# Patient Record
Sex: MALE | Race: BLACK OR AFRICAN AMERICAN | NOT HISPANIC OR LATINO | Employment: OTHER | URBAN - METROPOLITAN AREA
[De-identification: names, ages, dates, MRNs, and addresses within clinical notes are randomized per-mention and may not be internally consistent; named-entity substitution may affect disease eponyms.]

---

## 2017-01-17 DIAGNOSIS — N52.9 ERECTILE DYSFUNCTION, UNSPECIFIED ERECTILE DYSFUNCTION TYPE: ICD-10-CM

## 2017-01-17 DIAGNOSIS — I10 ESSENTIAL HYPERTENSION: ICD-10-CM

## 2017-01-17 DIAGNOSIS — G47.00 INSOMNIA, UNSPECIFIED TYPE: ICD-10-CM

## 2017-01-17 RX ORDER — SILDENAFIL CITRATE 100 MG/1
100 TABLET, FILM COATED ORAL
Qty: 8 TABLET | Refills: 6 | Status: SHIPPED | OUTPATIENT
Start: 2017-01-17 | End: 2018-01-26 | Stop reason: SDUPTHER

## 2017-01-17 RX ORDER — ZOLPIDEM TARTRATE 10 MG/1
10 TABLET ORAL NIGHTLY PRN
Qty: 30 TABLET | Refills: 5 | Status: SHIPPED | OUTPATIENT
Start: 2017-01-17 | End: 2017-08-31 | Stop reason: SDUPTHER

## 2017-01-17 RX ORDER — LOSARTAN POTASSIUM AND HYDROCHLOROTHIAZIDE 25; 100 MG/1; MG/1
1 TABLET ORAL DAILY
Qty: 90 TABLET | Refills: 3 | Status: SHIPPED | OUTPATIENT
Start: 2017-01-17 | End: 2017-04-27 | Stop reason: SDUPTHER

## 2017-01-17 NOTE — TELEPHONE ENCOUNTER
----- Message from Rebecca Velasquez sent at 1/17/2017  4:25 PM CST -----  Contact: Self/ [phone]   Type: Rx    Name of medication(s): losartan-hydrochlorothiazide 100-25 mg (HYZAAR) 100-25 mg per tablet, zolpidem (AMBIEN) 10 mg Tab and VIAGRA 100 mg tablet    Is this a refill? New rx? Refill     Who prescribed medication? Dr. Valle     Pharmacy Name, Phone, & Location: [facility]     Comments: pt is calling to have a refill on the medications above. Please call and advise     Thank you

## 2017-03-10 ENCOUNTER — TELEPHONE (OUTPATIENT)
Dept: INTERNAL MEDICINE | Facility: CLINIC | Age: 62
End: 2017-03-10

## 2017-03-10 NOTE — TELEPHONE ENCOUNTER
Please call pt. He is due to follow up with me. Missed appointment on 3/10. Need to reschedule for BP and DM follow up. Thanks.

## 2017-04-27 DIAGNOSIS — I10 ESSENTIAL HYPERTENSION: ICD-10-CM

## 2017-04-27 RX ORDER — LOSARTAN POTASSIUM AND HYDROCHLOROTHIAZIDE 25; 100 MG/1; MG/1
1 TABLET ORAL DAILY
Qty: 90 TABLET | Refills: 3 | Status: SHIPPED | OUTPATIENT
Start: 2017-04-27 | End: 2018-03-16 | Stop reason: SDUPTHER

## 2017-04-27 NOTE — TELEPHONE ENCOUNTER
----- Message from Blanca Hernandez sent at 4/27/2017  4:49 PM CDT -----  Contact: self/[phone]  Pt called in regards to getting a Rx refill for losartan-hydrochlorothiazide 100-25 mg (HYZAAR) 100-25 mg per tablet.      [facility]  Please advise

## 2017-05-19 DIAGNOSIS — E11.9 TYPE 2 DIABETES MELLITUS WITHOUT COMPLICATION: ICD-10-CM

## 2017-06-29 ENCOUNTER — OFFICE VISIT (OUTPATIENT)
Dept: INTERNAL MEDICINE | Facility: CLINIC | Age: 62
End: 2017-06-29
Payer: COMMERCIAL

## 2017-06-29 VITALS
BODY MASS INDEX: 38 KG/M2 | WEIGHT: 250.75 LBS | HEART RATE: 62 BPM | SYSTOLIC BLOOD PRESSURE: 134 MMHG | HEIGHT: 68 IN | DIASTOLIC BLOOD PRESSURE: 80 MMHG

## 2017-06-29 DIAGNOSIS — E11.9 DIABETES MELLITUS TYPE II, NON INSULIN DEPENDENT: ICD-10-CM

## 2017-06-29 DIAGNOSIS — Z00.00 HEALTH CARE MAINTENANCE: ICD-10-CM

## 2017-06-29 DIAGNOSIS — I10 ESSENTIAL HYPERTENSION, BENIGN: Primary | Chronic | ICD-10-CM

## 2017-06-29 DIAGNOSIS — Z23 NEED FOR STREPTOCOCCUS PNEUMONIAE VACCINATION: ICD-10-CM

## 2017-06-29 DIAGNOSIS — M79.671 PAIN IN BOTH FEET: ICD-10-CM

## 2017-06-29 DIAGNOSIS — M79.672 PAIN IN BOTH FEET: ICD-10-CM

## 2017-06-29 PROCEDURE — 90732 PPSV23 VACC 2 YRS+ SUBQ/IM: CPT | Mod: S$GLB,,, | Performed by: INTERNAL MEDICINE

## 2017-06-29 PROCEDURE — 4010F ACE/ARB THERAPY RXD/TAKEN: CPT | Mod: S$GLB,,, | Performed by: INTERNAL MEDICINE

## 2017-06-29 PROCEDURE — 90471 IMMUNIZATION ADMIN: CPT | Mod: S$GLB,,, | Performed by: INTERNAL MEDICINE

## 2017-06-29 PROCEDURE — 3044F HG A1C LEVEL LT 7.0%: CPT | Mod: S$GLB,,, | Performed by: INTERNAL MEDICINE

## 2017-06-29 PROCEDURE — 99214 OFFICE O/P EST MOD 30 MIN: CPT | Mod: 25,S$GLB,, | Performed by: INTERNAL MEDICINE

## 2017-06-29 PROCEDURE — 99999 PR PBB SHADOW E&M-EST. PATIENT-LVL III: CPT | Mod: PBBFAC,,, | Performed by: INTERNAL MEDICINE

## 2017-06-29 NOTE — PROGRESS NOTES
"Subjective:       Patient ID: Mirza Do is a 61 y.o. male.    Chief Complaint: Follow-up    HPI   60 yo M here for follow up of HTN, hx CVA, DM.     a1c in September 6.5%. LDL in March was 142. Due for recheck.     Losartan-hctz 100-25    Feels like he is walking on water for several months.     Feeling heart racing when worrying about bills, etc.     Wants to lose weight. Does some walking 3 days a week 3 miles.   Review of Systems   Constitutional: Negative for fever.   HENT: Negative.    Eyes: Negative.    Respiratory: Negative for shortness of breath.    Cardiovascular: Negative for chest pain and leg swelling.   Gastrointestinal: Negative for abdominal pain, diarrhea, nausea and vomiting.   Genitourinary: Negative.    Musculoskeletal: Negative for arthralgias.   Skin: Negative for rash.   Psychiatric/Behavioral: Negative.        Objective:   /80   Pulse 62   Ht 5' 8" (1.727 m)   Wt 113.7 kg (250 lb 12.4 oz)   BMI 38.13 kg/m²      Physical Exam   Constitutional: He is oriented to person, place, and time. He appears well-developed and well-nourished.   HENT:   Head: Normocephalic and atraumatic.   Eyes: Conjunctivae and EOM are normal. Pupils are equal, round, and reactive to light.   Neck: Neck supple. No thyromegaly present.   Cardiovascular: Normal rate, regular rhythm and normal heart sounds.    No murmur heard.  Pulmonary/Chest: Effort normal and breath sounds normal. No respiratory distress. He has no wheezes.   Abdominal: Soft. Bowel sounds are normal. He exhibits no distension. There is no tenderness.   Musculoskeletal: Normal range of motion.   Neurological: He is alert and oriented to person, place, and time.   Skin: Skin is warm and dry. No rash noted.   Psychiatric: He has a normal mood and affect. Judgment and thought content normal.   Vitals reviewed.      Protective Sensation (w/ 10 gram monofilament):  Right: Intact  Left: Intact    Visual Inspection:  Normal -  Bilateral    Pedal " Pulses:   Right: Diminshed  Left: Diminshed    Posterior tibialis:   Right:Diminshed  Left: Diminshed      Assessment:       1. Essential hypertension, benign    2. Diabetes mellitus type II, non insulin dependent    3. Health care maintenance    4. Pain in both feet        Plan:       Mirza was seen today for follow-up.    Diagnoses and all orders for this visit:    Essential hypertension, benign  At goal, continue current meds    Diabetes mellitus type II, non insulin dependent  -     Comprehensive metabolic panel; Future  -     Hemoglobin A1c; Future  -     Lipid panel; Future   Diet only    Health care maintenance  -     Case request GI: COLONOSCOPY    Discomfort in both feet with walking - question claudication. a1c has been under 7%, less suspicious for neuropathy  -     Cardiology Lab Ankle Brachial Indices W Stress; Future    Other orders  -     Pneumococcal Polysaccharide Vaccine (23 Valent) (SQ/IM)

## 2017-07-07 DIAGNOSIS — Z12.11 SPECIAL SCREENING FOR MALIGNANT NEOPLASMS, COLON: Primary | ICD-10-CM

## 2017-07-07 RX ORDER — SODIUM, POTASSIUM,MAG SULFATES 17.5-3.13G
SOLUTION, RECONSTITUTED, ORAL ORAL
Qty: 1 BOTTLE | Refills: 0 | Status: ON HOLD | OUTPATIENT
Start: 2017-07-07 | End: 2017-08-04

## 2017-08-04 ENCOUNTER — ANESTHESIA (OUTPATIENT)
Dept: ENDOSCOPY | Facility: HOSPITAL | Age: 62
End: 2017-08-04
Payer: COMMERCIAL

## 2017-08-04 ENCOUNTER — ANESTHESIA EVENT (OUTPATIENT)
Dept: ENDOSCOPY | Facility: HOSPITAL | Age: 62
End: 2017-08-04
Payer: COMMERCIAL

## 2017-08-04 ENCOUNTER — SURGERY (OUTPATIENT)
Age: 62
End: 2017-08-04

## 2017-08-04 VITALS — RESPIRATION RATE: 16 BRPM

## 2017-08-04 PROCEDURE — D9220A PRA ANESTHESIA: Mod: CRNA,,, | Performed by: NURSE ANESTHETIST, CERTIFIED REGISTERED

## 2017-08-04 PROCEDURE — 63600175 PHARM REV CODE 636 W HCPCS: Performed by: NURSE ANESTHETIST, CERTIFIED REGISTERED

## 2017-08-04 PROCEDURE — D9220A PRA ANESTHESIA: Mod: ANES,,, | Performed by: ANESTHESIOLOGY

## 2017-08-04 RX ORDER — PROPOFOL 10 MG/ML
VIAL (ML) INTRAVENOUS CONTINUOUS PRN
Status: DISCONTINUED | OUTPATIENT
Start: 2017-08-04 | End: 2017-08-04

## 2017-08-04 RX ORDER — PROPOFOL 10 MG/ML
VIAL (ML) INTRAVENOUS
Status: DISCONTINUED | OUTPATIENT
Start: 2017-08-04 | End: 2017-08-04

## 2017-08-04 RX ORDER — LIDOCAINE HCL/PF 100 MG/5ML
SYRINGE (ML) INTRAVENOUS
Status: DISCONTINUED | OUTPATIENT
Start: 2017-08-04 | End: 2017-08-04

## 2017-08-04 RX ADMIN — PROPOFOL 100 MG: 10 INJECTION, EMULSION INTRAVENOUS at 02:08

## 2017-08-04 RX ADMIN — PROPOFOL 150 MCG/KG/MIN: 10 INJECTION, EMULSION INTRAVENOUS at 02:08

## 2017-08-04 RX ADMIN — LIDOCAINE HYDROCHLORIDE 50 MG: 20 INJECTION, SOLUTION INTRAVENOUS at 02:08

## 2017-08-04 NOTE — ANESTHESIA RELEASE NOTE
Post Anesthetic Evaluation    Patient: Mirza Do    Procedure(s) Performed: Procedure(s) (LRB):  COLONOSCOPY (N/A)    Anesthesia type: GA    Patient location: PACU    Post pain: Adequate analgesia    Post assessment: no apparent anesthetic complications    Last Vitals:   Vitals:    08/04/17 1457   BP: 136/83   Pulse: 87   Resp: 16   Temp:        Post vital signs: stable    Level of consciousness: awake    Complications: none    Follow-up Needed: No

## 2017-08-04 NOTE — ANESTHESIA POSTPROCEDURE EVALUATION
"Anesthesia Post Evaluation    Patient: Mirza Do    Procedure(s) Performed: Procedure(s) (LRB):  COLONOSCOPY (N/A)    Final Anesthesia Type: general  Patient location during evaluation: PACU  Patient participation: Yes- Able to Participate  Level of consciousness: awake and alert  Post-procedure vital signs: reviewed and stable  Pain management: adequate  Airway patency: patent  PONV status at discharge: No PONV  Anesthetic complications: no      Cardiovascular status: blood pressure returned to baseline  Respiratory status: spontaneous ventilation and room air  Hydration status: euvolemic  Follow-up not needed.        Visit Vitals  /83   Pulse 87   Temp 37.2 °C (98.9 °F)   Resp 16   Ht 5' 8" (1.727 m)   Wt 118.8 kg (262 lb)   SpO2 97%   BMI 39.84 kg/m²       Pain/Chaim Score: Pain Assessment Performed: Yes (8/4/2017  1:26 PM)  Presence of Pain: denies (8/4/2017  2:41 PM)  Pain Rating Prior to Med Admin: 0 (8/4/2017  2:41 PM)  Pain Rating Post Med Admin: 0 (8/4/2017  2:41 PM)  Chaim Score: 10 (8/4/2017  2:41 PM)      "

## 2017-08-04 NOTE — TRANSFER OF CARE
"Anesthesia Transfer of Care Note    Patient: Mirza Do    Procedure(s) Performed: Procedure(s) (LRB):  COLONOSCOPY (N/A)    Patient location: PACU    Anesthesia Type: general    Transport from OR: Transported from OR on room air with adequate spontaneous ventilation    Post pain: adequate analgesia    Post assessment: no apparent anesthetic complications    Post vital signs: stable    Level of consciousness: sedated    Nausea/Vomiting: no nausea/vomiting    Complications: none    Transfer of care protocol was followed      Last vitals:   Visit Vitals  /76   Pulse 73   Temp 37.2 °C (98.9 °F)   Resp 16   Ht 5' 8" (1.727 m)   Wt 118.8 kg (262 lb)   SpO2 97%   BMI 39.84 kg/m²     "

## 2017-08-04 NOTE — ANESTHESIA PREPROCEDURE EVALUATION
08/04/2017  Mirza Do is a 61 y.o., male.    Anesthesia Evaluation    I have reviewed the Patient Summary Reports.    I have reviewed the Nursing Notes.      Review of Systems  Anesthesia Hx:  No problems with previous Anesthesia    Hematology/Oncology:  Hematology Normal   Oncology Normal     EENT/Dental:EENT/Dental Normal   Cardiovascular:   Hypertension hyperlipidemia    Pulmonary:  Pulmonary Normal    Renal/:  Renal/ Normal     Hepatic/GI:  Hepatic/GI Normal    Musculoskeletal:  Musculoskeletal Normal    Neurological:   CVA    Endocrine:   Diabetes    Dermatological:  Skin Normal    Psych:  Psychiatric Normal           Physical Exam  General:  Obesity    Airway/Jaw/Neck:  Airway Findings: Mouth Opening: Normal Tongue: Normal  General Airway Assessment: Adult  Mallampati: II  TM Distance: Normal, at least 6 cm        Eyes/Ears/Nose:  EYES/EARS/NOSE FINDINGS: Normal   Dental:  Dental Findings: lower partial dentures, upper partial dentures   Chest/Lungs:  Chest/Lungs Clear    Heart/Vascular:  Heart Findings: Normal Heart murmur: negative Vascular Findings: Normal    Abdomen:  Abdomen Findings: Normal    Musculoskeletal:  Musculoskeletal Findings: Normal   Skin:  Skin Findings: Normal    Mental Status:  Mental Status Findings: Normal        Anesthesia Plan  Type of Anesthesia, risks & benefits discussed:  Anesthesia Type:  general  Patient's Preference:   Intra-op Monitoring Plan:   Intra-op Monitoring Plan Comments:   Post Op Pain Control Plan:   Post Op Pain Control Plan Comments:   Induction:   IV  Beta Blocker:  Patient is not currently on a Beta-Blocker (No further documentation required).       Informed Consent: Patient understands risks and agrees with Anesthesia plan.  Questions answered. Anesthesia consent signed with patient.  ASA Score: 3     Day of Surgery Review of History & Physical:     H&P update referred to the surgeon.         Ready For Surgery From Anesthesia Perspective.

## 2017-08-11 ENCOUNTER — TELEPHONE (OUTPATIENT)
Dept: ENDOSCOPY | Facility: HOSPITAL | Age: 62
End: 2017-08-11

## 2017-08-31 DIAGNOSIS — G47.00 INSOMNIA, UNSPECIFIED TYPE: ICD-10-CM

## 2017-08-31 RX ORDER — ZOLPIDEM TARTRATE 10 MG/1
TABLET ORAL
Qty: 30 TABLET | Refills: 5 | Status: SHIPPED | OUTPATIENT
Start: 2017-08-31 | End: 2018-03-16 | Stop reason: SDUPTHER

## 2017-10-06 ENCOUNTER — HOSPITAL ENCOUNTER (EMERGENCY)
Facility: HOSPITAL | Age: 62
Discharge: HOME | End: 2017-10-06
Attending: EMERGENCY MEDICINE
Payer: COMMERCIAL

## 2017-10-06 VITALS
OXYGEN SATURATION: 95 % | HEIGHT: 68 IN | WEIGHT: 240 LBS | BODY MASS INDEX: 36.37 KG/M2 | RESPIRATION RATE: 18 BRPM | SYSTOLIC BLOOD PRESSURE: 141 MMHG | HEART RATE: 84 BPM | TEMPERATURE: 99 F | DIASTOLIC BLOOD PRESSURE: 96 MMHG

## 2017-10-06 DIAGNOSIS — S05.01XA ABRASION OF RIGHT CORNEA, INITIAL ENCOUNTER: Primary | ICD-10-CM

## 2017-10-06 PROCEDURE — 25000003 PHARM REV CODE 250: Performed by: NURSE PRACTITIONER

## 2017-10-06 PROCEDURE — 99283 EMERGENCY DEPT VISIT LOW MDM: CPT

## 2017-10-06 RX ORDER — PROPARACAINE HYDROCHLORIDE 5 MG/ML
2 SOLUTION/ DROPS OPHTHALMIC
Status: COMPLETED | OUTPATIENT
Start: 2017-10-06 | End: 2017-10-06

## 2017-10-06 RX ORDER — HYDROCODONE BITARTRATE AND ACETAMINOPHEN 5; 325 MG/1; MG/1
1 TABLET ORAL EVERY 6 HOURS PRN
Qty: 12 TABLET | Refills: 0 | Status: SHIPPED | OUTPATIENT
Start: 2017-10-06 | End: 2018-03-16

## 2017-10-06 RX ADMIN — FLUORESCEIN SODIUM 1 STRIP: 1 STRIP OPHTHALMIC at 06:10

## 2017-10-06 RX ADMIN — PROPARACAINE HYDROCHLORIDE 2 DROP: 5 SOLUTION/ DROPS OPHTHALMIC at 06:10

## 2017-10-06 NOTE — ED PROVIDER NOTES
Encounter Date: 10/6/2017    SCRIBE #1 NOTE: I, Sultanashanel Hernandez, am scribing for, and in the presence of,  Bobby Hui NP. I have scribed the following portions of the note - Other sections scribed: HPI and ROS.       History     Chief Complaint   Patient presents with    Eye Injury     pt c/o (R) eye pain after accidentally getting hit by a tree branch at 1:00 pm today     CC: Eye Injury    HPI: This 62 y.o. male with medical history of hypertension, CVA, diabetes mellitus type II and hyperlipidemia presents to the ED c/o an injury to the right eye. Pt reports that a tree branch snapped and hit him in the right eye at about 1:00 pm today. He states that he is presently experiencing pain to the right eye as well as pain surrounding the right side of the eye. Symptoms are acute, constant and severe (10/10). Pt reports that he was evaluated at an Urgent Care facility today for the same symptoms and was diagnosed with a corneal abrasion. He adds that he was prescribed antibiotics and was advised to come in to the ED. Pt denies use of contacts and history of eye issues. No other associated symptoms.           The history is provided by the patient. No  was used.     Review of patient's allergies indicates:   Allergen Reactions    No known allergies      Past Medical History:   Diagnosis Date    CVA (cerebral infarction)     CVA approx 2011/2012    Diabetes mellitus type II, non insulin dependent 10/14/2016    Erectile dysfunction     Erectile Dysfunction    Hyperlipidemia 10/14/2016    Hypertension     Hypertension     Past Surgical History:   Procedure Laterality Date    BACK SURGERY      discectomy    COLONOSCOPY N/A 8/4/2017    Procedure: COLONOSCOPY;  Surgeon: Mario Alberto Vega MD;  Location: [address]);  Service: Endoscopy;  Laterality: N/A;  Conflict with [facility]'s schedule no other times available with CRS, Pt requested 8/4/17 - moved to be scoped with GI MD - ER    HERNIA  REPAIR      ROOT CANAL      UMBILICAL HERNIA REPAIR       Family History   Problem Relation Age of Onset    Cancer Father     Hypertension Brother     Cancer Brother     No Known Problems Sister     No Known Problems Sister     No Known Problems Sister      Social History   Substance Use Topics    Smoking status: Former Smoker     Packs/day: 0.10     Years: 4.00    Smokeless tobacco: Never Used      Comment: social smoking as teenager    Alcohol use 6.0 oz/week     10 Cans of beer per week      Comment: occasionally     Review of Systems   Constitutional: Negative for fever.   Eyes: Positive for pain (right), redness and visual disturbance (Right Eye Decreased). Negative for photophobia, discharge and itching.   Respiratory: Negative for cough and shortness of breath.    Cardiovascular: Negative for chest pain and leg swelling.   Gastrointestinal: Negative for abdominal pain, nausea and vomiting.   Musculoskeletal: Negative for neck pain and neck stiffness.   Skin: Negative for rash and wound.   Neurological: Positive for headaches (to the right side of the face/head ). Negative for dizziness, speech difficulty and weakness.        (-) LOC   Psychiatric/Behavioral: Negative for confusion.       Physical Exam     Initial Vitals [10/06/17 1728]   BP Pulse Resp Temp SpO2   (!) 164/94 82 18 99.1 °F (37.3 °C) --      MAP       117.33         Physical Exam    Nursing note and vitals reviewed.  Constitutional: He appears well-developed and well-nourished. He is not diaphoretic. He is cooperative.  Non-toxic appearance. He does not have a sickly appearance. No distress.   HENT:   Head: Normocephalic and atraumatic. Head is without raccoon's eyes, without Estrada's sign and without contusion.   Right Ear: Tympanic membrane and external ear normal. Tympanic membrane is not erythematous. No hemotympanum.   Left Ear: Tympanic membrane and external ear normal. Tympanic membrane is not erythematous. No hemotympanum.    Mouth/Throat: Uvula is midline, oropharynx is clear and moist and mucous membranes are normal. No trismus in the jaw.   Eyes: EOM are normal. Pupils are equal, round, and reactive to light. Right conjunctiva is injected. Right conjunctiva has no hemorrhage. Left conjunctiva is not injected. Left conjunctiva has no hemorrhage.   Fundoscopic exam:       The right eye shows no hemorrhage and no papilledema.        The left eye shows no hemorrhage and no papilledema.   Slit lamp exam:       The right eye shows corneal abrasion and fluorescein uptake. The right eye shows no corneal flare, no foreign body and no hyphema.        The left eye shows no corneal abrasion, no corneal flare, no hyphema and no fluorescein uptake.   Eye History: he does not wear contact lenses and does not wear glasses.  Visual Acuity: OD: 20/40; OS: 20/20; OU: 20/20. Physical Exam:  OD: Pupil equal, round, and reactive to light with EOM's intact in all directions. There is no photophobia with direct light. There was fluroescein uptake on Wood's Lamp exam with a Negative Shruthi's sign. There is a corneal abrasion. There are no dendritic lesion, hyphema, or subconjunctival hemorrhage. There is no foreign body identified on the eye itself or under the lids with double lid eversion. Fundoscopic Exam with no papilledema appreciated. Slit Lamp Exam completed with no abnormal findings identified.     OS: Pupil equal, round, and reactive to light with EOM's intact in all directions. There is no photophobia with direct light. There was no fluroescein uptake on Wood's Lamp exam and a Negative Shruthi's sign. There is no corneal abrasion, conjunctival abrasion, dendritic lesion, hyphema, or subconjunctival hemorrhage. There is no foreign body identified on the eye. Fundoscopic Exam with no papilledema appreciated. Slit Lamp Exam completed with no abnormal findings identified.      Neck: Full passive range of motion without pain. Normal range of motion  present.   Pulmonary/Chest: No tachypnea and no bradypnea. No respiratory distress.   Musculoskeletal: Normal range of motion.   Neurological: He is alert and oriented to person, place, and time. He has normal strength. No sensory deficit. Coordination and gait normal. GCS eye subscore is 4. GCS verbal subscore is 5. GCS motor subscore is 6.   Skin: Skin is warm and dry. Capillary refill takes less than 2 seconds. No bruising and no rash noted. No erythema.   Psychiatric: He has a normal mood and affect. His behavior is normal. Judgment and thought content normal.         ED Course   Procedures  Labs Reviewed - No data to display          Medical Decision Making:   History:   Old Medical Records: I decided to obtain old medical records.  Old Records Summarized: other records.       <> Summary of Records: Patient was seen at the urgent care clinic on [facility].  Spoke with Kimberly at the clinic.  There they diagnosed him with a large corneal abrasion.  Discharged him with antibiotic ointment.  No treatment for pain.       APC / Resident Notes:   This is an evaluation of a 62 y.o. male that presents to the Emergency Department for right eye pain following an injury from a tree branch earlier today.  The patient was seen at an outside facility given antibiotic eye ointment and advised to come to the emergency Department for new or worsening pain.  The patient reports that the provider at the urgent care and advised him to come to the emergency department.  He reports they did not give any medication for pain. Physical Exam shows a non-toxic, afebrile, and well appearing male.  There is a corneal abrasion noted in the right eye through visual axis.  Extra ocular movements are intact.  There is no evidence of open globe injury.  There is no tenderness palpation surrounding the orbit of the right eye.  There is no surrounding erythema or increased warmth.  No abnormalities noted in the anterior chamber of the eye.   No tenderness palpation over the skull or maxillofacial right face.  Vital Signs Are Reassuring. If available, previous records reviewed.     My overall impression is corneal abrasion of right eye. I considered, but at this time, do not suspect orbital fracture, orbital or periorbital cellulitis, open globe injury, retained ocular foreign body, skull fracture.    D/C Meds: Norco. Additional D/C Information: Continue antibiotic ointment as previously prescribed. The diagnosis, treatment plan, instructions for follow-up and reevaluation with her eye doctor or ophthalmology as well as ED return precautions were discussed and understanding was verbalized. All questions or concerns have been addressed. This case was discussed with Dr. Cee who is in agreement with my assessment and plan. EUNICE Keith, MEME-ROSA        Scribe Attestation:   Scribe #1: I performed the above scribed service and the documentation accurately describes the services I performed. I attest to the accuracy of the note.    Attending Attestation:     Physician Attestation Statement for NP/PA:   I discussed this assessment and plan of this patient with the NP/PA, but I did not personally examine the patient. The face to face encounter was performed by the NP/PA.        Physician Attestation for Scribe:  Physician Attestation Statement for Scribe #1: I, Bobby Hui NP, reviewed documentation, as scribed by Sultana Hernandez in my presence, and it is both accurate and complete.                 ED Course      Clinical Impression:   The encounter diagnosis was Abrasion of right cornea, initial encounter.    Disposition:   Disposition: Discharged  Condition: Stable                        MEME Luna  10/06/17 2013       Adrien Cee MD  10/07/17 0892

## 2017-10-07 NOTE — DISCHARGE INSTRUCTIONS
Please return to the Emergency Department for any new or worsening symptoms including: worsening pain, vision changes, fever, chest pain, shortness of breath, loss of consciousness, dizziness, weakness, or any other concerns.     Please follow up with an Eye Doctor within in the week. If you do not have one, you may contact the one listed on your discharge paperwork or you may also call the [facility] Appointment Desk at [phone] to schedule an appointment with one.     Please use your antibiotic eye ointment as directed by your previous visit.     Please take all medication as prescribed. You have been prescribed Norco for pain. Please do not take this medication while working, drinking alcohol, swimming, or while driving/operating heavy machinery. This medication may cause drowsiness, impair judgment, and reduce physical capabilities. This medication contains Tylenol. Please do not take any additional Tylenol while you are taking this medication.

## 2018-01-15 ENCOUNTER — HOSPITAL ENCOUNTER (EMERGENCY)
Facility: HOSPITAL | Age: 63
Discharge: HOME | End: 2018-01-15
Attending: EMERGENCY MEDICINE
Payer: COMMERCIAL

## 2018-01-15 VITALS
DIASTOLIC BLOOD PRESSURE: 88 MMHG | HEIGHT: 68 IN | BODY MASS INDEX: 39.4 KG/M2 | TEMPERATURE: 98 F | WEIGHT: 260 LBS | HEART RATE: 60 BPM | RESPIRATION RATE: 17 BRPM | OXYGEN SATURATION: 98 % | SYSTOLIC BLOOD PRESSURE: 180 MMHG

## 2018-01-15 DIAGNOSIS — R05.9 COUGH: Primary | ICD-10-CM

## 2018-01-15 DIAGNOSIS — Z87.09 HISTORY OF INFLUENZA: ICD-10-CM

## 2018-01-15 PROCEDURE — 94640 AIRWAY INHALATION TREATMENT: CPT

## 2018-01-15 PROCEDURE — 25000242 PHARM REV CODE 250 ALT 637 W/ HCPCS: Performed by: PHYSICIAN ASSISTANT

## 2018-01-15 PROCEDURE — 99284 EMERGENCY DEPT VISIT MOD MDM: CPT

## 2018-01-15 PROCEDURE — 25000003 PHARM REV CODE 250: Performed by: PHYSICIAN ASSISTANT

## 2018-01-15 PROCEDURE — 94761 N-INVAS EAR/PLS OXIMETRY MLT: CPT

## 2018-01-15 RX ORDER — PROMETHAZINE HYDROCHLORIDE AND CODEINE PHOSPHATE 6.25; 1 MG/5ML; MG/5ML
5 SOLUTION ORAL EVERY 4 HOURS PRN
Qty: 118 ML | Refills: 0 | Status: SHIPPED | OUTPATIENT
Start: 2018-01-15 | End: 2018-01-25

## 2018-01-15 RX ORDER — BENZONATATE 100 MG/1
200 CAPSULE ORAL ONCE
Status: COMPLETED | OUTPATIENT
Start: 2018-01-15 | End: 2018-01-15

## 2018-01-15 RX ORDER — IPRATROPIUM BROMIDE AND ALBUTEROL SULFATE 2.5; .5 MG/3ML; MG/3ML
3 SOLUTION RESPIRATORY (INHALATION)
Status: COMPLETED | OUTPATIENT
Start: 2018-01-15 | End: 2018-01-15

## 2018-01-15 RX ORDER — OSELTAMIVIR PHOSPHATE 75 MG/1
75 CAPSULE ORAL
COMMUNITY
End: 2018-03-16

## 2018-01-15 RX ORDER — AZITHROMYCIN 250 MG/1
500 TABLET, FILM COATED ORAL DAILY
COMMUNITY
End: 2018-03-16

## 2018-01-15 RX ORDER — AZELASTINE 1 MG/ML
1 SPRAY, METERED NASAL 2 TIMES DAILY
COMMUNITY
End: 2018-03-16

## 2018-01-15 RX ORDER — ALBUTEROL SULFATE 90 UG/1
1-2 AEROSOL, METERED RESPIRATORY (INHALATION) EVERY 6 HOURS PRN
Qty: 1 INHALER | Refills: 0 | Status: SHIPPED | OUTPATIENT
Start: 2018-01-15 | End: 2018-11-13 | Stop reason: ALTCHOICE

## 2018-01-15 RX ADMIN — BENZONATATE 200 MG: 100 CAPSULE ORAL at 09:01

## 2018-01-15 RX ADMIN — IPRATROPIUM BROMIDE AND ALBUTEROL SULFATE 3 ML: .5; 3 SOLUTION RESPIRATORY (INHALATION) at 09:01

## 2018-01-15 NOTE — ED TRIAGE NOTES
"Patient came in PER personal transport with c/o cough, worse at night, CP from cough, and headache, chills. Patient was diagnosed with the flu last Wednesday. Patient states that he has been able to eat and drink fine but the cough "is really bad".   "

## 2018-01-15 NOTE — ED PROVIDER NOTES
Encounter Date: 1/15/2018    SCRIBE #1 NOTE: I, Lubna Vigil, am scribing for, and in the presence of, Ananda Boss PA-C. Other sections scribed: HPI/ROS.       History     Chief Complaint   Patient presents with    Cough     Went to urgent care last week and was dx with flu. Reports productive cough with white sputum and chest pain with cough    Fever    Chills     CC: Cough    Pt is a 62 y.o. male w/ HTN, NIDDM type 2, HLD, and hx of CVA, back surgery, and umbilical hernia repair presenting to the ED c/o productive cough x 7 days. Pt states he was dx with flu at an urgent care last week and was given azithromycin, Tamiflu, and Cheratussin. Pt reports compliance w/ abx and Tamiflu and endorses alleviation of all flu sx except for cough. Pt reports no relief with Cheratussin or Tylenol Cold + Flu. Pt reports cough is interfering severely with sleep. Pt also c/o CP exclusively with cough, as well as subjective fever, chills, and minor diarrhea. Pt reports SOB last week but none currently.     Pt denies N/V, sore throat, appetite changes, HA, arthralgias or myalgias, or hx of DM. Pt reports no further symptoms.        The history is provided by the patient.     Review of patient's allergies indicates:   Allergen Reactions    No known allergies      Past Medical History:   Diagnosis Date    CVA (cerebral infarction)     CVA approx 2011/2012    Diabetes mellitus type II, non insulin dependent 10/14/2016    Erectile dysfunction     Erectile Dysfunction    Hyperlipidemia 10/14/2016    Hypertension     Hypertension     Past Surgical History:   Procedure Laterality Date    BACK SURGERY      discectomy    COLONOSCOPY N/A 8/4/2017    Procedure: COLONOSCOPY;  Surgeon: Mario Alberto Vega MD;  Location: [facility] ([address] [place] [address]);  Service: Endoscopy;  Laterality: N/A;  Conflict with [facility]'s schedule no other times available with CRS, Pt requested 8/4/17 - moved to be scoped with GI MD - ER    HERNIA REPAIR      ROOT CANAL       UMBILICAL HERNIA REPAIR       Family History   Problem Relation Age of Onset    Cancer Father     Hypertension Brother     Cancer Brother     No Known Problems Sister     No Known Problems Sister     No Known Problems Sister      Social History   Substance Use Topics    Smoking status: Former Smoker     Packs/day: 0.10     Years: 4.00    Smokeless tobacco: Never Used      Comment: social smoking as teenager    Alcohol use 6.0 oz/week     10 Cans of beer per week      Comment: occasionally     Review of Systems   Constitutional: Positive for chills and fever. Negative for appetite change.   HENT: Negative for ear pain and sore throat.    Eyes: Negative for pain.   Respiratory: Positive for cough.    Cardiovascular: Positive for chest pain (+) exclusive with cough.   Gastrointestinal: Positive for diarrhea. Negative for constipation, nausea and vomiting.   Genitourinary: Negative for dysuria and hematuria.   Musculoskeletal: Negative for arthralgias and myalgias.   Skin: Negative for wound.   Neurological: Negative for headaches.       Physical Exam     Initial Vitals   BP Pulse Resp Temp SpO2   01/15/18 0852 01/15/18 0852 01/15/18 0930 01/15/18 0852 01/15/18 0852   (!) 185/90 62 18 98 °F (36.7 °C) 97 %      MAP       01/15/18 0852       121.67         Physical Exam    Nursing note and vitals reviewed.  Constitutional: He appears well-developed and well-nourished. He is not diaphoretic. No distress.   HENT:   Head: Normocephalic and atraumatic.   Right Ear: Tympanic membrane normal.   Left Ear: Tympanic membrane normal.   Nose: Nose normal. No rhinorrhea. Right sinus exhibits no maxillary sinus tenderness and no frontal sinus tenderness. Left sinus exhibits no maxillary sinus tenderness and no frontal sinus tenderness.   Mouth/Throat: Uvula is midline and oropharynx is clear and moist. No oropharyngeal exudate, posterior oropharyngeal edema, posterior oropharyngeal erythema or tonsillar abscesses.    Eyes: Conjunctivae and EOM are normal. Right eye exhibits no discharge. Left eye exhibits no discharge.   Neck: Normal range of motion. No tracheal deviation present. No JVD present.   Cardiovascular: Normal rate, regular rhythm and normal heart sounds. Exam reveals no friction rub.    No murmur heard.  Pulmonary/Chest: Breath sounds normal. No stridor. No respiratory distress. He has no wheezes. He has no rhonchi. He has no rales. He exhibits no tenderness.   Musculoskeletal: Normal range of motion.   Lymphadenopathy:     He has no cervical adenopathy.   Neurological: He is alert and oriented to person, place, and time.   Skin: Skin is warm and dry. No rash and no abscess noted. No erythema. No pallor.         ED Course   Procedures  Labs Reviewed - No data to display       X-Rays:   Independently Interpreted Readings:   Chest X-Ray: No PNA     Medical Decision Making:   History:   Old Medical Records: I decided to obtain old medical records.  Initial Assessment:   61 yo M with persistent cough following diagnosis of flu last week. Taking both Tamiflu and Azithromycin. Aside from cough, all other symptoms overall improving.   ED Management:  Cough likely lingering from viral process. No PNA on CXR. No wheezing to suggest acute asthma exacerbation, however, Duoneb in ED improves symptoms. Does not endorse acid reflux symptoms. Not on ACE-inhibitor.     Sent home with cough medication and albuterol. Patient agreeable to plan.   Other:   I have discussed this case with another health care provider.       <> Summary of the Discussion: Case discussed with Dr. Jordan who is in agreement with my assessment and plan.             Scribe Attestation:   Scribe #1: I performed the above scribed service and the documentation accurately describes the services I performed. I attest to the accuracy of the note.    Attending Attestation:           Physician Attestation for Scribe:  Physician Attestation Statement for Scribe #1: I,  Ananda Boss PA-C, reviewed documentation, as scribed by Lubna Vigil in my presence, and it is both accurate and complete.                 ED Course      Clinical Impression:   The primary encounter diagnosis was Cough. A diagnosis of History of influenza was also pertinent to this visit.    Disposition:   Disposition: Discharged  Condition: Stable                        Ananda Boss PA-C  01/15/18 1112

## 2018-01-26 DIAGNOSIS — N52.9 ERECTILE DYSFUNCTION, UNSPECIFIED ERECTILE DYSFUNCTION TYPE: ICD-10-CM

## 2018-01-26 RX ORDER — SILDENAFIL CITRATE 100 MG/1
TABLET, FILM COATED ORAL
Qty: 8 TABLET | Refills: 0 | Status: SHIPPED | OUTPATIENT
Start: 2018-01-26 | End: 2018-02-19 | Stop reason: SDUPTHER

## 2018-01-26 NOTE — TELEPHONE ENCOUNTER
----- Message from Liborio Rowley sent at 1/26/2018 11:25 AM CST -----  Contact: [phone]  Type: Rx    Name of medication(s): VIAGRA 100 mg tablet    Is this a refill? New rx? Refill     Who prescribed medication?    Pharmacy Name, Phone, & Location: [facility] [phone] (    Comments: please advise Thanks

## 2018-02-19 DIAGNOSIS — N52.9 ERECTILE DYSFUNCTION, UNSPECIFIED ERECTILE DYSFUNCTION TYPE: ICD-10-CM

## 2018-02-19 RX ORDER — SILDENAFIL CITRATE 100 MG/1
TABLET, FILM COATED ORAL
Qty: 8 TABLET | Refills: 0 | Status: SHIPPED | OUTPATIENT
Start: 2018-02-19 | End: 2018-03-16 | Stop reason: SDUPTHER

## 2018-02-19 NOTE — TELEPHONE ENCOUNTER
"----- Message from Denise Brownlee sent at 2/19/2018  3:38 PM CST -----  Contact: Self/[phone]  RX request - refill or new RX.  Is this a refill or new RX:  refill  RX name and strength: VIAGRA 100 mg tablet   Directions: TAKE 1 TABLET BY MOUTH AS NEEDED FOR ERECTILE DYSFUNCTION  Is this a 30 day or 90 day RX:     Pharmacy name and phone # (DON'T enter "on file" or "in chart"): [facility] [phone] (Phone) or [phone] (Fax  Comments:        "

## 2018-03-16 ENCOUNTER — OFFICE VISIT (OUTPATIENT)
Dept: INTERNAL MEDICINE | Facility: CLINIC | Age: 63
End: 2018-03-16
Payer: COMMERCIAL

## 2018-03-16 VITALS
HEIGHT: 68 IN | DIASTOLIC BLOOD PRESSURE: 80 MMHG | OXYGEN SATURATION: 95 % | SYSTOLIC BLOOD PRESSURE: 115 MMHG | WEIGHT: 258 LBS | HEART RATE: 78 BPM | BODY MASS INDEX: 39.1 KG/M2

## 2018-03-16 DIAGNOSIS — Z00.00 HEALTH CARE MAINTENANCE: Primary | ICD-10-CM

## 2018-03-16 DIAGNOSIS — N52.9 ERECTILE DYSFUNCTION, UNSPECIFIED ERECTILE DYSFUNCTION TYPE: ICD-10-CM

## 2018-03-16 DIAGNOSIS — G47.00 INSOMNIA, UNSPECIFIED TYPE: ICD-10-CM

## 2018-03-16 DIAGNOSIS — Z86.73 HISTORY OF CVA (CEREBROVASCULAR ACCIDENT): ICD-10-CM

## 2018-03-16 DIAGNOSIS — I10 ESSENTIAL HYPERTENSION, BENIGN: Chronic | ICD-10-CM

## 2018-03-16 DIAGNOSIS — E11.9 DIABETES MELLITUS TYPE II, NON INSULIN DEPENDENT: ICD-10-CM

## 2018-03-16 DIAGNOSIS — E78.5 HYPERLIPIDEMIA, UNSPECIFIED HYPERLIPIDEMIA TYPE: ICD-10-CM

## 2018-03-16 DIAGNOSIS — Z12.5 SPECIAL SCREENING FOR MALIGNANT NEOPLASM OF PROSTATE: ICD-10-CM

## 2018-03-16 DIAGNOSIS — I10 ESSENTIAL HYPERTENSION: ICD-10-CM

## 2018-03-16 DIAGNOSIS — M25.462 SWELLING OF JOINT OF LEFT KNEE: ICD-10-CM

## 2018-03-16 DIAGNOSIS — M25.562 POSTERIOR LEFT KNEE PAIN: ICD-10-CM

## 2018-03-16 LAB
BILIRUB UR QL STRIP: NEGATIVE
CLARITY UR REFRACT.AUTO: CLEAR
COLOR UR AUTO: YELLOW
CREAT UR-MCNC: 220 MG/DL
GLUCOSE UR QL STRIP: NEGATIVE
HGB UR QL STRIP: ABNORMAL
KETONES UR QL STRIP: NEGATIVE
LEUKOCYTE ESTERASE UR QL STRIP: NEGATIVE
MICROALBUMIN UR DL<=1MG/L-MCNC: 8 UG/ML
MICROALBUMIN/CREATININE RATIO: 3.6 UG/MG
MICROSCOPIC COMMENT: NORMAL
NITRITE UR QL STRIP: NEGATIVE
PH UR STRIP: 5 [PH] (ref 5–8)
PROT UR QL STRIP: NEGATIVE
RBC #/AREA URNS AUTO: 3 /HPF (ref 0–4)
SP GR UR STRIP: 1.03 (ref 1–1.03)
SQUAMOUS #/AREA URNS AUTO: 1 /HPF
URN SPEC COLLECT METH UR: ABNORMAL
UROBILINOGEN UR STRIP-ACNC: NEGATIVE EU/DL
WBC #/AREA URNS AUTO: 0 /HPF (ref 0–5)

## 2018-03-16 PROCEDURE — 99999 PR PBB SHADOW E&M-EST. PATIENT-LVL IV: CPT | Mod: PBBFAC,,, | Performed by: INTERNAL MEDICINE

## 2018-03-16 PROCEDURE — 99396 PREV VISIT EST AGE 40-64: CPT | Mod: S$GLB,,, | Performed by: INTERNAL MEDICINE

## 2018-03-16 PROCEDURE — 81001 URINALYSIS AUTO W/SCOPE: CPT

## 2018-03-16 PROCEDURE — 82043 UR ALBUMIN QUANTITATIVE: CPT

## 2018-03-16 RX ORDER — LOSARTAN POTASSIUM AND HYDROCHLOROTHIAZIDE 25; 100 MG/1; MG/1
1 TABLET ORAL DAILY
Qty: 90 TABLET | Refills: 3 | Status: SHIPPED | OUTPATIENT
Start: 2018-03-16 | End: 2018-09-25 | Stop reason: SDUPTHER

## 2018-03-16 RX ORDER — SILDENAFIL CITRATE 100 MG/1
TABLET, FILM COATED ORAL
Qty: 8 TABLET | Refills: 3 | Status: SHIPPED | OUTPATIENT
Start: 2018-03-16 | End: 2018-07-05 | Stop reason: SDUPTHER

## 2018-03-16 RX ORDER — ATORVASTATIN CALCIUM 20 MG/1
20 TABLET, FILM COATED ORAL DAILY
Qty: 90 TABLET | Refills: 3 | Status: SHIPPED | OUTPATIENT
Start: 2018-03-16 | End: 2019-02-21 | Stop reason: SDUPTHER

## 2018-03-16 RX ORDER — ZOLPIDEM TARTRATE 10 MG/1
10 TABLET ORAL NIGHTLY PRN
Qty: 30 TABLET | Refills: 5 | Status: SHIPPED | OUTPATIENT
Start: 2018-03-16 | End: 2018-09-25 | Stop reason: SDUPTHER

## 2018-03-16 NOTE — PROGRESS NOTES
"Subjective:       Patient ID: Mirza Do is a 62 y.o. male.    Chief Complaint: Annual Exam (yearly physical.); Numbness (pt complains of numbness and tingling sensation in the L leg. ); and Joint Swelling (pt complains of having L knee pain/swelling, hard to elevate or bend.)    HPI   Mirza Do is a 62 y.o. male here for a yearly preventative healthcare visit.     DM 2  Not on meds, last a1c 6%    Hx CVA approx 2012  On plavix for secondary prevention    HTN  Losartan -hctz 100-25    Left outer thigh gets numb when standing for long periods. No back or foot pain.   Back of left knee also bothers him. 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  Swelling left knee.     He was sick with flu and had to go to ED.   Had some wheezing  Review of Systems   Constitutional: Negative for fever.   HENT: Negative.    Eyes: Negative.    Respiratory: Negative for shortness of breath.    Cardiovascular: Negative for chest pain and leg swelling.   Gastrointestinal: Negative for abdominal pain, diarrhea, nausea and vomiting.   Genitourinary: Negative.    Musculoskeletal: Negative for arthralgias.        Left knee pain posterior, left outer thigh discomfort with standing   Skin: Negative for rash.   Psychiatric/Behavioral: Negative.        Objective:   /80 (BP Location: Left arm, Patient Position: Sitting, BP Method: Large (Manual))   Pulse 78   Ht 5' 8" (1.727 m)   Wt 117 kg (258 lb)   SpO2 95%   BMI 39.23 kg/m²      Physical Exam   Constitutional: He is oriented to person, place, and time. He appears well-developed and well-nourished. No distress.   HENT:   Head: Normocephalic and atraumatic.   Cardiovascular: Normal rate and regular rhythm.    No murmur heard.  Pulmonary/Chest: Effort normal. No respiratory distress. He has no wheezes. He has no rales.   Neurological: He is alert and oriented to person, place, and time.   Skin: Skin is warm and dry. He is not diaphoretic.   Psychiatric: He has a normal mood " and affect. His behavior is normal.       Assessment:       1. Health care maintenance    2. Diabetes mellitus type II, non insulin dependent    3. Essential hypertension, benign    4. History of CVA (cerebrovascular accident)    5. Hyperlipidemia, unspecified hyperlipidemia type    6. Posterior left knee pain    7. Swelling of joint of left knee    8. Essential hypertension    9. Erectile dysfunction, unspecified erectile dysfunction type    10. Insomnia, unspecified type    11. Special screening for malignant neoplasm of prostate        Plan:       Mirza was seen today for annual exam, numbness and joint swelling.    Diagnoses and all orders for this visit:    Health care maintenance  -     CBC auto differential; Future  -     Comprehensive metabolic panel; Future  -     Hemoglobin A1c; Future  -     Lipid panel; Future  -     TSH; Future  -     Urinalysis    Diabetes mellitus type II, non insulin dependent  -     Hemoglobin A1c; Future  -     Ambulatory referral to Optometry  -     Microalbumin/creatinine urine ratio  -     atorvastatin (LIPITOR) 20 MG tablet; Take 1 tablet (20 mg total) by mouth once daily.   If a1c uptrends will start metformin - discussed with patient and wife.   His wife was unaware of his diagnosis of diabetes (made at our visit in 2016) - discussed this diagnosis with her.     Essential hypertension, benign  At goal, continue current medicine    History of CVA (cerebrovascular accident)    Hyperlipidemia, unspecified hyperlipidemia type  -     atorvastatin (LIPITOR) 20 MG tablet; Take 1 tablet (20 mg total) by mouth once daily.    Posterior left knee pain  -     X-ray Knee Ortho Bilateral; Future  -     Ambulatory referral to Orthopedics    Swelling of joint of left knee  -     X-ray Knee Ortho Bilateral; Future  -     Ambulatory referral to Orthopedics    Essential hypertension  -     losartan-hydrochlorothiazide 100-25 mg (HYZAAR) 100-25 mg per tablet; Take 1 tablet by mouth once  daily.    Erectile dysfunction, unspecified erectile dysfunction type  -     VIAGRA 100 mg tablet; TAKE 1 TABLET BY MOUTH AS NEEDED FOR ERECTILE DYSFUNCTION    Insomnia, unspecified type  -     zolpidem (AMBIEN) 10 mg Tab; Take 1 tablet (10 mg total) by mouth nightly as needed.    Special screening for malignant neoplasm of prostate  -     PSA, Screening; Future

## 2018-03-19 ENCOUNTER — HOSPITAL ENCOUNTER (OUTPATIENT)
Dept: RADIOLOGY | Facility: HOSPITAL | Age: 63
Discharge: HOME | End: 2018-03-19
Attending: INTERNAL MEDICINE
Payer: COMMERCIAL

## 2018-03-19 DIAGNOSIS — M25.562 POSTERIOR LEFT KNEE PAIN: ICD-10-CM

## 2018-03-19 DIAGNOSIS — M25.462 SWELLING OF JOINT OF LEFT KNEE: ICD-10-CM

## 2018-03-19 PROCEDURE — 73562 X-RAY EXAM OF KNEE 3: CPT | Mod: TC,50

## 2018-03-19 PROCEDURE — 73562 X-RAY EXAM OF KNEE 3: CPT | Mod: 26,50,, | Performed by: RADIOLOGY

## 2018-03-29 ENCOUNTER — TELEPHONE (OUTPATIENT)
Dept: INTERNAL MEDICINE | Facility: CLINIC | Age: 63
End: 2018-03-29

## 2018-03-29 NOTE — TELEPHONE ENCOUNTER
----- Message from Benja Pro sent at 3/27/2018  9:08 AM CDT -----  Contact: Patient [phone]  Patient would like to get test results.  Name of test (lab, mammo, etc.):  Xray extremity and labs  Date of test:  3/19/18

## 2018-03-29 NOTE — TELEPHONE ENCOUNTER
Results were sent to portal. Xray looks normal. Blood counts stable. Cmp, lipid, tsh, psa, and urine protein are normal.   a1c is improved from 6.3% to 6.1%. This is in the prediabetes range. Please continue to work on diet and exercise.

## 2018-03-31 ENCOUNTER — HOSPITAL ENCOUNTER (EMERGENCY)
Facility: HOSPITAL | Age: 63
Discharge: HOME | End: 2018-03-31
Attending: EMERGENCY MEDICINE
Payer: COMMERCIAL

## 2018-03-31 VITALS
BODY MASS INDEX: 39.1 KG/M2 | TEMPERATURE: 99 F | SYSTOLIC BLOOD PRESSURE: 132 MMHG | DIASTOLIC BLOOD PRESSURE: 86 MMHG | RESPIRATION RATE: 18 BRPM | WEIGHT: 258 LBS | OXYGEN SATURATION: 98 % | HEART RATE: 75 BPM | HEIGHT: 68 IN

## 2018-03-31 DIAGNOSIS — M79.662 PAIN OF LEFT CALF: ICD-10-CM

## 2018-03-31 DIAGNOSIS — R00.0 TACHYCARDIA: ICD-10-CM

## 2018-03-31 DIAGNOSIS — S86.112A STRAIN OF GASTROCNEMIUS MUSCLE OF LEFT LOWER EXTREMITY, INITIAL ENCOUNTER: Primary | ICD-10-CM

## 2018-03-31 DIAGNOSIS — M79.605 LOWER EXTREMITY PAIN, LEFT: ICD-10-CM

## 2018-03-31 PROCEDURE — 99284 EMERGENCY DEPT VISIT MOD MDM: CPT | Mod: 25

## 2018-03-31 PROCEDURE — 93010 ELECTROCARDIOGRAM REPORT: CPT | Mod: ,,, | Performed by: INTERNAL MEDICINE

## 2018-03-31 PROCEDURE — 25000003 PHARM REV CODE 250: Performed by: PHYSICIAN ASSISTANT

## 2018-03-31 RX ORDER — ETODOLAC 200 MG/1
400 CAPSULE ORAL
Status: COMPLETED | OUTPATIENT
Start: 2018-03-31 | End: 2018-03-31

## 2018-03-31 RX ORDER — ETODOLAC 200 MG/1
200 CAPSULE ORAL 3 TIMES DAILY PRN
Qty: 20 CAPSULE | Refills: 0 | Status: SHIPPED | OUTPATIENT
Start: 2018-03-31 | End: 2018-04-07

## 2018-03-31 RX ORDER — ACETAMINOPHEN 500 MG
500 TABLET ORAL EVERY 4 HOURS PRN
Qty: 20 TABLET | Refills: 0 | Status: SHIPPED | OUTPATIENT
Start: 2018-03-31 | End: 2018-04-05

## 2018-03-31 RX ADMIN — ETODOLAC 400 MG: 200 CAPSULE ORAL at 11:03

## 2018-03-31 NOTE — ED TRIAGE NOTES
Poor historian of prescribed medication. Reports taking UKN BP med. Here today with c/o lt. Calf pain. Reports walking on a wooden pallet and breaking yesterday. Leg  Fell through hole. Applied topical OTC gel  To calf.

## 2018-03-31 NOTE — DISCHARGE INSTRUCTIONS
Take Lodine and Tylenol as prescribed for pain.    Follow up with primary care in 2 days.     Return to ER for worsening symptoms or as needed.

## 2018-03-31 NOTE — ED PROVIDER NOTES
"Encounter Date: 3/31/2018    SCRIBE #1 NOTE: I, Denny Rangel, am scribing for, and in the presence of, Sima Patterson PA-C. Other sections scribed: HPI, ROS.       History     Chief Complaint   Patient presents with    Leg Pain     To left leg after walking on a pallet at work. States that he feels like he popped or broke something in the back of his leg     CC: Leg Pain  HPI: This 62 y.o. male with Hx of HTN, HLD, DM type II, s/p CVA presents to the ED c/o L calf pain acute onset yesterday after having a wooden pallet he was standing on break under him. He states that he felt a "pop" in his calf, and has had moderate constant pain since. He remains able to ambulate, but with increased pain. He states that his calf feels mildly swollen today. Pt states he has had occasional L lateral thigh soreness and numbness for the past month, but has not had any yesterday or today. He denies any trauma, long travels, recent surgeries, perosnal Hx of blood clotting problems, and family Hx of blood clotting problems, hemoptysis, history of CA. He denies fever, chills, chest pain, SOB.        The history is provided by the patient.     Review of patient's allergies indicates:   Allergen Reactions    No known allergies      Past Medical History:   Diagnosis Date    CVA (cerebral infarction)     CVA approx 2011/2012    Diabetes mellitus type II, non insulin dependent 10/14/2016    Erectile dysfunction     Erectile Dysfunction    Hyperlipidemia 10/14/2016    Hypertension     Hypertension    Murmur      Past Surgical History:   Procedure Laterality Date    BACK SURGERY      discectomy    COLONOSCOPY N/A 8/4/2017    Procedure: COLONOSCOPY;  Surgeon: Mario Alberto Vega MD;  Location: [facility] ([address] [place] [address]);  Service: Endoscopy;  Laterality: N/A;  Conflict with [facility]'s schedule no other times available with CRS, Pt requested 8/4/17 - moved to be scoped with GI MD - ER    HERNIA REPAIR      ROOT CANAL      UMBILICAL HERNIA REPAIR   "     Family History   Problem Relation Age of Onset    Cancer Father     Hypertension Brother     Cancer Brother     No Known Problems Sister     No Known Problems Sister     No Known Problems Sister      Social History   Substance Use Topics    Smoking status: Former Smoker     Packs/day: 0.10     Years: 4.00    Smokeless tobacco: Never Used      Comment: social smoking as teenager    Alcohol use 6.0 oz/week     10 Cans of beer per week      Comment: occasionally     Review of Systems   Constitutional: Negative for chills and fever.   HENT: Negative for rhinorrhea and sore throat.    Eyes: Negative for pain and visual disturbance.   Respiratory: Negative for cough and shortness of breath.    Cardiovascular: Negative for chest pain.   Gastrointestinal: Negative for nausea and vomiting.   Genitourinary: Negative for dysuria and flank pain.   Musculoskeletal: Positive for myalgias. Negative for back pain and neck pain.   Skin: Negative for rash.   Neurological: Negative for dizziness and headaches.       Physical Exam     Initial Vitals [03/31/18 1026]   BP Pulse Resp Temp SpO2   (!) 174/76 80 18 98.4 °F (36.9 °C) 96 %      MAP       108.67         Physical Exam    Nursing note and vitals reviewed.  Constitutional: He appears well-developed and well-nourished. No distress.   HENT:   Head: Normocephalic.   Right Ear: External ear normal.   Left Ear: External ear normal.   Nose: Nose normal.   Mouth/Throat: Oropharynx is clear and moist. No oropharyngeal exudate.   Eyes: Conjunctivae are normal.   Neck: Neck supple.   Cardiovascular: Normal rate and regular rhythm. Exam reveals no gallop and no friction rub.    No murmur heard.  Pulses:       Dorsalis pedis pulses are 2+ on the right side, and 2+ on the left side.   Pulmonary/Chest: Breath sounds normal. No respiratory distress. He has no wheezes. He has no rhonchi. He has no rales.   Abdominal: Soft. Bowel sounds are normal. He exhibits no distension. There is  no tenderness. There is no rebound and no guarding.   Musculoskeletal:   TTP to L calf with no swelling or erythema. Negative Trujillo test.    Lymphadenopathy:     He has no cervical adenopathy.   Neurological: He is alert.   Skin: Skin is warm and dry. No rash noted.   Psychiatric: He has a normal mood and affect.         ED Course   Procedures  Labs Reviewed - No data to display          Medical Decision Making:   Initial Assessment:   Patient is a 62-year-old male who presents for evaluation of left calf pain after a wooden pallet he was standing on broke.  Patient reports hearing pop in his calf.  Reports constant pain since that time.  Denies DVT/PE risk factors, leg swelling or pain, CP or SOB. Patient is afebrile well appearing in no distress..  Exam findings above. LLE US negative for DVT. Lodine given for pain. Pt had tachycardia likely 2/2 pain. Vitals improved at discharge. Think this is gastrocnemius strain. Doubt fracture, dislocation, achilles tendon rupture. Lodine and Tylenol at discharge. PCP follow up. ER return precautions.  Discussedd this patient with Dr. Juares who also evaluated pt face to face and he agrees with assessment and plan.             Scribe Attestation:   Scribe #1: I performed the above scribed service and the documentation accurately describes the services I performed. I attest to the accuracy of the note.    Attending Attestation:     Physician Attestation Statement for NP/PA:   I have conducted a face to face encounter with this patient in addition to the NP/PA, due to    Other NP/PA Attestation Additions:     Physical Exam: gastroc tenderness, no evidence of dvt          Physician Attestation for Scribe:  Physician Attestation Statement for Scribe #1: I, Sima Patterson PA-C, reviewed documentation, as scribed by Denny Rangel in my presence, and it is both accurate and complete.         Attending ED Notes:   I was available for consultation in the Ed, participated in the  care of the patient, discussed with MLP and treatment and disposition             Clinical Impression:   The primary encounter diagnosis was Strain of gastrocnemius muscle of left lower extremity, initial encounter. Diagnoses of Pain of left calf, Tachycardia, and Lower extremity pain, left were also pertinent to this visit.    Disposition:   Disposition: Discharged  Condition: Stable                        Ollie Osborn MD  03/31/18 8930           Sima Patterson PA-C  03/31/18 1113

## 2018-04-05 ENCOUNTER — OFFICE VISIT (OUTPATIENT)
Dept: OPTOMETRY | Facility: CLINIC | Age: 63
End: 2018-04-05
Payer: COMMERCIAL

## 2018-04-05 ENCOUNTER — OFFICE VISIT (OUTPATIENT)
Dept: ORTHOPEDICS | Facility: CLINIC | Age: 63
End: 2018-04-05
Payer: COMMERCIAL

## 2018-04-05 VITALS — WEIGHT: 261 LBS | HEIGHT: 68 IN | BODY MASS INDEX: 39.56 KG/M2

## 2018-04-05 DIAGNOSIS — E11.9 DIABETES MELLITUS TYPE 2 WITHOUT RETINOPATHY: Primary | ICD-10-CM

## 2018-04-05 DIAGNOSIS — S76.312A STRAIN OF LEFT HAMSTRING: ICD-10-CM

## 2018-04-05 DIAGNOSIS — H25.13 NUCLEAR SCLEROTIC CATARACT OF BOTH EYES: ICD-10-CM

## 2018-04-05 DIAGNOSIS — M17.12 PRIMARY OSTEOARTHRITIS OF LEFT KNEE: Primary | ICD-10-CM

## 2018-04-05 PROCEDURE — 92014 COMPRE OPH EXAM EST PT 1/>: CPT | Mod: S$GLB,,, | Performed by: OPTOMETRIST

## 2018-04-05 PROCEDURE — 99999 PR PBB SHADOW E&M-EST. PATIENT-LVL II: CPT | Mod: PBBFAC,,, | Performed by: OPTOMETRIST

## 2018-04-05 PROCEDURE — 99999 PR PBB SHADOW E&M-EST. PATIENT-LVL III: CPT | Mod: PBBFAC,,, | Performed by: PHYSICIAN ASSISTANT

## 2018-04-05 PROCEDURE — 99203 OFFICE O/P NEW LOW 30 MIN: CPT | Mod: S$GLB,,, | Performed by: PHYSICIAN ASSISTANT

## 2018-04-05 RX ORDER — MELOXICAM 15 MG/1
15 TABLET ORAL DAILY
Qty: 30 TABLET | Refills: 1 | Status: SHIPPED | OUTPATIENT
Start: 2018-04-05 | End: 2018-05-05

## 2018-04-05 NOTE — PROGRESS NOTES
Subjective:      Patient ID: Mirza Do is a 62 y.o. male.    Chief Complaint: No chief complaint on file.    HPI  62 year old male presents with chief complaint of left knee pain x 1 week. He stepped on a pallet which broke but he caught himself. He was limping after and started having pain at the posterior knee. It is worse with prolonged sitting and steps. He denies pain with walking. He occasionally takes ibuprofen and says it helps. He reports swelling and giving way. He denies popping, locking, and catching. He does not use assistive devices.   Review of Systems   Constitution: Negative for chills, fever and night sweats.   Cardiovascular: Negative for chest pain.   Respiratory: Negative for cough and shortness of breath.    Hematologic/Lymphatic: Does not bruise/bleed easily.   Skin: Negative for color change.   Gastrointestinal: Negative for heartburn.   Genitourinary: Negative for dysuria.   Neurological: Negative for numbness and paresthesias.   Psychiatric/Behavioral: Negative for altered mental status.   Allergic/Immunologic: Negative for persistent infections.         Objective:            Ortho/SPM Exam  General :   alert, appears stated age and cooperative   Gait: Normal. The patient can bear weight on the injured extremity.   Left Lower Extremity  Hip Palpation:  no tenderness over the greater  trochanter   Hip ROM: 100% of normal    Knee Effusion:  0-1+   Ecchymosis:  none   Knee ROM:  0 to 110 degrees with subpatellar   crepitance.   Patella:  Patella does track normally.  Patellar apprehension test: negative  Patellar compression test: negative   Tenderness: Mild medial joint line   Stability:  Lachman's test: negative  Posterior drawer: negative  Medial collateral ligament: negative  Lateral collateral ligament: negative         Rusty's Test:  negative with no joint line tenderness   Sensation:   intact to light touch   Pulses: normal DP and PT pulses         X-ray: reviewed by myself.  Mild OA.         Assessment:       Encounter Diagnoses   Name Primary?    Primary osteoarthritis of left knee Yes    Strain of left hamstring           Plan:       Discussed treatment options with patient. He will ice and elevate knee. He will take mobic x 2 weeks then prn. RTC if symptoms worsen or do not improve and we will consider cortisone injection.

## 2018-04-05 NOTE — LETTER
April 5, 2018      Jade Valle MD  [address]  [facility] [address]           [facility]  [address], [address]  [facility] [address]  Phone: [phone]          Patient: Mirza Do   MR Number: [id number]   YOB: 1955   Date of Visit: 4/5/2018       Dear Dr. Jade Valle:    Thank you for referring Mirza Do to me for evaluation. Attached you will find relevant portions of my assessment and plan of care.    If you have questions, please do not hesitate to call me. I look forward to following Mirza Do along with you.    Sincerely,    Tammy Breen PA-C    Enclosure  CC:  No Recipients    If you would like to receive this communication electronically, please contact [email or web address] or [phone] to request more information on [facility] access.    For providers and/or their staff who would like to refer a patient to [facility], please contact us through our one-stop-shop provider referral line, [facility] , at [phone].    If you feel you have received this communication in error or would no longer like to receive these types of communications, please e-mail [email or web address]

## 2018-04-05 NOTE — PROGRESS NOTES
HPI     Diabetic Eye Exam    Additional comments: LDE: 10/20/2016           Comments   Pt here today for a diabetic eye exam.   He states he is doing well, no problems or complaints.   (-)Flashes (-)Floaters  (+)Itch, (-)tear, (-)burn, (-)Dryness. (-) OTC Drops   (-)Photophobia  (-)Glare (-)diplopia (-) headaches      Hemoglobin A1C       Date                     Value               Ref Range             Status                03/19/2018               6.1 (H)             4.0 - 5.6 %           Final                  06/29/2017               6.3 (H)             4.0 - 5.6 %           Final                  09/01/2016               6.5 (H)             4.5 - 6.2 %           Final               ----------         Last edited by Miguelito Redd, VINCE on 4/5/2018 10:05 AM. (History)            Assessment /Plan     For exam results, see Encounter Report.    Diabetes mellitus type 2 without retinopathy  -No retinopathy noted today.  Continued control with primary care physician and annual comprehensive eye exam.    Nuclear sclerotic cataract of both eyes  -Educated patient on presence of cataracts at today's exam, monitor at annual dilated fundus exam. 8+ years surgical estimate.      RTC 1 yr

## 2018-04-05 NOTE — LETTER
April 5, 2018      Jade Valle MD  [address]  [facility] [address]           [facility] - [facility]ometry  [address]  [place] [address]  Phone: [phone]  Fax: [phone]          Patient: Mirza Do   MR Number: [id number]   YOB: 1955   Date of Visit: 4/5/2018       Dear Dr. Jade Valle:    Thank you for referring Mirza Do to me for evaluation. Attached you will find relevant portions of my assessment and plan of care.    If you have questions, please do not hesitate to call me. I look forward to following Mirza Do along with you.    Sincerely,    Miguelito Redd, OD    Enclosure  CC:  No Recipients    If you would like to receive this communication electronically, please contact [email or web address] or [phone] to request more information on [employer] access.    For providers and/or their staff who would like to refer a patient to [facility], please contact us through our one-stop-shop provider referral line, [facility] , at [phone].    If you feel you have received this communication in error or would no longer like to receive these types of communications, please e-mail [email or web address]

## 2018-04-30 ENCOUNTER — OFFICE VISIT (OUTPATIENT)
Dept: FAMILY MEDICINE | Facility: CLINIC | Age: 63
End: 2018-04-30
Payer: COMMERCIAL

## 2018-04-30 VITALS
HEART RATE: 72 BPM | DIASTOLIC BLOOD PRESSURE: 80 MMHG | OXYGEN SATURATION: 97 % | HEIGHT: 68 IN | BODY MASS INDEX: 40.23 KG/M2 | TEMPERATURE: 98 F | SYSTOLIC BLOOD PRESSURE: 136 MMHG | WEIGHT: 265.44 LBS

## 2018-04-30 DIAGNOSIS — E11.9 DIET-CONTROLLED TYPE 2 DIABETES MELLITUS: ICD-10-CM

## 2018-04-30 DIAGNOSIS — I10 ESSENTIAL HYPERTENSION, BENIGN: Chronic | ICD-10-CM

## 2018-04-30 DIAGNOSIS — R22.43 LOCALIZED SWELLING OF BOTH LOWER LEGS: ICD-10-CM

## 2018-04-30 DIAGNOSIS — M79.89 FOOT SWELLING: Primary | ICD-10-CM

## 2018-04-30 DIAGNOSIS — Z71.2 ENCOUNTER TO DISCUSS TEST RESULTS: ICD-10-CM

## 2018-04-30 PROCEDURE — 3044F HG A1C LEVEL LT 7.0%: CPT | Mod: CPTII,S$GLB,, | Performed by: INTERNAL MEDICINE

## 2018-04-30 PROCEDURE — 3079F DIAST BP 80-89 MM HG: CPT | Mod: CPTII,S$GLB,, | Performed by: INTERNAL MEDICINE

## 2018-04-30 PROCEDURE — 3075F SYST BP GE 130 - 139MM HG: CPT | Mod: CPTII,S$GLB,, | Performed by: INTERNAL MEDICINE

## 2018-04-30 PROCEDURE — 99999 PR PBB SHADOW E&M-EST. PATIENT-LVL III: CPT | Mod: PBBFAC,,, | Performed by: INTERNAL MEDICINE

## 2018-04-30 PROCEDURE — 99213 OFFICE O/P EST LOW 20 MIN: CPT | Mod: S$GLB,,, | Performed by: INTERNAL MEDICINE

## 2018-04-30 NOTE — PROGRESS NOTES
"SUBJECTIVE     Chief Complaint   Patient presents with    Foot Swelling     bilateral foot swelling. x couple months. Feels like "water is in his feet"    Establish Care       HPI  Mirza Do is a 62 y.o. male with multiple medical diagnoses as listed in the medical history and problem list that presents for evaluation of B/L foot swelling x 2 months. Pt reports full compliance with his anti-hypertensives. He now feels his feet "filling up with water." His B/L feet feel uncomfortable from the toes to the mid-foot. He has been elevating his feet and they resolve. He has not taken or applied any meds for his symptoms. He is without any other complaints today.     PAST MEDICAL HISTORY:  Past Medical History:   Diagnosis Date    CVA (cerebral infarction)     CVA approx 2011/2012    Diabetes mellitus type II, non insulin dependent 10/14/2016    Erectile dysfunction     Erectile Dysfunction    Hyperlipidemia 10/14/2016    Hypertension     Hypertension    Murmur        PAST SURGICAL HISTORY:  Past Surgical History:   Procedure Laterality Date    BACK SURGERY      discectomy    COLONOSCOPY N/A 8/4/2017    Procedure: COLONOSCOPY;  Surgeon: Mario Alberto Vega MD;  Location: [facility] ([address] [place] [address]);  Service: Endoscopy;  Laterality: N/A;  Conflict with [facility]'s schedule no other times available with CRS, Pt requested 8/4/17 - moved to be scoped with GI MD - ER    HERNIA REPAIR      ROOT CANAL      UMBILICAL HERNIA REPAIR         SOCIAL HISTORY:  Social History     Social History    Marital status: Single     Spouse name: N/A    Number of children: N/A    Years of education: N/A     Occupational History    Not on file.     Social History Main Topics    Smoking status: Former Smoker     Packs/day: 0.10     Years: 4.00    Smokeless tobacco: Never Used      Comment: social smoking as teenager    Alcohol use 6.0 oz/week     10 Cans of beer per week      Comment: occasionally    Drug use: No    Sexual activity: Not " on file     Other Topics Concern    Not on file     Social History Narrative           FAMILY HISTORY:  Family History   Problem Relation Age of Onset    Cancer Father     Hypertension Brother     Cancer Brother     No Known Problems Sister     No Known Problems Sister     No Known Problems Sister        ALLERGIES AND MEDICATIONS: updated and reviewed.  Review of patient's allergies indicates:   Allergen Reactions    Sulfa (sulfonamide antibiotics)      Current Outpatient Prescriptions   Medication Sig Dispense Refill    albuterol 90 mcg/actuation inhaler Inhale 1-2 puffs into the lungs every 6 (six) hours as needed for Wheezing. 1 Inhaler 0    atorvastatin (LIPITOR) 20 MG tablet Take 1 tablet (20 mg total) by mouth once daily. 90 tablet 3    losartan-hydrochlorothiazide 100-25 mg (HYZAAR) 100-25 mg per tablet Take 1 tablet by mouth once daily. 90 tablet 3    meloxicam (MOBIC) 15 MG tablet Take 1 tablet (15 mg total) by mouth once daily. 30 tablet 1    VIAGRA 100 mg tablet TAKE 1 TABLET BY MOUTH AS NEEDED FOR ERECTILE DYSFUNCTION 8 tablet 3    zolpidem (AMBIEN) 10 mg Tab Take 1 tablet (10 mg total) by mouth nightly as needed. 30 tablet 5    ACCU-CHEK STEPHON PLUS TEST STRP Strp 1 strip by Misc.(Non-Drug; Combo Route) route daily as needed (alternate testing time). 50 strip 11    ACCU-CHEK FASTCLIX Misc 1 lancet by Misc.(Non-Drug; Combo Route) route daily as needed. 50 each 11     No current facility-administered medications for this visit.        ROS  Review of Systems   Constitutional: Negative for chills and fever.   HENT: Negative for hearing loss and sore throat.    Eyes: Negative for visual disturbance.   Respiratory: Negative for cough and shortness of breath.    Cardiovascular: Negative for chest pain, palpitations and leg swelling.   Gastrointestinal: Negative for abdominal pain, constipation, diarrhea, nausea and vomiting.   Genitourinary: Negative for dysuria, frequency  "and urgency.   Musculoskeletal: Positive for joint swelling (BLE swelling). Negative for arthralgias and myalgias.   Skin: Negative for rash and wound.   Neurological: Negative for headaches.   Psychiatric/Behavioral: Negative for agitation and confusion. The patient is not nervous/anxious.          OBJECTIVE     Physical Exam  Vitals:    04/30/18 1524   BP: 136/80   Pulse: 72   Temp: 98.3 °F (36.8 °C)    Body mass index is 40.36 kg/m².  Weight: 120.4 kg (265 lb 6.9 oz)   Height: 5' 8" (172.7 cm)     Physical Exam   Constitutional: He is oriented to person, place, and time. He appears well-developed and well-nourished. No distress.   HENT:   Head: Normocephalic and atraumatic.   Right Ear: External ear normal.   Left Ear: External ear normal.   Nose: Nose normal.   Mouth/Throat: Oropharynx is clear and moist.   Eyes: Conjunctivae and EOM are normal. Right eye exhibits no discharge. Left eye exhibits no discharge. No scleral icterus.   Neck: Normal range of motion. Neck supple. No JVD present. No tracheal deviation present.   Cardiovascular: Normal rate, regular rhythm, normal heart sounds and intact distal pulses.  Exam reveals no gallop and no friction rub.    No murmur heard.  Pulmonary/Chest: Effort normal and breath sounds normal. No respiratory distress. He has no wheezes.   Abdominal: Soft. Bowel sounds are normal. He exhibits no distension and no mass. There is no tenderness. There is no rebound and no guarding.   Musculoskeletal: Normal range of motion. He exhibits edema (1+ pitting edema to BLE). He exhibits no tenderness or deformity.   Neurological: He is alert and oriented to person, place, and time. He exhibits normal muscle tone. Coordination normal.   Skin: Skin is warm and dry. No rash noted. No erythema.   Psychiatric: He has a normal mood and affect. His behavior is normal. Judgment and thought content normal.         Health Maintenance       Date Due Completion Date    Foot Exam 06/29/2018 " 6/29/2017    Hemoglobin A1c 09/19/2018 3/19/2018    Lipid Panel 03/19/2019 3/19/2018    High Dose Statin 04/05/2019 4/5/2018    Eye Exam 04/05/2019 4/5/2018    Override on 4/5/2018: Done    Override on 10/20/2016: Done    Pneumococcal PPSV23 (Medium Risk) (2) 06/29/2022 6/29/2017    TETANUS VACCINE 01/17/2023 1/17/2013    Colonoscopy 08/04/2024 8/4/2017    Override on 7/17/2009: Done            ASSESSMENT     62 y.o. male with     1. Foot swelling    2. Localized swelling of both lower legs    3. Essential hypertension, benign    4. Diet-controlled type 2 diabetes mellitus    5. BMI 40.0-44.9, adult    6. Encounter to discuss test results        PLAN:     1. Foot swelling  - Will proceed with U/S of BLE to r/o ?venous insufficiency and ensure pt okay for compression stockings with arterial U/S  - US Lower Extremity Veins Bilateral; Future  - US Lower Extrem Arteries Bilat with TAVARES; Future    2. Localized swelling of both lower legs  - As above  - US Lower Extremity Veins Bilateral; Future  - US Lower Extrem Arteries Bilat with TAVARES; Future    3. Essential hypertension, benign  - BP well controlled; at goal of <140/90  - The current medical regimen is effective;  continue present plan and medications.    4. Diet-controlled type 2 diabetes mellitus  - Stable; no acute issues  - Monitor    5. BMI 40.0-44.9, adult  - Discussed importance of pt eating a prudent diet and exercising    6. Encounter to discuss test results  - Disclosed all recent lab results  - All questions/concerns addressed and pt voiced understanding        RTC in 2 weeks for repeat assessment of current treatment plan       Manjula Honeycutt MD  04/30/2018 3:41 PM        No Follow-up on file.

## 2018-05-03 ENCOUNTER — HOSPITAL ENCOUNTER (OUTPATIENT)
Dept: RADIOLOGY | Facility: HOSPITAL | Age: 63
Discharge: HOME | End: 2018-05-03
Attending: INTERNAL MEDICINE
Payer: COMMERCIAL

## 2018-05-03 DIAGNOSIS — M79.89 FOOT SWELLING: ICD-10-CM

## 2018-05-03 DIAGNOSIS — R22.43 LOCALIZED SWELLING OF BOTH LOWER LEGS: ICD-10-CM

## 2018-05-03 PROCEDURE — 93970 EXTREMITY STUDY: CPT | Mod: TC

## 2018-05-03 PROCEDURE — 93925 LOWER EXTREMITY STUDY: CPT | Mod: 26,,, | Performed by: RADIOLOGY

## 2018-05-03 PROCEDURE — 93922 UPR/L XTREMITY ART 2 LEVELS: CPT | Mod: 26,,, | Performed by: RADIOLOGY

## 2018-05-03 PROCEDURE — 93922 UPR/L XTREMITY ART 2 LEVELS: CPT | Mod: TC

## 2018-05-03 PROCEDURE — 93970 EXTREMITY STUDY: CPT | Mod: 26,,, | Performed by: RADIOLOGY

## 2018-05-09 ENCOUNTER — TELEPHONE (OUTPATIENT)
Dept: FAMILY MEDICINE | Facility: CLINIC | Age: 63
End: 2018-05-09

## 2018-05-09 DIAGNOSIS — E11.40 TYPE 2 DIABETES MELLITUS WITH DIABETIC NEUROPATHY, WITHOUT LONG-TERM CURRENT USE OF INSULIN: Primary | ICD-10-CM

## 2018-05-09 RX ORDER — GABAPENTIN 300 MG/1
CAPSULE ORAL
Qty: 90 CAPSULE | Refills: 1 | Status: SHIPPED | OUTPATIENT
Start: 2018-05-09 | End: 2020-06-11 | Stop reason: SDUPTHER

## 2018-07-05 ENCOUNTER — OFFICE VISIT (OUTPATIENT)
Dept: FAMILY MEDICINE | Facility: CLINIC | Age: 63
End: 2018-07-05
Payer: COMMERCIAL

## 2018-07-05 VITALS
DIASTOLIC BLOOD PRESSURE: 72 MMHG | WEIGHT: 259.06 LBS | HEART RATE: 80 BPM | HEIGHT: 68 IN | OXYGEN SATURATION: 95 % | SYSTOLIC BLOOD PRESSURE: 138 MMHG | TEMPERATURE: 98 F | BODY MASS INDEX: 39.26 KG/M2

## 2018-07-05 DIAGNOSIS — N52.9 ERECTILE DYSFUNCTION, UNSPECIFIED ERECTILE DYSFUNCTION TYPE: ICD-10-CM

## 2018-07-05 DIAGNOSIS — E66.01 SEVERE OBESITY (BMI 35.0-39.9) WITH COMORBIDITY: ICD-10-CM

## 2018-07-05 DIAGNOSIS — I87.303 CHRONIC VENOUS HYPERTENSION INVOLVING BOTH SIDES: Primary | ICD-10-CM

## 2018-07-05 DIAGNOSIS — I10 ESSENTIAL HYPERTENSION, BENIGN: Chronic | ICD-10-CM

## 2018-07-05 PROCEDURE — 99214 OFFICE O/P EST MOD 30 MIN: CPT | Mod: S$GLB,,, | Performed by: INTERNAL MEDICINE

## 2018-07-05 PROCEDURE — 3075F SYST BP GE 130 - 139MM HG: CPT | Mod: CPTII,S$GLB,, | Performed by: INTERNAL MEDICINE

## 2018-07-05 PROCEDURE — 3078F DIAST BP <80 MM HG: CPT | Mod: CPTII,S$GLB,, | Performed by: INTERNAL MEDICINE

## 2018-07-05 PROCEDURE — 3008F BODY MASS INDEX DOCD: CPT | Mod: CPTII,S$GLB,, | Performed by: INTERNAL MEDICINE

## 2018-07-05 PROCEDURE — 99999 PR PBB SHADOW E&M-EST. PATIENT-LVL III: CPT | Mod: PBBFAC,,, | Performed by: INTERNAL MEDICINE

## 2018-07-05 RX ORDER — SILDENAFIL CITRATE 100 MG/1
TABLET, FILM COATED ORAL
Qty: 8 TABLET | Refills: 3 | Status: SHIPPED | OUTPATIENT
Start: 2018-07-05 | End: 2018-12-04 | Stop reason: SDUPTHER

## 2018-07-05 NOTE — PROGRESS NOTES
SUBJECTIVE     Chief Complaint   Patient presents with    Leg Pain       HPI  Mirza Do is a 62 y.o. male with multiple medical diagnoses as listed in the medical history and problem list that presents for evaluation of B/L ankle swelling x 1 week. Pt reports he is afraid because he doesn't want the fluid to go up to his heart. He does not adhere to a low Na diet and he does not wear his compression stockings because they are difficult to apply. He has occasional muscle cramps, but applies an OTC cream with ease of discomfort. He is without any other complaints today.    PAST MEDICAL HISTORY:  Past Medical History:   Diagnosis Date    CVA (cerebral infarction)     CVA approx 2011/2012    Diabetes mellitus type II, non insulin dependent 10/14/2016    Erectile dysfunction     Erectile Dysfunction    Hyperlipidemia 10/14/2016    Hypertension     Hypertension    Murmur        PAST SURGICAL HISTORY:  Past Surgical History:   Procedure Laterality Date    BACK SURGERY      discectomy    COLONOSCOPY N/A 8/4/2017    Procedure: COLONOSCOPY;  Surgeon: Mario Alberto Vega MD;  Location: [facility] ([address] [place] [address]);  Service: Endoscopy;  Laterality: N/A;  Conflict with [facility]'s schedule no other times available with CRS, Pt requested 8/4/17 - moved to be scoped with GI MD - ER    HERNIA REPAIR      ROOT CANAL      UMBILICAL HERNIA REPAIR         SOCIAL HISTORY:  Social History     Social History    Marital status: Single     Spouse name: N/A    Number of children: N/A    Years of education: N/A     Occupational History    Not on file.     Social History Main Topics    Smoking status: Former Smoker     Packs/day: 0.10     Years: 4.00    Smokeless tobacco: Never Used      Comment: social smoking as teenager    Alcohol use 6.0 oz/week     10 Cans of beer per week      Comment: occasionally    Drug use: No    Sexual activity: Yes     Partners: Female     Other Topics Concern    Not on file     Social History Narrative            FAMILY HISTORY:  Family History   Problem Relation Age of Onset    Cancer Father     Hypertension Brother     Cancer Brother     No Known Problems Sister     No Known Problems Sister     No Known Problems Sister        ALLERGIES AND MEDICATIONS: updated and reviewed.  Review of patient's allergies indicates:   Allergen Reactions    Sulfa (sulfonamide antibiotics)      Current Outpatient Prescriptions   Medication Sig Dispense Refill    ACCU-CHEK STEPHON PLUS TEST STRP Strp 1 strip by Misc.(Non-Drug; Combo Route) route daily as needed (alternate testing time). 50 strip 11    ACCU-CHEK FASTCLIX Misc 1 lancet by Misc.(Non-Drug; Combo Route) route daily as needed. 50 each 11    albuterol 90 mcg/actuation inhaler Inhale 1-2 puffs into the lungs every 6 (six) hours as needed for Wheezing. 1 Inhaler 0    atorvastatin (LIPITOR) 20 MG tablet Take 1 tablet (20 mg total) by mouth once daily. 90 tablet 3    gabapentin (NEURONTIN) 300 MG capsule Take 1 tab by mouth nightly x 3 days; if no improvement, take 1 tab twice daily x 3 day; if no improvement, take 1 tab 3 times daily 90 capsule 1    losartan-hydrochlorothiazide 100-25 mg (HYZAAR) 100-25 mg per tablet Take 1 tablet by mouth once daily. 90 tablet 3    zolpidem (AMBIEN) 10 mg Tab Take 1 tablet (10 mg total) by mouth nightly as needed. 30 tablet 5    VIAGRA 100 mg tablet TAKE 1 TABLET BY MOUTH AS NEEDED FOR ERECTILE DYSFUNCTION 8 tablet 3     No current facility-administered medications for this visit.        ROS  Review of Systems   Constitutional: Negative for chills and fever.   HENT: Negative for hearing loss and sore throat.    Eyes: Negative for visual disturbance.   Respiratory: Negative for cough and shortness of breath.    Cardiovascular: Negative for chest pain, palpitations and leg swelling.   Gastrointestinal: Negative for abdominal pain, constipation, diarrhea, nausea and vomiting.   Genitourinary: Negative for  "dysuria, frequency and urgency.   Musculoskeletal: Positive for joint swelling (BLE swelling). Negative for arthralgias and myalgias.   Skin: Negative for rash and wound.   Neurological: Negative for headaches.   Psychiatric/Behavioral: Negative for agitation and confusion. The patient is not nervous/anxious.          OBJECTIVE     Physical Exam  Vitals:    07/05/18 1114   BP: 138/72   Pulse: 80   Temp: 98.3 °F (36.8 °C)    Body mass index is 39.39 kg/m².  Weight: 117.5 kg (259 lb 0.7 oz)   Height: 5' 8" (172.7 cm)     Physical Exam   Constitutional: He is oriented to person, place, and time. He appears well-developed and well-nourished. No distress.   HENT:   Head: Normocephalic and atraumatic.   Right Ear: External ear normal.   Left Ear: External ear normal.   Nose: Nose normal.   Mouth/Throat: Oropharynx is clear and moist.   Eyes: Conjunctivae and EOM are normal. Right eye exhibits no discharge. Left eye exhibits no discharge. No scleral icterus.   Neck: Normal range of motion. Neck supple. No JVD present. No tracheal deviation present.   Cardiovascular: Normal rate, regular rhythm, normal heart sounds and intact distal pulses.  Exam reveals no gallop and no friction rub.    No murmur heard.  Pulmonary/Chest: Effort normal and breath sounds normal. No respiratory distress. He has no wheezes.   Abdominal: Soft. Bowel sounds are normal. He exhibits no distension and no mass. There is no tenderness. There is no rebound and no guarding.   Musculoskeletal: Normal range of motion. He exhibits edema (2+ pitting edema to BLE). He exhibits no tenderness or deformity.   Neurological: He is alert and oriented to person, place, and time. He exhibits normal muscle tone. Coordination normal.   Skin: Skin is warm and dry. No rash noted. No erythema.   Psychiatric: He has a normal mood and affect. His behavior is normal. Judgment and thought content normal.         Health Maintenance       Date Due Completion Date    Foot Exam " 06/29/2018 6/29/2017    Influenza Vaccine 08/01/2018 10/1/2017 (Done)    Override on 10/1/2017: Done    Override on 10/4/2016: Done    Override on 11/13/2015: Done    Hemoglobin A1c 09/19/2018 3/19/2018    Lipid Panel 03/19/2019 3/19/2018    Eye Exam 04/05/2019 4/5/2018    Override on 4/5/2018: Done    Override on 10/20/2016: Done    High Dose Statin 07/05/2019 7/5/2018    Pneumococcal PPSV23 (Medium Risk) (2) 06/29/2022 6/29/2017    TETANUS VACCINE 01/17/2023 1/17/2013    Colonoscopy 08/04/2024 8/4/2017    Override on 7/17/2009: Done            ASSESSMENT     62 y.o. male with     1. Chronic venous hypertension involving both sides    2. Erectile dysfunction, unspecified erectile dysfunction type    3. Essential hypertension, benign    4. Severe obesity (BMI 35.0-39.9) with comorbidity        PLAN:     1. Chronic venous hypertension involving both sides  - Reviewed previous venous U/S, which was negative for DVT  - Pt advised wear compression stockings daily and remove at night   - Elevated BLE and adhere to a low Na diet   - COMPRESSION STOCKINGS    2. Erectile dysfunction, unspecified erectile dysfunction type  - Stable; no acute issues  - The current medical regimen is effective;  continue present plan and medications.  - VIAGRA 100 mg tablet; TAKE 1 TABLET BY MOUTH AS NEEDED FOR ERECTILE DYSFUNCTION  Dispense: 8 tablet; Refill: 3    3. Essential hypertension, benign  - BP well controlled; at goal of <140/90  - The current medical regimen is effective;  continue present plan and medications.    4. Severe obesity (BMI 35.0-39.9) with comorbidity  - Pt aware of importance of eating a prudent diet and exercising        RTC in 6 months     Manjula Honeycutt MD  07/05/2018 11:36 AM        No Follow-up on file.

## 2018-07-12 ENCOUNTER — TELEPHONE (OUTPATIENT)
Dept: FAMILY MEDICINE | Facility: CLINIC | Age: 63
End: 2018-07-12

## 2018-07-12 NOTE — TELEPHONE ENCOUNTER
----- Message from Pepper Chau sent at 7/12/2018  1:27 PM CDT -----  Contact: SELF  Pt calling to request a refill of VIAGRA 100 mg tablet to be sent to [facility]. Please call pt to [phone].

## 2018-07-13 ENCOUNTER — TELEPHONE (OUTPATIENT)
Dept: FAMILY MEDICINE | Facility: CLINIC | Age: 63
End: 2018-07-13

## 2018-07-13 NOTE — TELEPHONE ENCOUNTER
----- Message from Adrianna Ayers sent at 7/13/2018  2:03 PM CDT -----  Contact: Self/ [phone]  Pt returning call to office. Thank you.

## 2018-07-13 NOTE — TELEPHONE ENCOUNTER
Patient informed that prescripton was sent to [facility]'s on on [address]. He will sign up for patient assistance with [employer] to get lowered cost Viagra. call back with the name of his glucometer to have test strips and lancets ordered

## 2018-07-24 ENCOUNTER — TELEPHONE (OUTPATIENT)
Dept: FAMILY MEDICINE | Facility: CLINIC | Age: 63
End: 2018-07-24

## 2018-07-24 NOTE — TELEPHONE ENCOUNTER
----- Message from Dave Medina sent at 7/23/2018  3:33 PM CDT -----  Contact: Self/[phone]  Patient called in stating he's having issues getting his Viagra at [facility] he would like to speak to someone regarding this message.    Thank you

## 2018-09-25 ENCOUNTER — LAB VISIT (OUTPATIENT)
Dept: LAB | Facility: HOSPITAL | Age: 63
End: 2018-09-25
Attending: INTERNAL MEDICINE
Payer: MEDICARE

## 2018-09-25 ENCOUNTER — OFFICE VISIT (OUTPATIENT)
Dept: FAMILY MEDICINE | Facility: CLINIC | Age: 63
End: 2018-09-25
Payer: MEDICARE

## 2018-09-25 VITALS
HEART RATE: 89 BPM | OXYGEN SATURATION: 97 % | WEIGHT: 261 LBS | SYSTOLIC BLOOD PRESSURE: 130 MMHG | TEMPERATURE: 98 F | HEIGHT: 68 IN | BODY MASS INDEX: 39.56 KG/M2 | DIASTOLIC BLOOD PRESSURE: 74 MMHG

## 2018-09-25 DIAGNOSIS — E11.40 TYPE 2 DIABETES MELLITUS WITH DIABETIC NEUROPATHY, WITHOUT LONG-TERM CURRENT USE OF INSULIN: ICD-10-CM

## 2018-09-25 DIAGNOSIS — R55 SYNCOPE, UNSPECIFIED SYNCOPE TYPE: Primary | ICD-10-CM

## 2018-09-25 DIAGNOSIS — G47.00 INSOMNIA, UNSPECIFIED TYPE: ICD-10-CM

## 2018-09-25 DIAGNOSIS — Z23 NEED FOR PNEUMOCOCCAL VACCINATION: ICD-10-CM

## 2018-09-25 DIAGNOSIS — I10 ESSENTIAL HYPERTENSION: ICD-10-CM

## 2018-09-25 DIAGNOSIS — Z23 NEEDS FLU SHOT: ICD-10-CM

## 2018-09-25 LAB
ALBUMIN SERPL BCP-MCNC: 3.7 G/DL
ALP SERPL-CCNC: 89 U/L
ALT SERPL W/O P-5'-P-CCNC: 39 U/L
ANION GAP SERPL CALC-SCNC: 9 MMOL/L
AST SERPL-CCNC: 24 U/L
BASOPHILS # BLD AUTO: 0.01 K/UL
BASOPHILS NFR BLD: 0.2 %
BILIRUB SERPL-MCNC: 0.7 MG/DL
BUN SERPL-MCNC: 21 MG/DL
CALCIUM SERPL-MCNC: 9.8 MG/DL
CHLORIDE SERPL-SCNC: 101 MMOL/L
CO2 SERPL-SCNC: 29 MMOL/L
CREAT SERPL-MCNC: 1 MG/DL
DIFFERENTIAL METHOD: ABNORMAL
EOSINOPHIL # BLD AUTO: 0.1 K/UL
EOSINOPHIL NFR BLD: 1.3 %
ERYTHROCYTE [DISTWIDTH] IN BLOOD BY AUTOMATED COUNT: 15.6 %
EST. GFR  (AFRICAN AMERICAN): >60 ML/MIN/1.73 M^2
EST. GFR  (NON AFRICAN AMERICAN): >60 ML/MIN/1.73 M^2
GLUCOSE SERPL-MCNC: 160 MG/DL
HCT VFR BLD AUTO: 42.5 %
HGB BLD-MCNC: 13.6 G/DL
LYMPHOCYTES # BLD AUTO: 2 K/UL
LYMPHOCYTES NFR BLD: 34.3 %
MCH RBC QN AUTO: 27.6 PG
MCHC RBC AUTO-ENTMCNC: 32 G/DL
MCV RBC AUTO: 86 FL
MONOCYTES # BLD AUTO: 0.3 K/UL
MONOCYTES NFR BLD: 5 %
NEUTROPHILS # BLD AUTO: 3.5 K/UL
NEUTROPHILS NFR BLD: 59.2 %
PLATELET # BLD AUTO: 235 K/UL
PMV BLD AUTO: 10.8 FL
POTASSIUM SERPL-SCNC: 3.6 MMOL/L
PROT SERPL-MCNC: 7.4 G/DL
RBC # BLD AUTO: 4.92 M/UL
SODIUM SERPL-SCNC: 139 MMOL/L
WBC # BLD AUTO: 5.95 K/UL

## 2018-09-25 PROCEDURE — 99214 OFFICE O/P EST MOD 30 MIN: CPT | Mod: S$PBB,,, | Performed by: INTERNAL MEDICINE

## 2018-09-25 PROCEDURE — 36415 COLL VENOUS BLD VENIPUNCTURE: CPT | Mod: PO

## 2018-09-25 PROCEDURE — 93005 ELECTROCARDIOGRAM TRACING: CPT | Mod: PBBFAC,PO | Performed by: INTERNAL MEDICINE

## 2018-09-25 PROCEDURE — 85025 COMPLETE CBC W/AUTO DIFF WBC: CPT

## 2018-09-25 PROCEDURE — 83036 HEMOGLOBIN GLYCOSYLATED A1C: CPT

## 2018-09-25 PROCEDURE — 80053 COMPREHEN METABOLIC PANEL: CPT

## 2018-09-25 PROCEDURE — 93010 ELECTROCARDIOGRAM REPORT: CPT | Mod: S$PBB,,, | Performed by: INTERNAL MEDICINE

## 2018-09-25 PROCEDURE — 90662 IIV NO PRSV INCREASED AG IM: CPT | Mod: PBBFAC,PO

## 2018-09-25 PROCEDURE — 99214 OFFICE O/P EST MOD 30 MIN: CPT | Mod: PBBFAC,PO | Performed by: INTERNAL MEDICINE

## 2018-09-25 PROCEDURE — G0009 ADMIN PNEUMOCOCCAL VACCINE: HCPCS | Mod: PBBFAC,PO

## 2018-09-25 PROCEDURE — 99999 PR PBB SHADOW E&M-EST. PATIENT-LVL IV: CPT | Mod: PBBFAC,,, | Performed by: INTERNAL MEDICINE

## 2018-09-25 RX ORDER — LOSARTAN POTASSIUM AND HYDROCHLOROTHIAZIDE 25; 100 MG/1; MG/1
1 TABLET ORAL DAILY
Qty: 90 TABLET | Refills: 3 | Status: SHIPPED | OUTPATIENT
Start: 2018-09-25 | End: 2018-12-28 | Stop reason: SDUPTHER

## 2018-09-25 RX ORDER — ZOLPIDEM TARTRATE 10 MG/1
10 TABLET ORAL NIGHTLY PRN
Qty: 30 TABLET | Refills: 2 | Status: SHIPPED | OUTPATIENT
Start: 2018-09-25 | End: 2018-12-28 | Stop reason: SDUPTHER

## 2018-09-25 NOTE — PROGRESS NOTES
SUBJECTIVE     Chief Complaint   Patient presents with    Hypertension     lighthead,     Follow-up    Medication Refill       HPI  Mirza Do is a 63 y.o. male with multiple medical diagnoses as listed in the medical history and problem list that presents for follow-up for medication refill for HTN. He has been fully compliant with meds and denies any adverse effects. He has been checking his BP at home, but does not recall any specific numbers. He is compliant with a low Na diet. He does not do any formal exercise, but does a lot of walking. Pt had a ?syncopal episode ~3 weeks ago while on vacation. He took all of his meds on an empty stomach and then walked the dog. Upon returning, he sat down and experienced some nausea followed by diaphoresis and a ?syncope period. He was possibly unconscious for ~10-15 minutes. He was with his girlfriend, who is a nurse, and she reported his breathing was normal during this time. He felt just fine after regaining consciousness. He had also had a shot of alcohol that morning.     PAST MEDICAL HISTORY:  Past Medical History:   Diagnosis Date    CVA (cerebral infarction)     CVA approx 2011/2012    Diabetes mellitus type II, non insulin dependent 10/14/2016    Erectile dysfunction     Erectile Dysfunction    Hyperlipidemia 10/14/2016    Hypertension     Hypertension    Murmur        PAST SURGICAL HISTORY:  Past Surgical History:   Procedure Laterality Date    BACK SURGERY      discectomy    COLONOSCOPY N/A 8/4/2017    Procedure: COLONOSCOPY;  Surgeon: Mario Alberto Vega MD;  [facility]  Service: Endoscopy;  Laterality: N/A;  Conflict with [facility]'s schedule no other times available with CRS, Pt requested 8/4/17 - moved to be scoped with GI MD - ER    COLONOSCOPY N/A 8/4/2017    Performed by Mario Alberto Vega MD at [facility] ([facility])    HERNIA REPAIR      ROOT CANAL      UMBILICAL HERNIA REPAIR         SOCIAL HISTORY:  Social History     Socioeconomic  History    Marital status: Single     Spouse name: Not on file    Number of children: Not on file    Years of education: Not on file    Highest education level: Not on file   Social Needs    Financial resource strain: Not on file    Food insecurity - worry: Not on file    Food insecurity - inability: Not on file    Transportation needs - medical: Not on file    Transportation needs - non-medical: Not on file   Occupational History    Not on file   Tobacco Use    Smoking status: Former Smoker     Packs/day: 0.10     Years: 4.00     Pack years: 0.40    Smokeless tobacco: Never Used    Tobacco comment: social smoking as teenager   Substance and Sexual Activity    Alcohol use: Yes     Alcohol/week: 6.0 oz     Types: 10 Cans of beer per week     Comment: occasionally    Drug use: No    Sexual activity: Yes     Partners: Female   Other Topics Concern    Not on file   Social History Narrative           FAMILY HISTORY:  Family History   Problem Relation Age of Onset    Cancer Father     Hypertension Brother     Cancer Brother     No Known Problems Sister     No Known Problems Sister     No Known Problems Sister        ALLERGIES AND MEDICATIONS: updated and reviewed.  Review of patient's allergies indicates:   Allergen Reactions    Sulfa (sulfonamide antibiotics)      Current Outpatient Medications   Medication Sig Dispense Refill    albuterol 90 mcg/actuation inhaler Inhale 1-2 puffs into the lungs every 6 (six) hours as needed for Wheezing. 1 Inhaler 0    atorvastatin (LIPITOR) 20 MG tablet Take 1 tablet (20 mg total) by mouth once daily. 90 tablet 3    gabapentin (NEURONTIN) 300 MG capsule Take 1 tab by mouth nightly x 3 days; if no improvement, take 1 tab twice daily x 3 day; if no improvement, take 1 tab 3 times daily 90 capsule 1    losartan-hydrochlorothiazide 100-25 mg (HYZAAR) 100-25 mg per tablet Take 1 tablet by mouth once daily. 90 tablet 3    VIAGRA 100 mg  "tablet TAKE 1 TABLET BY MOUTH AS NEEDED FOR ERECTILE DYSFUNCTION 8 tablet 3    zolpidem (AMBIEN) 10 mg Tab Take 1 tablet (10 mg total) by mouth nightly as needed. 30 tablet 2    ACCU-CHEK STEPHON PLUS TEST STRP Strp 1 strip by Misc.(Non-Drug; Combo Route) route daily as needed (alternate testing time). 50 strip 11    ACCU-CHEK FASTCLIX Misc 1 lancet by Misc.(Non-Drug; Combo Route) route daily as needed. 50 each 11     No current facility-administered medications for this visit.        ROS  Review of Systems   Constitutional: Negative for chills and fever.   HENT: Negative for hearing loss and sore throat.    Eyes: Negative for visual disturbance.   Respiratory: Negative for cough and shortness of breath.    Cardiovascular: Negative for chest pain, palpitations and leg swelling.   Gastrointestinal: Negative for abdominal pain, constipation, diarrhea, nausea and vomiting.   Genitourinary: Negative for dysuria, frequency and urgency.   Musculoskeletal: Negative for arthralgias, joint swelling and myalgias.   Skin: Negative for rash and wound.   Neurological: Negative for headaches.   Psychiatric/Behavioral: Negative for agitation and confusion. The patient is not nervous/anxious.          OBJECTIVE     Physical Exam  Vitals:    09/25/18 1003   BP: 130/74   Pulse: 89   Temp: 98.4 °F (36.9 °C)    Body mass index is 39.69 kg/m².  Weight: 118.4 kg (261 lb 0.4 oz)   Height: 5' 8" (172.7 cm)     Physical Exam   Constitutional: He is oriented to person, place, and time. He appears well-developed and well-nourished. No distress.   HENT:   Head: Normocephalic and atraumatic.   Right Ear: External ear normal.   Left Ear: External ear normal.   Nose: Nose normal.   Mouth/Throat: Oropharynx is clear and moist.   Eyes: Conjunctivae and EOM are normal. Right eye exhibits no discharge. Left eye exhibits no discharge. No scleral icterus.   Neck: Normal range of motion. Neck supple. No JVD present. No tracheal deviation present. "   Cardiovascular: Normal rate, regular rhythm, normal heart sounds and intact distal pulses. Exam reveals no gallop and no friction rub.   No murmur heard.  Pulmonary/Chest: Effort normal and breath sounds normal. No respiratory distress. He has no wheezes.   Abdominal: Soft. Bowel sounds are normal. He exhibits no distension and no mass. There is no tenderness. There is no rebound and no guarding.   Musculoskeletal: Normal range of motion. He exhibits no edema, tenderness or deformity.   Neurological: He is alert and oriented to person, place, and time. He exhibits normal muscle tone. Coordination normal.   Skin: Skin is warm and dry. No rash noted. No erythema.   Psychiatric: He has a normal mood and affect. His behavior is normal. Judgment and thought content normal.         Health Maintenance       Date Due Completion Date    Foot Exam 06/29/2018 6/29/2017    Influenza Vaccine 08/01/2018 10/1/2017 (Done)    Override on 10/1/2017: Done    Override on 10/4/2016: Done    Override on 11/13/2015: Done    Pneumococcal (65+) (1 of 2 - PCV13) 08/29/2018 6/29/2017    Abdominal Aortic Aneurysm Screening 08/29/2018 ---    Hemoglobin A1c 09/19/2018 3/19/2018    Lipid Panel 03/19/2019 3/19/2018    Eye Exam 04/05/2019 4/5/2018    Override on 4/5/2018: Done    Override on 10/20/2016: Done    High Dose Statin 07/05/2019 7/5/2018    TETANUS VACCINE 01/17/2023 1/17/2013    Colonoscopy 08/04/2024 8/4/2017    Override on 7/17/2009: Done            ASSESSMENT     63 y.o. male with     1. Syncope, unspecified syncope type    2. Essential hypertension    3. Insomnia, unspecified type    4. Type 2 diabetes mellitus with diabetic neuropathy, without long-term current use of insulin    5. Need for pneumococcal vaccination    6. Needs flu shot        PLAN:     1. Syncope, unspecified syncope type  - Will plan for cardiac workup as below  - EKG 12-lead; NSR, rate 80s, axis ~-30, normal intervals, no LVH or atrial enlargement, TWI in I,  aVL, and V3-V6, T wave flattening in II and aVF, +PVCs and PACs  - Holter monitor - 48 hour; Future  - Echo 2d complete; Future  - Ambulatory referral to Cardiology    2. Essential hypertension  - BP well controlled; at goal of <140/90  - The current medical regimen is effective;  continue present plan and medications.  - losartan-hydrochlorothiazide 100-25 mg (HYZAAR) 100-25 mg per tablet; Take 1 tablet by mouth once daily.  Dispense: 90 tablet; Refill: 3    3. Insomnia, unspecified type  - Stable; no acute issues  - The current medical regimen is effective;  continue present plan and medications.  - zolpidem (AMBIEN) 10 mg Tab; Take 1 tablet (10 mg total) by mouth nightly as needed.  Dispense: 30 tablet; Refill: 2    4. Type 2 diabetes mellitus with diabetic neuropathy, without long-term current use of insulin  - Stable; no acute issues  - Monitor  - CBC auto differential; Future  - Comprehensive metabolic panel; Future  - Hemoglobin A1c; Future    5. Need for pneumococcal vaccination  - Pneumococcal Conjugate Vaccine (13 Valent) (IM)    6. Needs flu shot  - Influenza - High Dose (65+) (PF) (IM)        RTC in 2 weeks for repeat assessment of current treatment plan       Manjula Honeycutt MD  09/25/2018 10:21 AM        No Follow-up on file.

## 2018-09-26 LAB
ESTIMATED AVG GLUCOSE: 134 MG/DL
HBA1C MFR BLD HPLC: 6.3 %

## 2018-09-27 ENCOUNTER — TELEPHONE (OUTPATIENT)
Dept: FAMILY MEDICINE | Facility: CLINIC | Age: 63
End: 2018-09-27

## 2018-09-27 NOTE — TELEPHONE ENCOUNTER
No answer, unable to leave messages. Mailing letter for patient to schedule his Cardiology appointment. Thanks.

## 2018-10-03 ENCOUNTER — HOSPITAL ENCOUNTER (EMERGENCY)
Facility: HOSPITAL | Age: 63
Discharge: HOME | End: 2018-10-03
Attending: EMERGENCY MEDICINE
Payer: MEDICARE

## 2018-10-03 VITALS
OXYGEN SATURATION: 99 % | WEIGHT: 260 LBS | HEART RATE: 82 BPM | TEMPERATURE: 99 F | BODY MASS INDEX: 39.4 KG/M2 | RESPIRATION RATE: 18 BRPM | DIASTOLIC BLOOD PRESSURE: 85 MMHG | SYSTOLIC BLOOD PRESSURE: 149 MMHG | HEIGHT: 68 IN

## 2018-10-03 DIAGNOSIS — R19.7 DIARRHEA, UNSPECIFIED TYPE: ICD-10-CM

## 2018-10-03 DIAGNOSIS — R68.83 CHILLS (WITHOUT FEVER): Primary | ICD-10-CM

## 2018-10-03 LAB
GLUCOSE SERPL-MCNC: 132 MG/DL (ref 70–110)
POCT GLUCOSE: 132 MG/DL (ref 70–110)

## 2018-10-03 PROCEDURE — 25000003 PHARM REV CODE 250: Performed by: EMERGENCY MEDICINE

## 2018-10-03 PROCEDURE — 99283 EMERGENCY DEPT VISIT LOW MDM: CPT | Mod: 25

## 2018-10-03 PROCEDURE — 82962 GLUCOSE BLOOD TEST: CPT

## 2018-10-03 RX ORDER — ACETAMINOPHEN 325 MG/1
650 TABLET ORAL
Status: COMPLETED | OUTPATIENT
Start: 2018-10-03 | End: 2018-10-03

## 2018-10-03 RX ADMIN — ACETAMINOPHEN 650 MG: 325 TABLET ORAL at 06:10

## 2018-10-03 NOTE — ED PROVIDER NOTES
"Encounter Date: 10/3/2018    SCRIBE #1 NOTE: I, Rich Morales , am scribing for, and in the presence of,  Adina Potts MD . I have scribed the following portions of the note - Other sections scribed: HPI, ROS .       History     Chief Complaint   Patient presents with    Fever     "Chills and fever" x 3 days      CC: Fever    HPI: 63 y.o M who has a past medical history of CVA (cerebral infarction), Diabetes mellitus type II, Hyperlipidemia, Hypertension presents to the ED c/o  chills, subjective fever, dry mouth, diarrhea, generalized body aches which began 3x days ago after receiving a flu and pneumonia shot. Pt reports he has been drinking a lot of water and has been urinating frequently. No prior medication at home for his symptoms. No pain/burning during medication, no nausea, emesis, cough, chest pain, dizziness, lightheadedness, of fever.         The history is provided by the patient. No  was used.     Review of patient's allergies indicates:   Allergen Reactions    Sulfa (sulfonamide antibiotics)      Past Medical History:   Diagnosis Date    CVA (cerebral infarction)     CVA approx 2011/2012    Diabetes mellitus type II, non insulin dependent 10/14/2016    Erectile dysfunction     Erectile Dysfunction    Hyperlipidemia 10/14/2016    Hypertension     Hypertension    Murmur      Past Surgical History:   Procedure Laterality Date    BACK SURGERY      discectomy    COLONOSCOPY N/A 8/4/2017    Procedure: COLONOSCOPY;  Surgeon: Mario Alberto Vega MD;  [facility]  Service: Endoscopy;  Laterality: N/A;  Conflict with [facility]'s schedule no other times available with CRS, Pt requested 8/4/17 - moved to be scoped with GI MD - ER    COLONOSCOPY N/A 8/4/2017    Performed by Mario Alberto Vega MD at [facility] (4[facility])    HERNIA REPAIR      ROOT CANAL      UMBILICAL HERNIA REPAIR       Family History   Problem Relation Age of Onset    Cancer Father     Hypertension Brother     " Cancer Brother     No Known Problems Sister     No Known Problems Sister     No Known Problems Sister      Social History     Tobacco Use    Smoking status: Former Smoker     Packs/day: 0.10     Years: 4.00     Pack years: 0.40    Smokeless tobacco: Never Used    Tobacco comment: social smoking as teenager   Substance Use Topics    Alcohol use: Yes     Alcohol/week: 6.0 oz     Types: 10 Cans of beer per week     Comment: occasionally    Drug use: No     Review of Systems   Constitutional: Positive for chills. Negative for activity change, diaphoresis and fever.        (+) Generalized body ache     HENT: Negative for congestion, drooling, ear pain, rhinorrhea, sneezing, sore throat and trouble swallowing.    Eyes: Negative for pain.   Respiratory: Positive for wheezing (reports intermittent wheezing). Negative for cough, chest tightness, shortness of breath and stridor.    Cardiovascular: Negative for chest pain, palpitations and leg swelling.   Gastrointestinal: Positive for diarrhea. Negative for abdominal distention, abdominal pain, constipation, nausea and vomiting.   Genitourinary: Negative for difficulty urinating, dysuria and urgency.   Musculoskeletal: Positive for myalgias. Negative for arthralgias, back pain, neck pain and neck stiffness.   Skin: Negative for pallor, rash and wound.   Neurological: Negative for dizziness, syncope, weakness, light-headedness, numbness and headaches.   All other systems reviewed and are negative.      Physical Exam     Initial Vitals [10/03/18 0608]   BP Pulse Resp Temp SpO2   (!) 164/90 79 18 98.5 °F (36.9 °C) 97 %      MAP       --         Physical Exam    Constitutional: He appears well-developed and well-nourished. He is not diaphoretic. No distress.   HENT:   Mouth/Throat: Oropharynx is clear and moist.   Eyes: Pupils are equal, round, and reactive to light.   Neck: Neck supple.   Cardiovascular: Normal rate and regular rhythm.   Pulmonary/Chest: Breath sounds  normal. No respiratory distress. He has no wheezes. He has no rhonchi. He has no rales.   Abdominal: Soft. Bowel sounds are normal.   Lymphadenopathy:     He has no cervical adenopathy.   Neurological: He is alert and oriented to person, place, and time.   Skin: Skin is warm and dry.   Psychiatric: He has a normal mood and affect.         ED Course   Procedures  Labs Reviewed   POCT GLUCOSE MONITORING CONTINUOUS          Imaging Results    None          Medical Decision Making:   Initial Assessment:   63-year-old male with diabetes and hypertension presents with intermittent fever and chills for 3 days says receiving his pneumonia vaccinations.  No associated cough, abdominal pain, sore throat. He does report intermittent nonbloody diarrhea.  Patient is well appearing on exam.  There is no focal findings in his HEENT or respiratory exam.  Given report of poly tipsy a and polyuria, will check blood glucose.  Treat with Tylenol.  I do not think patient has influenza as he is afebrile here.  I do not suspect acute dehydration as he is well appearing with moist mucous membranes on exam.  Patient treated with Tylenol.  Blood glucose 132.  Discharge with primary care follow-up.  Adina Potts MD              Scribe Attestation:   Scribe #1: I performed the above scribed service and the documentation accurately describes the services I performed. I attest to the accuracy of the note.    Attending Attestation:           Physician Attestation for Scribe:  Physician Attestation Statement for Scribe #1: I, Adina Potts MD , reviewed documentation, as scribed by Rich Morales  in my presence, and it is both accurate and complete.                    Clinical Impression:   The primary encounter diagnosis was Chills (without fever). A diagnosis of Diarrhea, unspecified type was also pertinent to this visit.                             Adina Potts MD  10/03/18 [id number]

## 2018-10-03 NOTE — ED TRIAGE NOTES
"Pt reports to ED with c/o chills and a fever that started Monday as well as N/D and loss of appetite. Pt received the flu shot last week. Pt reports some shortness of breath, denies chest pain. Pt also c/o "aching in my bones." Pt has not taken anything for relief.  "

## 2018-10-05 ENCOUNTER — HOSPITAL ENCOUNTER (EMERGENCY)
Facility: HOSPITAL | Age: 63
Discharge: HOME | End: 2018-10-05
Attending: EMERGENCY MEDICINE
Payer: MEDICARE

## 2018-10-05 ENCOUNTER — HOSPITAL ENCOUNTER (OUTPATIENT)
Dept: CARDIOLOGY | Facility: HOSPITAL | Age: 63
Discharge: HOME | End: 2018-10-05
Attending: INTERNAL MEDICINE
Payer: MEDICARE

## 2018-10-05 VITALS
RESPIRATION RATE: 20 BRPM | HEIGHT: 68 IN | SYSTOLIC BLOOD PRESSURE: 138 MMHG | WEIGHT: 261 LBS | DIASTOLIC BLOOD PRESSURE: 92 MMHG | BODY MASS INDEX: 39.56 KG/M2 | TEMPERATURE: 98 F | HEART RATE: 72 BPM | OXYGEN SATURATION: 94 %

## 2018-10-05 DIAGNOSIS — R55 SYNCOPE, UNSPECIFIED SYNCOPE TYPE: ICD-10-CM

## 2018-10-05 DIAGNOSIS — H92.02 LEFT EAR PAIN: Primary | ICD-10-CM

## 2018-10-05 PROBLEM — I51.89 DIASTOLIC DYSFUNCTION WITHOUT HEART FAILURE: Status: ACTIVE | Noted: 2018-10-05

## 2018-10-05 PROCEDURE — 93227 XTRNL ECG REC<48 HR R&I: CPT | Mod: ,,, | Performed by: INTERNAL MEDICINE

## 2018-10-05 PROCEDURE — 99281 EMR DPT VST MAYX REQ PHY/QHP: CPT

## 2018-10-05 PROCEDURE — 93225 XTRNL ECG REC<48 HRS REC: CPT

## 2018-10-05 NOTE — DISCHARGE INSTRUCTIONS
You can purchase Flonase nasal spray over-the-counter and use as directed on the label.    Follow-up with ENT.    Return to the emergency department for any concerns.

## 2018-10-05 NOTE — ED PROVIDER NOTES
"Encounter Date: 10/5/2018  SORT MSE:  Pt is a 63 y.o. male who presents for emergent consideration for foreign body in left ear. Pt will be moved to room when one is available, otherwise will wait in waiting room with triage nurse supervision.  Pt arrived by ambulatory. He is not in distress. Orders have not been placed. SHERYL Anderson DNP ACNP-BC 10/5/2018 11:38 AM     SCRIBE #1 NOTE: I, Marcus Mora, am scribing for, and in the presence of,  Pepper Newman PA-C. I have scribed the following portions of the note - Other sections scribed: HPI, ROS.       History     Chief Complaint   Patient presents with    Otalgia     left ear pain that began yesterday; pt reports that feels like something is "sitting on my ear drum"; denies drainage from ear; reports face began to hurt also     CC: Otalgia    The patient is a 62 y/o male with past medical hx of CVA, DM type 2, ED, hyperlipidemia, HTN, and a murmur that presents for emergent consideration of moderate (5/10), constant, L ear pain that began yesterday. He describes the pain as a "throbbing" throughout his ear and the L side of his face. He also describes it if there is a foreign body in "sitting in there". The pt denies drainage. He denies sinus issues, congestion, sore throat, HA, weakness, or numbness. No tx PTA.       The history is provided by the patient. No  was used.     Review of patient's allergies indicates:   Allergen Reactions    Sulfa (sulfonamide antibiotics)      Past Medical History:   Diagnosis Date    CVA (cerebral infarction)     CVA approx 2011/2012    Diabetes mellitus type II, non insulin dependent 10/14/2016    Erectile dysfunction     Erectile Dysfunction    Hyperlipidemia 10/14/2016    Hypertension     Hypertension    Murmur      Past Surgical History:   Procedure Laterality Date    BACK SURGERY      discectomy    COLONOSCOPY N/A 8/4/2017    Procedure: COLONOSCOPY;  Surgeon: Mario Alberto Vega MD;  Location: [facility]" (4TH FLR);  Service: Endoscopy;  Laterality: N/A;  Conflict with Jack's schedule no other times available with CRS, Pt requested 8/4/17 - moved to be scoped with GI MD - ER    COLONOSCOPY N/A 8/4/2017    Performed by [facility] Alber[facility] MD at [facility] (4TH FLR)    HERNIA REPAIR      ROOT CANAL      UMBILICAL HERNIA REPAIR       Family History   Problem Relation Age of Onset    Cancer Father     Hypertension Brother     Cancer Brother     No Known Problems Sister     No Known Problems Sister     No Known Problems Sister      Social History     Tobacco Use    Smoking status: Former Smoker     Packs/day: 0.10     Years: 4.00     Pack years: 0.40    Smokeless tobacco: Never Used    Tobacco comment: social smoking as teenager   Substance Use Topics    Alcohol use: Yes     Alcohol/week: 6.0 oz     Types: 10 Cans of beer per week     Comment: occasionally    Drug use: No     Review of Systems   Constitutional: Negative for chills and fever.   HENT: Positive for ear pain. Negative for congestion, ear discharge, facial swelling, hearing loss, rhinorrhea, sinus pressure, sinus pain and sore throat.    Eyes: Negative for pain and redness.   Respiratory: Negative for cough and shortness of breath.    Cardiovascular: Negative for chest pain.   Gastrointestinal: Negative for abdominal pain, diarrhea, nausea and vomiting.   Genitourinary: Negative for dysuria, flank pain, frequency, hematuria and urgency.   Musculoskeletal: Negative for back pain and neck pain.   Skin: Negative for rash.   Neurological: Negative for weakness, light-headedness, numbness and headaches.   Psychiatric/Behavioral: Negative for confusion.   All other systems reviewed and are negative.      Physical Exam     Initial Vitals [10/05/18 1140]   BP Pulse Resp Temp SpO2   (!) 143/91 67 18 98.1 °F (36.7 °C) 99 %      MAP       --         Physical Exam    Nursing note and vitals reviewed.  Constitutional: Vital signs are normal. He appears  well-developed and well-nourished. He is not diaphoretic. He is cooperative.  Non-toxic appearance. He does not have a sickly appearance. He does not appear ill. No distress.   HENT:   Head: Normocephalic and atraumatic.   Right Ear: Tympanic membrane, external ear and ear canal normal. No lacerations. No drainage, swelling or tenderness. No foreign bodies. No mastoid tenderness. Tympanic membrane is not injected, not scarred, not perforated, not erythematous, not retracted and not bulging. No middle ear effusion. No hemotympanum.   Left Ear: Tympanic membrane, external ear and ear canal normal. No lacerations. No drainage, swelling or tenderness. No foreign bodies. No mastoid tenderness. Tympanic membrane is not injected, not scarred, not perforated, not erythematous, not retracted and not bulging.  No middle ear effusion. No hemotympanum.   Nose: Nose normal.   Mouth/Throat: Uvula is midline, oropharynx is clear and moist and mucous membranes are normal. No oral lesions. No trismus in the jaw. No dental abscesses or uvula swelling. No oropharyngeal exudate, posterior oropharyngeal edema or posterior oropharyngeal erythema.   Eyes: Conjunctivae, EOM and lids are normal. Pupils are equal, round, and reactive to light.   Neck: Trachea normal, normal range of motion, full passive range of motion without pain and phonation normal. Neck supple.   Cardiovascular: Normal rate, regular rhythm, normal heart sounds and intact distal pulses. Exam reveals no gallop and no friction rub.    No murmur heard.  Pulmonary/Chest: Effort normal and breath sounds normal. No respiratory distress. He has no decreased breath sounds. He has no wheezes. He has no rhonchi. He has no rales.   Abdominal: Soft. Normal appearance and bowel sounds are normal. He exhibits no distension. There is no tenderness.   Musculoskeletal: Normal range of motion.   Lymphadenopathy:     He has no cervical adenopathy.   Neurological: He is alert and oriented  to person, place, and time. He has normal strength.   Skin: Skin is warm and dry. Capillary refill takes less than 2 seconds. No rash noted.   Psychiatric: He has a normal mood and affect. His speech is normal and behavior is normal. Judgment and thought content normal. Cognition and memory are normal.         ED Course   Procedures  Labs Reviewed - No data to display       Imaging Results    None                APC / Resident Notes:   This is an evaluation of a 63 y.o. male that presents to the Emergency Department for left otalgia x 1 day. Patient reports left ear pain and foreign body sensation. He denies drainage, facial swelling, dental pain, sinus congestion, sinus pressure or further symptoms. Denies attempted treatment.    Exam findings: Patient is non-toxic, afebrile and well appearing.  TMs are clear bilaterally.  There is no cerumen impaction.  There is no foreign body.  The external ear and ear canals are clear without drainage or erythema.  There is no tenderness with manipulation of the tragus.  There is no mastoid tenderness or erythema.  Good dentition, no abscess or dental caries.  Neck is supple, full range of motion noted. No cervical adenopathy.  Posterior oropharynx is clear.  Lungs are clear to ausculation bilaterally.    If available, past records have been reviewed.  Vitals are reassuring.    My overall impression: left otalgia  DDx: otalgia, otitis media, otitis externa, foreign body    ED course:  I will recommend Flonase and use of daily antihistamine.  I will recommend follow up with ENT.  Patient is stable for discharge. ED return precautions given.    The diagnosis and treatment plan have been discussed with the patient. All questions and concerns have been addressed. Patient expressed understanding. An educational information sheet was given to the patient prior to discharge.     This case has been discussed with Dr. Boyle and he is in agreement of the diagnosis and treatment plan.        Pepper Newman PA-C         Scribe Attestation:   Scribe #1: I performed the above scribed service and the documentation accurately describes the services I performed. I attest to the accuracy of the note.    Attending Attestation:           Physician Attestation for Scribe:  Physician Attestation Statement for Scribe #1: I, Pepper Newman PA-C, reviewed documentation, as scribed by Marcus Mora in my presence, and it is both accurate and complete.                    Clinical Impression:   The encounter diagnosis was Left ear pain.      Disposition:   Disposition: Discharged  Condition: Stable                        Pepper Newman PA-C  10/05/18 [id number]

## 2018-10-05 NOTE — ED TRIAGE NOTES
"Pt c/o LEFT sided ear pain, LEFT sided headache x2 days. Pt reports last night it felt like something was moving in his ear and "hitting" his ear drum. Denies hearing change. Pain is 9/10. Pt took 2 aspirin this AM and had releif  "

## 2018-10-08 LAB
OHS CV HOLTER LENGTH DECIMAL HOURS: 23.98
OHS CV HOLTER LENGTH HOURS: 23
OHS CV HOLTER LENGTH MINUTES: 59

## 2018-10-11 ENCOUNTER — TELEPHONE (OUTPATIENT)
Dept: FAMILY MEDICINE | Facility: CLINIC | Age: 63
End: 2018-10-11

## 2018-10-11 NOTE — TELEPHONE ENCOUNTER
----- Message from Irma Pearce sent at 10/11/2018  2:07 PM CDT -----  Contact: Mirza [phone]  Patient is requesting a call back from the staff in regards to his results from his test taken on October 5. Please call at your earliest convenience.

## 2018-10-11 NOTE — TELEPHONE ENCOUNTER
Patient informed of results, left message Carlos's voicemail to call patient to schedule Cardiology appointment

## 2018-10-17 ENCOUNTER — TELEPHONE (OUTPATIENT)
Dept: FAMILY MEDICINE | Facility: CLINIC | Age: 63
End: 2018-10-17

## 2018-10-17 NOTE — TELEPHONE ENCOUNTER
----- Message from Kylee Dahl sent at 10/17/2018  3:13 PM CDT -----  Contact: Self   Pt calling regarding handicap sticker. [phone]

## 2018-10-18 NOTE — TELEPHONE ENCOUNTER
Left message on voicemail. We have do diagnosis for handicap sticker. Call back to let us know what problem you are having to require handicap sticker

## 2018-10-22 ENCOUNTER — OFFICE VISIT (OUTPATIENT)
Dept: CARDIOLOGY | Facility: CLINIC | Age: 63
End: 2018-10-22
Payer: MEDICARE

## 2018-10-22 VITALS
WEIGHT: 261.94 LBS | OXYGEN SATURATION: 98 % | HEIGHT: 68 IN | DIASTOLIC BLOOD PRESSURE: 62 MMHG | BODY MASS INDEX: 39.7 KG/M2 | HEART RATE: 76 BPM | RESPIRATION RATE: 15 BRPM | SYSTOLIC BLOOD PRESSURE: 126 MMHG

## 2018-10-22 DIAGNOSIS — E11.40 TYPE 2 DIABETES MELLITUS WITH DIABETIC NEUROPATHY, WITHOUT LONG-TERM CURRENT USE OF INSULIN: ICD-10-CM

## 2018-10-22 DIAGNOSIS — E78.2 MIXED HYPERLIPIDEMIA: ICD-10-CM

## 2018-10-22 DIAGNOSIS — R42 DIZZINESS: Primary | ICD-10-CM

## 2018-10-22 DIAGNOSIS — E66.01 SEVERE OBESITY (BMI 35.0-39.9) WITH COMORBIDITY: ICD-10-CM

## 2018-10-22 DIAGNOSIS — R06.02 SOB (SHORTNESS OF BREATH): ICD-10-CM

## 2018-10-22 DIAGNOSIS — R94.31 ABNORMAL EKG: ICD-10-CM

## 2018-10-22 DIAGNOSIS — G47.33 OSA (OBSTRUCTIVE SLEEP APNEA): ICD-10-CM

## 2018-10-22 DIAGNOSIS — I10 ESSENTIAL HYPERTENSION, BENIGN: Chronic | ICD-10-CM

## 2018-10-22 DIAGNOSIS — I42.8 OTHER CARDIOMYOPATHIES: ICD-10-CM

## 2018-10-22 PROCEDURE — 99213 OFFICE O/P EST LOW 20 MIN: CPT | Mod: PBBFAC | Performed by: INTERNAL MEDICINE

## 2018-10-22 PROCEDURE — 93005 ELECTROCARDIOGRAM TRACING: CPT | Mod: PBBFAC | Performed by: INTERNAL MEDICINE

## 2018-10-22 PROCEDURE — 99204 OFFICE O/P NEW MOD 45 MIN: CPT | Mod: S$PBB,,, | Performed by: INTERNAL MEDICINE

## 2018-10-22 PROCEDURE — 93010 ELECTROCARDIOGRAM REPORT: CPT | Mod: S$PBB,,, | Performed by: INTERNAL MEDICINE

## 2018-10-22 PROCEDURE — 99999 PR PBB SHADOW E&M-EST. PATIENT-LVL III: CPT | Mod: PBBFAC,,, | Performed by: INTERNAL MEDICINE

## 2018-10-22 RX ORDER — NAPROXEN SODIUM 220 MG/1
81 TABLET, FILM COATED ORAL DAILY
COMMUNITY

## 2018-10-22 NOTE — PROGRESS NOTES
CARDIOVASCULAR CONSULTATION    REASON FOR CONSULT:   Mirza Do is a 63 y.o. male who presents for eval of LOC/dizziness.    Req/PCP: Yinka  HISTORY OF PRESENT ILLNESS:   The patient is a very pleasant 63-year-old  man referred at the request of his primary care physician for evaluation of dizziness.  The initial evaluation was actually scheduled for evaluation syncope, but the patient denies loss of consciousness.  He does report episodic dizziness, although he did recently have a Holter with arrhythmia but no symptoms during the Holter monitoring.  He denies angina or dyspnea.  There has been no PND, orthopnea, or lower extremity edema.  He has had no melena, hematuria, or claudicant symptoms.  The patient does report snoring and probable sleep apnea as well as up past sleep study, but does not use CPAP.    Family history appears to be negative for premature CAD or sudden cardiac death in first-degree relatives.    The patient is a nonsmoker.    CARDIOVASCULAR HISTORY:   PAT (Holter 10/2018)  CMP 45% (echo 10/2018)  ?YUNIEL, not on CPAP    PAST MEDICAL HISTORY:     Past Medical History:   Diagnosis Date    CVA (cerebral infarction)     CVA approx 2011/2012    Diabetes mellitus type II, non insulin dependent 10/14/2016    Erectile dysfunction     Erectile Dysfunction    Hyperlipidemia 10/14/2016    Hypertension     Hypertension    Murmur        PAST SURGICAL HISTORY:     Past Surgical History:   Procedure Laterality Date    BACK SURGERY      discectomy    COLONOSCOPY N/A 8/4/2017    Procedure: COLONOSCOPY;  Surgeon: Mario Alberto Vega MD;  Location: [facility] (4TH FLR);  Service: Endoscopy;  Laterality: N/A;  Conflict with [facility]'s schedule no other times available with CRS, Pt requested 8/4/17 - moved to be scoped with GI MD - ER    COLONOSCOPY N/A 8/4/2017    Performed by Mario Alberto Vega MD at [facility] (4TH FLR)    HERNIA REPAIR      ROOT CANAL      UMBILICAL HERNIA REPAIR         ALLERGIES  AND MEDICATION:     Review of patient's allergies indicates:   Allergen Reactions    Sulfa (sulfonamide antibiotics)         Medication List           Accurate as of 10/22/18 11:47 AM. If you have any questions, ask your nurse or doctor.               CONTINUE taking these medications    ACCU-CHEK STEPHON PLUS TEST STRP Strp  Generic drug:  blood sugar diagnostic  1 strip by Misc.(Non-Drug; Combo Route) route daily as needed (alternate testing time).     ACCU-CHEK FASTCLIX LANCING DEV Misc  Generic drug:  lancets  1 lancet by Misc.(Non-Drug; Combo Route) route daily as needed.     albuterol 90 mcg/actuation inhaler  Commonly known as:  PROVENTIL/VENTOLIN HFA  Inhale 1-2 puffs into the lungs every 6 (six) hours as needed for Wheezing.     aspirin 81 MG Chew     atorvastatin 20 MG tablet  Commonly known as:  LIPITOR  Take 1 tablet (20 mg total) by mouth once daily.     gabapentin 300 MG capsule  Commonly known as:  NEURONTIN  Take 1 tab by mouth nightly x 3 days; if no improvement, take 1 tab twice daily x 3 day; if no improvement, take 1 tab 3 times daily     losartan-hydrochlorothiazide 100-25 mg 100-25 mg per tablet  Commonly known as:  HYZAAR  Take 1 tablet by mouth once daily.     VIAGRA 100 MG tablet  Generic drug:  sildenafil  TAKE 1 TABLET BY MOUTH AS NEEDED FOR ERECTILE DYSFUNCTION     zolpidem 10 mg Tab  Commonly known as:  AMBIEN  Take 1 tablet (10 mg total) by mouth nightly as needed.            SOCIAL HISTORY:     Social History     Socioeconomic History    Marital status: Single     Spouse name: Not on file    Number of children: Not on file    Years of education: Not on file    Highest education level: Not on file   Social Needs    Financial resource strain: Not on file    Food insecurity - worry: Not on file    Food insecurity - inability: Not on file    Transportation needs - medical: Not on file    Transportation needs - non-medical: Not on file   Occupational History    Not on file  "  Tobacco Use    Smoking status: Former Smoker     Packs/day: 0.10     Years: 4.00     Pack years: 0.40    Smokeless tobacco: Never Used    Tobacco comment: social smoking as teenager   Substance and Sexual Activity    Alcohol use: Yes     Alcohol/week: 6.0 oz     Types: 10 Cans of beer per week     Comment: occasionally    Drug use: No    Sexual activity: Yes     Partners: Female   Other Topics Concern    Not on file   Social History Narrative           FAMILY HISTORY:     Family History   Problem Relation Age of Onset    Cancer Father     Hypertension Brother     Cancer Brother     No Known Problems Sister     No Known Problems Sister     No Known Problems Sister        REVIEW OF SYSTEMS:   Review of Systems   Constitutional: Negative for chills, diaphoresis and fever.   HENT: Negative for nosebleeds.    Eyes: Negative for blurred vision, double vision and photophobia.   Respiratory: Negative for hemoptysis, shortness of breath and wheezing.    Cardiovascular: Negative for chest pain, palpitations, orthopnea, claudication, leg swelling and PND.   Gastrointestinal: Negative for abdominal pain, blood in stool, heartburn, melena, nausea and vomiting.   Genitourinary: Negative for flank pain and hematuria.   Musculoskeletal: Negative for falls, myalgias and neck pain.   Skin: Negative for rash.   Neurological: Positive for dizziness. Negative for seizures, loss of consciousness, weakness and headaches.   Endo/Heme/Allergies: Negative for polydipsia. Does not bruise/bleed easily.   Psychiatric/Behavioral: Negative for depression and memory loss. The patient is not nervous/anxious.        PHYSICAL EXAM:     Vitals:    10/22/18 1122   BP: 126/62   Pulse: 76   Resp: 15    Body mass index is 39.82 kg/m².  Weight: 118.8 kg (261 lb 14.5 oz)   Height: 5' 8" (172.7 cm)     Physical Exam   Constitutional: He is oriented to person, place, and time. He appears well-developed and well-nourished. " He is cooperative.  Non-toxic appearance. No distress.   HENT:   Head: Normocephalic and atraumatic.   Eyes: Conjunctivae and EOM are normal. Pupils are equal, round, and reactive to light. No scleral icterus.   Neck: Trachea normal and normal range of motion. Neck supple. Normal carotid pulses and no JVD present. Carotid bruit is not present. No neck rigidity. No tracheal deviation and no edema present. No thyromegaly present.   Cardiovascular: Normal rate, regular rhythm, S1 normal and S2 normal. PMI is not displaced. Exam reveals no gallop and no friction rub.   No murmur heard.  Pulses:       Carotid pulses are 2+ on the right side, and 2+ on the left side.  Pulmonary/Chest: Effort normal and breath sounds normal. No stridor. No respiratory distress. He has no wheezes. He has no rales. He exhibits no tenderness.   Abdominal: Soft. He exhibits no distension. There is no hepatosplenomegaly.   obese   Musculoskeletal: He exhibits no edema or tenderness.   Feet:   Right Foot:   Skin Integrity: Negative for ulcer.   Left Foot:   Skin Integrity: Negative for ulcer.   Neurological: He is alert and oriented to person, place, and time. No cranial nerve deficit.   Skin: Skin is warm and dry. No rash noted. No erythema.   Psychiatric: He has a normal mood and affect. His speech is normal and behavior is normal.   Vitals reviewed.      DATA:   EKG: (personally reviewed tracing)  10/22/18 SR 76, LAD, lat ST abnl ?isch    Laboratory:  CBC:  Recent Labs   Lab 03/04/16  0910 03/19/18  0815 09/25/18  1110   WHITE BLOOD CELL COUNT 5.70 6.36 5.95   HEMOGLOBIN 14.1 13.3 L 13.6 L   HEMATOCRIT 43.8 41.2 42.5   PLATELETS 231 242 235       CHEMISTRIES:  Recent Labs   Lab 11/19/15  0732  06/29/17  1015 03/19/18  0815 09/25/18  1110   GLUCOSE 128 H   < > 96 105 160 H   SODIUM 139   < > 141 140 139   POTASSIUM 3.8   < > 3.8 4.2 3.6   BUN BLD 5 L   < > 11 19 21   CREATININE 0.8   < > 0.8 0.8 1.0   EGFR IF  >60   < > >60.0  >60 >60   EGFR IF NON- >60   < > >60.0 >60 >60   CALCIUM 9.1   < > 9.4 8.9 9.8   MAGNESIUM 2.4  --   --   --   --     < > = values in this interval not displayed.       CARDIAC BIOMARKERS:  Recent Labs   Lab 11/19/15  0732 11/19/15  1536 11/19/15  1916   TROPONIN I 0.024 0.017 0.020       COAGS:  Recent Labs   Lab 11/19/15  0732   INR 0.9       LIPIDS/LFTS:  Recent Labs   Lab 03/04/16  0910 06/29/17  1015 03/19/18  0815 09/25/18  1110   CHOLESTEROL 224 H 170 188  --    TRIGLYCERIDES 89 94 122  --    HDL 64 49 52  --    LDL CHOLESTEROL 142.2 102.2 111.6  --    NON-HDL CHOLESTEROL 160 121 136  --    AST 26 29 27 24   ALT 40 40 42 39     Lab Results   Component Value Date    HGBA1C 6.3 (H) 09/25/2018     The 10-year ASCVD risk score (Colemariel HOWELL Jr., et al., 2013) is: 27%    Values used to calculate the score:      Age: 63 years      Sex: Male      Is Non- : Yes      Diabetic: Yes      Tobacco smoker: No      Systolic Blood Pressure: 126 mmHg      Is BP treated: Yes      HDL Cholesterol: 52 mg/dL      Total Cholesterol: 188 mg/dL      Cardiovascular Testing:  Holter 10/5/18  Paroxsymal atrial tachycardia noted during monitoring period (140 BPM).  PACs and PVCs also noted.  Diary not returned.    Echo 10/5/18  · Left ventricle ejection fraction is mildly decreased at 45%, mild global hypokinesis.  · Left ventricle shows concentric remodeling.  · Grade I (mild) left ventricular diastolic dysfunction consistent with impaired relaxation.  · Tricuspid valve shows trace regurgitation.  · The estimated PA systolic pressure is 33.60 mm Hg    ASSESSMENT:   # Dizziness without LOC, no recurrence  # PAT, Holter 10/2018, no sxs during holter period  # CMP, EF 45% (echo 10/2018)  # abnl EKG  # HTN, controlled  # HLP on atorva 20mg, pt reports noncompliance  # ?DM  # BMI 40  # ?YUNIEL, pt reports possible sleep study several yrs (?[facility]) ago but is not on CPAP    PLAN:   Cont med rx  Resume atorva  20mg qhs  Check Ex MPI  Diet/exercise/weight loss  YUNIEL eval  RTC 1 month  Check lipids/LFT 3 months (mid Jan 2019)      Paul Fernandez MD, FACC

## 2018-10-22 NOTE — LETTER
October 22, 2018      Manjula Honeycutt MD  [address] [address]  [address]  [place] [address]           [facility]  [address] [facility] [address]  [place] [address]  Phone: [phone]          Patient: Mirza Do   MR Number: [id number]   YOB: 1955   Date of Visit: 10/22/2018       Dear Dr. Manjula Honeycutt:    Thank you for referring Mirza Do to me for evaluation. Attached you will find relevant portions of my assessment and plan of care.    If you have questions, please do not hesitate to call me. I look forward to following Mirza Do along with you.    Sincerely,    Paul Fernandez MD    Enclosure  CC:  No Recipients    If you would like to receive this communication electronically, please contact [email or web address] or [phone] to request more information on [facility] Link access.    For providers and/or their staff who would like to refer a patient to [facility], please contact us through our one-stop-shop provider referral line, [facility], at [phone].    If you feel you have received this communication in error or would no longer like to receive these types of communications, please e-mail [email or web address]

## 2018-10-30 ENCOUNTER — HOSPITAL ENCOUNTER (OUTPATIENT)
Dept: RADIOLOGY | Facility: HOSPITAL | Age: 63
Discharge: HOME | End: 2018-10-30
Attending: INTERNAL MEDICINE
Payer: MEDICARE

## 2018-10-30 ENCOUNTER — HOSPITAL ENCOUNTER (EMERGENCY)
Facility: HOSPITAL | Age: 63
Discharge: HOME | End: 2018-10-30
Attending: EMERGENCY MEDICINE
Payer: MEDICARE

## 2018-10-30 ENCOUNTER — HOSPITAL ENCOUNTER (OUTPATIENT)
Dept: CARDIOLOGY | Facility: HOSPITAL | Age: 63
Discharge: HOME | End: 2018-10-30
Attending: INTERNAL MEDICINE
Payer: MEDICARE

## 2018-10-30 VITALS
DIASTOLIC BLOOD PRESSURE: 86 MMHG | HEART RATE: 64 BPM | OXYGEN SATURATION: 96 % | TEMPERATURE: 98 F | SYSTOLIC BLOOD PRESSURE: 151 MMHG | RESPIRATION RATE: 19 BRPM | BODY MASS INDEX: 39.4 KG/M2 | WEIGHT: 260 LBS | HEIGHT: 68 IN

## 2018-10-30 DIAGNOSIS — R94.31 ABNORMAL EKG: ICD-10-CM

## 2018-10-30 DIAGNOSIS — E11.40 TYPE 2 DIABETES MELLITUS WITH DIABETIC NEUROPATHY, WITHOUT LONG-TERM CURRENT USE OF INSULIN: ICD-10-CM

## 2018-10-30 DIAGNOSIS — R05.9 COUGH: Primary | ICD-10-CM

## 2018-10-30 DIAGNOSIS — I42.8 OTHER CARDIOMYOPATHIES: ICD-10-CM

## 2018-10-30 DIAGNOSIS — R42 DIZZINESS: ICD-10-CM

## 2018-10-30 LAB
CV STRESS BASE HR: 66
DIASTOLIC BLOOD PRESSURE: 87
OHS CV CPX 1 MINUTE RECOVERY HEART RATE: 144 BPM
OHS CV CPX 85 PERCENT MAX PREDICTED HEART RATE MALE: 132
OHS CV CPX ESTIMATED METS: 5
OHS CV CPX MAX PREDICTED HEART RATE: 155
OHS CV CPX PATIENT IS FEMALE: 0
OHS CV CPX PATIENT IS MALE: 1
OHS CV CPX PEAK DIASTOLIC BLOOD PRESSURE: 123 MMHG
OHS CV CPX PEAK HEAR RATE: 171
OHS CV CPX PEAK RATE PRESSURE PRODUCT: NORMAL
OHS CV CPX PEAK SYSTOLIC BLOOD PRESSURE: 221
OHS CV CPX PERCENT MAX PREDICTED HEART RATE ACHIEVED: 110
OHS CV CPX RATE PRESSURE PRODUCT PRESENTING: NORMAL
STRESS ANGINA INDEX: 0
STRESS ECHO POST EXERCISE DUR MIN: 3 MIN
STRESS ECHO POST EXERCISE DUR SEC: 50
SYSTOLIC BLOOD PRESSURE: 154

## 2018-10-30 PROCEDURE — 78452 HT MUSCLE IMAGE SPECT MULT: CPT

## 2018-10-30 PROCEDURE — 99284 EMERGENCY DEPT VISIT MOD MDM: CPT | Mod: 25

## 2018-10-30 PROCEDURE — 25000003 PHARM REV CODE 250

## 2018-10-30 PROCEDURE — 93016 CV STRESS TEST SUPVJ ONLY: CPT | Mod: ,,, | Performed by: INTERNAL MEDICINE

## 2018-10-30 PROCEDURE — 93005 ELECTROCARDIOGRAM TRACING: CPT

## 2018-10-30 PROCEDURE — 93018 CV STRESS TEST I&R ONLY: CPT | Mod: ,,, | Performed by: INTERNAL MEDICINE

## 2018-10-30 PROCEDURE — 78452 HT MUSCLE IMAGE SPECT MULT: CPT | Mod: 26,,, | Performed by: INTERNAL MEDICINE

## 2018-10-30 PROCEDURE — 93010 ELECTROCARDIOGRAM REPORT: CPT | Mod: ,,, | Performed by: INTERNAL MEDICINE

## 2018-10-30 RX ORDER — METOPROLOL TARTRATE 1 MG/ML
5 INJECTION, SOLUTION INTRAVENOUS ONCE
Status: DISCONTINUED | OUTPATIENT
Start: 2018-10-30 | End: 2018-10-31 | Stop reason: HOSPADM

## 2018-10-30 RX ORDER — BENZONATATE 100 MG/1
100 CAPSULE ORAL 3 TIMES DAILY PRN
Qty: 15 CAPSULE | Refills: 0 | Status: SHIPPED | OUTPATIENT
Start: 2018-10-30 | End: 2018-11-09

## 2018-10-30 RX ORDER — METOPROLOL TARTRATE 1 MG/ML
INJECTION, SOLUTION INTRAVENOUS
Status: COMPLETED
Start: 2018-10-30 | End: 2018-10-30

## 2018-10-30 RX ADMIN — METOPROLOL TARTRATE 5 MG: 5 INJECTION, SOLUTION INTRAVENOUS at 09:10

## 2018-10-30 NOTE — ED TRIAGE NOTES
Patient presents to the ED via personal transportation alone. Patient reports productive cough brown mucus x 4 days, headache x 1 day, nasal congestion, sob, and diarrhea x 2 days. Denies nausea, vomiting, chills. Patient states that had a fever, but denies checking temperature.

## 2018-10-30 NOTE — ED PROVIDER NOTES
"Encounter Date: 10/30/2018  SORT:  This is a 63 year old male who presents to the ED complaining of a "Cold".  He reports a cough with brown sputum.  Orders placed.      History     Chief Complaint   Patient presents with    Cough     Pt. arrives to ED reports cough that started about a week ago, reports sputum brown in color, reports recently getting flu and pneumonia vaccine, feeling warm at times. Denies sore throat, states "When I cough too much my head hurts."      Chief Complaint:  Cough  History of  Present Illness: History obtained from patient. This 63 y.o. male who has past medical history of hypertension, hyperlipidemia and diabetes presents to the ED complaining of productive cough with white sputum for 1 week with associated rhinorrhea.  He denies fever, chills, chest pain, shortness of breath, headache, dizziness, weakness, nausea, vomiting, congestion, rhinorrhea and sore throat. He reports no sick contacts.  He has attempted treatment with their foot home without relief.  He reports worsening of cough at night.          Review of patient's allergies indicates:   Allergen Reactions    Sulfa (sulfonamide antibiotics)      Past Medical History:   Diagnosis Date    CVA (cerebral infarction)     CVA approx 2011/2012    Diabetes mellitus type II, non insulin dependent 10/14/2016    Erectile dysfunction     Erectile Dysfunction    Hyperlipidemia 10/14/2016    Hypertension     Hypertension    Murmur      Past Surgical History:   Procedure Laterality Date    BACK SURGERY      discectomy    COLONOSCOPY N/A 8/4/2017    Procedure: COLONOSCOPY;  Surgeon: Mario Alberto Vega MD;  [facility]  Service: Endoscopy;  Laterality: N/A;  Conflict with [facility]'s schedule no other times available with CRS, Pt requested 8/4/17 - moved to be scoped with GI MD - ER    COLONOSCOPY N/A 8/4/2017    Performed by Mario Alberto Vega MD at [facility] (4TH FLR)    HERNIA REPAIR      ROOT CANAL      UMBILICAL HERNIA " REPAIR       Family History   Problem Relation Age of Onset    Cancer Father     Hypertension Brother     Cancer Brother     No Known Problems Sister     No Known Problems Sister     No Known Problems Sister      Social History     Tobacco Use    Smoking status: Current Some Day Smoker     Packs/day: 0.10     Years: 4.00     Pack years: 0.40    Smokeless tobacco: Never Used    Tobacco comment: social smoking as teenager   Substance Use Topics    Alcohol use: Yes     Alcohol/week: 6.0 oz     Types: 10 Cans of beer per week     Comment: occasionally    Drug use: No     Review of Systems   Constitutional: Negative for chills and fever.   HENT: Positive for rhinorrhea. Negative for congestion, ear pain and sore throat.    Respiratory: Positive for cough. Negative for shortness of breath.    Cardiovascular: Negative for chest pain.   Gastrointestinal: Negative for nausea and vomiting.   Neurological: Negative for dizziness, weakness and headaches.       Physical Exam     Initial Vitals [10/30/18 1059]   BP Pulse Resp Temp SpO2   (!) 150/92 (!) 124 18 98 °F (36.7 °C) 98 %      MAP       --         Physical Exam    Nursing note and vitals reviewed.  Constitutional: He appears well-developed and well-nourished. No distress.   HENT:   Head: Normocephalic and atraumatic.   Right Ear: Tympanic membrane normal.   Left Ear: Tympanic membrane normal.   Nose: Nose normal.   Mouth/Throat: Uvula is midline and mucous membranes are normal. Posterior oropharyngeal erythema present. No oropharyngeal exudate or posterior oropharyngeal edema.   Eyes: EOM are normal. Pupils are equal, round, and reactive to light.   Neck: Normal range of motion. Neck supple.   Cardiovascular: Normal rate, regular rhythm and normal heart sounds.   Pulmonary/Chest: Breath sounds normal. No respiratory distress. He has no wheezes.   The patient is not coughing on exam   Lymphadenopathy:     He has no cervical adenopathy.   Neurological: He is  alert and oriented to person, place, and time. He has normal strength. No sensory deficit.   Skin: Skin is warm. Capillary refill takes less than 2 seconds.         ED Course   Procedures  Labs Reviewed - No data to display  EKG Readings: (Independently Interpreted)   Rhythm: Normal Sinus Rhythm. Heart Rate: 108. Ectopy: No Ectopy. Conduction: Normal. ST Segments: Normal ST Segments. T Waves Flipped: V4, V5 and V6. Clinical Impression: Normal Sinus Rhythm       Imaging Results          X-Ray Chest PA And Lateral (Final result)  Result time 10/30/18 12:25:52    Final result by Froilan Baker MD (10/30/18 12:25:52)                 Impression:      No acute cardiopulmonary disease      Electronically signed by: Froilan Baker MD  Date:    10/30/2018  Time:    12:25             Narrative:    EXAMINATION:  XR CHEST PA AND LATERAL    CLINICAL HISTORY:  Cough    TECHNIQUE:  PA and lateral views of the chest were performed.    COMPARISON:  01/15/2018    FINDINGS:  The heart size and mediastinal contour are normal.  Lungs are expanded with mild relative elevation of right diaphragm, chronic finding.  Lungs are clear without consolidation or pleural fluid.  Skeletal structures are intact noting degenerative changes in the spine with prominent osteophytes.                                 Medical Decision Making:   Clinical Tests:   Radiological Study: Ordered and Reviewed  ED Management:  This is an evaluation of 63-year-old male who presents the ED for productive cough for 1 week.  Patient was initially tachycardic in triage.  Patient was not tachycardic on my exam.  Vital signs otherwise stable.  Afebrile.  Patient is nontoxic appearing in no acute distress. Lungs are clear to auscultation with no signs of respiratory distress. Patient is not coughing on exam.  Heart sounds are normal. Posterior oropharynx is erythematous without tonsillar edema or exudates. No cervical lymphadenopathy.  Chest x-ray shows no  cardiopulmonary processes.  No focal consolidation concerning for pneumonia. EKG shows inverted T waves without ST segment changes.  I considered but doubt cardiac etiology given no complaints of chest pain, shortness of breath or weakness. Patient reported having stress test prior to evaluation in the ED.  Will instruct patient to follow up with his cardiologist for test results.  Recheck vital signs prior to discharge showed heart rate of 64.   Patient will be discharged home with Tessalon Perles.  Patient given return precautions and instructed to follow up with primary care.  Patient verbalized understanding and agreed to the plan.    I discussed the case with Dr. Peter Rodrigez who is in agreement with my assessment and plan.                        Clinical Impression:   The encounter diagnosis was Cough.                             Abner Weheler PA-C  10/30/18 7852

## 2018-11-08 ENCOUNTER — OFFICE VISIT (OUTPATIENT)
Dept: CARDIOLOGY | Facility: CLINIC | Age: 63
End: 2018-11-08
Payer: MEDICARE

## 2018-11-08 VITALS
HEART RATE: 120 BPM | DIASTOLIC BLOOD PRESSURE: 100 MMHG | BODY MASS INDEX: 40.22 KG/M2 | WEIGHT: 264.56 LBS | RESPIRATION RATE: 16 BRPM | SYSTOLIC BLOOD PRESSURE: 162 MMHG | OXYGEN SATURATION: 81 %

## 2018-11-08 DIAGNOSIS — E78.1 HYPERTRIGLYCERIDEMIA: ICD-10-CM

## 2018-11-08 DIAGNOSIS — I51.89 DIASTOLIC DYSFUNCTION WITHOUT HEART FAILURE: ICD-10-CM

## 2018-11-08 DIAGNOSIS — I42.8 OTHER CARDIOMYOPATHIES: ICD-10-CM

## 2018-11-08 DIAGNOSIS — I10 ESSENTIAL HYPERTENSION, BENIGN: Primary | Chronic | ICD-10-CM

## 2018-11-08 DIAGNOSIS — E11.40 TYPE 2 DIABETES MELLITUS WITH DIABETIC NEUROPATHY, WITHOUT LONG-TERM CURRENT USE OF INSULIN: ICD-10-CM

## 2018-11-08 DIAGNOSIS — E78.2 MIXED HYPERLIPIDEMIA: ICD-10-CM

## 2018-11-08 DIAGNOSIS — E66.01 SEVERE OBESITY (BMI 35.0-39.9) WITH COMORBIDITY: ICD-10-CM

## 2018-11-08 PROCEDURE — 99214 OFFICE O/P EST MOD 30 MIN: CPT | Mod: S$GLB,,, | Performed by: INTERNAL MEDICINE

## 2018-11-08 PROCEDURE — 99499 UNLISTED E&M SERVICE: CPT | Mod: S$PBB,,, | Performed by: INTERNAL MEDICINE

## 2018-11-08 PROCEDURE — 99999 PR PBB SHADOW E&M-EST. PATIENT-LVL III: CPT | Mod: PBBFAC,,, | Performed by: INTERNAL MEDICINE

## 2018-11-08 PROCEDURE — 99213 OFFICE O/P EST LOW 20 MIN: CPT | Mod: PBBFAC | Performed by: INTERNAL MEDICINE

## 2018-11-08 RX ORDER — METOPROLOL SUCCINATE 50 MG/1
50 TABLET, EXTENDED RELEASE ORAL DAILY
Qty: 90 TABLET | Refills: 3 | Status: SHIPPED | OUTPATIENT
Start: 2018-11-08 | End: 2019-04-24 | Stop reason: SDUPTHER

## 2018-11-13 ENCOUNTER — OFFICE VISIT (OUTPATIENT)
Dept: FAMILY MEDICINE | Facility: CLINIC | Age: 63
End: 2018-11-13
Payer: MEDICARE

## 2018-11-13 ENCOUNTER — INITIAL CONSULT (OUTPATIENT)
Dept: PULMONOLOGY | Facility: CLINIC | Age: 63
End: 2018-11-13
Payer: MEDICARE

## 2018-11-13 VITALS
HEIGHT: 68 IN | SYSTOLIC BLOOD PRESSURE: 148 MMHG | TEMPERATURE: 98 F | BODY MASS INDEX: 40.6 KG/M2 | DIASTOLIC BLOOD PRESSURE: 90 MMHG | OXYGEN SATURATION: 97 % | WEIGHT: 267.88 LBS | HEART RATE: 127 BPM

## 2018-11-13 VITALS
HEIGHT: 68 IN | OXYGEN SATURATION: 97 % | DIASTOLIC BLOOD PRESSURE: 91 MMHG | TEMPERATURE: 98 F | WEIGHT: 266.19 LBS | BODY MASS INDEX: 40.34 KG/M2 | SYSTOLIC BLOOD PRESSURE: 138 MMHG | HEART RATE: 123 BPM

## 2018-11-13 DIAGNOSIS — E66.01 SEVERE OBESITY (BMI 35.0-39.9) WITH COMORBIDITY: ICD-10-CM

## 2018-11-13 DIAGNOSIS — J20.8 ACUTE BACTERIAL BRONCHITIS: Primary | ICD-10-CM

## 2018-11-13 DIAGNOSIS — I10 ESSENTIAL HYPERTENSION, BENIGN: Chronic | ICD-10-CM

## 2018-11-13 DIAGNOSIS — G47.33 OSA (OBSTRUCTIVE SLEEP APNEA): ICD-10-CM

## 2018-11-13 DIAGNOSIS — Z13.6 ENCOUNTER FOR ABDOMINAL AORTIC ANEURYSM (AAA) SCREENING: ICD-10-CM

## 2018-11-13 DIAGNOSIS — R05.3 CHRONIC COUGH: ICD-10-CM

## 2018-11-13 DIAGNOSIS — B96.89 ACUTE BACTERIAL BRONCHITIS: Primary | ICD-10-CM

## 2018-11-13 PROCEDURE — 99204 OFFICE O/P NEW MOD 45 MIN: CPT | Mod: S$GLB,,, | Performed by: INTERNAL MEDICINE

## 2018-11-13 PROCEDURE — 3075F SYST BP GE 130 - 139MM HG: CPT | Mod: CPTII,S$GLB,, | Performed by: INTERNAL MEDICINE

## 2018-11-13 PROCEDURE — 99214 OFFICE O/P EST MOD 30 MIN: CPT | Mod: S$GLB,,, | Performed by: INTERNAL MEDICINE

## 2018-11-13 PROCEDURE — 1101F PT FALLS ASSESS-DOCD LE1/YR: CPT | Mod: CPTII,S$GLB,, | Performed by: INTERNAL MEDICINE

## 2018-11-13 PROCEDURE — 3080F DIAST BP >= 90 MM HG: CPT | Mod: CPTII,S$GLB,, | Performed by: INTERNAL MEDICINE

## 2018-11-13 PROCEDURE — 99999 PR PBB SHADOW E&M-EST. PATIENT-LVL III: CPT | Mod: PBBFAC,,, | Performed by: INTERNAL MEDICINE

## 2018-11-13 PROCEDURE — 3008F BODY MASS INDEX DOCD: CPT | Mod: CPTII,S$GLB,, | Performed by: INTERNAL MEDICINE

## 2018-11-13 PROCEDURE — 3074F SYST BP LT 130 MM HG: CPT | Mod: CPTII,S$GLB,, | Performed by: INTERNAL MEDICINE

## 2018-11-13 RX ORDER — ESOMEPRAZOLE MAGNESIUM 40 MG/1
40 CAPSULE, DELAYED RELEASE ORAL
Qty: 30 CAPSULE | Refills: 2 | Status: SHIPPED | OUTPATIENT
Start: 2018-11-13 | End: 2019-04-24

## 2018-11-13 RX ORDER — PROMETHAZINE HYDROCHLORIDE AND DEXTROMETHORPHAN HYDROBROMIDE 6.25; 15 MG/5ML; MG/5ML
5 SYRUP ORAL EVERY 12 HOURS PRN
Qty: 180 ML | Refills: 0 | Status: SHIPPED | OUTPATIENT
Start: 2018-11-13 | End: 2018-11-23

## 2018-11-13 RX ORDER — DOXYCYCLINE HYCLATE 100 MG
100 TABLET ORAL 2 TIMES DAILY
Qty: 20 TABLET | Refills: 0 | Status: SHIPPED | OUTPATIENT
Start: 2018-11-13 | End: 2018-11-23

## 2018-11-13 RX ORDER — LEVALBUTEROL TARTRATE 45 UG/1
1-2 AEROSOL, METERED ORAL EVERY 6 HOURS PRN
Qty: 15 G | Refills: 0 | Status: SHIPPED | OUTPATIENT
Start: 2018-11-13 | End: 2021-03-17

## 2018-11-13 NOTE — PROGRESS NOTES
Mirza Do  was seen as a new patient at the request of  Paul Fernandez MD for the evaluation of  yuniel.    CHIEF COMPLAINT:    Chief Complaint   Patient presents with    Sleep Apnea    Snoring       HISTORY OF PRESENT ILLNESS: Mirza Do is a 63 y.o. male is here for sleep evaluation.   Patient underwent psg at [facility] many years ago.  Patient was recommend cpap titration but did not tolerate due to claustrophobia.  Never got cpap.    Currently, patient with loud snoring.  +witnessed apnea.  Rested upon awake.  Occasional sleep talking.  No sleep walking.  No cataplexy.  No rls symptoms.      [place] Sleepiness Scale score during initial sleep evaluation was 6.    SLEEP ROUTINE:  Activity the hour prior to sleep: watch tv     Bed partner:  alone  Time to bed:  10 pm to 12 am   Lights off:  Music is on  Sleep onset latency:  30 minutes        Disruptions or awakenings:    none    Wakeup time:      4 am   Perceived sleep quality:  rested       Daytime naps:      none  Weekend sleep routine:      same  Caffeine use: none  exercise habit:   Walk 1 mile per day      PAST MEDICAL HISTORY:    Active Ambulatory Problems     Diagnosis Date Noted    Impotence of organic origin     History of CVA (cerebrovascular accident)     Essential hypertension, benign     Insomnia 10/05/2012    History of hiatal hernia 11/19/2015    Hypertriglyceridemia 11/20/2015    Type 2 diabetes mellitus with diabetic neuropathy, without long-term current use of insulin 10/14/2016    Mixed hyperlipidemia 10/14/2016    Severe obesity (BMI 35.0-39.9) with comorbidity 04/30/2018    Diastolic dysfunction without heart failure 10/05/2018    YUNIEL (obstructive sleep apnea) 11/13/2018    Chronic cough 11/13/2018     Resolved Ambulatory Problems     Diagnosis Date Noted    Subcutaneous emphysema, post-procedure 07/03/2015    Acute chest pain 11/19/2015    URI (upper respiratory infection) 11/19/2015     Past Medical History:    Diagnosis Date    CVA (cerebral infarction)     Diabetes mellitus type II, non insulin dependent 10/14/2016    Erectile dysfunction     Hyperlipidemia 10/14/2016    Hypertension     Murmur                 PAST SURGICAL HISTORY:    Past Surgical History:   Procedure Laterality Date    BACK SURGERY      discectomy    COLONOSCOPY N/A 8/4/2017    Procedure: COLONOSCOPY;  Surgeon: Mario Alberto Vega MD;  Location: [facility] (4TH FLR);  Service: Endoscopy;  Laterality: N/A;  Conflict with [facility]'s schedule no other times available with CRS, Pt requested 8/4/17 - moved to be scoped with GI MD - ER    COLONOSCOPY N/A 8/4/2017    Performed by [facility] Alber[facility]ega MD at [facility] (4TH FLR)    ROOT CANAL      UMBILICAL HERNIA REPAIR           FAMILY HISTORY:                Family History   Problem Relation Age of Onset    Cancer Father     Hypertension Brother     Cancer Brother     No Known Problems Sister     No Known Problems Sister     No Known Problems Sister        SOCIAL HISTORY:          Tobacco:   Social History     Tobacco Use   Smoking Status Current Some Day Smoker    Packs/day: 0.10    Years: 4.00    Pack years: 0.40   Smokeless Tobacco Never Used   Tobacco Comment    social smoking as teenager       alcohol use:    Social History     Substance and Sexual Activity   Alcohol Use Yes    Alcohol/week: 6.0 oz    Types: 10 Cans of beer per week    Comment: occasionally                 Occupation:  [employer]     ALLERGIES:  Review of patient's allergies indicates:  No Known Allergies    CURRENT MEDICATIONS:    Current Outpatient Medications   Medication Sig Dispense Refill    ACCU-CHEK STEPHON PLUS TEST STRP Strp 1 strip by Misc.(Non-Drug; Combo Route) route daily as needed (alternate testing time). 50 strip 11    ACCU-CHEK FASTCLIX Misc 1 lancet by Misc.(Non-Drug; Combo Route) route daily as needed. 50 each 11    albuterol 90 mcg/actuation inhaler Inhale 1-2 puffs into the lungs every 6 (six) hours  "as needed for Wheezing. 1 Inhaler 0    aspirin 81 MG Chew Take 81 mg by mouth once daily.      atorvastatin (LIPITOR) 20 MG tablet Take 1 tablet (20 mg total) by mouth once daily. 90 tablet 3    gabapentin (NEURONTIN) 300 MG capsule Take 1 tab by mouth nightly x 3 days; if no improvement, take 1 tab twice daily x 3 day; if no improvement, take 1 tab 3 times daily 90 capsule 1    losartan-hydrochlorothiazide 100-25 mg (HYZAAR) 100-25 mg per tablet Take 1 tablet by mouth once daily. 90 tablet 3    metoprolol succinate (TOPROL-XL) 50 MG 24 hr tablet Take 1 tablet (50 mg total) by mouth once daily. 90 tablet 3    VIAGRA 100 mg tablet TAKE 1 TABLET BY MOUTH AS NEEDED FOR ERECTILE DYSFUNCTION 8 tablet 3    zolpidem (AMBIEN) 10 mg Tab Take 1 tablet (10 mg total) by mouth nightly as needed. 30 tablet 2    esomeprazole (NEXIUM) 40 MG capsule Take 1 capsule (40 mg total) by mouth before breakfast. 30 capsule 2     No current facility-administered medications for this visit.                   REVIEW OF SYSTEMS:     Sleep related symptoms as per HPI.  CONST:Denies weight gain    HEENT: + sinus congestion  PULM: Denies dyspnea; chronic  CARD:  Denies palpitations   GI:  Denies acid reflux  : Denies polyuria  NEURO: Denies headaches  PSYCH: Denies mood disturbance  HEME: Denies anemia   Otherwise, a balance of systems reviewed is negative.          PHYSICAL EXAM:  Vitals:    11/13/18 1038   BP: (!) 138/91   Pulse: (!) 123   Temp: 98 °F (36.7 °C)   TempSrc: Oral   SpO2: 97%   Weight: 120.7 kg (266 lb 3.2 oz)   Height: 5' 8" (1.727 m)   PainSc: 0-No pain     Body mass index is 40.48 kg/m².     GENERAL: Normal development, well groomed  HEENT:  Conjunctivae are non-erythematous; Pupils equal, round, and reactive to light; Nose is symmetrical; Nasal mucosa is pink and moist; Septum is midline; Inferior turbinates are normal; Nasal airflow is normal; Posterior pharynx is pink; Modified Mallampati: 4; Posterior palate is " normal; Tonsils +1; Uvula is normal and pink;Tongue is normal; Dentition is fair; No TMJ tenderness; Jaw opening and protrusion without click and without discomfort.  NECK: Supple.  No thyromegaly. No palpable nodes.     SKIN: On face and neck: No abrasions, no rashes, no lesions.  No subcutaneous nodules are palpable.  RESPIRATORY: Chest is clear to auscultation.  Normal chest expansion and non-labored breathing at rest.  CARDIOVASCULAR: Normal S1, S2.  No murmurs, gallops or rubs. No carotid bruits bilaterally.  EXTREMITIES: No edema. No clubbing. No cyanosis. Station normal. Gait normal.        NEURO/PSYCH: Oriented to time, place and person. Normal attention span and concentration. Affect is full. Mood is normal.                                              DATA no prior sleep study    10/30/18 no effusion or consolidation  10/5/18  · Left ventricle ejection fraction is mildly decreased at 45%, mild global hypokinesis.  · Left ventricle shows concentric remodeling.  · Grade I (mild) left ventricular diastolic dysfunction consistent with impaired relaxation.  · Tricuspid valve shows trace regurgitation.  · The estimated PA systolic pressure is 33.60 mm Hg    Lab Results   Component Value Date    TSH 0.986 03/19/2018     ASSESSMENT/PLAN  Problem List Items Addressed This Visit     Chronic cough    Overview     Etiology unclear.  ?LPR due worsening of cough with supine position.  Sleep in semirecumbent position.  +hiattal hernia on ct neck.           Current Assessment & Plan     Will refer to ent for upper airway and lpr evaluation.         YUNIEL (obstructive sleep apnea)    Overview     S/p sleep study at [facility].  Poor cpap tolerance due to claustrophobial         Current Assessment & Plan     requalification study.           Relevant Orders    Polysomnogram (CPAP will be added if patient meets diagnostic criteria.)    Severe obesity (BMI 35.0-39.9) with comorbidity    Overview     Aware of need for drastic weight  loss.           Current Assessment & Plan     S/p diabetic teaching.                 Diagnostic: Polysomnogram. The nature of this procedure and its indication was discussed with the patient.     Education: During our discussion today, we talked about the etiology of obstructive sleep apnea as well as the potential ramifications of untreated sleep apnea, which could include daytime sleepiness, hypertension, heart disease and/or stroke.     Precautions: The patient was advised to abstain from driving should they feel sleepy or drowsy.       Thank you for allowing me the opportunity to participate in the care of your patient.    Patient will Follow-up after sleep study. with md/np.    Please cc note to  Paul Fernandez MD.     This is 45 minutes visit, over 50% of time spent in direct consultation with patient.

## 2018-11-13 NOTE — LETTER
November 13, 2018      Paul Fernandez MD  [address] [facility] [address]  [address]  [place] [address]           [facility]  [address] [facility] [address]  [place] [address]  Phone: [phone]  Fax: [phone]          Patient: Mirza Do   MR Number: [id number]   YOB: 1955   Date of Visit: 11/13/2018       Dear Dr. Paul Fernandez:    Thank you for referring Mirza Do to me for evaluation. Attached you will find relevant portions of my assessment and plan of care.    If you have questions, please do not hesitate to call me. I look forward to following Mirza Do along with you.    Sincerely,    Blas Garcia MD    Enclosure  CC:  No Recipients    If you would like to receive this communication electronically, please contact [email or web address] or [phone] to request more information on [facility] access.    For providers and/or their staff who would like to refer a patient to [facility], please contact us through our one-stop-shop provider referral line, [facility], at [phone].    If you feel you have received this communication in error or would no longer like to receive these types of communications, please e-mail [email or web address]

## 2018-11-13 NOTE — PROGRESS NOTES
SUBJECTIVE     Chief Complaint   Patient presents with    Cough     worst at night. Went to ER. completed meds. Nothing haelped. Wet cough       HPI  Mirza Do is a 63 y.o. male with multiple medical diagnoses as listed in the medical history and problem list that presents for evaluation of URI x 1 month. Pt reports a productive cough and wheezing. Denies any fever or chills, but pt has had night sweats. Pt has been taking Theraflu and Tessalon Perles without relief of symptoms. A hot totti has helped him. Denies any recent travel. +sick contacts(pt has been at a  facility).    PAST MEDICAL HISTORY:  Past Medical History:   Diagnosis Date    CVA (cerebral infarction)     CVA approx 2011/2012    Diabetes mellitus type II, non insulin dependent 10/14/2016    Erectile dysfunction     Erectile Dysfunction    Hyperlipidemia 10/14/2016    Hypertension     Hypertension    Murmur        PAST SURGICAL HISTORY:  Past Surgical History:   Procedure Laterality Date    BACK SURGERY      discectomy    COLONOSCOPY N/A 8/4/2017    Procedure: COLONOSCOPY;  Surgeon: Mario Alberto Vega MD;  Location: [facility] (4TH FLR);  Service: Endoscopy;  Laterality: N/A;  Conflict with Jack's schedule no other times available with CRS, Pt requested 8/4/17 - moved to be scoped with GI MD - ER    COLONOSCOPY N/A 8/4/2017    Performed by Mario Alberto Vega MD at [facility] (4TH FLR)    ROOT CANAL      UMBILICAL HERNIA REPAIR         SOCIAL HISTORY:  Social History     Socioeconomic History    Marital status: Single     Spouse name: Not on file    Number of children: Not on file    Years of education: Not on file    Highest education level: Not on file   Social Needs    Financial resource strain: Not on file    Food insecurity - worry: Not on file    Food insecurity - inability: Not on file    Transportation needs - medical: Not on file    Transportation needs - non-medical: Not on file   Occupational History    Not on file    Tobacco Use    Smoking status: Current Some Day Smoker     Packs/day: 0.10     Years: 4.00     Pack years: 0.40    Smokeless tobacco: Never Used    Tobacco comment: social smoking as teenager   Substance and Sexual Activity    Alcohol use: Yes     Alcohol/week: 6.0 oz     Types: 10 Cans of beer per week     Comment: occasionally    Drug use: No    Sexual activity: Yes     Partners: Female   Other Topics Concern    Not on file   Social History Narrative           FAMILY HISTORY:  Family History   Problem Relation Age of Onset    Cancer Father     Hypertension Brother     Cancer Brother     No Known Problems Sister     No Known Problems Sister     No Known Problems Sister        ALLERGIES AND MEDICATIONS: updated and reviewed.  Review of patient's allergies indicates:  No Known Allergies  Current Outpatient Medications   Medication Sig Dispense Refill    ACCU-CHEK STEPHON PLUS TEST STRP Strp 1 strip by Misc.(Non-Drug; Combo Route) route daily as needed (alternate testing time). 50 strip 11    ACCU-CHEK FASTCLIX Misc 1 lancet by Misc.(Non-Drug; Combo Route) route daily as needed. 50 each 11    aspirin 81 MG Chew Take 81 mg by mouth once daily.      atorvastatin (LIPITOR) 20 MG tablet Take 1 tablet (20 mg total) by mouth once daily. 90 tablet 3    doxycycline (VIBRA-TABS) 100 MG tablet Take 1 tablet (100 mg total) by mouth 2 (two) times daily. for 10 days 20 tablet 0    esomeprazole (NEXIUM) 40 MG capsule Take 1 capsule (40 mg total) by mouth before breakfast. 30 capsule 2    gabapentin (NEURONTIN) 300 MG capsule Take 1 tab by mouth nightly x 3 days; if no improvement, take 1 tab twice daily x 3 day; if no improvement, take 1 tab 3 times daily 90 capsule 1    levalbuterol (XOPENEX HFA) 45 mcg/actuation inhaler Inhale 1-2 puffs into the lungs every 6 (six) hours as needed for Wheezing or Shortness of Breath. Rescue 15 g 0    losartan-hydrochlorothiazide 100-25 mg (HYZAAR)  "100-25 mg per tablet Take 1 tablet by mouth once daily. 90 tablet 3    metoprolol succinate (TOPROL-XL) 50 MG 24 hr tablet Take 1 tablet (50 mg total) by mouth once daily. 90 tablet 3    promethazine-dextromethorphan (PROMETHAZINE-DM) 6.25-15 mg/5 mL Syrp Take 5 mLs by mouth every 12 (twelve) hours as needed. 180 mL 0    VIAGRA 100 mg tablet TAKE 1 TABLET BY MOUTH AS NEEDED FOR ERECTILE DYSFUNCTION 8 tablet 3    zolpidem (AMBIEN) 10 mg Tab Take 1 tablet (10 mg total) by mouth nightly as needed. 30 tablet 2     No current facility-administered medications for this visit.        ROS  Review of Systems   Constitutional: Negative for chills and fever.   HENT: Negative for hearing loss and sore throat.    Eyes: Negative for visual disturbance.   Respiratory: Positive for cough and wheezing. Negative for shortness of breath.    Cardiovascular: Negative for chest pain, palpitations and leg swelling.   Gastrointestinal: Negative for abdominal pain, constipation, diarrhea, nausea and vomiting.   Genitourinary: Negative for dysuria, frequency and urgency.   Musculoskeletal: Negative for arthralgias, joint swelling and myalgias.   Skin: Negative for rash and wound.   Neurological: Negative for headaches.   Psychiatric/Behavioral: Negative for agitation and confusion. The patient is not nervous/anxious.          OBJECTIVE     Physical Exam  Vitals:    11/13/18 1426   BP: (!) 148/90   Pulse: (!) 127   Temp: 97.8 °F (36.6 °C)    Body mass index is 40.73 kg/m².  Weight: 121.5 kg (267 lb 13.7 oz)   Height: 5' 8" (172.7 cm)     Physical Exam   Constitutional: He is oriented to person, place, and time. He appears well-developed and well-nourished. No distress.   HENT:   Head: Normocephalic and atraumatic.   Right Ear: External ear normal.   Left Ear: External ear normal.   Nose: Nose normal.   Mouth/Throat: Oropharynx is clear and moist.   Eyes: Conjunctivae and EOM are normal. Right eye exhibits no discharge. Left eye exhibits " no discharge. No scleral icterus.   Neck: Normal range of motion. Neck supple. No JVD present. No tracheal deviation present.   Cardiovascular: Normal rate, regular rhythm, normal heart sounds and intact distal pulses. Exam reveals no gallop and no friction rub.   No murmur heard.  Pulmonary/Chest: Effort normal and breath sounds normal. No respiratory distress. He has no wheezes.   Abdominal: Soft. Bowel sounds are normal. He exhibits no distension and no mass. There is no tenderness. There is no rebound and no guarding.   Musculoskeletal: Normal range of motion. He exhibits no edema, tenderness or deformity.   Neurological: He is alert and oriented to person, place, and time. He exhibits normal muscle tone. Coordination normal.   Skin: Skin is warm and dry. No rash noted. No erythema.   Psychiatric: He has a normal mood and affect. His behavior is normal. Judgment and thought content normal.         Health Maintenance       Date Due Completion Date    Foot Exam 06/29/2018 6/29/2017    Abdominal Aortic Aneurysm Screening 08/29/2018 ---    Lipid Panel 03/19/2019 3/19/2018    Hemoglobin A1c 03/25/2019 9/25/2018    Eye Exam 04/05/2019 4/5/2018    Override on 4/5/2018: Done    Override on 10/20/2016: Done    High Dose Statin 11/13/2019 11/13/2018    Pneumococcal (65+) (2 of 2 - PPSV23) 06/29/2022 9/25/2018    TETANUS VACCINE 01/17/2023 1/17/2013    Colonoscopy 08/04/2024 8/4/2017    Override on 7/17/2009: Done            ASSESSMENT     63 y.o. male with     1. Acute bacterial bronchitis    2. Essential hypertension, benign    3. BMI 40.0-44.9, adult    4. Encounter for abdominal aortic aneurysm (AAA) screening        PLAN:     1. Acute bacterial bronchitis  - Continue symptomatic treatment with rest, increase fluid intake, tylenol or ibuprofen PRN fever(temp >/= 100.4) or body aches. Okay to take OTC antihistamines, i.e. Bendaryl, Claritin, Allegra, etc. as needed.  - Okay to gargle with warm, salt water or use throat  lozenges as needed  - Pt advised to take Abx to completion  - doxycycline (VIBRA-TABS) 100 MG tablet; Take 1 tablet (100 mg total) by mouth 2 (two) times daily. for 10 days  Dispense: 20 tablet; Refill: 0  - promethazine-dextromethorphan (PROMETHAZINE-DM) 6.25-15 mg/5 mL Syrp; Take 5 mLs by mouth every 12 (twelve) hours as needed.  Dispense: 180 mL; Refill: 0  - levalbuterol (XOPENEX HFA) 45 mcg/actuation inhaler; Inhale 1-2 puffs into the lungs every 6 (six) hours as needed for Wheezing or Shortness of Breath. Rescue  Dispense: 15 g; Refill: 0    2. Essential hypertension, benign  - BP elevated above goal of <140/90; possibly 2/2 acute illness  - Continue current meds and monitor    3. BMI 40.0-44.9, adult  - Discussed importance of eating a prudent diet and exercising    4. Encounter for abdominal aortic aneurysm (AAA) screening  - US Doppler Abdomen Complete (Cupid Only); Future        RTC in 1-2 weeks as needed for any acute worsening of current condition or failure to improve        Manjula Honeycutt MD  11/13/2018 2:44 PM        No Follow-up on file.

## 2018-11-14 DIAGNOSIS — G47.00 INSOMNIA, UNSPECIFIED TYPE: ICD-10-CM

## 2018-11-14 RX ORDER — ZOLPIDEM TARTRATE 10 MG/1
10 TABLET ORAL NIGHTLY PRN
Qty: 30 TABLET | Refills: 2 | OUTPATIENT
Start: 2018-11-14

## 2018-11-14 NOTE — TELEPHONE ENCOUNTER
----- Message from Adrianna Randall sent at 11/14/2018 11:13 AM CST -----  Contact: [facility] [phone]  Can the clinic reply in [facility]: no      Please refill the medication(s) listed below. The patient can be reached at this phone number (_____) once it is called into the pharmacy.      Medication #1zolpidem (AMBIEN) 10 mg Tab - pt needs early refill. Is it okay? The patient dropped in toilet on accident. Call pharmacy      Medication #2      Preferred Pharmacy:

## 2018-11-15 ENCOUNTER — TELEPHONE (OUTPATIENT)
Dept: FAMILY MEDICINE | Facility: CLINIC | Age: 63
End: 2018-11-15

## 2018-11-15 DIAGNOSIS — Z13.6 ENCOUNTER FOR ABDOMINAL AORTIC ANEURYSM (AAA) SCREENING: Primary | ICD-10-CM

## 2018-11-15 RX ORDER — ZOLPIDEM TARTRATE 10 MG/1
10 TABLET ORAL NIGHTLY PRN
Qty: 30 TABLET | Refills: 2 | OUTPATIENT
Start: 2018-11-15

## 2018-11-15 NOTE — TELEPHONE ENCOUNTER
----- Message from Magaly Wilkins sent at 11/15/2018  9:11 AM CST -----  Contact: Self  Pt is calling to get a refill approved for zolpidem (AMBIEN) 10 mg Tab. Please call pt at [phone]      [facility] [address] [place] [address] [address] [place] [address]  Phone: [phone] Fax: [phone]

## 2018-11-15 NOTE — TELEPHONE ENCOUNTER
----- Message from Gracie Escobar sent at 11/15/2018 11:38 AM CST -----  Patient's u/s order was put in wrong. INCORRECTLY ORDERED-to be order under US AAA Screening (message from Kristy at the hospital). Thanks.

## 2018-11-16 ENCOUNTER — TELEPHONE (OUTPATIENT)
Dept: FAMILY MEDICINE | Facility: CLINIC | Age: 63
End: 2018-11-16

## 2018-11-16 DIAGNOSIS — G47.00 INSOMNIA, UNSPECIFIED TYPE: ICD-10-CM

## 2018-11-16 DIAGNOSIS — Z13.6 SCREENING FOR AAA (ABDOMINAL AORTIC ANEURYSM): Primary | ICD-10-CM

## 2018-11-16 RX ORDER — ZOLPIDEM TARTRATE 10 MG/1
10 TABLET ORAL NIGHTLY PRN
Qty: 30 TABLET | Refills: 2 | OUTPATIENT
Start: 2018-11-16

## 2018-11-16 NOTE — TELEPHONE ENCOUNTER
Spoke with pt he and he states he opened th bottle over the toilet and wasted his medication. Want to know if he can get an order for his Ambien

## 2018-11-16 NOTE — TELEPHONE ENCOUNTER
----- Message from Gracie Escobar sent at 11/16/2018 11:33 AM CST -----  Message from Kristy: ORDERED INCORRECTLY, supposed to read US AAA not CV

## 2018-11-23 ENCOUNTER — TELEPHONE (OUTPATIENT)
Dept: SLEEP MEDICINE | Facility: OTHER | Age: 63
End: 2018-11-23

## 2018-11-26 ENCOUNTER — TELEPHONE (OUTPATIENT)
Dept: SLEEP MEDICINE | Facility: OTHER | Age: 63
End: 2018-11-26

## 2018-11-30 ENCOUNTER — TELEPHONE (OUTPATIENT)
Dept: SLEEP MEDICINE | Facility: OTHER | Age: 63
End: 2018-11-30

## 2018-12-03 ENCOUNTER — TELEPHONE (OUTPATIENT)
Dept: SLEEP MEDICINE | Facility: OTHER | Age: 63
End: 2018-12-03

## 2018-12-03 ENCOUNTER — HOSPITAL ENCOUNTER (OUTPATIENT)
Dept: RADIOLOGY | Facility: HOSPITAL | Age: 63
Discharge: HOME | End: 2018-12-03
Attending: INTERNAL MEDICINE
Payer: MEDICARE

## 2018-12-03 DIAGNOSIS — Z13.6 SCREENING FOR AAA (ABDOMINAL AORTIC ANEURYSM): ICD-10-CM

## 2018-12-03 PROCEDURE — 76775 US EXAM ABDO BACK WALL LIM: CPT | Mod: 26,,, | Performed by: RADIOLOGY

## 2018-12-03 PROCEDURE — 76775 US EXAM ABDO BACK WALL LIM: CPT | Mod: TC

## 2018-12-04 ENCOUNTER — TELEPHONE (OUTPATIENT)
Dept: SLEEP MEDICINE | Facility: OTHER | Age: 63
End: 2018-12-04

## 2018-12-04 DIAGNOSIS — N52.9 ERECTILE DYSFUNCTION, UNSPECIFIED ERECTILE DYSFUNCTION TYPE: ICD-10-CM

## 2018-12-04 RX ORDER — SILDENAFIL CITRATE 100 MG/1
TABLET, FILM COATED ORAL
Qty: 8 TABLET | Refills: 3 | Status: SHIPPED | OUTPATIENT
Start: 2018-12-04 | End: 2022-04-05

## 2018-12-04 NOTE — TELEPHONE ENCOUNTER
----- Message from Adrianna Ayers sent at 12/4/2018 11:21 AM CST -----  Contact: Self/ [phone]  Refills:    [VIAGRA 100 mg tablet]  [zolpidem (AMBIEN) 10 mg Tab]    Please call pt with status. Thank you.  .  [id number] - [place], [place] - [address] [place] [address]  Phone: [phone] Fax: [phone]

## 2018-12-10 ENCOUNTER — TELEPHONE (OUTPATIENT)
Dept: SLEEP MEDICINE | Facility: OTHER | Age: 63
End: 2018-12-10

## 2018-12-10 ENCOUNTER — HOSPITAL ENCOUNTER (OUTPATIENT)
Dept: SLEEP MEDICINE | Facility: OTHER | Age: 63
Discharge: HOME | End: 2018-12-10
Attending: INTERNAL MEDICINE
Payer: MEDICARE

## 2018-12-10 DIAGNOSIS — G47.33 OSA (OBSTRUCTIVE SLEEP APNEA): ICD-10-CM

## 2018-12-10 PROCEDURE — 95811 POLYSOM 6/>YRS CPAP 4/> PARM: CPT

## 2018-12-10 PROCEDURE — 95811 POLYSOM 6/>YRS CPAP 4/> PARM: CPT | Mod: 26,,, | Performed by: INTERNAL MEDICINE

## 2018-12-11 NOTE — PROGRESS NOTES
A Split night study was preformed on Mirza Do on the night of 12/10/18. the procedure was explained to the patient, all questions were asked and answered prior to the start of the study. the importance of supine sleep was discussed. The patient did meet split night criteria.     EKG: Appeared to have shown NSR with periods of Tachycardia, PVC's and PAC's    Cpap mask: F&P Eson Nasal Large with a chin strap    Cpap range: 6-93pfK4D, Cflex 3, Humidity used    Optimum pressure: Best control of sleep disordered breathing events not noted.     The HOB had to be elevated during the night for patient comfort. The patient stated he was use to sleeping in a chair or on the sofa. The patient appeared to have tolerated cpap and the mask fairly well.  An end of the night instruction sheet was giving to the patient upon leaving the lab. The patient's response to cpap at the end of the night was that he felt like he slept good with it on.

## 2018-12-13 ENCOUNTER — TELEPHONE (OUTPATIENT)
Dept: FAMILY MEDICINE | Facility: CLINIC | Age: 63
End: 2018-12-13

## 2018-12-13 NOTE — TELEPHONE ENCOUNTER
Please inform pt that his U/S result was released on [facility] on 12/3/18 at 5:20 pm. It is normal. I do not have information regarding the sleep study. He can call the Sleep Medicine clinic for results.

## 2018-12-13 NOTE — TELEPHONE ENCOUNTER
Returned call. Patient had concerns about his US and the results of the sleep study he had. He was told that they would look over results and get with PCP regarding results.  Informed patient that we did not get any messages form the other provider, but that I will send message to PCP to see what results are and what next step should be. Informed patient that we will callback. Voiced understanding.    Please Advise  Sleep Medicine and Us results.

## 2018-12-13 NOTE — TELEPHONE ENCOUNTER
----- Message from Dave Medina sent at 12/13/2018  1:08 PM CST -----  Contact: Self/[phone]  The patient would like to speak to the staff regarding his test results.        Thank you

## 2018-12-20 ENCOUNTER — TELEPHONE (OUTPATIENT)
Dept: SLEEP MEDICINE | Facility: OTHER | Age: 63
End: 2018-12-20

## 2018-12-20 DIAGNOSIS — G47.33 OSA (OBSTRUCTIVE SLEEP APNEA): Primary | ICD-10-CM

## 2018-12-20 NOTE — TELEPHONE ENCOUNTER
Called patient to discuss results of sleep study. Patient states he want to come in and discuss options with  Appointment scheduled.

## 2018-12-20 NOTE — TELEPHONE ENCOUNTER
Please let patient know that he/she has sleep apnea and the titration went well.  Patient can have the option of coming into clinic to further discuss result of sleep study  or begin cpap and follow up in clinic.  If patient agreed to cpap, then I would like to set patient up with cpap via dme of choice.  Clinic follow up with md/np in approximately 8 weeks after cpap usage.

## 2018-12-20 NOTE — PROCEDURES
"Dear Provider,     You have ordered sleep LAB services to perform the sleep study for Mirza Do.  The sleep study that you ordered is complete.      Please find Sleep Study result in "Chart Review" under the "Media tab."      As the ordering provider, you are responsible for reviewing the results and implementing a treatment plan with your patient.    If you need a Sleep Medicine provider to explain the sleep study findings and arrange treatment for the patient, please refer patient for consultation to our Sleep Clinic via [facility] with Ambulatory Consult Sleep.    To do that please place an order for an  "Ambulatory Consult Sleep" - it will go to our clinic work queue for our Medical Assistant to contact the patient for an appointment.     For any questions, please contact our clinic staff at [phone] to talk to clinical staff.   "

## 2018-12-28 DIAGNOSIS — I10 ESSENTIAL HYPERTENSION: ICD-10-CM

## 2018-12-28 DIAGNOSIS — G47.00 INSOMNIA, UNSPECIFIED TYPE: ICD-10-CM

## 2018-12-28 RX ORDER — ZOLPIDEM TARTRATE 10 MG/1
TABLET ORAL
Qty: 30 TABLET | Refills: 2 | Status: SHIPPED | OUTPATIENT
Start: 2018-12-28 | End: 2019-04-08 | Stop reason: SDUPTHER

## 2018-12-28 RX ORDER — ZOLPIDEM TARTRATE 10 MG/1
10 TABLET ORAL NIGHTLY PRN
Qty: 30 TABLET | Refills: 2 | Status: CANCELLED | OUTPATIENT
Start: 2018-12-28

## 2018-12-28 RX ORDER — LOSARTAN POTASSIUM AND HYDROCHLOROTHIAZIDE 25; 100 MG/1; MG/1
1 TABLET ORAL DAILY
Qty: 90 TABLET | Refills: 1 | Status: SHIPPED | OUTPATIENT
Start: 2018-12-28 | End: 2019-10-30

## 2018-12-28 NOTE — TELEPHONE ENCOUNTER
----- Message from Kylee Dahl sent at 12/28/2018  1:15 PM CST -----  Contact: Self  Refill : losartan-hydrochlorothiazide 100-25 mg (HYZAAR) 100-25 mg per tablet     zolpidem (AMBIEN) 10 mg Tab       [facility] - [place], [place] - [address] [place] [address]  Phone: [phone] Fax: [phone]

## 2019-01-08 ENCOUNTER — OFFICE VISIT (OUTPATIENT)
Dept: PULMONOLOGY | Facility: CLINIC | Age: 64
End: 2019-01-08
Payer: MEDICARE

## 2019-01-08 VITALS
HEART RATE: 75 BPM | WEIGHT: 267.19 LBS | DIASTOLIC BLOOD PRESSURE: 95 MMHG | TEMPERATURE: 98 F | BODY MASS INDEX: 40.5 KG/M2 | HEIGHT: 68 IN | SYSTOLIC BLOOD PRESSURE: 166 MMHG | OXYGEN SATURATION: 97 %

## 2019-01-08 DIAGNOSIS — G47.33 OBSTRUCTIVE SLEEP APNEA: Primary | ICD-10-CM

## 2019-01-08 PROCEDURE — 3077F PR MOST RECENT SYSTOLIC BLOOD PRESSURE >= 140 MM HG: ICD-10-PCS | Mod: CPTII,S$GLB,, | Performed by: NURSE PRACTITIONER

## 2019-01-08 PROCEDURE — 3080F PR MOST RECENT DIASTOLIC BLOOD PRESSURE >= 90 MM HG: ICD-10-PCS | Mod: CPTII,S$GLB,, | Performed by: NURSE PRACTITIONER

## 2019-01-08 PROCEDURE — 3008F BODY MASS INDEX DOCD: CPT | Mod: CPTII,S$GLB,, | Performed by: NURSE PRACTITIONER

## 2019-01-08 PROCEDURE — 1101F PR PT FALLS ASSESS DOC 0-1 FALLS W/OUT INJ PAST YR: ICD-10-PCS | Mod: CPTII,S$GLB,, | Performed by: NURSE PRACTITIONER

## 2019-01-08 PROCEDURE — 3077F SYST BP >= 140 MM HG: CPT | Mod: CPTII,S$GLB,, | Performed by: NURSE PRACTITIONER

## 2019-01-08 PROCEDURE — 99214 PR OFFICE/OUTPT VISIT, EST, LEVL IV, 30-39 MIN: ICD-10-PCS | Mod: S$GLB,,, | Performed by: NURSE PRACTITIONER

## 2019-01-08 PROCEDURE — 3008F PR BODY MASS INDEX (BMI) DOCUMENTED: ICD-10-PCS | Mod: CPTII,S$GLB,, | Performed by: NURSE PRACTITIONER

## 2019-01-08 PROCEDURE — 99214 OFFICE O/P EST MOD 30 MIN: CPT | Mod: S$GLB,,, | Performed by: NURSE PRACTITIONER

## 2019-01-08 PROCEDURE — 3080F DIAST BP >= 90 MM HG: CPT | Mod: CPTII,S$GLB,, | Performed by: NURSE PRACTITIONER

## 2019-01-08 PROCEDURE — 1101F PT FALLS ASSESS-DOCD LE1/YR: CPT | Mod: CPTII,S$GLB,, | Performed by: NURSE PRACTITIONER

## 2019-01-08 NOTE — PATIENT INSTRUCTIONS
[facility] :    Durable Medical Equipment -[facility] [phone]     Neha Ly, RT office [phone]    Plan:   1. Start CPAP, Compliance requirement on machine 4 hours or more 70% nights (22/30 days)  2. If do not like mask, exchange equipment within 30 days  3. Follow up in 6 weeks after usage to assess effectiveness    Education: During our discussion today, we talked about the etiology of obstructive sleep apnea as well as the potential ramifications of untreated sleep apnea, which could include daytime sleepiness, dementia, cognitive impairment, hypertension, heart disease and/or stroke. We discussed potential treatment options, which could include weight loss, body positioning, oral appliances (OA), continuous positive airway pressure (CPAP), or referral for surgical consideration.     Behavior modification which includes losing weight, exercising, changing the sleep position, abstaining from alcohol, and avoiding certain medications    Precautions: The patient was advised to abstain from driving should they feel sleepy or drowsy    CPAP DESENSITIZATION if difficulty adhering to machine:    Step 1:  Wear the CPAP  at home while awake for 1-2 hour each day.  Practice breathing through the mask while watching television, reading, or performing another sedentary activity that keeps your mind off your anxiety.     Step 2: Use the CPAP   during scheduled one hour naps at home.     Step 3: If can tolerate CPAP for 2 hours daily while awake, start sleeping with it. Use CPAP   during the initial 4.5 hours of nocturnal sleep.     Step 4: Use CPAP  through the entire night of sleep.

## 2019-01-08 NOTE — PROGRESS NOTES
CHIEF COMPLAINT:    Chief Complaint   Patient presents with    Sleep Apnea     sleep study f/u       HISTORY OF PRESENT ILLNESS: Mirza Do is a 63 y.o. male with HT,HLD, Diastolic Dysfunction, morbid obesity, YUNIEL,  Hx CVA is here for sleep study follow up. Patient with symptoms of snoring, witnessed apnea.       Split-night 12/10/2018:    The overall AHI was 65.8 with an oxygen zuri of 73.0%.    Effective control of sleep disordered breathing was achieved with CPAP at 12 cm of water.       PAST MEDICAL HISTORY:    Active Ambulatory Problems     Diagnosis Date Noted    Impotence of organic origin     History of CVA (cerebrovascular accident)     Essential hypertension, benign     Insomnia 10/05/2012    History of hiatal hernia 11/19/2015    Hypertriglyceridemia 11/20/2015    Type 2 diabetes mellitus with diabetic neuropathy, without long-term current use of insulin 10/14/2016    Mixed hyperlipidemia 10/14/2016    BMI 40.0-44.9, adult 04/30/2018    Diastolic dysfunction without heart failure 10/05/2018    YUNIEL (obstructive sleep apnea) 11/13/2018    Chronic cough 11/13/2018       Past Medical History:   Diagnosis Date    CVA (cerebral infarction)     Diabetes mellitus type II, non insulin dependent 10/14/2016    Erectile dysfunction     Hyperlipidemia 10/14/2016    Hypertension     Murmur                 PAST SURGICAL HISTORY:    Past Surgical History:   Procedure Laterality Date    BACK SURGERY      discectomy    COLONOSCOPY N/A 8/4/2017    Performed by Mario Alberto [facility]ega MD at [facility] (4TH FLR)    ROOT CANAL      UMBILICAL HERNIA REPAIR           FAMILY HISTORY:                Family History   Problem Relation Age of Onset    Cancer Father     Hypertension Brother     Cancer Brother     No Known Problems Sister     No Known Problems Sister     No Known Problems Sister        SOCIAL HISTORY:          Tobacco:   Social History     Tobacco Use   Smoking Status Current Some Day Smoker     Packs/day: 0.10    Years: 4.00    Pack years: 0.40   Smokeless Tobacco Never Used   Tobacco Comment    social smoking as teenager       alcohol use:    Social History     Substance and Sexual Activity   Alcohol Use Yes    Alcohol/week: 6.0 oz    Types: 10 Cans of beer per week    Comment: occasionally                 Occupation:  [employer]     ALLERGIES:  Review of patient's allergies indicates:  No Known Allergies    CURRENT MEDICATIONS:    Current Outpatient Medications   Medication Sig Dispense Refill    ACCU-CHEK STEPHON PLUS TEST STRP Strp 1 strip by Misc.(Non-Drug; Combo Route) route daily as needed (alternate testing time). 50 strip 11    ACCU-CHEK FASTCLIX Misc 1 lancet by Misc.(Non-Drug; Combo Route) route daily as needed. 50 each 11    aspirin 81 MG Chew Take 81 mg by mouth once daily.      atorvastatin (LIPITOR) 20 MG tablet Take 1 tablet (20 mg total) by mouth once daily. 90 tablet 3    esomeprazole (NEXIUM) 40 MG capsule Take 1 capsule (40 mg total) by mouth before breakfast. 30 capsule 2    gabapentin (NEURONTIN) 300 MG capsule Take 1 tab by mouth nightly x 3 days; if no improvement, take 1 tab twice daily x 3 day; if no improvement, take 1 tab 3 times daily 90 capsule 1    levalbuterol (XOPENEX HFA) 45 mcg/actuation inhaler Inhale 1-2 puffs into the lungs every 6 (six) hours as needed for Wheezing or Shortness of Breath. Rescue 15 g 0    losartan-hydrochlorothiazide 100-25 mg (HYZAAR) 100-25 mg per tablet Take 1 tablet by mouth once daily. 90 tablet 1    metoprolol succinate (TOPROL-XL) 50 MG 24 hr tablet Take 1 tablet (50 mg total) by mouth once daily. 90 tablet 3    VIAGRA 100 mg tablet TAKE 1 TABLET BY MOUTH AS NEEDED FOR ERECTILE DYSFUNCTION 8 tablet 3    zolpidem (AMBIEN) 10 mg Tab TAKE 1 TABLET BY MOUTH AT NIGHT AS NEEDED 30 tablet 2     No current facility-administered medications for this visit.                   REVIEW OF SYSTEMS:     Sleep related symptoms as per  "HPI.  CONST:Denies weight gain    HEENT: Denies sinus congestion  PULM: Denies dyspnea; chronic  CARD:  Denies palpitations       PHYSICAL EXAM:  Vitals:    01/08/19 1147   BP: (!) 166/95   Pulse: 75   Temp: 98.1 °F (36.7 °C)   TempSrc: Oral   SpO2: 97%   Weight: 121.2 kg (267 lb 3.2 oz)   Height: 5' 8" (1.727 m)   PainSc: 0-No pain     Body mass index is 40.63 kg/m².     GENERAL: Normal development, well groomed, obese  HEENT:  Conjunctivae are non-erythematous; Pupils equal, round, and reactive to light; Nose is symmetrical; Nasal mucosa is pink and moist; Septum is midline; Inferior turbinates are normal; Nasal airflow is normal; Posterior pharynx is pink; Modified Mallampati: 4  RESPIRATORY: Chest is clear to auscultation.  Normal chest expansion and non-labored breathing at rest.  CARDIOVASCULAR: Normal S1, S2.  N murmurs, gallops or rubs.   EXTREMITIES:  Station normal. Gait normal.        NEURO/PSYCH: Oriented to time, place and person. Normal attention span and concentration. Affect is full. Mood is normal.                                              DATA     CXR10/30/18 no effusion or consolidation  ECHO10/5/18  · Left ventricle ejection fraction is mildly decreased at 45%, mild global hypokinesis.  · Left ventricle shows concentric remodeling.  · Grade I (mild) left ventricular diastolic dysfunction consistent with impaired relaxation.  · Tricuspid valve shows trace regurgitation.  · The estimated PA systolic pressure is 33.60 mm Hg    Split-night 12/10/2018:    The overall AHI was 65.8 with an oxygen zuri of 73.0%.    Effective control of sleep disordered breathing was achieved with CPAP at 12 cm of water.     Lab Results   Component Value Date    TSH 0.986 03/19/2018     ASSESSMENT/PLAN  Problem List Items Addressed This Visit        Unprioritized    BMI 40.0-44.9, adult    Overview     Aware of need for drastic weight loss.           Current Assessment & Plan     Referral bariatric medicine         " Relevant Orders    Ambulatory Referral to Bariatric Medicine    Obstructive sleep apnea - Primary    Overview     Split-night 12/10/2018:    The overall AHI was 65.8 with an oxygen zuri of 73.0%.    Effective control of sleep disordered breathing was achieved with CPAP at 12 cm of water.          Current Assessment & Plan     1. Start CPAP, Compliance requirement on machine 4 hours or more 70% nights (22/30 days)  2. If do not like mask, exchange equipment within 30 days  3. Follow up in 6 weeks after usage to assess effectiveness         Relevant Orders    CPAP FOR HOME USE          Education: During our discussion today, we talked about the etiology of obstructive sleep apnea as well as the potential ramifications of untreated sleep apnea, which could include daytime sleepiness, dementia, cognitive impairment, hypertension, heart disease and/or stroke. We discussed potential treatment options, which could include weight loss, body positioning, oral appliances (OA), continuous positive airway pressure (CPAP), or referral for surgical consideration.     Behavior modification which includes losing weight, exercising, changing the sleep position, abstaining from alcohol, and avoiding certain medications    Patient will Follow-up in about 8 weeks (around 3/5/2019).

## 2019-01-10 PROBLEM — E66.813 CLASS 3 SEVERE OBESITY WITH SERIOUS COMORBIDITY AND BODY MASS INDEX (BMI) OF 40.0 TO 44.9 IN ADULT: Status: ACTIVE | Noted: 2019-01-10

## 2019-01-10 PROBLEM — E66.01 CLASS 3 SEVERE OBESITY WITH SERIOUS COMORBIDITY AND BODY MASS INDEX (BMI) OF 40.0 TO 44.9 IN ADULT: Status: ACTIVE | Noted: 2019-01-10

## 2019-01-10 NOTE — ASSESSMENT & PLAN NOTE
1. Start CPAP, Compliance requirement on machine 4 hours or more 70% nights (22/30 days)  2. If do not like mask, exchange equipment within 30 days  3. Follow up in 6 weeks after usage to assess effectiveness

## 2019-01-31 NOTE — PROGRESS NOTES
Subjective:       Patient ID: Mirza Do is a 63 y.o. male.    Chief Complaint: Consult    CC:    Current attempts at weight loss: New pt to me, referred by Farida Lucero, NP  [address] [facility]  [place] [id number] , with Patient Active Problem List:     Impotence of organic origin     History of CVA (cerebrovascular accident)     Essential hypertension, benign     Insomnia     History of hiatal hernia     Hypertriglyceridemia     Type 2 diabetes mellitus with diabetic neuropathy, without long-term current use of insulin     Mixed hyperlipidemia     BMI 40.0-44.9, adult     Diastolic dysfunction without heart failure     Obstructive sleep apnea on CPAP      Chronic cough         Lab Results       Component                Value               Date                       HGBA1C                   6.3 (H)             09/25/2018                 HGBA1C                   6.1 (H)             03/19/2018                 HGBA1C                   6.3 (H)             06/29/2017            Lab Results       Component                Value               Date                       LDLCALC                  111.6               03/19/2018                 CREATININE               1.0                 09/25/2018                 Has started to use CPAP. Walking 3 days a week. Does not yet have gym membership his insurance. Lives in [facility]     Previous diet attempts: Denies.     History of medication for loss: Denies.   checked today     Heaviest weight: 271#    Lightest weight: 160# in     Goal weight:  200#      Last eye exam:     Last year. No glaucoma per pt.                                   Provider:    Typical eating patterns:  Retired from [employer]. Lives with daughter, son-in-law and grandson. He does most of the cooking, sometimes his daughters bring food home  Breakfast:  Skips. Maybe fruit. Weekends: same    Lunch: maybe burger. May skip.     Dinner: Red beans, rice, chicken and green beans and dessert. Fried  "fish with rice, beet and cucumber salad.  (larger portions)    Snacks: Cookies.     Beverages: soda - 1 with dinner, water, ACV. Beer and shot of tequila- daily to weekly.     Willingness to change: 10/10    EKG:  SR with Mobitz I  Nonspecific T wave abnormality  Abnormal ECG  When compared with ECG of 22-OCT-2018 10:37,  Significant changes have occurred    BMR: 1993      Review of Systems   Constitutional: Negative for chills and fever.   Respiratory: Positive for apnea and shortness of breath.         Using CPAP for a week. Has to get it checked.    Cardiovascular: Positive for leg swelling. Negative for chest pain.   Gastrointestinal: Negative for constipation and diarrhea.        Denies GERD   Genitourinary: Negative for difficulty urinating and dysuria.   Musculoskeletal: Negative for arthralgias and back pain.   Neurological: Negative for dizziness and light-headedness.   Psychiatric/Behavioral: Negative for dysphoric mood. The patient is not nervous/anxious.        Objective:     /78   Pulse (!) 130   Ht 5' 8" (1.727 m)   Wt 123 kg (271 lb 2.7 oz)   BMI 41.23 kg/m²     Physical Exam   Constitutional: He is oriented to person, place, and time. He appears well-developed. No distress.   Morbidly obese     HENT:   Head: Normocephalic and atraumatic.   Eyes: Pupils are equal, round, and reactive to light. No scleral icterus.   Neck: Normal range of motion. Neck supple. No thyromegaly present.   Cardiovascular: Normal rate, regular rhythm and normal heart sounds. Exam reveals no gallop and no friction rub.   No murmur heard.  Pulmonary/Chest: Effort normal and breath sounds normal. No respiratory distress. He has no wheezes.   Abdominal: Soft. Bowel sounds are normal. He exhibits no distension. There is no tenderness.   Musculoskeletal: Normal range of motion. He exhibits no edema.   Neurological: He is alert and oriented to person, place, and time.   Skin: Skin is warm and dry.   Psychiatric: He has a " normal mood and affect. His behavior is normal. Judgment normal.   Vitals reviewed.      Assessment:       1. Class 3 severe obesity due to excess calories with serious comorbidity and body mass index (BMI) of 40.0 to 44.9 in adult    2. Type 2 diabetes mellitus with diabetic neuropathy, without long-term current use of insulin    3. Hypertriglyceridemia    4. Essential hypertension, benign        Plan:         1. Class 3 severe obesity due to excess calories with serious comorbidity and body mass index (BMI) of 40.0 to 44.9 in adult  Patient was informed that topiramate is used for migraine prevention and seizures. Weight loss is a common side effect that is well documented. he understands this. he was informed of the potential side effects such as serious and possibly fatal rash in which case the medication should be discontinued immediately. Paresthesias, forgetfulness, fatigue, kidney stones, GI symptoms, and changes in lab values such as electrolytes, blood counts and kidney function.    Start topiramate  in the evening for 1 week, then morning and evening.     Exercise 30 min 3 times a week. Gradually increase.     Contact your health insurance about your free gym membership.     No soda, sweet tea, juices or lemonade    Limit/avoid starchy carbohydrates (bread, rice, pasta, potatoes, corn, peas, oatmeal, grits, tortillas, crackers, chips)    Nutrition materials provided today.      3520-9932 star menu and meal ideas given.     2. Type 2 diabetes mellitus with diabetic neuropathy, without long-term current use of insulin  Expect improvement with weight loss.       3. Hypertriglyceridemia  Expect improvement with weight loss. Recheck after 10% TBW lost.      4. Essential hypertension, benign  The current medical regimen is effective;  continue present plan and medications. Expect improvement with weight loss.

## 2019-02-04 ENCOUNTER — OFFICE VISIT (OUTPATIENT)
Dept: BARIATRICS | Facility: CLINIC | Age: 64
End: 2019-02-04
Payer: MEDICARE

## 2019-02-04 VITALS
HEIGHT: 68 IN | WEIGHT: 271.19 LBS | SYSTOLIC BLOOD PRESSURE: 121 MMHG | DIASTOLIC BLOOD PRESSURE: 78 MMHG | BODY MASS INDEX: 41.1 KG/M2 | HEART RATE: 78 BPM

## 2019-02-04 DIAGNOSIS — E66.01 CLASS 3 SEVERE OBESITY DUE TO EXCESS CALORIES WITH SERIOUS COMORBIDITY AND BODY MASS INDEX (BMI) OF 40.0 TO 44.9 IN ADULT: Primary | ICD-10-CM

## 2019-02-04 DIAGNOSIS — I10 ESSENTIAL HYPERTENSION, BENIGN: Chronic | ICD-10-CM

## 2019-02-04 DIAGNOSIS — E11.40 TYPE 2 DIABETES MELLITUS WITH DIABETIC NEUROPATHY, WITHOUT LONG-TERM CURRENT USE OF INSULIN: ICD-10-CM

## 2019-02-04 DIAGNOSIS — E78.1 HYPERTRIGLYCERIDEMIA: ICD-10-CM

## 2019-02-04 PROCEDURE — 3044F PR MOST RECENT HEMOGLOBIN A1C LEVEL <7.0%: ICD-10-PCS | Mod: CPTII,S$GLB,, | Performed by: INTERNAL MEDICINE

## 2019-02-04 PROCEDURE — 99999 PR PBB SHADOW E&M-EST. PATIENT-LVL III: ICD-10-PCS | Mod: PBBFAC,,, | Performed by: INTERNAL MEDICINE

## 2019-02-04 PROCEDURE — 99499 UNLISTED E&M SERVICE: CPT | Mod: S$GLB,,, | Performed by: INTERNAL MEDICINE

## 2019-02-04 PROCEDURE — 99215 PR OFFICE/OUTPT VISIT, EST, LEVL V, 40-54 MIN: ICD-10-PCS | Mod: S$GLB,,, | Performed by: INTERNAL MEDICINE

## 2019-02-04 PROCEDURE — 99499 RISK ADDL DX/OHS AUDIT: ICD-10-PCS | Mod: S$GLB,,, | Performed by: INTERNAL MEDICINE

## 2019-02-04 PROCEDURE — 1101F PR PT FALLS ASSESS DOC 0-1 FALLS W/OUT INJ PAST YR: ICD-10-PCS | Mod: CPTII,S$GLB,, | Performed by: INTERNAL MEDICINE

## 2019-02-04 PROCEDURE — 3074F SYST BP LT 130 MM HG: CPT | Mod: CPTII,S$GLB,, | Performed by: INTERNAL MEDICINE

## 2019-02-04 PROCEDURE — 3078F PR MOST RECENT DIASTOLIC BLOOD PRESSURE < 80 MM HG: ICD-10-PCS | Mod: CPTII,S$GLB,, | Performed by: INTERNAL MEDICINE

## 2019-02-04 PROCEDURE — 3044F HG A1C LEVEL LT 7.0%: CPT | Mod: CPTII,S$GLB,, | Performed by: INTERNAL MEDICINE

## 2019-02-04 PROCEDURE — 99215 OFFICE O/P EST HI 40 MIN: CPT | Mod: S$GLB,,, | Performed by: INTERNAL MEDICINE

## 2019-02-04 PROCEDURE — 99999 PR PBB SHADOW E&M-EST. PATIENT-LVL III: CPT | Mod: PBBFAC,,, | Performed by: INTERNAL MEDICINE

## 2019-02-04 PROCEDURE — 3008F PR BODY MASS INDEX (BMI) DOCUMENTED: ICD-10-PCS | Mod: CPTII,S$GLB,, | Performed by: INTERNAL MEDICINE

## 2019-02-04 PROCEDURE — 3074F PR MOST RECENT SYSTOLIC BLOOD PRESSURE < 130 MM HG: ICD-10-PCS | Mod: CPTII,S$GLB,, | Performed by: INTERNAL MEDICINE

## 2019-02-04 PROCEDURE — 3078F DIAST BP <80 MM HG: CPT | Mod: CPTII,S$GLB,, | Performed by: INTERNAL MEDICINE

## 2019-02-04 PROCEDURE — 1101F PT FALLS ASSESS-DOCD LE1/YR: CPT | Mod: CPTII,S$GLB,, | Performed by: INTERNAL MEDICINE

## 2019-02-04 PROCEDURE — 3008F BODY MASS INDEX DOCD: CPT | Mod: CPTII,S$GLB,, | Performed by: INTERNAL MEDICINE

## 2019-02-04 RX ORDER — TOPIRAMATE 25 MG/1
25 TABLET ORAL 2 TIMES DAILY
Qty: 60 TABLET | Refills: 3 | Status: SHIPPED | OUTPATIENT
Start: 2019-02-04 | End: 2019-06-13 | Stop reason: SDUPTHER

## 2019-02-04 NOTE — PATIENT INSTRUCTIONS
Patient was informed that topiramate is used for migraine prevention and seizures. Weight loss is a common side effect that is well documented. he understands this. he was informed of the potential side effects such as serious and possibly fatal rash in which case the medication should be discontinued immediately. Paresthesias, forgetfulness, fatigue, kidney stones, GI symptoms, and changes in lab values such as electrolytes, blood counts and kidney function.    Start topiramate  in the evening for 1 week, then morning and evening.     Exercise 30 min 3 times a week. Gradually increase.     Contact your health insurance about your free gym membership.     No soda, sweet tea, juices or lemonade    Limit/avoid starchy carbohydrates (bread, rice, pasta, potatoes, corn, peas, oatmeal, grits, tortillas, crackers, chips)    Fruits and Vegetables       Include 1-2 servings of fruit daily.      1 serving of fruit includes ½ cup unsweetened applesauce, ½ medium banana, tennis ball size piece of fruit, 17 grapes, 1 cup melon, 1 cup strawberries, ¼ cup dried fruit     Include 2-3 servings of vegetables daily. 1 serving is 1 cup raw or ½ cup cooked.     Non-starchy vegetables include artichoke, asparagus, baby corn, bamboo shoots, beans: green/Italian/wax, bean sprouts, beets, broccoli, [place] sprouts, cabbage, carrots, cauliflower, celery, cucumber, eggplant, green onions or scallions, greens, jicama, leeks, mushrooms, okra, onions, pea pods, peppers, radishes, spinach, summer squash, tomatoes and salsa, turnips, vegetable juice cocktail, water chestnuts, zucchini            1200- 1500 calorie Sample meal plan   80-120g protein per day.   Protein drinks and bars: 0-4 grams sugar   Drink lots of water throughout the day and exercise!   MENU # 1   Breakfast: 2 eggs, 1 turkey sausage ochoa, 1 apple   Snack: P3 protein pack  Lunch: 2 roll-ups (sliced turkey, low-fat sliced cheese, mustard), 12 baby carrots dipped in 1 Tbsp  natural peanut butter   Mid-Day Snack: ¼ cup unsalted almonds, ½ cup fruit   Dinner: 1 chicken thigh simmered in tomato sauce + 2 Tbsp mozzarella cheese, ½ cup black beans, 1/2 cup steamed carrots   Evening Snack: 1/2 cup grapes with 4 cubes low-fat cheddar cheese   ___________________________________________________   MENU # 2   Breakfast: 200 calorie protein drink   Mid-morning snack : ¼ cup unsalted nuts, medium banana   Lunch: 3oz tuna or chicken salad made with 2 tbsp light rucker, over salad with tomatoes and cucumbers.   Snack: low-fat cheese stick   Dinner: 3oz hamburger ochoa, slice of low-fat cheese, 1 cup yellow squash and zucchini sauteed in nonstick cookspray  Snack: 6oz light yogurt   _______________________________________________________   Menu #3   Breakfast: 6oz plain Greek yogurt + fruit (½ banana OR ½ cup fruit - pineapple, blueberries, strawberries, peach), may add Splenda to vinh.   Lunch: Grilled chicken breast w/ slice low-fat pepper diamond cheese, 1/2 cup grilled/baked asparagus and small salad with Salad Spritzer.   Mid-Day snack: 200 calorie protein drink   Dinner: 4oz Grilled fish, ½ cup white beans, ½ cup cooked spinach   Evening Snack: fudgsicle no-sugar-added   Menu # 4   Breakfast: 1 scoop vanilla protein powder + 4oz skim milk + 4oz coffee   Snack: Pure protein bar   Lunch: 2 Lettuce tacos: 3oz seasoned ground turkey wrapped in a Roshan lettuce leaves with 1 Tbsp shredded cheese and dollop low-fat sour cream   Snack: ½ cup cottage cheese, ½ cup pineapple chunks   Dinner: Shrimp omelet: 2 eggs, ½ cup shrimp, green onions, and shredded cheese   ______________________________________________________   Menu #5   Breakfast: 1 cup low-fat cottage cheese, ½ cup peaches (no sugar added)   Snack: 1 apple, 4 cubes cheese   Lunch: 3oz baked pork chop, 1 cup okra and tomato stew   Snack: 1/2 cup black beans + salsa + dollop light sour cream   Dinner: Caprese chicken salad: 3 oz chicken breast,  1oz fresh mozzarella, sliced tomato, 1 Tbsp olive oil, basil   Snack: sugar-free popsicle   _______________________________________________________  Menu #6   Breakfast: ½ cup part-skim ricotta cheese, 2 Tbsp sugar-free strawberry preserves, sprinkle of slivered almonds   Snack: 1 orange + string cheese  Lunch: 3 oz canned chicken, 1oz shredded cheddar cheese, ½ cup black beans, 2 Tbsp salsa   Snack: 200 calorie Protein drink   Dinner: 4 oz grilled salmon steak, over mixed green salad with 2 tbsp light dressing   _______________________________________________________  Menu #7  Breakfast: crust-less quiche (bake 1 egg mixed w/ low fat cheese, broccoli and low sodium ham in a muffin cup until cooked inside)  Snack: 1 light yogurt  Lunch: protein shake (1 scoop powder + 8 oz skim milk, blended w/ ice)  Snack: small apple w/ 2 tbsp PB2 (powdered peanut butter)  Dinner: 2 Turkey meatballs w/ low sugar marinara sauce, 1/2 cup spiralized zucchini (sauteed on stove top w/ nonstick cookspray)        Meal Ideas for Regular Bariatric Diet  *Recipes and products available at [email or web address].com      Breakfast: (15-20g protein)    - Egg white omelet: 2 egg whites or ½ cup Egg Beaters. (Optional proteins: cheese, shrimp, black beans, chicken, sliced turkey) (Optional veggies: tomatoes, salsa, spinach, mushrooms, onions, green peppers, or small slice avocado)     - Egg and sausage: 1 egg or ¼ cup Egg Beaters (any variety), with 1 ochoa or 2 links of Turkey sausage or Veggie breakfast sausage ([employer] or [employer])    - Crust-less breakfast quiche: To make a glass pie dish, mix 4oz part skim Ricotta, 1 cup skim milk, and 2 eggs as your base. Add protein: shredded cheese, sliced lean ham or turkey, turkey michaels/sausage. Add veggies: tomato, onion, green onion, mushroom, green pepper, spinach, etc.    - Yogurt parfait: Mix 1 - 6oz container Dannon Light N Fit vanilla yogurt, with ¼ cup Kashi Go Lean cereal    - Cottage  cheese and fruit: ½ cup part-skim cottage cheese or ricotta cheese topped with fresh fruit or sugar free preserves     - Aranza Luna's Vanilla Egg custard* (add 2 Tbsp instant coffee granules to make Cappuccino Custard*)    - Hi-Protein café latte (skim milk, decaf coffee, 1 scoop protein powder). Optional to add Sugar free syrup or extract flavoring.    Lunch: (20-30g protein)    - ½ cup Black bean soup (Homemade or Progresso), with ¼ cup shredded low-fat cheese. Top with chopped tomato or fresh salsa.     - Lean deli turkey breast and low-fat sliced cheese, mustard or light rucker to moisten, rolled up together, or wrapped in a Roshan lettuce leaf    - Chicken salad made from dinner leftovers, moisten with low-fat salad dressing or light rucker. Also try leftover salmon, shrimp, tuna or boiled eggs. Serve ½ cup over dark green salad    - Fat-free canned refried beans, topped with ¼ cup shredded low-fat cheese. Top with chopped tomato or fresh salsa.     - Greek salad: Top mixed greens with 1-2oz grilled chicken, tomatoes, red onions, 2-3 kalamata olives, and sprinkle lightly with feta cheese. Spritz with Balsamic vinegar to taste.     - Crust-less lunch quiche: To make a glass pie dish, mix 4oz part skim Ricotta, 1 cup skim milk, and 2 eggs as your base. Add protein: shredded cheese, sliced lean ham or turkey, shrimp, chicken. Add veggies: tomato, onion, green onion, mushroom, green pepper, spinach, artichoke, broccoli, etc.    - Pizza bake: tomato sauce, low-fat shredded mozzarella and turkey pepperoni or Mauritanian michaels. Add any veggies.    - Cucumber crab bites: Spread ¼ cup crab dip (lump crabmeat + light cream cheese and green onions) over sliced cucumber.     - Chicken with light spinach and artichoke dip*: Puree in : 6oz cooked and drained spinach, 2 cloves garlic, 1 can cannelloni beans, ½ cup chopped green onions, 1 can drained artichoke hearts (not marinated in oil), lemon juice and basil. Mix  in 2oz chopped up chicken.    Supper: (20-30g protein)    - Serve grilled fish over dark green salad tossed with low-fat dressing, served with grilled asparagus davidson     - Rotisserie chicken salad: served with sliced strawberries, walnuts, fat-free feta cheese crumbles and 1 tbsp Rosens Own Light Raspberry [place] Vinaigrette    - Shrimp cocktail: Dip cold boiled shrimp in homemade low-sugar cocktail sauce (1/2 cup Hernan One Carb ketchup, 2 tbsp horseradish, 1/4 tsp hot sauce, 1 tsp Worcestershire sauce, 1 tbsp freshly-squeezed lemon juice). Serve with dark green salad, walnuts, and crumbled blue cheese drizzled with olive oil and Balsamic vinegar    - Tuna Melt: Spread tuna salad onto 2 thick slices of tomato. Top with low-fat cheese and broil until cheese is melted. May also be made with chicken salad of shrimp salad. Rafael Gonzalez with different types of cheeses.    - Homemade low-fat Chili using extra lean ground beef or ground turkey. Top with shredded cheese and salsa as desired. May add dollop fat-free sour cream if desired    - Dinner Omelet with shrimp or chicken and onion, green peppers and chives.    - No noodle lasagna: Use sliced zucchini or eggplant in place of noodles.  Layer with part skim ricotta cheese and low sugar meat sauce (use very lean ground beef or ground turkey).    - Mexican chicken bake: Bake chunks of chicken breast or thigh with taco seasoning, Pace brand enchilada sauce, green onions and low-fat cheese. Serve with ¼ cup black beans or fat free refried beans topped with chopped tomatoes or salsa.    - Liliam frozen meatballs, simmered in Classico Marinara sauce. Different flavors of salsa or spaghetti sauce create different dishes! Sprinkle with parmesan cheese. Serve with grilled or steamed veggies, or a dark green salad.    - Simmer boneless skinless chicken thigh chunks in Classico Marinara sauce or roasted salsa until tender with chopped onion, bell pepper, garlic, mushrooms,  spinach, etc.     - Hamburger, without the bun, dressed the way you like. Served with grilled or steamed veggies.    - Eggplant parmesan: Bake slices of eggplant at 350 degrees for 15 minutes. Layer tomato sauce, sliced eggplant and low-fat mozzarella cheese in a baking dish and cover with foil. Bake 30-40 more minutes or until bubbly. Uncover and bake at 400 degrees for about 15 more minutes, or until top is slightly crisp.    - Fish tacos: grilled/baked white fish, wrapped in Roshan lettuce leaf, topped with salsa, shredded low-fat cheese, and light coleslaw.    Snacks: (100-200 calories; >5g protein)    - 1 low-fat cheese stick with 8 cherry tomatoes or 1 serving fresh fruit  - 4 thin slices fat-free turkey breast and 1 slice low-fat cheese  - 4 thin slices fat-free honey ham with wedge of melon  - 1/4 cup unsalted nuts with ½ cup fruit  - 6-oz container Dannon Light n Fit vanilla yogurt, topped with 1oz unsalted nuts         - apple, celery or baby carrots spread with 2 Tbsp natural peanut butter or almond butter   - apple slices with 1 oz slice low-fat cheese  - celery, cucumber, bell pepper or baby carrots dipped in ¼ cup hummus bean spread or light spinach and artichoke dip (*recipe in lunch section)  - 100 calorie bag microwave light popcorn with 3 tbsp grated parmesan cheese  - Lazaro Links Beef Steak - 14g protein! (similar to beef jerky)  - 2 wedges Laughing Cow - Light Herb & Garlic Cheese with sliced cucumber or green bell pepper  - 1/2 cup low-fat cottage cheese with ¼ cup fruit or ¼ cup salsa  - RTD Protein drinks: Atkins, Low Carb Slim Fast, EAS light, Muscle Milk Light, etc.  - Homemade Protein drinks: GNC Soy95, Isopure, Nectar, UNJURY, Whey Gourmet, etc. Mix 1 scoop powder with 8oz skim/1% milk or light soymilk.  - Protein bars: Atkins, EAS, Pure Protein, Think Thin, Detour, etc. Must have 0-4 grams sugar - Read the label.    Takeout Options: No more than twice/week  Deli - Salads (no pasta or  rice), meats, cheeses. Roasted chicken. Lox (salmon)    Mexican - Platters which don't include tortillas, chips, or rice. Go easy on the beans. Example: Fajitas without the tortillas. Ask the  not to bring chips to the table if they are too tempting.    Greek - Meat or fish and vegetable, but no bread or rice. Including hummus, baba ganoush, etc, is OK. Most sit-down Greek restaurants can provide you with cucumber slices for dipping instead of barber bread.    Fast Food (Avoid as much as possible) - Salads (no croutons and limit salad dressing to 2 tbsp), grilled chicken sandwich without the bun and ask for no rucker. Chanas low fat chili or Taco Bell pintos and cheese.    BBQ - The meats are fine if you ask for sauces on the side, but most of the traditional side dishes are loaded with carbs. Compa slaw, baked beans and BBQ sauce are typically made with sugar.    Chinese - Nothing deep-fried, no rice or noodles. Many Chinese sauces have starch and sugar in them, so you'll have to use your judgement. If you find that these sauces trigger cravings, or cause Dumping, you can ask for the sauce to be made without sugar or just use soy sauce.

## 2019-02-04 NOTE — LETTER
[facility]  [address]  [facility] [address]  Phone: [phone]  Fax: [phone] February 4, 2019      Farida Lucero, TATIANA  [address]  [place] [address]    Patient: Mirza Do   MR Number: [id number]   YOB: 1955   Date of Visit: 2/4/2019     Dear Farida Lucero:    Thank you for referring Mirza Do to me for evaluation. Attached you will find relevant portions of my assessment and plan of care.    Mr. Do's assessment is as follows:    1. Class 3 severe obesity due to excess calories with serious comorbidity and body mass index (BMI) of 40.0 to 44.9 in adult    2. Type 2 diabetes mellitus with diabetic neuropathy, without long-term current use of insulin    3. Hypertriglyceridemia    4. Essential hypertension, benign        PLAN:    1. Class 3 severe obesity due to excess calories with serious comorbidity and body mass index (BMI) of 40.0 to 44.9 in adult  The patient was informed that topiramate is used for migraine prevention and seizures. Weight loss is a common side effect that is well documented. He understands this. He was informed of the potential side effects such as serious and possibly fatal rash in which case the medication should be discontinued immediately. Additional side effects include paresthesias, forgetfulness, fatigue, kidney stones, GI symptoms, and changes in lab values such as electrolytes, blood counts and kidney function.     The patient is to start topiramate  in the evening for one week, then morning and evening.      He is to exercise for 30 min, three times a week and gradually increase.      The patient should contact his health insurance about his free gym membership.      No soda, sweet tea, juices or lemonade     He is to limit/avoid starchy carbohydrates (bread, rice, pasta, potatoes, corn, peas, oatmeal, grits, tortillas, crackers, chips)  The patient was provided with nutrition materials today and given a  [id number] star menu and meal ideas.      2. Type 2 diabetes mellitus with diabetic neuropathy, without long-term current use of insulin  Expect improvement with weight loss.      3. Hypertriglyceridemia  Expect improvement with weight loss. Recheck after 10% TBW lost.       4. Essential hypertension, benign  The current medical regimen is effective; continue present plan and medications. Expect improvement with weight loss.      If you have questions, please do not hesitate to call me. I look forward to following Mirza Do along with you.    Sincerely,    Pepper Arias MD  Section of Bariatric Surgery  Department of Surgery  [facility]    AGF/afw

## 2019-02-08 ENCOUNTER — LAB VISIT (OUTPATIENT)
Dept: LAB | Facility: HOSPITAL | Age: 64
End: 2019-02-08
Attending: INTERNAL MEDICINE
Payer: MEDICARE

## 2019-02-08 DIAGNOSIS — E78.1 HYPERTRIGLYCERIDEMIA: ICD-10-CM

## 2019-02-08 DIAGNOSIS — E78.2 MIXED HYPERLIPIDEMIA: ICD-10-CM

## 2019-02-08 LAB
ALBUMIN SERPL BCP-MCNC: 3.7 G/DL
ALP SERPL-CCNC: 84 U/L
ALT SERPL W/O P-5'-P-CCNC: 40 U/L
AST SERPL-CCNC: 26 U/L
BILIRUB DIRECT SERPL-MCNC: 0.3 MG/DL
BILIRUB SERPL-MCNC: 0.7 MG/DL
CHOLEST SERPL-MCNC: 192 MG/DL
CHOLEST/HDLC SERPL: 4.1 {RATIO}
HDLC SERPL-MCNC: 47 MG/DL
HDLC SERPL: 24.5 %
LDLC SERPL CALC-MCNC: 115.4 MG/DL
NONHDLC SERPL-MCNC: 145 MG/DL
PROT SERPL-MCNC: 7.4 G/DL
TRIGL SERPL-MCNC: 148 MG/DL

## 2019-02-08 PROCEDURE — 80076 HEPATIC FUNCTION PANEL: CPT

## 2019-02-08 PROCEDURE — 36415 COLL VENOUS BLD VENIPUNCTURE: CPT

## 2019-02-08 PROCEDURE — 80061 LIPID PANEL: CPT

## 2019-02-19 ENCOUNTER — OFFICE VISIT (OUTPATIENT)
Dept: PULMONOLOGY | Facility: CLINIC | Age: 64
End: 2019-02-19
Payer: MEDICARE

## 2019-02-19 ENCOUNTER — HOSPITAL ENCOUNTER (OUTPATIENT)
Dept: CARDIOLOGY | Facility: HOSPITAL | Age: 64
Discharge: HOME | End: 2019-02-19
Attending: NURSE PRACTITIONER
Payer: MEDICARE

## 2019-02-19 VITALS
BODY MASS INDEX: 41.24 KG/M2 | WEIGHT: 272.13 LBS | TEMPERATURE: 98 F | DIASTOLIC BLOOD PRESSURE: 79 MMHG | HEIGHT: 68 IN | OXYGEN SATURATION: 99 % | HEART RATE: 110 BPM | SYSTOLIC BLOOD PRESSURE: 120 MMHG

## 2019-02-19 DIAGNOSIS — R00.0 TACHYCARDIA: ICD-10-CM

## 2019-02-19 DIAGNOSIS — R06.02 SOB (SHORTNESS OF BREATH) ON EXERTION: ICD-10-CM

## 2019-02-19 DIAGNOSIS — G47.33 OSA ON CPAP: Primary | ICD-10-CM

## 2019-02-19 PROCEDURE — 93005 ELECTROCARDIOGRAM TRACING: CPT

## 2019-02-19 PROCEDURE — 1101F PT FALLS ASSESS-DOCD LE1/YR: CPT | Mod: CPTII,S$GLB,, | Performed by: NURSE PRACTITIONER

## 2019-02-19 PROCEDURE — 3074F PR MOST RECENT SYSTOLIC BLOOD PRESSURE < 130 MM HG: ICD-10-PCS | Mod: CPTII,S$GLB,, | Performed by: NURSE PRACTITIONER

## 2019-02-19 PROCEDURE — 3008F PR BODY MASS INDEX (BMI) DOCUMENTED: ICD-10-PCS | Mod: CPTII,S$GLB,, | Performed by: NURSE PRACTITIONER

## 2019-02-19 PROCEDURE — 99999 PR PBB SHADOW E&M-EST. PATIENT-LVL IV: ICD-10-PCS | Mod: PBBFAC,,, | Performed by: NURSE PRACTITIONER

## 2019-02-19 PROCEDURE — 99214 PR OFFICE/OUTPT VISIT, EST, LEVL IV, 30-39 MIN: ICD-10-PCS | Mod: S$GLB,,, | Performed by: NURSE PRACTITIONER

## 2019-02-19 PROCEDURE — 3074F SYST BP LT 130 MM HG: CPT | Mod: CPTII,S$GLB,, | Performed by: NURSE PRACTITIONER

## 2019-02-19 PROCEDURE — 3078F PR MOST RECENT DIASTOLIC BLOOD PRESSURE < 80 MM HG: ICD-10-PCS | Mod: CPTII,S$GLB,, | Performed by: NURSE PRACTITIONER

## 2019-02-19 PROCEDURE — 99214 OFFICE O/P EST MOD 30 MIN: CPT | Mod: S$GLB,,, | Performed by: NURSE PRACTITIONER

## 2019-02-19 PROCEDURE — 99999 PR PBB SHADOW E&M-EST. PATIENT-LVL IV: CPT | Mod: PBBFAC,,, | Performed by: NURSE PRACTITIONER

## 2019-02-19 PROCEDURE — 93010 EKG 12-LEAD: ICD-10-PCS | Mod: ,,, | Performed by: INTERNAL MEDICINE

## 2019-02-19 PROCEDURE — 93010 ELECTROCARDIOGRAM REPORT: CPT | Mod: ,,, | Performed by: INTERNAL MEDICINE

## 2019-02-19 PROCEDURE — 3078F DIAST BP <80 MM HG: CPT | Mod: CPTII,S$GLB,, | Performed by: NURSE PRACTITIONER

## 2019-02-19 PROCEDURE — 3008F BODY MASS INDEX DOCD: CPT | Mod: CPTII,S$GLB,, | Performed by: NURSE PRACTITIONER

## 2019-02-19 PROCEDURE — 1101F PR PT FALLS ASSESS DOC 0-1 FALLS W/OUT INJ PAST YR: ICD-10-PCS | Mod: CPTII,S$GLB,, | Performed by: NURSE PRACTITIONER

## 2019-02-19 NOTE — PROGRESS NOTES
CHIEF COMPLAINT:    Chief Complaint   Patient presents with    Sleep Apnea       HISTORY OF PRESENT ILLNESS: Mirza Do is a 63 y.o. male with HT,HLD, Diastolic Dysfunction, morbid obesity, YUNIEL,  Hx CVA is here for management of obstructive sleep apnea and for SOB.  Patient with symptoms of snoring, witnessed apnea.  He started on CPAP last visit and reports improved symptoms when using but difficulty tolerating pressure.  At today's visit, patient reports SOB with exertion after walking about 1 block. Uses xopenex once weekly with improvement. No chronic cough.     Split-night 12/10/2018:    The overall AHI was 65.8 with an oxygen zuri of 73.0%.    Effective control of sleep disordered breathing was achieved with CPAP at 12 cm of water.     CPAP set up Date :not noted on cpap  Overall CPAP use: rare  Is CPAP helping?  Yes, states he feels better and more rested after using, resolution snoring  Pressure tolerance: slightly too high    CPAP Device interrogation last 30 days:   Machine type : [employer]  Pressure setting and range:  12 cm H2O  Average daily usage 30 days:  1.8  Hours    Days > 4 Hours: /30 days  Predicted AHI: 1.3  Mask fit: 100 %  Periodic breathin %          PAST MEDICAL HISTORY:    Active Ambulatory Problems     Diagnosis Date Noted    Impotence of organic origin     History of CVA (cerebrovascular accident)     Essential hypertension, benign     Insomnia 10/05/2012    History of hiatal hernia 2015    Hypertriglyceridemia 2015    Type 2 diabetes mellitus with diabetic neuropathy, without long-term current use of insulin 10/14/2016    Mixed hyperlipidemia 10/14/2016    BMI 40.0-44.9, adult 2018    Diastolic dysfunction without heart failure 10/05/2018    YUNIEL (obstructive sleep apnea) 2018    Chronic cough 2018       Past Medical History:   Diagnosis Date    CVA (cerebral infarction)     Diabetes mellitus type II, non insulin dependent  10/14/2016    Erectile dysfunction     Hyperlipidemia 10/14/2016    Hypertension     Murmur                 PAST SURGICAL HISTORY:    Past Surgical History:   Procedure Laterality Date    BACK SURGERY      discectomy    COLONOSCOPY N/A 8/4/2017    Performed by Mario Alberto Vega MD at [facility] (4TH FLR)    ROOT CANAL      UMBILICAL HERNIA REPAIR           FAMILY HISTORY:                Family History   Problem Relation Age of Onset    Cancer Father     Hypertension Brother     Cancer Brother     No Known Problems Sister     No Known Problems Sister     No Known Problems Sister        SOCIAL HISTORY:          Tobacco:   Social History     Tobacco Use   Smoking Status Current Some Day Smoker    Packs/day: 0.10    Years: 4.00    Pack years: 0.40   Smokeless Tobacco Never Used   Tobacco Comment    social smoking as teenager       alcohol use:    Social History     Substance and Sexual Activity   Alcohol Use Yes    Alcohol/week: 6.0 oz    Types: 10 Cans of beer per week    Comment: occasionally                 Occupation:  [employer]     ALLERGIES:  Review of patient's allergies indicates:  No Known Allergies    CURRENT MEDICATIONS:    Current Outpatient Medications   Medication Sig Dispense Refill    ACCU-CHEK STEPHON PLUS TEST STRP Strp 1 strip by Misc.(Non-Drug; Combo Route) route daily as needed (alternate testing time). 50 strip 11    ACCU-CHEK FASTCLIX Misc 1 lancet by Misc.(Non-Drug; Combo Route) route daily as needed. 50 each 11    aspirin 81 MG Chew Take 81 mg by mouth once daily.      esomeprazole (NEXIUM) 40 MG capsule Take 1 capsule (40 mg total) by mouth before breakfast. 30 capsule 2    gabapentin (NEURONTIN) 300 MG capsule Take 1 tab by mouth nightly x 3 days; if no improvement, take 1 tab twice daily x 3 day; if no improvement, take 1 tab 3 times daily 90 capsule 1    levalbuterol (XOPENEX HFA) 45 mcg/actuation inhaler Inhale 1-2 puffs into the lungs every 6 (six) hours as needed  "for Wheezing or Shortness of Breath. Rescue 15 g 0    losartan-hydrochlorothiazide 100-25 mg (HYZAAR) 100-25 mg per tablet Take 1 tablet by mouth once daily. 90 tablet 1    metoprolol succinate (TOPROL-XL) 50 MG 24 hr tablet Take 1 tablet (50 mg total) by mouth once daily. 90 tablet 3    topiramate (TOPAMAX) 25 MG tablet Take 1 tablet (25 mg total) by mouth 2 (two) times daily. 60 tablet 3    VIAGRA 100 mg tablet TAKE 1 TABLET BY MOUTH AS NEEDED FOR ERECTILE DYSFUNCTION 8 tablet 3    zolpidem (AMBIEN) 10 mg Tab TAKE 1 TABLET BY MOUTH AT NIGHT AS NEEDED 30 tablet 2    atorvastatin (LIPITOR) 40 MG tablet Take 1 tablet (40 mg total) by mouth every evening. 90 tablet 3     No current facility-administered medications for this visit.                   REVIEW OF SYSTEMS:     Sleep related symptoms as per HPI.  CONST:Denies weight gain    HEENT: Denies sinus congestion  PULM: + FERRIS; chronic cough  CARD:  Denies palpitations       PHYSICAL EXAM:  Vitals:    02/19/19 0825   BP: 120/79   Pulse: 110   Temp: 97.8 °F (36.6 °C)   TempSrc: Oral   SpO2: 99%   Weight: 123.4 kg (272 lb 1.6 oz)   Height: 5' 8" (1.727 m)   PainSc: 0-No pain     Body mass index is 41.37 kg/m².     GENERAL: Normal development, well groomed, obese  HEENT:  Conjunctivae are non-erythematous; Pupils equal, round, and reactive to light; Nose is symmetrical; Nasal mucosa is pink and moist; Septum is midline; Inferior turbinates are normal; Nasal airflow is normal; Posterior pharynx is pink; Modified Mallampati: 4  RESPIRATORY: Chest is clear to auscultation.  Normal chest expansion and non-labored breathing at rest.  CARDIOVASCULAR: Normal S1, S2.  No murmurs, gallops or rubs. Taachycardic  EXTREMITIES:  Trace lower extremity edema. Station normal. Gait normal.        NEURO/PSYCH: Oriented to time, place and person. Normal attention span and concentration. Affect is full. Mood is normal.                                              DATA:     CXR10/30/18 " no effusion or consolidation  ECHO10/5/18  · Left ventricle ejection fraction is mildly decreased at 45%, mild global hypokinesis.  · Left ventricle shows concentric remodeling.  · Grade I (mild) left ventricular diastolic dysfunction consistent with impaired relaxation.  · Tricuspid valve shows trace regurgitation.  · The estimated PA systolic pressure is 33.60 mm Hg    Split-night 12/10/2018:    The overall AHI was 65.8 with an oxygen zuri of 73.0%.    Effective control of sleep disordered breathing was achieved with CPAP at 12 cm of water.     Lab Results   Component Value Date    TSH 0.986 03/19/2018     ASSESSMENT/PLAN  Problem List Items Addressed This Visit        Unprioritized    YUNIEL on CPAP - Primary    Overview     Split-night 12/10/2018:    The overall AHI was 65.8 with an oxygen zuri of 73.0%.    Effective control of sleep disordered breathing was achieved with CPAP at 12 cm of water with Cflex 2.   Not tolerating CPAP 12 cm H2O, switched setting to 8-14 cm H2O with Aflex 3         Current Assessment & Plan     Patient is using and benefiting. Recommend increase nightly usage. Adjusted setting to APAP to see if this improves comfort         SOB (shortness of breath) on exertion    Overview     Multifactorial- body habitus, deconditioning, diastolic dysfunction, RAD component given response to bronchodilators.         Current Assessment & Plan     Recommend pulmonary studies to assess lung function         Relevant Orders    Complete PFT with bronchodilator    Stress test, pulmonary    SCHEDULED EKG 12-LEAD (to Muse) (Completed)      Other Visit Diagnoses     Tachycardia        Relevant Orders    SCHEDULED EKG 12-LEAD (to Muse) (Completed)          Patient will Follow-up in about 1 month (around 3/19/2019).

## 2019-02-20 ENCOUNTER — HOSPITAL ENCOUNTER (OUTPATIENT)
Dept: RESPIRATORY THERAPY | Facility: HOSPITAL | Age: 64
Discharge: HOME | End: 2019-02-20
Attending: INTERNAL MEDICINE
Payer: MEDICARE

## 2019-02-20 VITALS — RESPIRATION RATE: 18 BRPM | HEART RATE: 112 BPM | OXYGEN SATURATION: 99 %

## 2019-02-20 DIAGNOSIS — R06.02 SOB (SHORTNESS OF BREATH) ON EXERTION: ICD-10-CM

## 2019-02-20 PROCEDURE — 94060 EVALUATION OF WHEEZING: CPT

## 2019-02-20 PROCEDURE — 94618 PULMONARY STRESS TESTING: CPT

## 2019-02-20 PROCEDURE — 25000242 PHARM REV CODE 250 ALT 637 W/ HCPCS: Performed by: INTERNAL MEDICINE

## 2019-02-20 RX ORDER — ALBUTEROL SULFATE 2.5 MG/.5ML
2.5 SOLUTION RESPIRATORY (INHALATION) ONCE
Status: COMPLETED | OUTPATIENT
Start: 2019-02-20 | End: 2019-02-20

## 2019-02-20 RX ADMIN — ALBUTEROL SULFATE 2.5 MG: 2.5 SOLUTION RESPIRATORY (INHALATION) at 11:02

## 2019-02-21 ENCOUNTER — OFFICE VISIT (OUTPATIENT)
Dept: CARDIOLOGY | Facility: CLINIC | Age: 64
End: 2019-02-21
Payer: MEDICARE

## 2019-02-21 VITALS
SYSTOLIC BLOOD PRESSURE: 122 MMHG | HEIGHT: 68 IN | WEIGHT: 271.38 LBS | DIASTOLIC BLOOD PRESSURE: 64 MMHG | RESPIRATION RATE: 15 BRPM | BODY MASS INDEX: 41.13 KG/M2 | OXYGEN SATURATION: 97 % | HEART RATE: 78 BPM

## 2019-02-21 DIAGNOSIS — E11.40 TYPE 2 DIABETES MELLITUS WITH DIABETIC NEUROPATHY, WITHOUT LONG-TERM CURRENT USE OF INSULIN: ICD-10-CM

## 2019-02-21 DIAGNOSIS — E66.01 SEVERE OBESITY (BMI 35.0-39.9) WITH COMORBIDITY: ICD-10-CM

## 2019-02-21 DIAGNOSIS — E78.5 HYPERLIPIDEMIA, UNSPECIFIED HYPERLIPIDEMIA TYPE: ICD-10-CM

## 2019-02-21 DIAGNOSIS — G47.33 OSA (OBSTRUCTIVE SLEEP APNEA): ICD-10-CM

## 2019-02-21 DIAGNOSIS — E11.9 DIABETES MELLITUS TYPE II, NON INSULIN DEPENDENT: ICD-10-CM

## 2019-02-21 DIAGNOSIS — E78.2 MIXED HYPERLIPIDEMIA: ICD-10-CM

## 2019-02-21 DIAGNOSIS — R94.31 ABNORMAL EKG: ICD-10-CM

## 2019-02-21 DIAGNOSIS — I51.89 DIASTOLIC DYSFUNCTION WITHOUT HEART FAILURE: ICD-10-CM

## 2019-02-21 DIAGNOSIS — I10 ESSENTIAL HYPERTENSION, BENIGN: Primary | ICD-10-CM

## 2019-02-21 PROCEDURE — 99499 RISK ADDL DX/OHS AUDIT: ICD-10-PCS | Mod: S$GLB,,, | Performed by: INTERNAL MEDICINE

## 2019-02-21 PROCEDURE — 3008F BODY MASS INDEX DOCD: CPT | Mod: CPTII,S$GLB,, | Performed by: INTERNAL MEDICINE

## 2019-02-21 PROCEDURE — 1101F PR PT FALLS ASSESS DOC 0-1 FALLS W/OUT INJ PAST YR: ICD-10-PCS | Mod: CPTII,S$GLB,, | Performed by: INTERNAL MEDICINE

## 2019-02-21 PROCEDURE — 3044F HG A1C LEVEL LT 7.0%: CPT | Mod: CPTII,S$GLB,, | Performed by: INTERNAL MEDICINE

## 2019-02-21 PROCEDURE — 3078F DIAST BP <80 MM HG: CPT | Mod: CPTII,S$GLB,, | Performed by: INTERNAL MEDICINE

## 2019-02-21 PROCEDURE — 3074F PR MOST RECENT SYSTOLIC BLOOD PRESSURE < 130 MM HG: ICD-10-PCS | Mod: CPTII,S$GLB,, | Performed by: INTERNAL MEDICINE

## 2019-02-21 PROCEDURE — 99214 OFFICE O/P EST MOD 30 MIN: CPT | Mod: S$GLB,,, | Performed by: INTERNAL MEDICINE

## 2019-02-21 PROCEDURE — 3078F PR MOST RECENT DIASTOLIC BLOOD PRESSURE < 80 MM HG: ICD-10-PCS | Mod: CPTII,S$GLB,, | Performed by: INTERNAL MEDICINE

## 2019-02-21 PROCEDURE — 3008F PR BODY MASS INDEX (BMI) DOCUMENTED: ICD-10-PCS | Mod: CPTII,S$GLB,, | Performed by: INTERNAL MEDICINE

## 2019-02-21 PROCEDURE — 3044F PR MOST RECENT HEMOGLOBIN A1C LEVEL <7.0%: ICD-10-PCS | Mod: CPTII,S$GLB,, | Performed by: INTERNAL MEDICINE

## 2019-02-21 PROCEDURE — 99999 PR PBB SHADOW E&M-EST. PATIENT-LVL III: CPT | Mod: PBBFAC,,, | Performed by: INTERNAL MEDICINE

## 2019-02-21 PROCEDURE — 3074F SYST BP LT 130 MM HG: CPT | Mod: CPTII,S$GLB,, | Performed by: INTERNAL MEDICINE

## 2019-02-21 PROCEDURE — 99999 PR PBB SHADOW E&M-EST. PATIENT-LVL III: ICD-10-PCS | Mod: PBBFAC,,, | Performed by: INTERNAL MEDICINE

## 2019-02-21 PROCEDURE — 99499 UNLISTED E&M SERVICE: CPT | Mod: S$GLB,,, | Performed by: INTERNAL MEDICINE

## 2019-02-21 PROCEDURE — 99214 PR OFFICE/OUTPT VISIT, EST, LEVL IV, 30-39 MIN: ICD-10-PCS | Mod: S$GLB,,, | Performed by: INTERNAL MEDICINE

## 2019-02-21 PROCEDURE — 1101F PT FALLS ASSESS-DOCD LE1/YR: CPT | Mod: CPTII,S$GLB,, | Performed by: INTERNAL MEDICINE

## 2019-02-21 RX ORDER — ATORVASTATIN CALCIUM 40 MG/1
40 TABLET, FILM COATED ORAL NIGHTLY
Qty: 90 TABLET | Refills: 3 | Status: SHIPPED | OUTPATIENT
Start: 2019-02-21 | End: 2022-02-22 | Stop reason: SDUPTHER

## 2019-02-21 NOTE — PROGRESS NOTES
CARDIOVASCULAR PROGRESS NOTE    REASON FOR CONSULT:   Mirza Do is a 63 y.o. male who presents for f/u HTN, HLP, testing.    PCP: Yinka Arias  HISTORY OF PRESENT ILLNESS:   The patient returns for follow-up of his testing.  He reports generally asymptomatic status without angina or dyspnea.  He has had no palpitations, lightheadedness, dizziness, or syncope.  He denies PND, orthopnea, or lower extremity edema.  There has been no melena, hematuria, or claudicant symptoms.  The patient is frustrated with his weight gain, and plans to initiate an exercise program.  I wholeheartedly encouraged him to do this.    I reviewed the results of his lipid panel, and plan to increase his atorvastatin to 40 mg.    CARDIOVASCULAR HISTORY:   PAT (Holter 10/2018)  CMP 45% (echo 10/2018), MPI 10/2018 normal  YUNIEL on CPAP    PAST MEDICAL HISTORY:     Past Medical History:   Diagnosis Date    CVA (cerebral infarction)     CVA approx 2011/2012    Diabetes mellitus type II, non insulin dependent 10/14/2016    Erectile dysfunction     Erectile Dysfunction    Hyperlipidemia 10/14/2016    Hypertension     Hypertension    Murmur        PAST SURGICAL HISTORY:     Past Surgical History:   Procedure Laterality Date    BACK SURGERY      discectomy    COLONOSCOPY N/A 8/4/2017    Performed by [facility] MD at [facility] (4TH FLR)    ROOT CANAL      UMBILICAL HERNIA REPAIR         ALLERGIES AND MEDICATION:     Review of patient's allergies indicates:   Allergen Reactions    Sulfa (sulfonamide antibiotics)         Medication List           Accurate as of 2/21/19 10:22 AM. If you have any questions, ask your nurse or doctor.               CONTINUE taking these medications    ACCU-CHEK STEPHON PLUS TEST STRP Strp  Generic drug:  blood sugar diagnostic  1 strip by Misc.(Non-Drug; Combo Route) route daily as needed (alternate testing time).     ACCU-CHEK FASTCLIX LANCING DEV Misc  Generic drug:  lancets  1 lancet by  Misc.(Non-Drug; Combo Route) route daily as needed.     aspirin 81 MG Chew     atorvastatin 20 MG tablet  Commonly known as:  LIPITOR  Take 1 tablet (20 mg total) by mouth once daily.     esomeprazole 40 MG capsule  Commonly known as:  NEXIUM  Take 1 capsule (40 mg total) by mouth before breakfast.     gabapentin 300 MG capsule  Commonly known as:  NEURONTIN  Take 1 tab by mouth nightly x 3 days; if no improvement, take 1 tab twice daily x 3 day; if no improvement, take 1 tab 3 times daily     levalbuterol 45 mcg/actuation inhaler  Commonly known as:  XOPENEX HFA  Inhale 1-2 puffs into the lungs every 6 (six) hours as needed for Wheezing or Shortness of Breath. Rescue     losartan-hydrochlorothiazide 100-25 mg 100-25 mg per tablet  Commonly known as:  HYZAAR  Take 1 tablet by mouth once daily.     metoprolol succinate 50 MG 24 hr tablet  Commonly known as:  TOPROL-XL  Take 1 tablet (50 mg total) by mouth once daily.     topiramate 25 MG tablet  Commonly known as:  TOPAMAX  Take 1 tablet (25 mg total) by mouth 2 (two) times daily.     VIAGRA 100 MG tablet  Generic drug:  sildenafil  TAKE 1 TABLET BY MOUTH AS NEEDED FOR ERECTILE DYSFUNCTION     zolpidem 10 mg Tab  Commonly known as:  AMBIEN  TAKE 1 TABLET BY MOUTH AT NIGHT AS NEEDED            SOCIAL HISTORY:     Social History     Socioeconomic History    Marital status: Single     Spouse name: Not on file    Number of children: Not on file    Years of education: Not on file    Highest education level: Not on file   Social Needs    Financial resource strain: Not on file    Food insecurity - worry: Not on file    Food insecurity - inability: Not on file    Transportation needs - medical: Not on file    Transportation needs - non-medical: Not on file   Occupational History    Not on file   Tobacco Use    Smoking status: Current Some Day Smoker     Packs/day: 0.10     Years: 4.00     Pack years: 0.40    Smokeless tobacco: Never Used    Tobacco comment:  "social smoking as teenager   Substance and Sexual Activity    Alcohol use: Yes     Alcohol/week: 6.0 oz     Types: 10 Cans of beer per week     Comment: occasionally    Drug use: No    Sexual activity: Yes     Partners: Female   Other Topics Concern    Not on file   Social History Narrative           FAMILY HISTORY:     Family History   Problem Relation Age of Onset    Cancer Father     Hypertension Brother     Cancer Brother     No Known Problems Sister     No Known Problems Sister     No Known Problems Sister        REVIEW OF SYSTEMS:   Review of Systems   Constitutional: Negative for chills, diaphoresis and fever.   HENT: Negative for nosebleeds.    Eyes: Negative for blurred vision, double vision and photophobia.   Respiratory: Negative for cough, hemoptysis, shortness of breath and wheezing.    Cardiovascular: Negative for chest pain, palpitations, orthopnea, claudication, leg swelling and PND.   Gastrointestinal: Negative for abdominal pain, blood in stool, heartburn, melena, nausea and vomiting.   Genitourinary: Negative for flank pain and hematuria.   Musculoskeletal: Negative for falls, myalgias and neck pain.   Skin: Negative for rash.   Neurological: Negative for dizziness, seizures, loss of consciousness, weakness and headaches.   Endo/Heme/Allergies: Negative for polydipsia. Does not bruise/bleed easily.   Psychiatric/Behavioral: Negative for depression and memory loss. The patient is not nervous/anxious.        PHYSICAL EXAM:     Vitals:    02/21/19 0950   BP: 122/64   Pulse: 78   Resp: 15    Body mass index is 41.26 kg/m².  Weight: 123.1 kg (271 lb 6.2 oz)   Height: 5' 8" (172.7 cm)     Physical Exam   Constitutional: He is oriented to person, place, and time. He appears well-developed and well-nourished. He is cooperative.  Non-toxic appearance. No distress.   HENT:   Head: Normocephalic and atraumatic.   Eyes: Conjunctivae and EOM are normal. Pupils are equal, round, " and reactive to light. No scleral icterus.   Neck: Trachea normal and normal range of motion. Neck supple. Normal carotid pulses and no JVD present. Carotid bruit is not present. No neck rigidity. No tracheal deviation and no edema present. No thyromegaly present.   Cardiovascular: Normal rate, regular rhythm, S1 normal and S2 normal. PMI is not displaced. Exam reveals no gallop and no friction rub.   No murmur heard.  Pulses:       Carotid pulses are 2+ on the right side, and 2+ on the left side.  Pulmonary/Chest: Effort normal and breath sounds normal. No stridor. No respiratory distress. He has no wheezes. He has no rales. He exhibits no tenderness.   Abdominal: Soft. He exhibits no distension. There is no hepatosplenomegaly.   obese   Musculoskeletal: He exhibits no edema or tenderness.   Feet:   Right Foot:   Skin Integrity: Negative for ulcer.   Left Foot:   Skin Integrity: Negative for ulcer.   Neurological: He is alert and oriented to person, place, and time. No cranial nerve deficit.   Skin: Skin is warm and dry. No rash noted. No erythema.   Psychiatric: He has a normal mood and affect. His speech is normal and behavior is normal.   Vitals reviewed.      DATA:   EKG: (personally reviewed tracing)  10/22/18 SR 76, LAD, lat ST abnl ?isch    Laboratory:  CBC:  Recent Labs   Lab 03/04/16  0910 03/19/18  0815 09/25/18  1110   WHITE BLOOD CELL COUNT 5.70 6.36 5.95   HEMOGLOBIN 14.1 13.3 L 13.6 L   HEMATOCRIT 43.8 41.2 42.5   PLATELETS 231 242 235       CHEMISTRIES:  Recent Labs   Lab 06/29/17  1015 03/19/18  0815 09/25/18  1110   GLUCOSE 96 105 160 H   SODIUM 141 140 139   POTASSIUM 3.8 4.2 3.6   BUN BLD 11 19 21   CREATININE 0.8 0.8 1.0   EGFR IF  >60.0 >60 >60   EGFR IF NON- >60.0 >60 >60   CALCIUM 9.4 8.9 9.8       CARDIAC BIOMARKERS:        COAGS:        LIPIDS/LFTS:  Recent Labs   Lab 06/29/17  1015 03/19/18  0815 09/25/18  1110 02/08/19  0810   CHOLESTEROL 170 188  --  192    TRIGLYCERIDES 94 122  --  148   HDL 49 52  --  47   LDL CHOLESTEROL 102.2 111.6  --  115.4   NON-HDL CHOLESTEROL 121 136  --  145   AST 29 27 24 26   ALT 40 42 39 40     Lab Results   Component Value Date    HGBA1C 6.3 (H) 09/25/2018     The 10-year ASCVD risk score (Cole HOWELL Jr., et al., 2013) is: 41.2%    Values used to calculate the score:      Age: 63 years      Sex: Male      Is Non- : Yes      Diabetic: Yes      Tobacco smoker: Yes      Systolic Blood Pressure: 122 mmHg      Is BP treated: Yes      HDL Cholesterol: 47 mg/dL      Total Cholesterol: 192 mg/dL      Cardiovascular Testing:  Ex MPI 10/30/18  · Normal exercise MPI  · The perfusion scan is free of evidence from myocardial ischemia or injury.  · Visually estimated LV function is normal.  · Resting wall motion is physiologic.  · The EKG portion of this study is negative for myocardial ischemia. (Tristian 3:50, 5 METS, 110% MPRH, +SOB, -CP/EKG, +HTN)    Holter 10/5/18  Paroxsymal atrial tachycardia noted during monitoring period (140 BPM).  PACs and PVCs also noted.  Diary not returned.    Echo 10/5/18  · Left ventricle ejection fraction is mildly decreased at 45%, mild global hypokinesis.  · Left ventricle shows concentric remodeling.  · Grade I (mild) left ventricular diastolic dysfunction consistent with impaired relaxation.  · Tricuspid valve shows trace regurgitation.  · The estimated PA systolic pressure is 33.60 mm Hg    ASSESSMENT:   # PAT, Holter 10/2018, no sxs during holter period.  # CMP, EF 45% (echo 10/2018).  MPI 10/2018 normal  # abnl EKG  # HTN, controlled  # HLP on atorva 20mg  # ?DM  # BMI 41, up 1 unit vs last OV  # YUNIEL, now on CPAP    PLAN:   Cont med rx  Inc atorva 40mg qhs  Diet/exercise/weight loss  RTC 3 months with lipids/LFT (mid May 2019)      Paul Fernandez MD, FACC

## 2019-02-26 PROBLEM — R06.02 SOB (SHORTNESS OF BREATH) ON EXERTION: Status: ACTIVE | Noted: 2019-02-26

## 2019-02-26 NOTE — ASSESSMENT & PLAN NOTE
Patient is using and benefiting. Recommend increase nightly usage. Adjusted setting to APAP to see if this improves comfort

## 2019-04-05 DIAGNOSIS — E11.9 TYPE 2 DIABETES MELLITUS WITHOUT COMPLICATION: ICD-10-CM

## 2019-04-05 DIAGNOSIS — G47.00 INSOMNIA, UNSPECIFIED TYPE: ICD-10-CM

## 2019-04-05 RX ORDER — ZOLPIDEM TARTRATE 10 MG/1
TABLET ORAL
Qty: 30 TABLET | Refills: 2 | OUTPATIENT
Start: 2019-04-05

## 2019-04-06 DIAGNOSIS — G47.00 INSOMNIA, UNSPECIFIED TYPE: ICD-10-CM

## 2019-04-08 DIAGNOSIS — G47.00 INSOMNIA, UNSPECIFIED TYPE: ICD-10-CM

## 2019-04-08 RX ORDER — ZOLPIDEM TARTRATE 10 MG/1
TABLET ORAL
Qty: 30 TABLET | Refills: 2 | Status: SHIPPED | OUTPATIENT
Start: 2019-04-08 | End: 2019-07-09 | Stop reason: SDUPTHER

## 2019-04-08 RX ORDER — ZOLPIDEM TARTRATE 10 MG/1
TABLET ORAL
Qty: 30 TABLET | Refills: 2 | OUTPATIENT
Start: 2019-04-08

## 2019-04-08 NOTE — TELEPHONE ENCOUNTER
----- Message from Nicole Falcon sent at 4/8/2019  9:19 AM CDT -----  Contact: Self/  [phone]  Refill:  zolpidem (AMBIEN) 10 mg Tab  [facility] [address] [address] [place]  Thank you

## 2019-04-09 DIAGNOSIS — G47.00 INSOMNIA, UNSPECIFIED TYPE: ICD-10-CM

## 2019-04-09 DIAGNOSIS — E11.9 TYPE 2 DIABETES MELLITUS WITHOUT COMPLICATION, UNSPECIFIED WHETHER LONG TERM INSULIN USE: ICD-10-CM

## 2019-04-09 RX ORDER — ZOLPIDEM TARTRATE 10 MG/1
TABLET ORAL
Qty: 30 TABLET | Refills: 2 | OUTPATIENT
Start: 2019-04-09

## 2019-04-09 NOTE — TELEPHONE ENCOUNTER
----- Message from Mary Steven sent at 4/9/2019 10:29 AM CDT -----  Contact: Self   Type:  Needs Medical Advice    Who Called: Self   zolpidem (AMBIEN) 10 mg Tab    Pharmacy name and phone #:  [facility] [address] [place] [address] [place] [phone] (Phone)  [phone] (Fax)    Would the patient rather a call back or a response via My [facility]? Call     Best Call Back Number: [phone]    Additional Information: patient says his pharmacy does not have his medication

## 2019-04-16 ENCOUNTER — OFFICE VISIT (OUTPATIENT)
Dept: FAMILY MEDICINE | Facility: CLINIC | Age: 64
End: 2019-04-16
Payer: MEDICARE

## 2019-04-16 VITALS
OXYGEN SATURATION: 98 % | HEART RATE: 118 BPM | RESPIRATION RATE: 16 BRPM | WEIGHT: 273.38 LBS | DIASTOLIC BLOOD PRESSURE: 80 MMHG | SYSTOLIC BLOOD PRESSURE: 124 MMHG | BODY MASS INDEX: 41.43 KG/M2 | TEMPERATURE: 98 F | HEIGHT: 68 IN

## 2019-04-16 DIAGNOSIS — I51.89 DIASTOLIC DYSFUNCTION WITHOUT HEART FAILURE: ICD-10-CM

## 2019-04-16 DIAGNOSIS — N52.9 IMPOTENCE OF ORGANIC ORIGIN: Chronic | ICD-10-CM

## 2019-04-16 DIAGNOSIS — G47.33 OSA ON CPAP: ICD-10-CM

## 2019-04-16 DIAGNOSIS — Z86.73 HISTORY OF CVA (CEREBROVASCULAR ACCIDENT): ICD-10-CM

## 2019-04-16 DIAGNOSIS — Z00.00 ENCOUNTER FOR PREVENTIVE HEALTH EXAMINATION: Primary | ICD-10-CM

## 2019-04-16 DIAGNOSIS — G47.00 INSOMNIA, UNSPECIFIED TYPE: ICD-10-CM

## 2019-04-16 DIAGNOSIS — E78.1 HYPERTRIGLYCERIDEMIA: ICD-10-CM

## 2019-04-16 DIAGNOSIS — E78.2 MIXED HYPERLIPIDEMIA: ICD-10-CM

## 2019-04-16 DIAGNOSIS — E11.40 TYPE 2 DIABETES MELLITUS WITH DIABETIC NEUROPATHY, WITHOUT LONG-TERM CURRENT USE OF INSULIN: ICD-10-CM

## 2019-04-16 DIAGNOSIS — I10 ESSENTIAL HYPERTENSION, BENIGN: Chronic | ICD-10-CM

## 2019-04-16 DIAGNOSIS — Z87.19 HISTORY OF HIATAL HERNIA: ICD-10-CM

## 2019-04-16 DIAGNOSIS — R06.02 SOB (SHORTNESS OF BREATH) ON EXERTION: ICD-10-CM

## 2019-04-16 PROBLEM — R05.3 CHRONIC COUGH: Status: RESOLVED | Noted: 2018-11-13 | Resolved: 2019-04-16

## 2019-04-16 PROCEDURE — 3074F SYST BP LT 130 MM HG: CPT | Mod: CPTII,S$GLB,, | Performed by: PHYSICIAN ASSISTANT

## 2019-04-16 PROCEDURE — G0439 PR MEDICARE ANNUAL WELLNESS SUBSEQUENT VISIT: ICD-10-PCS | Mod: S$GLB,,, | Performed by: PHYSICIAN ASSISTANT

## 2019-04-16 PROCEDURE — G0439 PPPS, SUBSEQ VISIT: HCPCS | Mod: S$GLB,,, | Performed by: PHYSICIAN ASSISTANT

## 2019-04-16 PROCEDURE — 3079F PR MOST RECENT DIASTOLIC BLOOD PRESSURE 80-89 MM HG: ICD-10-PCS | Mod: CPTII,S$GLB,, | Performed by: PHYSICIAN ASSISTANT

## 2019-04-16 PROCEDURE — 99999 PR PBB SHADOW E&M-EST. PATIENT-LVL V: CPT | Mod: PBBFAC,,, | Performed by: PHYSICIAN ASSISTANT

## 2019-04-16 PROCEDURE — 3079F DIAST BP 80-89 MM HG: CPT | Mod: CPTII,S$GLB,, | Performed by: PHYSICIAN ASSISTANT

## 2019-04-16 PROCEDURE — 99999 PR PBB SHADOW E&M-EST. PATIENT-LVL V: ICD-10-PCS | Mod: PBBFAC,,, | Performed by: PHYSICIAN ASSISTANT

## 2019-04-16 PROCEDURE — 3044F PR MOST RECENT HEMOGLOBIN A1C LEVEL <7.0%: ICD-10-PCS | Mod: CPTII,S$GLB,, | Performed by: PHYSICIAN ASSISTANT

## 2019-04-16 PROCEDURE — 3044F HG A1C LEVEL LT 7.0%: CPT | Mod: CPTII,S$GLB,, | Performed by: PHYSICIAN ASSISTANT

## 2019-04-16 PROCEDURE — 3074F PR MOST RECENT SYSTOLIC BLOOD PRESSURE < 130 MM HG: ICD-10-PCS | Mod: CPTII,S$GLB,, | Performed by: PHYSICIAN ASSISTANT

## 2019-04-16 NOTE — PATIENT INSTRUCTIONS
Counseling and Referral of Other Preventative  (Italic type indicates deductible and co-insurance are waived)    Patient Name: Mirza Do  Today's Date: 4/16/2019    Health Maintenance       Date Due Completion Date    Foot Exam 06/29/2018 6/29/2017    Hemoglobin A1c 03/25/2019 9/25/2018    Eye Exam 04/05/2019 4/5/2018    Override on 4/5/2018: Done    Override on 10/20/2016: Done    Lipid Panel 02/08/2020 2/8/2019    High Dose Statin 02/21/2020 2/21/2019    Pneumococcal Vaccine (65+ Low/Medium Risk) (2 of 2 - PPSV23) 06/29/2022 9/25/2018    TETANUS VACCINE 01/17/2023 1/17/2013    Colonoscopy 08/04/2024 8/4/2017    Override on 7/17/2009: Done        No orders of the defined types were placed in this encounter.    The following information is provided to all patients.  This information is to help you find resources for any of the problems found today that may be affecting your health:                Living healthy guide: www.[facility].[place].gov      Understanding Diabetes: www.diabetes.org      Eating healthy: www.cdc.gov/healthyweight      CDC home safety checklist: www.cdc.gov/steadi/patient.html      Agency on Aging: www.goea.[place].gov      Alcoholics anonymous (AA): www.aa.org      Physical Activity: www.erin.nih.gov/ex9djez      Tobacco use: www.quitwithusla.org

## 2019-04-16 NOTE — PROGRESS NOTES
"Mirza Do presented for a  Medicare AWV and comprehensive Health Risk Assessment today. The following components were reviewed and updated:    · Medical history  · Family History  · Social history  · Allergies and Current Medications  · Health Risk Assessment  · Health Maintenance  · Care Team     ** See Completed Assessments for Annual Wellness Visit within the encounter summary.**       The following assessments were completed:  · Living Situation  · CAGE  · Depression Screening  · Timed Get Up and Go  · Whisper Test  · Cognitive Function Screening  · Nutrition Screening  · ADL Screening  · PAQ Screening      Vitals:    04/16/19 1403   BP: 124/80   Pulse: (!) 118   Resp: 16   Temp: 98.3 °F (36.8 °C)   TempSrc: Oral   SpO2: 98%   Weight: 124 kg (273 lb 5.9 oz)   Height: 5' 8" (1.727 m)     Body mass index is 41.57 kg/m².  Physical Exam   Constitutional: He is oriented to person, place, and time. No distress.   Neurological: He is alert and oriented to person, place, and time.   Skin: Skin is warm. No erythema.         Diagnoses and health risks identified today and associated recommendations/orders:    1. Insomnia, unspecified type  ,The current medical regimen is effective;  continue present plan and medications.    2. SOB (shortness of breath) on exertion  Stable not any worse     3. YUNIEL on CPAP  Patient states he has been having a hard time. States he has not had been instruction to titrate it.   I advised him to take to wear it in increasing increments and build up to over night.   1 hour week one, then 2 hours, then 3, and then over night.   Let him know machine may be taken away if non compliant.   If used correctly should feel less fatigue, improved BP, and even weight loss     4. Diastolic dysfunction without heart failure  The current medical regimen is effective;  continue present plan and medications.  Continue to follow up with Dr. Fernandez    5. BMI 40.0-44.9, adult  Discussed his dieting being a " larger portion of his diet.   He admits to soft drinks daily with dinner. Advised him cutting this alone would help much.   He has orange plate from diabetes education. Advised him to use it for portion or just even cut back on portion size.  Also choice in fruit will help. More berries, less banana and oranges.  He also should choose 1 over 2 buns when he eats out.     6. Essential hypertension, benign  The current medical regimen is effective;  continue present plan and medications.    7. History of CVA (cerebrovascular accident)  Continue to monitor and treat RF.     8. Impotence of organic origin  The current medical regimen is effective;  continue present plan and medications.    9. Type 2 diabetes mellitus with diabetic neuropathy, without long-term current use of insulin  The current medical regimen is effective;  continue present plan and medications.    10. Mixed hyperlipidemia  The current medical regimen is effective;  continue present plan and medications.    11. History of hiatal hernia  No current issues     12. Hypertriglyceridemia  The current medical regimen is effective;  continue present plan and medications.    13. Encounter for preventive health examination  Extensive talk about diet to improve but weight and diabetes.       Provided Mirza with a 5-10 year written screening schedule and personal prevention plan. Recommendations were developed using the USPSTF age appropriate recommendations. Education, counseling, and referrals were provided as needed. After Visit Summary printed and given to patient which includes a list of additional screenings\tests needed.    No follow-ups on file.    JV Pulido

## 2019-04-17 ENCOUNTER — LAB VISIT (OUTPATIENT)
Dept: LAB | Facility: HOSPITAL | Age: 64
End: 2019-04-17
Attending: INTERNAL MEDICINE
Payer: MEDICARE

## 2019-04-17 DIAGNOSIS — E11.9 TYPE 2 DIABETES MELLITUS WITHOUT COMPLICATION: ICD-10-CM

## 2019-04-17 LAB
ESTIMATED AVG GLUCOSE: 140 MG/DL (ref 68–131)
HBA1C MFR BLD HPLC: 6.5 % (ref 4–5.6)

## 2019-04-17 PROCEDURE — 36415 COLL VENOUS BLD VENIPUNCTURE: CPT | Mod: PO

## 2019-04-17 PROCEDURE — 83036 HEMOGLOBIN GLYCOSYLATED A1C: CPT

## 2019-04-24 ENCOUNTER — OFFICE VISIT (OUTPATIENT)
Dept: FAMILY MEDICINE | Facility: CLINIC | Age: 64
End: 2019-04-24
Payer: MEDICARE

## 2019-04-24 VITALS
RESPIRATION RATE: 16 BRPM | HEIGHT: 71 IN | HEART RATE: 109 BPM | DIASTOLIC BLOOD PRESSURE: 106 MMHG | OXYGEN SATURATION: 97 % | TEMPERATURE: 98 F | WEIGHT: 275.81 LBS | BODY MASS INDEX: 38.61 KG/M2 | SYSTOLIC BLOOD PRESSURE: 160 MMHG

## 2019-04-24 DIAGNOSIS — I10 ESSENTIAL HYPERTENSION, BENIGN: Primary | Chronic | ICD-10-CM

## 2019-04-24 DIAGNOSIS — E66.01 SEVERE OBESITY (BMI 35.0-39.9) WITH COMORBIDITY: ICD-10-CM

## 2019-04-24 DIAGNOSIS — E11.40 TYPE 2 DIABETES MELLITUS WITH DIABETIC NEUROPATHY, WITHOUT LONG-TERM CURRENT USE OF INSULIN: ICD-10-CM

## 2019-04-24 PROCEDURE — 3077F PR MOST RECENT SYSTOLIC BLOOD PRESSURE >= 140 MM HG: ICD-10-PCS | Mod: CPTII,S$GLB,, | Performed by: INTERNAL MEDICINE

## 2019-04-24 PROCEDURE — 3080F DIAST BP >= 90 MM HG: CPT | Mod: CPTII,S$GLB,, | Performed by: INTERNAL MEDICINE

## 2019-04-24 PROCEDURE — 99999 PR PBB SHADOW E&M-EST. PATIENT-LVL IV: CPT | Mod: PBBFAC,,, | Performed by: INTERNAL MEDICINE

## 2019-04-24 PROCEDURE — 3080F PR MOST RECENT DIASTOLIC BLOOD PRESSURE >= 90 MM HG: ICD-10-PCS | Mod: CPTII,S$GLB,, | Performed by: INTERNAL MEDICINE

## 2019-04-24 PROCEDURE — 3044F HG A1C LEVEL LT 7.0%: CPT | Mod: CPTII,S$GLB,, | Performed by: INTERNAL MEDICINE

## 2019-04-24 PROCEDURE — 1101F PR PT FALLS ASSESS DOC 0-1 FALLS W/OUT INJ PAST YR: ICD-10-PCS | Mod: CPTII,S$GLB,, | Performed by: INTERNAL MEDICINE

## 2019-04-24 PROCEDURE — 3008F BODY MASS INDEX DOCD: CPT | Mod: CPTII,S$GLB,, | Performed by: INTERNAL MEDICINE

## 2019-04-24 PROCEDURE — 99214 PR OFFICE/OUTPT VISIT, EST, LEVL IV, 30-39 MIN: ICD-10-PCS | Mod: S$GLB,,, | Performed by: INTERNAL MEDICINE

## 2019-04-24 PROCEDURE — 99999 PR PBB SHADOW E&M-EST. PATIENT-LVL IV: ICD-10-PCS | Mod: PBBFAC,,, | Performed by: INTERNAL MEDICINE

## 2019-04-24 PROCEDURE — 1101F PT FALLS ASSESS-DOCD LE1/YR: CPT | Mod: CPTII,S$GLB,, | Performed by: INTERNAL MEDICINE

## 2019-04-24 PROCEDURE — 3077F SYST BP >= 140 MM HG: CPT | Mod: CPTII,S$GLB,, | Performed by: INTERNAL MEDICINE

## 2019-04-24 PROCEDURE — 3008F PR BODY MASS INDEX (BMI) DOCUMENTED: ICD-10-PCS | Mod: CPTII,S$GLB,, | Performed by: INTERNAL MEDICINE

## 2019-04-24 PROCEDURE — 3044F PR MOST RECENT HEMOGLOBIN A1C LEVEL <7.0%: ICD-10-PCS | Mod: CPTII,S$GLB,, | Performed by: INTERNAL MEDICINE

## 2019-04-24 PROCEDURE — 99214 OFFICE O/P EST MOD 30 MIN: CPT | Mod: S$GLB,,, | Performed by: INTERNAL MEDICINE

## 2019-04-24 RX ORDER — METOPROLOL SUCCINATE 50 MG/1
50 TABLET, EXTENDED RELEASE ORAL DAILY
Qty: 90 TABLET | Refills: 1 | Status: SHIPPED | OUTPATIENT
Start: 2019-04-24 | End: 2021-03-17 | Stop reason: SDUPTHER

## 2019-04-24 NOTE — PROGRESS NOTES
SUBJECTIVE     Chief Complaint   Patient presents with    Diabetes     discuss meds    Hypertension       HPI  Mirza Do is a 63 y.o. male with multiple medical diagnoses as listed in the medical history and problem list that presents for follow-up for DM2 and HTN. Pt is fully compliant with meds, but does not have Nexium or Metoprolol. He reports he checks fasting blood sugar levels, but does not recall any numbers at home. He also reportedly check his BP at home, but does not recall any numbers. He is not compliant with a low Na diet or an ADA diet. He exercises in the gym 3 times weekly.    PAST MEDICAL HISTORY:  Past Medical History:   Diagnosis Date    CVA (cerebral infarction)     CVA approx     Diabetes mellitus type II, non insulin dependent 10/14/2016    Erectile dysfunction     Erectile Dysfunction    Hyperlipidemia 10/14/2016    Hypertension     Hypertension    Murmur     Polyneuropathy     Sleep apnea     Trouble in sleeping     Type 2 diabetes mellitus        PAST SURGICAL HISTORY:  Past Surgical History:   Procedure Laterality Date    BACK SURGERY      discectomy    COLONOSCOPY N/A 2017    Performed by [facility] at [facility] (4TH FLR)    ROOT CANAL      UMBILICAL HERNIA REPAIR         SOCIAL HISTORY:  Social History     Socioeconomic History    Marital status: Single     Spouse name: Not on file    Number of children: Not on file    Years of education: Not on file    Highest education level: Not on file   Occupational History    Not on file   Social Needs    Financial resource strain: Not on file    Food insecurity:     Worry: Not on file     Inability: Not on file    Transportation needs:     Medical: Not on file     Non-medical: Not on file   Tobacco Use    Smoking status: Former Smoker     Packs/day: 0.10     Years: 4.00     Pack years: 0.40     Last attempt to quit: 1995     Years since quittin.0    Smokeless tobacco: Never Used    Tobacco  comment: social smoking as teenager   Substance and Sexual Activity    Alcohol use: Yes     Comment: beer 6 pack in a month; red wine 2x a month     Drug use: No    Sexual activity: Yes     Partners: Female   Lifestyle    Physical activity:     Days per week: Not on file     Minutes per session: Not on file    Stress: Not on file   Relationships    Social connections:     Talks on phone: Not on file     Gets together: Not on file     Attends Spiritism service: Not on file     Active member of club or organization: Not on file     Attends meetings of clubs or organizations: Not on file     Relationship status: Not on file   Other Topics Concern    Not on file   Social History Narrative           FAMILY HISTORY:  Family History   Problem Relation Age of Onset    Hypertension Brother     Cancer Brother         lung     Cancer Mother         smoker     Emphysema Sister     Diabetes Sister     No Known Problems Sister     No Known Problems Sister        ALLERGIES AND MEDICATIONS: updated and reviewed.  Review of patient's allergies indicates:  No Known Allergies  Current Outpatient Medications   Medication Sig Dispense Refill    ACCU-CHEK STEPHON PLUS TEST STRP Strp 1 strip by Misc.(Non-Drug; Combo Route) route daily as needed (alternate testing time). 50 strip 11    ACCU-CHEK FASTCLIX Misc 1 lancet by Misc.(Non-Drug; Combo Route) route daily as needed. 50 each 11    aspirin 81 MG Chew Take 81 mg by mouth once daily.      atorvastatin (LIPITOR) 40 MG tablet Take 1 tablet (40 mg total) by mouth every evening. 90 tablet 3    gabapentin (NEURONTIN) 300 MG capsule Take 1 tab by mouth nightly x 3 days; if no improvement, take 1 tab twice daily x 3 day; if no improvement, take 1 tab 3 times daily 90 capsule 1    levalbuterol (XOPENEX HFA) 45 mcg/actuation inhaler Inhale 1-2 puffs into the lungs every 6 (six) hours as needed for Wheezing or Shortness of Breath. Rescue 15 g 0     "topiramate (TOPAMAX) 25 MG tablet Take 1 tablet (25 mg total) by mouth 2 (two) times daily. 60 tablet 3    zolpidem (AMBIEN) 10 mg Tab TAKE 1 TABLET BY MOUTH AT NIGHT AS NEEDED 30 tablet 2    losartan-hydrochlorothiazide 100-25 mg (HYZAAR) 100-25 mg per tablet Take 1 tablet by mouth once daily. 90 tablet 1    metoprolol succinate (TOPROL-XL) 50 MG 24 hr tablet Take 1 tablet (50 mg total) by mouth once daily. 90 tablet 1    VIAGRA 100 mg tablet TAKE 1 TABLET BY MOUTH AS NEEDED FOR ERECTILE DYSFUNCTION 8 tablet 3     No current facility-administered medications for this visit.        ROS  Review of Systems   Constitutional: Negative for chills and fever.   HENT: Negative for hearing loss and sore throat.    Eyes: Negative for visual disturbance.   Respiratory: Negative for cough and shortness of breath.    Cardiovascular: Negative for chest pain, palpitations and leg swelling.   Gastrointestinal: Negative for abdominal pain, constipation, diarrhea, nausea and vomiting.   Genitourinary: Negative for dysuria, frequency and urgency.   Musculoskeletal: Negative for arthralgias, joint swelling and myalgias.   Skin: Negative for rash and wound.   Neurological: Negative for headaches.   Psychiatric/Behavioral: Negative for agitation and confusion. The patient is not nervous/anxious.          OBJECTIVE     Physical Exam  Vitals:    04/24/19 1425   BP: (!) 160/106   Pulse: 109   Resp: 16   Temp: 98.3 °F (36.8 °C)    Body mass index is 38.47 kg/m².  Weight: 125.1 kg (275 lb 12.7 oz)   Height: 5' 11" (180.3 cm)     Physical Exam   Constitutional: He is oriented to person, place, and time. He appears well-developed and well-nourished. No distress.   HENT:   Head: Normocephalic and atraumatic.   Right Ear: External ear normal.   Left Ear: External ear normal.   Nose: Nose normal.   Mouth/Throat: Oropharynx is clear and moist.   Eyes: Conjunctivae and EOM are normal. Right eye exhibits no discharge. Left eye exhibits no " discharge. No scleral icterus.   Neck: Normal range of motion. Neck supple. No JVD present. No tracheal deviation present.   Cardiovascular: Normal rate, regular rhythm, normal heart sounds and intact distal pulses. Exam reveals no gallop and no friction rub.   No murmur heard.  Pulmonary/Chest: Effort normal and breath sounds normal. No respiratory distress. He has no wheezes.   Abdominal: Soft. Bowel sounds are normal. He exhibits no distension and no mass. There is no tenderness. There is no rebound and no guarding.   Musculoskeletal: Normal range of motion. He exhibits edema (2+ piiting edema to the RLE and 1 + pitting edema to the LLE). He exhibits no tenderness or deformity.        Right foot: There is normal range of motion and no deformity.        Left foot: There is normal range of motion and no deformity.   Feet:   Right Foot:   Protective Sensation: 10 sites tested. 1 site sensed.  Skin Integrity: Positive for callus and dry skin. Negative for ulcer, blister or skin breakdown.   Left Foot:   Protective Sensation: 10 sites tested. 2 sites sensed.   Skin Integrity: Positive for callus and dry skin. Negative for ulcer, blister or skin breakdown.   Neurological: He is alert and oriented to person, place, and time. He exhibits normal muscle tone. Coordination normal.   Skin: Skin is warm and dry. No rash noted. No erythema.   Psychiatric: He has a normal mood and affect. His behavior is normal. Judgment and thought content normal.         Health Maintenance       Date Due Completion Date    Foot Exam 06/29/2018 6/29/2017    Eye Exam 04/05/2019 4/5/2018    Override on 4/5/2018: Done    Override on 10/20/2016: Done    Hemoglobin A1c 10/17/2019 4/17/2019    Lipid Panel 02/08/2020 2/8/2019    High Dose Statin 04/16/2020 4/16/2019    Pneumococcal Vaccine (65+ Low/Medium Risk) (2 of 2 - PPSV23) 06/29/2022 9/25/2018    TETANUS VACCINE 01/17/2023 1/17/2013    Colonoscopy 08/04/2024 8/4/2017    Override on 7/17/2009:  Done            ASSESSMENT     63 y.o. male with     1. Essential hypertension, benign    2. Type 2 diabetes mellitus with diabetic neuropathy, without long-term current use of insulin    3. Severe obesity (BMI 35.0-39.9) with comorbidity        PLAN:     1. Essential hypertension, benign  - BP elevated above goal of <140/90; previously controlled per chart check  - Will have pt take Toprol as previously prescribed; monitor  - metoprolol succinate (TOPROL-XL) 50 MG 24 hr tablet; Take 1 tablet (50 mg total) by mouth once daily.  Dispense: 90 tablet; Refill: 1    2. Type 2 diabetes mellitus with diabetic neuropathy, without long-term current use of insulin  - Stable; no acute issues  - The current medical regimen is effective;  continue present plan and medications.  - Ambulatory referral to Optometry    3. Severe obesity (BMI 35.0-39.9) with comorbidity  - Discussed importance of eating a prudent diet and exercising        RTC in 4 weeks for a nurse visit BP check     Manjula Honeycutt MD  04/24/2019 3:04 PM        No follow-ups on file.

## 2019-04-25 ENCOUNTER — TELEPHONE (OUTPATIENT)
Dept: FAMILY MEDICINE | Facility: CLINIC | Age: 64
End: 2019-04-25

## 2019-04-25 DIAGNOSIS — I87.8 CHRONIC VENOUS STASIS: Primary | ICD-10-CM

## 2019-04-25 NOTE — TELEPHONE ENCOUNTER
----- Message from Suzette Romero sent at 4/25/2019 12:34 PM CDT -----  Contact: Self: [phone]  Type: Patient Call Back    Who called: SElf    What is the request in detail: Swelling in feet    Can the clinic reply by [facility]? No    Would the patient rather a call back or a response via [facility]? Callback    Best call back number:[phone]    Additional Information:N/A

## 2019-04-26 ENCOUNTER — TELEPHONE (OUTPATIENT)
Dept: OPTOMETRY | Facility: CLINIC | Age: 64
End: 2019-04-26

## 2019-05-07 ENCOUNTER — HOSPITAL ENCOUNTER (EMERGENCY)
Facility: HOSPITAL | Age: 64
Discharge: HOME | End: 2019-05-07
Attending: EMERGENCY MEDICINE
Payer: MEDICARE

## 2019-05-07 VITALS
TEMPERATURE: 98 F | DIASTOLIC BLOOD PRESSURE: 79 MMHG | BODY MASS INDEX: 40.16 KG/M2 | HEIGHT: 68 IN | OXYGEN SATURATION: 95 % | RESPIRATION RATE: 18 BRPM | WEIGHT: 265 LBS | SYSTOLIC BLOOD PRESSURE: 124 MMHG | HEART RATE: 113 BPM

## 2019-05-07 DIAGNOSIS — J06.9 VIRAL URI WITH COUGH: Primary | ICD-10-CM

## 2019-05-07 DIAGNOSIS — R05.9 COUGH: ICD-10-CM

## 2019-05-07 PROCEDURE — 99284 EMERGENCY DEPT VISIT MOD MDM: CPT | Mod: 25

## 2019-05-07 PROCEDURE — 25000003 PHARM REV CODE 250: Performed by: PHYSICIAN ASSISTANT

## 2019-05-07 RX ORDER — BENZONATATE 100 MG/1
100 CAPSULE ORAL
Status: COMPLETED | OUTPATIENT
Start: 2019-05-07 | End: 2019-05-07

## 2019-05-07 RX ORDER — BENZONATATE 100 MG/1
100 CAPSULE ORAL 3 TIMES DAILY PRN
Qty: 20 CAPSULE | Refills: 0 | Status: SHIPPED | OUTPATIENT
Start: 2019-05-07 | End: 2019-05-17

## 2019-05-07 RX ORDER — DEXTROMETHORPHAN HYDROBROMIDE, GUAIFENESIN 5; 100 MG/5ML; MG/5ML
650 LIQUID ORAL EVERY 8 HOURS PRN
Qty: 30 TABLET | Refills: 0 | Status: SHIPPED | OUTPATIENT
Start: 2019-05-07

## 2019-05-07 RX ORDER — FLUTICASONE PROPIONATE 50 MCG
1 SPRAY, SUSPENSION (ML) NASAL 2 TIMES DAILY PRN
Qty: 15 G | Refills: 0 | Status: SHIPPED | OUTPATIENT
Start: 2019-05-07 | End: 2021-03-17

## 2019-05-07 RX ORDER — IBUPROFEN 600 MG/1
600 TABLET ORAL
Status: COMPLETED | OUTPATIENT
Start: 2019-05-07 | End: 2019-05-07

## 2019-05-07 RX ADMIN — IBUPROFEN 600 MG: 600 TABLET ORAL at 12:05

## 2019-05-07 RX ADMIN — BENZONATATE 100 MG: 100 CAPSULE ORAL at 12:05

## 2019-05-07 NOTE — ED PROVIDER NOTES
Encounter Date: 5/7/2019  This is an initial triage evaluation of Mirza Do, a 63 y.o., male  that presents to the Emergency Department with c/o cough and congestion.      Pertinent exam findings: none    Orders Pending : cxr    Destination: [facility]    I have evaluated and provided a medical screening exam with initial orders placed, if indicated, to expedite care. The patient is stable to return to the waiting area and will be placed in a treatment area when one is available. Care will be transferred to an alternate provider when patient is roomed from the lobby for full assessment including: history, physical exam, additional orders, and final disposition.      MEME Montemayor-ROSA        History     Chief Complaint   Patient presents with    Cough     Patient reports a productinve cough, fevers, wheezing, sore throat, sneezing x 2 days. Denies hx of asthma,copd     63-year-old male with diabetes, hypertension, hyperlipidemia, history of stroke complains of cough and sore throat x2 days.  He reports the cough is nonproductive, but causes some mild soreness in his chest upon coughing.  No chest pain when not coughing.  He denies shortness of breath, fever, leg pain or swelling.        Review of patient's allergies indicates:  No Known Allergies  Past Medical History:   Diagnosis Date    CVA (cerebral infarction)     CVA approx 2011/2012    Diabetes mellitus type II, non insulin dependent 10/14/2016    Erectile dysfunction     Erectile Dysfunction    Hyperlipidemia 10/14/2016    Hypertension     Hypertension    Murmur     Polyneuropathy     Sleep apnea     Trouble in sleeping     Type 2 diabetes mellitus      Past Surgical History:   Procedure Laterality Date    BACK SURGERY      discectomy    COLONOSCOPY N/A 8/4/2017    Performed by [facility] at [facility] (4TH FLR)    ROOT CANAL      UMBILICAL HERNIA REPAIR       Family History   Problem Relation Age of Onset    Hypertension Brother      Cancer Brother         lung     Cancer Mother         smoker     Emphysema Sister     Diabetes Sister     No Known Problems Sister     No Known Problems Sister      Social History     Tobacco Use    Smoking status: Former Smoker     Packs/day: 0.10     Years: 4.00     Pack years: 0.40     Last attempt to quit: 1995     Years since quittin.0    Smokeless tobacco: Never Used    Tobacco comment: social smoking as teenager   Substance Use Topics    Alcohol use: Yes     Comment: beer 6 pack in a month; red wine 2x a month     Drug use: No     Review of Systems   Constitutional: Negative for fever.   HENT: Positive for sore throat.    Respiratory: Positive for cough. Negative for shortness of breath.    Cardiovascular: Negative for chest pain and leg swelling.   Gastrointestinal: Negative for nausea.   Musculoskeletal: Negative for neck pain.   Skin: Negative for rash.       Physical Exam     Initial Vitals   BP Pulse Resp Temp SpO2   19 1150 19 1150 19 1150 19 1150 19 1307   (!) 168/95 81 20 98.4 °F (36.9 °C) 95 %      MAP       --                Physical Exam    Constitutional: He appears well-developed and well-nourished. He is not diaphoretic. No distress.   HENT:   Head: Normocephalic and atraumatic.   Right Ear: External ear normal.   Left Ear: External ear normal.   Nose: Nose normal.   Eyes: Conjunctivae are normal. No scleral icterus.   Neck: Normal range of motion. Neck supple. No JVD present.   Cardiovascular: Normal rate, regular rhythm, normal heart sounds and intact distal pulses.   Pulmonary/Chest: Breath sounds normal. No respiratory distress. He has no wheezes. He has no rhonchi. He has no rales.   Musculoskeletal: Normal range of motion. He exhibits no edema or tenderness.   Lymphadenopathy:     He has no cervical adenopathy.   Neurological: He is alert and oriented to person, place, and time.   Skin: Skin is warm and dry. No rash noted.         ED Course    Procedures  Labs Reviewed - No data to display       Imaging Results          X-Ray Chest PA And Lateral (Final result)  Result time 05/07/19 12:13:14    Final result by Geovani Haro MD (05/07/19 12:13:14)                 Impression:      No acute abnormality.      Electronically signed by: Geovani Haro MD  Date:    05/07/2019  Time:    12:13             Narrative:    EXAMINATION:  XR CHEST PA AND LATERAL    CLINICAL HISTORY:  Cough    TECHNIQUE:  PA and lateral views of the chest were performed.    COMPARISON:  10/30/2018    FINDINGS:  Mild elevation RIGHT hemidiaphragm.The lungs are clear, with normal appearance of pulmonary vasculature and no pleural effusion or pneumothorax.    The cardiac silhouette is normal in size. The hilar and mediastinal contours are unremarkable.    Bones are intact.                              X-Rays:   Independently Interpreted Readings:   Chest X-Ray: Normal heart size.  No infiltrates.  No acute abnormalities.     Medical Decision Making:   ED Management:  63 yr old male presenting with constellation of symptoms likely representing uncomplicated viral upper respiratory symptoms as characterized by productive cough, sore throat, and myalgias.  He is afebrile, nontoxic, in no distress.    Also considered but less likely:  PTA/RPA/epiglottitis: vaccinated, no hot potato voice, no uvular deviation, no pain with neck movement  Unlikely deep space infection/Vernons  Low suspicion for CNS infection, bacterial sinusitis, or pneumonia given exam and history.  Unlikely Strep or EBV as centor negative and with no pharyngeal exudate, posterior LAD.  Will attempt to alleviate symptoms conservatively; no overt indications at this time for antibiotics. Patient treated with Tylenol and Tessalon. No respiratory distress, otherwise relatively well appearing and nontoxic. Return precautions given, patient understands and agrees with plan. All questions answered.  Instructed to follow up  with PCP.                        Clinical Impression:       ICD-10-CM ICD-9-CM   1. Viral URI with cough J06.9 465.9    B97.89    2. Cough R05 786.2                                Toribio Ray PA-C  05/07/19 4442

## 2019-05-07 NOTE — ED TRIAGE NOTES
Patient presents to the ED via personal transportation alone. Patient reports sob, wheezing, a productive cough with white mucus, a post nasal drip, sneezing, fevers, chills, and a sore throat x 2 days. Denies taking any medications for symptoms. Denies hx of asthma, COPD. Patient states that he started having symptoms after sleeping under a fan the night before.

## 2019-05-22 ENCOUNTER — LAB VISIT (OUTPATIENT)
Dept: LAB | Facility: HOSPITAL | Age: 64
End: 2019-05-22
Attending: INTERNAL MEDICINE
Payer: MEDICARE

## 2019-05-22 ENCOUNTER — CLINICAL SUPPORT (OUTPATIENT)
Dept: FAMILY MEDICINE | Facility: CLINIC | Age: 64
End: 2019-05-22
Payer: MEDICARE

## 2019-05-22 VITALS — SYSTOLIC BLOOD PRESSURE: 132 MMHG | DIASTOLIC BLOOD PRESSURE: 60 MMHG

## 2019-05-22 DIAGNOSIS — E78.2 MIXED HYPERLIPIDEMIA: ICD-10-CM

## 2019-05-22 DIAGNOSIS — I10 ESSENTIAL HYPERTENSION, BENIGN: Primary | ICD-10-CM

## 2019-05-22 LAB
ALBUMIN SERPL BCP-MCNC: 3.6 G/DL (ref 3.5–5.2)
ALP SERPL-CCNC: 85 U/L (ref 55–135)
ALT SERPL W/O P-5'-P-CCNC: 48 U/L (ref 10–44)
AST SERPL-CCNC: 29 U/L (ref 10–40)
BILIRUB DIRECT SERPL-MCNC: 0.2 MG/DL (ref 0.1–0.3)
BILIRUB SERPL-MCNC: 0.5 MG/DL (ref 0.1–1)
CHOLEST SERPL-MCNC: 129 MG/DL (ref 120–199)
CHOLEST/HDLC SERPL: 2.9 {RATIO} (ref 2–5)
HDLC SERPL-MCNC: 44 MG/DL (ref 40–75)
HDLC SERPL: 34.1 % (ref 20–50)
LDLC SERPL CALC-MCNC: 69.2 MG/DL (ref 63–159)
NONHDLC SERPL-MCNC: 85 MG/DL
PROT SERPL-MCNC: 7 G/DL (ref 6–8.4)
TRIGL SERPL-MCNC: 79 MG/DL (ref 30–150)

## 2019-05-22 PROCEDURE — 99999 PR PBB SHADOW E&M-EST. PATIENT-LVL I: ICD-10-PCS | Mod: PBBFAC,,,

## 2019-05-22 PROCEDURE — 99499 UNLISTED E&M SERVICE: CPT | Mod: S$GLB,,, | Performed by: INTERNAL MEDICINE

## 2019-05-22 PROCEDURE — 99499 NO LOS: ICD-10-PCS | Mod: S$GLB,,, | Performed by: INTERNAL MEDICINE

## 2019-05-22 PROCEDURE — 80076 HEPATIC FUNCTION PANEL: CPT

## 2019-05-22 PROCEDURE — 99999 PR PBB SHADOW E&M-EST. PATIENT-LVL I: CPT | Mod: PBBFAC,,,

## 2019-05-22 PROCEDURE — 80061 LIPID PANEL: CPT

## 2019-05-22 PROCEDURE — 36415 COLL VENOUS BLD VENIPUNCTURE: CPT

## 2019-05-22 NOTE — PROGRESS NOTES
Mirza Do 63 y.o. male is here today for Blood Pressure check.   History of HTN yes.    Review of patient's allergies indicates:  No Known Allergies  Creatinine   Date Value Ref Range Status   09/25/2018 1.0 0.5 - 1.4 mg/dL Final     Sodium   Date Value Ref Range Status   09/25/2018 139 136 - 145 mmol/L Final     Potassium   Date Value Ref Range Status   09/25/2018 3.6 3.5 - 5.1 mmol/L Final   ]  Patient verifies taking blood pressure medications on a regular basis at the same time of the day.     Current Outpatient Medications:     ACCU-CHEK STEPHON PLUS TEST STRP Strp, 1 strip by Misc.(Non-Drug; Combo Route) route daily as needed (alternate testing time)., Disp: 50 strip, Rfl: 11    ACCU-CHEK FASTCLIX Misc, 1 lancet by Misc.(Non-Drug; Combo Route) route daily as needed., Disp: 50 each, Rfl: 11    acetaminophen (TYLENOL) 650 MG TbSR, Take 1 tablet (650 mg total) by mouth every 8 (eight) hours as needed., Disp: 30 tablet, Rfl: 0    aspirin 81 MG Chew, Take 81 mg by mouth once daily., Disp: , Rfl:     atorvastatin (LIPITOR) 40 MG tablet, Take 1 tablet (40 mg total) by mouth every evening., Disp: 90 tablet, Rfl: 3    fluticasone propionate (FLONASE) 50 mcg/actuation nasal spray, 1 spray (50 mcg total) by Each Nare route 2 (two) times daily as needed for Rhinitis., Disp: 15 g, Rfl: 0    gabapentin (NEURONTIN) 300 MG capsule, Take 1 tab by mouth nightly x 3 days; if no improvement, take 1 tab twice daily x 3 day; if no improvement, take 1 tab 3 times daily, Disp: 90 capsule, Rfl: 1    levalbuterol (XOPENEX HFA) 45 mcg/actuation inhaler, Inhale 1-2 puffs into the lungs every 6 (six) hours as needed for Wheezing or Shortness of Breath. Rescue, Disp: 15 g, Rfl: 0    losartan-hydrochlorothiazide 100-25 mg (HYZAAR) 100-25 mg per tablet, Take 1 tablet by mouth once daily., Disp: 90 tablet, Rfl: 1    metoprolol succinate (TOPROL-XL) 50 MG 24 hr tablet, Take 1 tablet (50 mg total) by mouth once daily., Disp: 90  tablet, Rfl: 1    topiramate (TOPAMAX) 25 MG tablet, Take 1 tablet (25 mg total) by mouth 2 (two) times daily., Disp: 60 tablet, Rfl: 3    VIAGRA 100 mg tablet, TAKE 1 TABLET BY MOUTH AS NEEDED FOR ERECTILE DYSFUNCTION, Disp: 8 tablet, Rfl: 3    zolpidem (AMBIEN) 10 mg Tab, TAKE 1 TABLET BY MOUTH AT NIGHT AS NEEDED, Disp: 30 tablet, Rfl: 2  Does patient have record of home blood pressure readings no. Readings have been averaging  - no complaints.   Last dose of blood pressure medication was taken at 9:00 am 5/22/2019.  Patient is asymptomatic.   Complains of  - no complaints.      144/78 right arm, sitting,manual    132/60 right arm, sitting, manual    Dr. Honeycutt informed of nurse visit.

## 2019-06-13 ENCOUNTER — OFFICE VISIT (OUTPATIENT)
Dept: BARIATRICS | Facility: CLINIC | Age: 64
End: 2019-06-13
Payer: MEDICARE

## 2019-06-13 VITALS
HEART RATE: 119 BPM | SYSTOLIC BLOOD PRESSURE: 118 MMHG | HEIGHT: 68 IN | BODY MASS INDEX: 41.1 KG/M2 | DIASTOLIC BLOOD PRESSURE: 70 MMHG | WEIGHT: 271.19 LBS

## 2019-06-13 DIAGNOSIS — E66.01 CLASS 3 SEVERE OBESITY DUE TO EXCESS CALORIES WITH SERIOUS COMORBIDITY AND BODY MASS INDEX (BMI) OF 40.0 TO 44.9 IN ADULT: Primary | ICD-10-CM

## 2019-06-13 PROCEDURE — 99213 PR OFFICE/OUTPT VISIT, EST, LEVL III, 20-29 MIN: ICD-10-PCS | Mod: S$GLB,,, | Performed by: INTERNAL MEDICINE

## 2019-06-13 PROCEDURE — 3074F PR MOST RECENT SYSTOLIC BLOOD PRESSURE < 130 MM HG: ICD-10-PCS | Mod: CPTII,S$GLB,, | Performed by: INTERNAL MEDICINE

## 2019-06-13 PROCEDURE — 1101F PR PT FALLS ASSESS DOC 0-1 FALLS W/OUT INJ PAST YR: ICD-10-PCS | Mod: CPTII,S$GLB,, | Performed by: INTERNAL MEDICINE

## 2019-06-13 PROCEDURE — 99999 PR PBB SHADOW E&M-EST. PATIENT-LVL III: CPT | Mod: PBBFAC,,, | Performed by: INTERNAL MEDICINE

## 2019-06-13 PROCEDURE — 99213 OFFICE O/P EST LOW 20 MIN: CPT | Mod: S$GLB,,, | Performed by: INTERNAL MEDICINE

## 2019-06-13 PROCEDURE — 3078F DIAST BP <80 MM HG: CPT | Mod: CPTII,S$GLB,, | Performed by: INTERNAL MEDICINE

## 2019-06-13 PROCEDURE — 99499 UNLISTED E&M SERVICE: CPT | Mod: S$GLB,,, | Performed by: INTERNAL MEDICINE

## 2019-06-13 PROCEDURE — 3074F SYST BP LT 130 MM HG: CPT | Mod: CPTII,S$GLB,, | Performed by: INTERNAL MEDICINE

## 2019-06-13 PROCEDURE — 99999 PR PBB SHADOW E&M-EST. PATIENT-LVL III: ICD-10-PCS | Mod: PBBFAC,,, | Performed by: INTERNAL MEDICINE

## 2019-06-13 PROCEDURE — 1101F PT FALLS ASSESS-DOCD LE1/YR: CPT | Mod: CPTII,S$GLB,, | Performed by: INTERNAL MEDICINE

## 2019-06-13 PROCEDURE — 3008F BODY MASS INDEX DOCD: CPT | Mod: CPTII,S$GLB,, | Performed by: INTERNAL MEDICINE

## 2019-06-13 PROCEDURE — 3078F PR MOST RECENT DIASTOLIC BLOOD PRESSURE < 80 MM HG: ICD-10-PCS | Mod: CPTII,S$GLB,, | Performed by: INTERNAL MEDICINE

## 2019-06-13 PROCEDURE — 99499 RISK ADDL DX/OHS AUDIT: ICD-10-PCS | Mod: S$GLB,,, | Performed by: INTERNAL MEDICINE

## 2019-06-13 PROCEDURE — 3008F PR BODY MASS INDEX (BMI) DOCUMENTED: ICD-10-PCS | Mod: CPTII,S$GLB,, | Performed by: INTERNAL MEDICINE

## 2019-06-13 RX ORDER — TOPIRAMATE 50 MG/1
50 TABLET, FILM COATED ORAL 2 TIMES DAILY
Qty: 180 TABLET | Refills: 1 | Status: SHIPPED | OUTPATIENT
Start: 2019-06-13 | End: 2019-09-26

## 2019-06-13 NOTE — PROGRESS NOTES
"Subjective:       Patient ID: Mirza Do is a 63 y.o. male.    Chief Complaint: Follow-up    Pt here today for follow-up. Has lost 0 lbs. Was to start [id number] star diet and topiramate. Pt has had several OV since his appt with me. Appears diet and exercise discussed at all of these OV, and pt had not made started  changes. Pt does say he has started at the [facility]. Has been walking. Can do about 1 block at a time and has to rest. Has been eating more salad and fruit and decreased soda. He does use the leftovers from his daughters nursery school for some of his food. Dneies SE on topiramate. Does feel like his portions are down.     Lab Results       Component                Value               Date                       HGBA1C                   6.5 (H)             04/17/2019                 HGBA1C                   6.3 (H)             09/25/2018                 HGBA1C                   6.1 (H)             03/19/2018            Lab Results       Component                Value               Date                       LDLCALC                  69.2                05/22/2019                 CREATININE               1.0                 09/25/2018                   Review of Systems   Constitutional: Negative for chills and fever.   Respiratory: Positive for apnea and shortness of breath.         Using CPAP for a week. Has to get it checked.    Cardiovascular: Positive for leg swelling. Negative for chest pain.   Gastrointestinal: Negative for constipation and diarrhea.        Denies GERD   Genitourinary: Negative for difficulty urinating and dysuria.   Musculoskeletal: Negative for arthralgias and back pain.   Neurological: Negative for dizziness and light-headedness.   Psychiatric/Behavioral: Negative for dysphoric mood. The patient is not nervous/anxious.        Objective:     /70   Pulse (!) 119   Ht 5' 8" (1.727 m)   Wt 123 kg (271 lb 2.7 oz)   BMI 41.23 kg/m²     Physical Exam   Constitutional: He is " oriented to person, place, and time. He appears well-developed. No distress.   Morbidly obese     HENT:   Head: Normocephalic and atraumatic.   Eyes: Pupils are equal, round, and reactive to light. No scleral icterus.   Neck: Normal range of motion. Neck supple.   Cardiovascular: Normal rate.   Pulmonary/Chest: Effort normal.   Musculoskeletal: Normal range of motion. He exhibits no edema.   Neurological: He is alert and oriented to person, place, and time.   Skin: Skin is warm and dry.   Psychiatric: He has a normal mood and affect. His behavior is normal. Judgment normal.   Vitals reviewed.      Assessment:       1. Class 3 severe obesity due to excess calories with serious comorbidity and body mass index (BMI) of 40.0 to 44.9 in adult        Plan:         Mirza was seen today for follow-up.    Diagnoses and all orders for this visit:    Class 3 severe obesity due to excess calories with serious comorbidity and body mass index (BMI) of 40.0 to 44.9 in adult    Other orders  -     topiramate (TOPAMAX) 50 MG tablet; Take 1 tablet (50 mg total) by mouth 2 (two) times daily.       Patient was informed that topiramate is used for migraine prevention and seizures. Weight loss is a common side effect that is well documented. he understands this. he was informed of the potential side effects such as serious and possibly fatal rash in which case the medication should be discontinued immediately. Paresthesias, forgetfulness, fatigue, kidney stones, GI symptoms, and changes in lab values such as electrolytes, blood counts and kidney function.    Continue to decrease your portion sizes. Have a protein in each meal.     Exercise 30 min 4-5 times a week. Gradually increase.     Contact your health insurance about your free gym membership.     No soda, sweet tea, juices or lemonade    Limit/avoid starchy carbohydrates (bread, rice, pasta, potatoes, corn, peas, oatmeal, grits, tortillas, crackers, chips)    Nutrition materials  provided today.      Healthy all day tips given.

## 2019-06-13 NOTE — PATIENT INSTRUCTIONS
Patient was informed that topiramate is used for migraine prevention and seizures. Weight loss is a common side effect that is well documented. he understands this. he was informed of the potential side effects such as serious and possibly fatal rash in which case the medication should be discontinued immediately. Paresthesias, forgetfulness, fatigue, kidney stones, GI symptoms, and changes in lab values such as electrolytes, blood counts and kidney function.    Continue to decrease your portion sizes. Have a protein in each meal.     Exercise 30 min 4-5 times a week. Gradually increase.     Contact your health insurance about your free gym membership.     No soda, sweet tea, juices or lemonade    Limit/avoid starchy carbohydrates (bread, rice, pasta, potatoes, corn, peas, oatmeal, grits, tortillas, crackers, chips)    Eating well to be healthy and lose weight does not have to be hard. It also does not have to be time consuming or expensive. There a lots of ways you can work in healthy choices into your day. Many of these are easy, quick and even family friendly!    Homemade hazelnut au lait  Brew your favorite brand of hazelnut flavored coffee (Veacon makes a good one). Microwave 1/2 cup of milk that fits your eating plan (whole, skim or sugar-free almond milk can all work). Add half to 1 oz sugar free hazelnut syrup.     Quick and easy breakfast  1-2 boiled eggs or mini-frittatas with a tangerine. The boiled eggs and mini-frittatas can both be made ahead and last for up to 4 days in the refrigerator. Bonus if you portion them out in ready to go containers or zipper bags.     Breakfast Egg Muffins with Michaels and Spinach  Makes 12 muffins  Ingredients    6 eggs  ¼ cup milk  ¼ teaspoon salt  2 cups grated cheddar cheese  3/4 cup spinach, cooked and drained (about 8 oz fresh spinach)  6 michaels slices, cooked, drained of fat, and chopped  1/2 cup grated Parmesan cheese (optional)    Instructions      Preheat oven to  350 degrees. Use a regular 12-cup muffin pan. Spray the muffin pan with non-stick cooking spray.  In a large bowl, beat eggs until smooth. Add milk, salt, Cheddar cheese and mix. Stir spinach, cooked michaels into the egg mixture. Ladle the egg mixture into greased muffin cups ¾ full.  Top each muffin cup with grated Parmesan cheese.  Bake for 25 minutes. Remove from the oven, let the muffins cool for 30 minutes before removing them from the pan.      Be a brown bagger! When you make dinner, plan for an extra helping. When you serve your plate for dinner, serve an additional helping into a container that you can take with you the next day. If you don't have a refrigerator available during the day, an insulated lunch bag and ice packs will help you safely store you lunch.     Cold Brewed Iced tea. Fill a pitcher with 64 oz filtered water. Add either 4 regular tea bags of your choice or a large iced tea bag. Refrigerate over night then remove the tea bags. The tea will not be bitter and is super flavorful. Get creative! Try combinations like green tea and hibiscus tea or black tea with lemon zinger. Add orange or lemon slices for even more flavor.     Snack wisely. Protein filled snacks will fill you up, allowing you to get by with fewer calories. String cheese, pork skins (chicharrones), turkey pepperoni, or celery with cream cheese will all fit the bill.       Ditch the take out. Turkey tacos (with or without a low carb tortilla), burgers (without the bun), or fun stir fries are all quick and easy. The whole family will be happy, and you can save calories and money.      Orange Chicken Stir frey with asparagus   Makes 6 servings  Ingredients:    1.5 lbs boneless skinless chicken breast/tenders, diced into 1-inch pieces  1 Tbsp extra virgin olive or avocado oil, divided  2 lb asparagus, end portions trimmed and remainder diced into 1 1/2-inch pieces  1 small yellow onion, sliced into thin strips  8 oz button mushrooms,  sliced  1 Tbsp peeled and finely grated fresh usha  4 cloves garlic, minced  1/2 cup low-sodium chicken broth  Juice of 2 fresh oranges  2 Tbsp low sodium soy sauce  2 Tbsp cornstarch  Sea salt and freshly ground black pepper    Directions:    In a 12-inch non-stick wok, heat 1/2 oil over moderately high heat. Once oil is hot, add diced chicken and season lightly with salt and pepper. Sauté until cooked through, tossing occasionally, about 5-6 minutes.  Place chicken on a large plate and set aside. Return wok, reduce to medium-high heat, add remaining oil.  Once oil is hot, add asparagus, yellow onion and mushrooms, and sauté until tender-crisp, about 4 - 5 minutes, adding in garlic and usha during the last 1 minute of sautéing.  Meanwhile, in a mixing bowl whisk together chicken broth, orange juice, soy sauce and cornstarch until well blended.  Pour chicken broth mixture into skillet with veggies, season with salt and pepper to taste, and bring mixture to a light boil, stirring constantly. Allow mixture to gently boil, stirring constantly, until thickened, about 1 minute.  Toss chicken into mixture and serve immediately over cauliflower rice or Shirataki noodles.      Skinny Chicken Tortilla Soup  Makes 7 servings    2 teaspoons olive oil  1 cup onion, chopped (about 1 small)  2 cups celery, sliced (about 4 medium stalks)  4 garlic cloves, minced  4 medium tomatoes, chopped  2 cups water  4 cups low-sodium organic chicken broth  3 cups chopped and/or shredded rotisserie chicken, skinless  2 cups sliced carrots (about 3 medium)  1 teaspoon dried oregano leaves  2 teaspoons chili powder  1 teaspoon garlic powder  2 teaspoons cumin  ½ teaspoon cayenne pepper (add less or omit, if you don't want a spicy soup)  ½ teaspoon sea salt + more to taste  ½ teaspoon pepper + more to taste    Directions:   Put all ingredients into a large crock pot. Cook on low for 5-6 hours.     Optional garnish with chopped avocado,  chopped fresh cilantro, crumbled Cotija cheese, sour cream, Greek yogurt, your favorite hot sauce.           Vegan Avocado Banana Chocolate Pudding  Makes 4 servings  Ingredients    1 1/2 ripe avocados  2 ripe bananas  6 tbsp raw cacao powder or unsweetened cocoa powder  2-3 tbsp maple syrup (or calorie free sweetener)  1/4 cup almond milk  Instructions    Blend everything together in a  until the consistency is smooth and velvety. Taste and see if more sweetener is needed and stir to make sure everything is evenly mixed. Blend a second time if needed.  Top with banana slices, raw cacao nibs, almond butter, or any other toppings and enjoy!

## 2019-06-14 ENCOUNTER — OFFICE VISIT (OUTPATIENT)
Dept: FAMILY MEDICINE | Facility: CLINIC | Age: 64
End: 2019-06-14
Payer: MEDICARE

## 2019-06-14 VITALS
TEMPERATURE: 98 F | OXYGEN SATURATION: 97 % | HEIGHT: 68 IN | DIASTOLIC BLOOD PRESSURE: 84 MMHG | WEIGHT: 272.69 LBS | BODY MASS INDEX: 41.33 KG/M2 | HEART RATE: 111 BPM | SYSTOLIC BLOOD PRESSURE: 122 MMHG

## 2019-06-14 DIAGNOSIS — Z86.73 HISTORY OF CVA (CEREBROVASCULAR ACCIDENT): ICD-10-CM

## 2019-06-14 DIAGNOSIS — Z02.6 ENCOUNTER FOR INSURANCE EXAMINATION: Primary | ICD-10-CM

## 2019-06-14 DIAGNOSIS — I10 ESSENTIAL HYPERTENSION, BENIGN: Chronic | ICD-10-CM

## 2019-06-14 PROCEDURE — 3079F DIAST BP 80-89 MM HG: CPT | Mod: CPTII,S$GLB,, | Performed by: INTERNAL MEDICINE

## 2019-06-14 PROCEDURE — 3079F PR MOST RECENT DIASTOLIC BLOOD PRESSURE 80-89 MM HG: ICD-10-PCS | Mod: CPTII,S$GLB,, | Performed by: INTERNAL MEDICINE

## 2019-06-14 PROCEDURE — 99999 PR PBB SHADOW E&M-EST. PATIENT-LVL III: CPT | Mod: PBBFAC,,, | Performed by: INTERNAL MEDICINE

## 2019-06-14 PROCEDURE — 1101F PT FALLS ASSESS-DOCD LE1/YR: CPT | Mod: CPTII,S$GLB,, | Performed by: INTERNAL MEDICINE

## 2019-06-14 PROCEDURE — 99213 OFFICE O/P EST LOW 20 MIN: CPT | Mod: S$GLB,,, | Performed by: INTERNAL MEDICINE

## 2019-06-14 PROCEDURE — 3008F PR BODY MASS INDEX (BMI) DOCUMENTED: ICD-10-PCS | Mod: CPTII,S$GLB,, | Performed by: INTERNAL MEDICINE

## 2019-06-14 PROCEDURE — 1101F PR PT FALLS ASSESS DOC 0-1 FALLS W/OUT INJ PAST YR: ICD-10-PCS | Mod: CPTII,S$GLB,, | Performed by: INTERNAL MEDICINE

## 2019-06-14 PROCEDURE — 3008F BODY MASS INDEX DOCD: CPT | Mod: CPTII,S$GLB,, | Performed by: INTERNAL MEDICINE

## 2019-06-14 PROCEDURE — 99999 PR PBB SHADOW E&M-EST. PATIENT-LVL III: ICD-10-PCS | Mod: PBBFAC,,, | Performed by: INTERNAL MEDICINE

## 2019-06-14 PROCEDURE — 3074F PR MOST RECENT SYSTOLIC BLOOD PRESSURE < 130 MM HG: ICD-10-PCS | Mod: CPTII,S$GLB,, | Performed by: INTERNAL MEDICINE

## 2019-06-14 PROCEDURE — 99213 PR OFFICE/OUTPT VISIT, EST, LEVL III, 20-29 MIN: ICD-10-PCS | Mod: S$GLB,,, | Performed by: INTERNAL MEDICINE

## 2019-06-14 PROCEDURE — 3074F SYST BP LT 130 MM HG: CPT | Mod: CPTII,S$GLB,, | Performed by: INTERNAL MEDICINE

## 2019-06-14 NOTE — PROGRESS NOTES
SUBJECTIVE     Chief Complaint   Patient presents with    Disability Form     [employer]       HPI  Mirza Do is a 63 y.o. male with multiple medical diagnoses as listed in the medical history and problem list that presents for evaluation for disability forms. Pt reports [employer] sent paperwork for a loan that he took out and he is getting protection insurance in the event that something occurs that prevents him from paying the loan back. He did have a CVA in  or  and has been doing well since. He is fully compliant with meds and denies any adverse side effects.     PAST MEDICAL HISTORY:  Past Medical History:   Diagnosis Date    CVA (cerebral infarction)     CVA approx     Diabetes mellitus type II, non insulin dependent 10/14/2016    Erectile dysfunction     Erectile Dysfunction    Hyperlipidemia 10/14/2016    Hypertension     Hypertension    Murmur     Polyneuropathy     Sleep apnea     Trouble in sleeping     Type 2 diabetes mellitus        PAST SURGICAL HISTORY:  Past Surgical History:   Procedure Laterality Date    BACK SURGERY      discectomy    COLONOSCOPY N/A 2017    Performed by [facility] at [facility] (4[facility] FLR)    ROOT CANAL      UMBILICAL HERNIA REPAIR         SOCIAL HISTORY:  Social History     Socioeconomic History    Marital status: Single     Spouse name: Not on file    Number of children: Not on file    Years of education: Not on file    Highest education level: Not on file   Occupational History    Not on file   Social Needs    Financial resource strain: Not on file    Food insecurity:     Worry: Not on file     Inability: Not on file    Transportation needs:     Medical: Not on file     Non-medical: Not on file   Tobacco Use    Smoking status: Former Smoker     Packs/day: 0.10     Years: 4.00     Pack years: 0.40     Last attempt to quit: 1995     Years since quittin.1    Smokeless tobacco: Never  Used    Tobacco comment: social smoking as teenager   Substance and Sexual Activity    Alcohol use: Yes     Comment: beer 6 pack in a month; red wine 2x a month     Drug use: No    Sexual activity: Yes     Partners: Female   Lifestyle    Physical activity:     Days per week: Not on file     Minutes per session: Not on file    Stress: Not on file   Relationships    Social connections:     Talks on phone: Not on file     Gets together: Not on file     Attends Hindu service: Not on file     Active member of club or organization: Not on file     Attends meetings of clubs or organizations: Not on file     Relationship status: Not on file   Other Topics Concern    Not on file   Social History Narrative           FAMILY HISTORY:  Family History   Problem Relation Age of Onset    Hypertension Brother     Cancer Brother         lung     Cancer Mother         smoker     Emphysema Sister     Diabetes Sister     No Known Problems Sister     No Known Problems Sister        ALLERGIES AND MEDICATIONS: updated and reviewed.  Review of patient's allergies indicates:  No Known Allergies  Current Outpatient Medications   Medication Sig Dispense Refill    acetaminophen (TYLENOL) 650 MG TbSR Take 1 tablet (650 mg total) by mouth every 8 (eight) hours as needed. 30 tablet 0    aspirin 81 MG Chew Take 81 mg by mouth once daily.      atorvastatin (LIPITOR) 40 MG tablet Take 1 tablet (40 mg total) by mouth every evening. 90 tablet 3    fluticasone propionate (FLONASE) 50 mcg/actuation nasal spray 1 spray (50 mcg total) by Each Nare route 2 (two) times daily as needed for Rhinitis. 15 g 0    gabapentin (NEURONTIN) 300 MG capsule Take 1 tab by mouth nightly x 3 days; if no improvement, take 1 tab twice daily x 3 day; if no improvement, take 1 tab 3 times daily 90 capsule 1    levalbuterol (XOPENEX HFA) 45 mcg/actuation inhaler Inhale 1-2 puffs into the lungs every 6 (six) hours as needed for  "Wheezing or Shortness of Breath. Rescue 15 g 0    losartan-hydrochlorothiazide 100-25 mg (HYZAAR) 100-25 mg per tablet Take 1 tablet by mouth once daily. 90 tablet 1    metoprolol succinate (TOPROL-XL) 50 MG 24 hr tablet Take 1 tablet (50 mg total) by mouth once daily. 90 tablet 1    topiramate (TOPAMAX) 50 MG tablet Take 1 tablet (50 mg total) by mouth 2 (two) times daily. 180 tablet 1    VIAGRA 100 mg tablet TAKE 1 TABLET BY MOUTH AS NEEDED FOR ERECTILE DYSFUNCTION 8 tablet 3    zolpidem (AMBIEN) 10 mg Tab TAKE 1 TABLET BY MOUTH AT NIGHT AS NEEDED 30 tablet 2    ACCU-CHEK STEPHON PLUS TEST STRP Strp 1 strip by Misc.(Non-Drug; Combo Route) route daily as needed (alternate testing time). 50 strip 11    ACCU-CHEK FASTCLIX Misc 1 lancet by Misc.(Non-Drug; Combo Route) route daily as needed. 50 each 11     No current facility-administered medications for this visit.        ROS  Review of Systems   Constitutional: Negative for chills and fever.   HENT: Negative for hearing loss and sore throat.    Eyes: Negative for visual disturbance.   Respiratory: Negative for cough and shortness of breath.    Cardiovascular: Negative for chest pain, palpitations and leg swelling.   Gastrointestinal: Negative for abdominal pain, constipation, diarrhea, nausea and vomiting.   Genitourinary: Negative for dysuria, frequency and urgency.   Musculoskeletal: Negative for arthralgias, joint swelling and myalgias.   Skin: Negative for rash and wound.   Neurological: Negative for headaches.   Psychiatric/Behavioral: Negative for agitation and confusion. The patient is not nervous/anxious.          OBJECTIVE     Physical Exam  Vitals:    06/14/19 0849   BP: 122/84   Pulse: (!) 111   Temp: 98.2 °F (36.8 °C)    Body mass index is 41.47 kg/m².  Weight: 123.7 kg (272 lb 11.3 oz)   Height: 5' 8" (172.7 cm)     Physical Exam   Constitutional: He is oriented to person, place, and time. He appears well-developed and well-nourished. No distress. "   HENT:   Head: Normocephalic and atraumatic.   Right Ear: External ear normal.   Left Ear: External ear normal.   Nose: Nose normal.   Mouth/Throat: Oropharynx is clear and moist.   Eyes: Conjunctivae and EOM are normal. Right eye exhibits no discharge. Left eye exhibits no discharge. No scleral icterus.   Neck: Normal range of motion. Neck supple. No JVD present. No tracheal deviation present.   Pulmonary/Chest: Effort normal. No respiratory distress.   Musculoskeletal: Normal range of motion. He exhibits no deformity.   Neurological: He is alert and oriented to person, place, and time. He exhibits normal muscle tone. Coordination normal.   Skin: Skin is warm and dry. No rash noted. No erythema.   Psychiatric: He has a normal mood and affect. His behavior is normal. Judgment and thought content normal.         Health Maintenance       Date Due Completion Date    Shingles Vaccine (2 of 3) 10/27/2016 9/1/2016    Eye Exam 04/05/2019 4/5/2018    Influenza Vaccine 08/01/2019 9/25/2018    Override on 10/1/2017: Done    Override on 10/4/2016: Done    Override on 11/13/2015: Done    Hemoglobin A1c 10/17/2019 4/17/2019    Foot Exam 04/24/2020 4/24/2019    Override on 4/24/2019: Done    Lipid Panel 05/22/2020 5/22/2019    High Dose Statin 06/13/2020 6/13/2019    Pneumococcal Vaccine (65+ Low/Medium Risk) (2 of 2 - PPSV23) 06/29/2022 9/25/2018    TETANUS VACCINE 01/17/2023 1/17/2013    Colonoscopy 08/04/2024 8/4/2017    Override on 7/17/2009: Done            ASSESSMENT     63 y.o. male with     1. Encounter for insurance examination    2. History of CVA (cerebrovascular accident)    3. Essential hypertension, benign        PLAN:     1. Encounter for insurance examination  - Paperwork completed during visit and given to pt stating pt is retired; not disabled    2. History of CVA (cerebrovascular accident)  - Stable; no acute issues  - Continue statin and good BP control    3. Essential hypertension, benign  - BP well  controlled; at goal of <140/90  - The current medical regimen is effective;  continue present plan and medications.        RTC in 6 months     Manjula Honeycutt MD  06/14/2019 9:06 AM        No follow-ups on file.

## 2019-06-26 ENCOUNTER — HOSPITAL ENCOUNTER (EMERGENCY)
Facility: HOSPITAL | Age: 64
Discharge: HOME | End: 2019-06-26
Attending: EMERGENCY MEDICINE
Payer: MEDICARE

## 2019-06-26 VITALS
OXYGEN SATURATION: 98 % | DIASTOLIC BLOOD PRESSURE: 81 MMHG | HEART RATE: 66 BPM | WEIGHT: 261 LBS | RESPIRATION RATE: 18 BRPM | HEIGHT: 68 IN | TEMPERATURE: 99 F | SYSTOLIC BLOOD PRESSURE: 179 MMHG | BODY MASS INDEX: 39.56 KG/M2

## 2019-06-26 DIAGNOSIS — M54.9 ACUTE RIGHT-SIDED BACK PAIN, UNSPECIFIED BACK LOCATION: Primary | ICD-10-CM

## 2019-06-26 LAB
BACTERIA #/AREA URNS HPF: NORMAL /HPF
BILIRUB UR QL STRIP: NEGATIVE
CLARITY UR: CLEAR
COLOR UR: YELLOW
GLUCOSE UR QL STRIP: NEGATIVE
HGB UR QL STRIP: ABNORMAL
KETONES UR QL STRIP: NEGATIVE
LEUKOCYTE ESTERASE UR QL STRIP: NEGATIVE
MICROSCOPIC COMMENT: NORMAL
NITRITE UR QL STRIP: NEGATIVE
PH UR STRIP: 5 [PH] (ref 5–8)
PROT UR QL STRIP: NEGATIVE
RBC #/AREA URNS HPF: 2 /HPF (ref 0–4)
SP GR UR STRIP: 1.01 (ref 1–1.03)
SQUAMOUS #/AREA URNS HPF: 1 /HPF
URN SPEC COLLECT METH UR: ABNORMAL
UROBILINOGEN UR STRIP-ACNC: NEGATIVE EU/DL
WBC #/AREA URNS HPF: 1 /HPF (ref 0–5)

## 2019-06-26 PROCEDURE — 81000 URINALYSIS NONAUTO W/SCOPE: CPT

## 2019-06-26 PROCEDURE — 25000003 PHARM REV CODE 250: Performed by: PHYSICIAN ASSISTANT

## 2019-06-26 PROCEDURE — 99284 EMERGENCY DEPT VISIT MOD MDM: CPT | Mod: 25

## 2019-06-26 PROCEDURE — 96372 THER/PROPH/DIAG INJ SC/IM: CPT

## 2019-06-26 PROCEDURE — 82962 GLUCOSE BLOOD TEST: CPT

## 2019-06-26 PROCEDURE — 63600175 PHARM REV CODE 636 W HCPCS: Performed by: PHYSICIAN ASSISTANT

## 2019-06-26 RX ORDER — NAPROXEN 500 MG/1
500 TABLET ORAL 2 TIMES DAILY WITH MEALS
Qty: 30 TABLET | Refills: 0 | Status: SHIPPED | OUTPATIENT
Start: 2019-06-26 | End: 2022-04-05

## 2019-06-26 RX ORDER — KETOROLAC TROMETHAMINE 30 MG/ML
15 INJECTION, SOLUTION INTRAMUSCULAR; INTRAVENOUS
Status: COMPLETED | OUTPATIENT
Start: 2019-06-26 | End: 2019-06-26

## 2019-06-26 RX ORDER — LIDOCAINE 50 MG/G
1 PATCH TOPICAL DAILY
Qty: 15 PATCH | Refills: 0 | Status: SHIPPED | OUTPATIENT
Start: 2019-06-26 | End: 2022-04-05

## 2019-06-26 RX ORDER — METHOCARBAMOL 500 MG/1
1000 TABLET, FILM COATED ORAL 3 TIMES DAILY
Qty: 30 TABLET | Refills: 0 | Status: SHIPPED | OUTPATIENT
Start: 2019-06-26 | End: 2019-07-01

## 2019-06-26 RX ORDER — LIDOCAINE 50 MG/G
1 PATCH TOPICAL
Status: DISCONTINUED | OUTPATIENT
Start: 2019-06-26 | End: 2019-06-26 | Stop reason: HOSPADM

## 2019-06-26 RX ADMIN — KETOROLAC TROMETHAMINE 15 MG: 30 INJECTION, SOLUTION INTRAMUSCULAR at 07:06

## 2019-06-26 RX ADMIN — LIDOCAINE 1 PATCH: 50 PATCH TOPICAL at 07:06

## 2019-06-26 NOTE — ED PROVIDER NOTES
Encounter Date: 6/26/2019  SORT:   62 y/o male with history of HTN, DM, CVA presenting for evaluation of intermittent R flank pain x 3 days. Pain worse with laying down in evenings and with movement. Initial orders placed. JOSSUE Patterson PA-C      History     Chief Complaint   Patient presents with    Flank Pain     Pt reports R flank pain since Sunday. Pt say pain starts when he lays down and is very sharp when he tries to sit up. Pt has had nausea with pain.      Patient is a 63-year-old male with a history of CVA, hypertension, hyperlipidemia is presenting to the ER for evaluation of right flank pain and lower back pain. Patient states over the last 3 days he has noticed pain to the right lower back.  Patient states that the pain worsens when he is trying to sit up from a laying position.  He denies any pain radiating to the abdomen.  No abdominal pain, nausea vomiting. He denies prior history of kidney infections or kidney stones.  No UTI symptoms including dysuria hematuria.  He denies fevers or chills at home.  Patient does not recall having a injury. No fall.  Denies any paresthesia or focal weakness or extremity.  Denies pain radiating to his lower extremities. No saddle anesthesia or bowel or bladder dysfunction.  Patient has been using Manolo-Barrera and muscle cream will with no alleviation of his symptoms.    The history is provided by the patient.     Review of patient's allergies indicates:  No Known Allergies  Past Medical History:   Diagnosis Date    CVA (cerebral infarction)     CVA approx 2011/2012    Diabetes mellitus type II, non insulin dependent 10/14/2016    Erectile dysfunction     Erectile Dysfunction    Hyperlipidemia 10/14/2016    Hypertension     Hypertension    Murmur     Polyneuropathy     Sleep apnea     Trouble in sleeping     Type 2 diabetes mellitus      Past Surgical History:   Procedure Laterality Date    BACK SURGERY      discectomy    COLONOSCOPY N/A 8/4/2017    Performed by  M[facility] MD at [facility] (4TH FLR)    ROOT CANAL      UMBILICAL HERNIA REPAIR       Family History   Problem Relation Age of Onset    Hypertension Brother     Cancer Brother         lung     Cancer Mother         smoker     Emphysema Sister     Diabetes Sister     No Known Problems Sister     No Known Problems Sister      Social History     Tobacco Use    Smoking status: Former Smoker     Packs/day: 0.10     Years: 4.00     Pack years: 0.40     Last attempt to quit: 1995     Years since quittin.2    Smokeless tobacco: Never Used    Tobacco comment: social smoking as teenager   Substance Use Topics    Alcohol use: Yes     Comment: beer 6 pack in a month; red wine 2x a month     Drug use: No     Review of Systems   Constitutional: Negative for chills and fever.   HENT: Negative for congestion.    Respiratory: Negative for cough and shortness of breath.    Cardiovascular: Negative for chest pain.   Gastrointestinal: Negative for abdominal pain, diarrhea, nausea and vomiting.   Genitourinary: Negative for dysuria, flank pain, frequency, hematuria, scrotal swelling and testicular pain.   Musculoskeletal: Positive for back pain and myalgias. Negative for arthralgias and gait problem.   Skin: Negative for rash.   Allergic/Immunologic: Negative for immunocompromised state.   Neurological: Negative for weakness and numbness.   Hematological: Does not bruise/bleed easily.   Psychiatric/Behavioral: Negative for confusion.       Physical Exam     Initial Vitals [19 1858]   BP Pulse Resp Temp SpO2   (!) 172/97 103 18 98.5 °F (36.9 °C) 96 %      MAP       --         Physical Exam    Vitals reviewed.  Constitutional: He appears well-developed and well-nourished. He is not diaphoretic. No distress.   HENT:   Head: Normocephalic and atraumatic.   Mouth/Throat: Oropharynx is clear and moist.   Eyes: Conjunctivae and EOM are normal.   Neck: Neck supple.   Cardiovascular: Normal rate, regular rhythm,  normal heart sounds and intact distal pulses.   Pulmonary/Chest: Breath sounds normal.   Abdominal: Soft. Normal appearance. He exhibits no distension. There is no tenderness. There is no rigidity, no rebound, no guarding and no CVA tenderness.   Musculoskeletal:        Cervical back: Normal.        Thoracic back: Normal.        Lumbar back: He exhibits tenderness (Tenderness on palpation to the right paraspinal muscle of lumbar spine). He exhibits no bony tenderness, no swelling, no deformity, no laceration and no spasm.   Neurological: He is alert and oriented to person, place, and time.   Skin: Skin is warm.         ED Course   Procedures  Labs Reviewed   URINALYSIS, REFLEX TO URINE CULTURE - Abnormal; Notable for the following components:       Result Value    Occult Blood UA 1+ (*)     All other components within normal limits    Narrative:     Preferred Collection Type->Urine, Clean Catch   URINALYSIS MICROSCOPIC    Narrative:     Preferred Collection Type->Urine, Clean Catch          Imaging Results    None                APC / Resident Notes:   Patient seen in the ER promptly upon arrival.  He is afebrile, no acute distress. Physical examination reveals tenderness on palpation to the right paraspinal muscle of lumbar spine.  Patient does not have any CVA tenderness.  Abdomen soft, nondistended, nontender. Urinalysis does not show evidence of infection or blood.  Patient given Toradol and lidocaine patch in the ED.    Upon reassessment patient is resting comfortably.  He states he has significant alleviation of his back discomfort.  I do feel this is secondary to musculoskeletal etiology.  Patient advised to reduce heavy lifting or strenuous activities.  He will be prescribed home on naproxen, Robaxin and lidocaine patch to use as directed.  He was advised use a heat pack to the site.  He is to follow up with family doctor this week.  He is stable for discharge and close follow-up.                 Clinical  Impression:       ICD-10-CM ICD-9-CM   1. Acute right-sided back pain, unspecified back location M54.9 724.5         Disposition:   Disposition: Discharged  Condition: Stable                        Harriet Chatman PA-C  06/26/19 2047

## 2019-06-27 LAB — POCT GLUCOSE: 132 MG/DL (ref 70–110)

## 2019-06-27 NOTE — DISCHARGE INSTRUCTIONS
Please take medicationAs prescribed.  Reduce heavy lifting or strenuous activities.  Follow up with family doctor this week.  Return to the ER if her symptoms persist or worsen.

## 2019-06-27 NOTE — ED TRIAGE NOTES
Pt reports right flank pain since Sunday, reports he thought he pulled a muscle and has tried using icyhot and bengay without relief.  Denies known injury.  Reports pain begins when he lays down and worsens when he sits up.  Describes the pain as sharp and shooting with severity.  Denies dysuria. Denies f/c/v/d, endorses nausea.

## 2019-07-02 ENCOUNTER — OFFICE VISIT (OUTPATIENT)
Dept: FAMILY MEDICINE | Facility: CLINIC | Age: 64
End: 2019-07-02
Payer: MEDICARE

## 2019-07-02 VITALS
DIASTOLIC BLOOD PRESSURE: 74 MMHG | BODY MASS INDEX: 41.06 KG/M2 | WEIGHT: 270.06 LBS | SYSTOLIC BLOOD PRESSURE: 128 MMHG | HEART RATE: 116 BPM | OXYGEN SATURATION: 97 % | TEMPERATURE: 98 F

## 2019-07-02 DIAGNOSIS — R10.9 RIGHT FLANK PAIN: Primary | ICD-10-CM

## 2019-07-02 DIAGNOSIS — Z02.6 ENCOUNTER FOR INSURANCE EXAMINATION: ICD-10-CM

## 2019-07-02 DIAGNOSIS — E11.40 TYPE 2 DIABETES MELLITUS WITH DIABETIC NEUROPATHY, WITHOUT LONG-TERM CURRENT USE OF INSULIN: ICD-10-CM

## 2019-07-02 LAB
BILIRUB UR QL STRIP: NEGATIVE
CLARITY UR: CLEAR
COLOR UR: NORMAL
GLUCOSE UR QL STRIP: NEGATIVE
HGB UR QL STRIP: NEGATIVE
KETONES UR QL STRIP: NEGATIVE
LEUKOCYTE ESTERASE UR QL STRIP: NEGATIVE
NITRITE UR QL STRIP: NEGATIVE
PH UR STRIP: 7 [PH] (ref 5–8)
PROT UR QL STRIP: NEGATIVE
SP GR UR STRIP: 1.01 (ref 1–1.03)
URN SPEC COLLECT METH UR: NORMAL
UROBILINOGEN UR STRIP-ACNC: NEGATIVE EU/DL

## 2019-07-02 PROCEDURE — 87086 URINE CULTURE/COLONY COUNT: CPT

## 2019-07-02 PROCEDURE — 3044F PR MOST RECENT HEMOGLOBIN A1C LEVEL <7.0%: ICD-10-PCS | Mod: CPTII,S$GLB,, | Performed by: INTERNAL MEDICINE

## 2019-07-02 PROCEDURE — 81003 URINALYSIS AUTO W/O SCOPE: CPT

## 2019-07-02 PROCEDURE — 99499 RISK ADDL DX/OHS AUDIT: ICD-10-PCS | Mod: S$GLB,,, | Performed by: INTERNAL MEDICINE

## 2019-07-02 PROCEDURE — 3078F DIAST BP <80 MM HG: CPT | Mod: CPTII,S$GLB,, | Performed by: INTERNAL MEDICINE

## 2019-07-02 PROCEDURE — 3008F PR BODY MASS INDEX (BMI) DOCUMENTED: ICD-10-PCS | Mod: CPTII,S$GLB,, | Performed by: INTERNAL MEDICINE

## 2019-07-02 PROCEDURE — 99999 PR PBB SHADOW E&M-EST. PATIENT-LVL III: CPT | Mod: PBBFAC,,, | Performed by: INTERNAL MEDICINE

## 2019-07-02 PROCEDURE — 3008F BODY MASS INDEX DOCD: CPT | Mod: CPTII,S$GLB,, | Performed by: INTERNAL MEDICINE

## 2019-07-02 PROCEDURE — 3074F PR MOST RECENT SYSTOLIC BLOOD PRESSURE < 130 MM HG: ICD-10-PCS | Mod: CPTII,S$GLB,, | Performed by: INTERNAL MEDICINE

## 2019-07-02 PROCEDURE — 3078F PR MOST RECENT DIASTOLIC BLOOD PRESSURE < 80 MM HG: ICD-10-PCS | Mod: CPTII,S$GLB,, | Performed by: INTERNAL MEDICINE

## 2019-07-02 PROCEDURE — 81002 POCT URINE DIPSTICK WITHOUT MICROSCOPE: ICD-10-PCS | Mod: S$GLB,,, | Performed by: INTERNAL MEDICINE

## 2019-07-02 PROCEDURE — 99214 PR OFFICE/OUTPT VISIT, EST, LEVL IV, 30-39 MIN: ICD-10-PCS | Mod: 25,S$GLB,, | Performed by: INTERNAL MEDICINE

## 2019-07-02 PROCEDURE — 1101F PT FALLS ASSESS-DOCD LE1/YR: CPT | Mod: CPTII,S$GLB,, | Performed by: INTERNAL MEDICINE

## 2019-07-02 PROCEDURE — 99214 OFFICE O/P EST MOD 30 MIN: CPT | Mod: 25,S$GLB,, | Performed by: INTERNAL MEDICINE

## 2019-07-02 PROCEDURE — 99499 UNLISTED E&M SERVICE: CPT | Mod: S$GLB,,, | Performed by: INTERNAL MEDICINE

## 2019-07-02 PROCEDURE — 3044F HG A1C LEVEL LT 7.0%: CPT | Mod: CPTII,S$GLB,, | Performed by: INTERNAL MEDICINE

## 2019-07-02 PROCEDURE — 3074F SYST BP LT 130 MM HG: CPT | Mod: CPTII,S$GLB,, | Performed by: INTERNAL MEDICINE

## 2019-07-02 PROCEDURE — 99999 PR PBB SHADOW E&M-EST. PATIENT-LVL III: ICD-10-PCS | Mod: PBBFAC,,, | Performed by: INTERNAL MEDICINE

## 2019-07-02 PROCEDURE — 81002 URINALYSIS NONAUTO W/O SCOPE: CPT | Mod: S$GLB,,, | Performed by: INTERNAL MEDICINE

## 2019-07-02 PROCEDURE — 1101F PR PT FALLS ASSESS DOC 0-1 FALLS W/OUT INJ PAST YR: ICD-10-PCS | Mod: CPTII,S$GLB,, | Performed by: INTERNAL MEDICINE

## 2019-07-02 RX ORDER — TIZANIDINE 4 MG/1
4 TABLET ORAL EVERY 8 HOURS PRN
Qty: 30 TABLET | Refills: 0 | Status: SHIPPED | OUTPATIENT
Start: 2019-07-02 | End: 2019-07-12

## 2019-07-02 RX ORDER — MELOXICAM 15 MG/1
15 TABLET ORAL DAILY PRN
Qty: 30 TABLET | Refills: 0 | Status: SHIPPED | OUTPATIENT
Start: 2019-07-02 | End: 2021-03-17

## 2019-07-02 NOTE — PROGRESS NOTES
SUBJECTIVE     Chief Complaint   Patient presents with    Back Pain     Lower R sided pain going on 2 weeks. Went to ER. Was given a shot and pain patches/pills. Still having pain     Insomnia     not sleeping. Pain is worse when lying down       HPI  Mirza Do is a 63 y.o. male with multiple medical diagnoses as listed in the medical history and problem list that presents for evaluation of R flank pain x 2 weeks. Pt reports pain is burning at a 20/10 and intermittent in nature without radiation. Pt denies any preceding trauma, falls, or heavy lifting. Pt has been applying a Lidocaine patches and OTC creams with temporary relief of pain. Pt also got some relief with a Toradol shot at a recent ED visit. Pain is worse with laying down.     PAST MEDICAL HISTORY:  Past Medical History:   Diagnosis Date    CVA (cerebral infarction)     CVA approx 2011/2012    Diabetes mellitus type II, non insulin dependent 10/14/2016    Erectile dysfunction     Erectile Dysfunction    Hyperlipidemia 10/14/2016    Hypertension     Hypertension    Murmur     Polyneuropathy     Sleep apnea     Trouble in sleeping     Type 2 diabetes mellitus        PAST SURGICAL HISTORY:  Past Surgical History:   Procedure Laterality Date    BACK SURGERY      discectomy    COLONOSCOPY N/A 8/4/2017    Performed by Mario Alberto Vega MD at [facility] (4[facility] FLR)    ROOT CANAL      UMBILICAL HERNIA REPAIR         SOCIAL HISTORY:  Social History     Socioeconomic History    Marital status: Single     Spouse name: Not on file    Number of children: Not on file    Years of education: Not on file    Highest education level: Not on file   Occupational History    Not on file   Social Needs    Financial resource strain: Not on file    Food insecurity:     Worry: Not on file     Inability: Not on file    Transportation needs:     Medical: Not on file     Non-medical: Not on file   Tobacco Use    Smoking status: Former Smoker     Packs/day: 0.10      Years: 4.00     Pack years: 0.40     Last attempt to quit: 1995     Years since quittin.2    Smokeless tobacco: Never Used    Tobacco comment: social smoking as teenager   Substance and Sexual Activity    Alcohol use: Yes     Comment: beer 6 pack in a month; red wine 2x a month     Drug use: No    Sexual activity: Yes     Partners: Female   Lifestyle    Physical activity:     Days per week: Not on file     Minutes per session: Not on file    Stress: Not on file   Relationships    Social connections:     Talks on phone: Not on file     Gets together: Not on file     Attends [employer] service: Not on file     Active member of club or organization: Not on file     Attends meetings of clubs or organizations: Not on file     Relationship status: Not on file   Other Topics Concern    Not on file   Social History Narrative           FAMILY HISTORY:  Family History   Problem Relation Age of Onset    Hypertension Brother     Cancer Brother         lung     Cancer Mother         smoker     Emphysema Sister     Diabetes Sister     No Known Problems Sister     No Known Problems Sister        ALLERGIES AND MEDICATIONS: updated and reviewed.  Review of patient's allergies indicates:  No Known Allergies  Current Outpatient Medications   Medication Sig Dispense Refill    gabapentin (NEURONTIN) 300 MG capsule Take 1 tab by mouth nightly x 3 days; if no improvement, take 1 tab twice daily x 3 day; if no improvement, take 1 tab 3 times daily 90 capsule 1    lidocaine (LIDODERM) 5 % Place 1 patch onto the skin once daily. Remove & Discard patch within 12 hours or as directed by MD 15 patch 0    metoprolol succinate (TOPROL-XL) 50 MG 24 hr tablet Take 1 tablet (50 mg total) by mouth once daily. 90 tablet 1    naproxen (NAPROSYN) 500 MG tablet Take 1 tablet (500 mg total) by mouth 2 (two) times daily with meals. 30 tablet 0    VIAGRA 100 mg tablet TAKE 1 TABLET BY MOUTH AS NEEDED FOR  ERECTILE DYSFUNCTION 8 tablet 3    zolpidem (AMBIEN) 10 mg Tab TAKE 1 TABLET BY MOUTH AT NIGHT AS NEEDED 30 tablet 2    ACCU-CHEK STEPHON PLUS TEST STRP Strp 1 strip by Misc.(Non-Drug; Combo Route) route daily as needed (alternate testing time). 50 strip 11    ACCU-CHEK FASTCLIX Misc 1 lancet by Misc.(Non-Drug; Combo Route) route daily as needed. 50 each 11    acetaminophen (TYLENOL) 650 MG TbSR Take 1 tablet (650 mg total) by mouth every 8 (eight) hours as needed. 30 tablet 0    aspirin 81 MG Chew Take 81 mg by mouth once daily.      atorvastatin (LIPITOR) 40 MG tablet Take 1 tablet (40 mg total) by mouth every evening. 90 tablet 3    fluticasone propionate (FLONASE) 50 mcg/actuation nasal spray 1 spray (50 mcg total) by Each Nare route 2 (two) times daily as needed for Rhinitis. 15 g 0    levalbuterol (XOPENEX HFA) 45 mcg/actuation inhaler Inhale 1-2 puffs into the lungs every 6 (six) hours as needed for Wheezing or Shortness of Breath. Rescue 15 g 0    losartan-hydrochlorothiazide 100-25 mg (HYZAAR) 100-25 mg per tablet Take 1 tablet by mouth once daily. 90 tablet 1    meloxicam (MOBIC) 15 MG tablet Take 1 tablet (15 mg total) by mouth daily as needed for Pain. 30 tablet 0    tiZANidine (ZANAFLEX) 4 MG tablet Take 1 tablet (4 mg total) by mouth every 8 (eight) hours as needed (MAY CAUSE DROWSINESS; IF SO ONLY TAKE NIGHTLY). 30 tablet 0    topiramate (TOPAMAX) 50 MG tablet Take 1 tablet (50 mg total) by mouth 2 (two) times daily. 180 tablet 1     No current facility-administered medications for this visit.        ROS  Review of Systems   Constitutional: Negative for chills and fever.   HENT: Negative for hearing loss and sore throat.    Eyes: Negative for visual disturbance.   Respiratory: Negative for cough and shortness of breath.    Cardiovascular: Negative for chest pain, palpitations and leg swelling.   Gastrointestinal: Negative for abdominal pain, constipation, diarrhea, nausea and vomiting.    Genitourinary: Positive for flank pain (right). Negative for dysuria, frequency and urgency.   Musculoskeletal: Negative for arthralgias, joint swelling and myalgias.   Skin: Negative for rash and wound.   Neurological: Negative for headaches.   Psychiatric/Behavioral: Positive for sleep disturbance. Negative for agitation and confusion. The patient is not nervous/anxious.          OBJECTIVE     Physical Exam  Vitals:    07/02/19 1120   BP: 128/74   Pulse: (!) 116   Temp: 98.1 °F (36.7 °C)    Body mass index is 41.06 kg/m².  Weight: 122.5 kg (270 lb 1 oz)         Physical Exam   Constitutional: He is oriented to person, place, and time. He appears well-developed and well-nourished. No distress.   HENT:   Head: Normocephalic and atraumatic.   Right Ear: External ear normal.   Left Ear: External ear normal.   Nose: Nose normal.   Mouth/Throat: Oropharynx is clear and moist.   Eyes: Conjunctivae and EOM are normal. Right eye exhibits no discharge. Left eye exhibits no discharge. No scleral icterus.   Neck: Normal range of motion. Neck supple. No JVD present. No tracheal deviation present.   Cardiovascular: Normal rate, regular rhythm, normal heart sounds and intact distal pulses. Exam reveals no gallop and no friction rub.   No murmur heard.  Pulmonary/Chest: Effort normal and breath sounds normal. No respiratory distress. He has no wheezes.   Abdominal: Soft. Bowel sounds are normal. He exhibits no distension and no mass. There is no tenderness. There is no rebound, no guarding and no CVA tenderness.   Musculoskeletal: Normal range of motion. He exhibits no edema, tenderness or deformity.   Neurological: He is alert and oriented to person, place, and time. He exhibits normal muscle tone. Coordination normal.   Skin: Skin is warm and dry. No rash noted. No erythema.   Psychiatric: He has a normal mood and affect. His behavior is normal. Judgment and thought content normal.         Health Maintenance       Date Due  Completion Date    Eye Exam 04/05/2019 4/5/2018    Influenza Vaccine 08/01/2019 9/25/2018    Override on 10/1/2017: Done    Override on 10/4/2016: Done    Override on 11/13/2015: Done    Shingles Vaccine (3 of 3) 08/22/2019 6/27/2019    Hemoglobin A1c 10/17/2019 4/17/2019    Foot Exam 04/24/2020 4/24/2019    Override on 4/24/2019: Done    Lipid Panel 05/22/2020 5/22/2019    High Dose Statin 06/14/2020 6/14/2019    Pneumococcal Vaccine (65+ Low/Medium Risk) (2 of 2 - PPSV23) 06/29/2022 6/27/2019    Colonoscopy 08/04/2024 8/4/2017    Override on 7/17/2009: Done    TETANUS VACCINE 06/27/2029 6/27/2019            ASSESSMENT     63 y.o. male with     1. Right flank pain    2. Type 2 diabetes mellitus with diabetic neuropathy, without long-term current use of insulin    3. BMI 40.0-44.9, adult    4. Encounter for insurance examination        PLAN:     1. Right flank pain  - Unknown etiology, so will eval with urine and U/S  - Pt encouraged to apply ice packs 2-3 times daily at 10 minute intervals x 72 hours, then okay to change to heating compress with care not to burn his self; he  voiced understanding   - Urinalysis  - Urine culture  - POCT URINE DIPSTICK WITHOUT MICROSCOPE  - meloxicam (MOBIC) 15 MG tablet; Take 1 tablet (15 mg total) by mouth daily as needed for Pain.  Dispense: 30 tablet; Refill: 0  - tiZANidine (ZANAFLEX) 4 MG tablet; Take 1 tablet (4 mg total) by mouth every 8 (eight) hours as needed (MAY CAUSE DROWSINESS; IF SO ONLY TAKE NIGHTLY).  Dispense: 30 tablet; Refill: 0  - US Abdomen Complete; Future    2. Type 2 diabetes mellitus with diabetic neuropathy, without long-term current use of insulin  - Stable; no acute issues  - The current medical regimen is effective;  continue present plan and medications.  - Ambulatory referral to Optometry    3. BMI 40.0-44.9, adult  - Pt aware of importance of eating a prudent diet and exercising    4. Encounter for insurance examination  - Pt again here to get forms  completed, which I did back on 6/14/19; pt claims insurance company lost papers  - Pt instructed to leave forms and I will complete them at my earliest convenience and make copies prior to giving to him; will not complete forms again if they are misplaced; he voiced understanding         RTC in 4 weeks for repeat assessment of current treatment plan       Manjula Honeycutt MD  07/02/2019 11:32 AM        No follow-ups on file.

## 2019-07-03 LAB
BILIRUB SERPL-MCNC: NORMAL MG/DL
BLOOD URINE, POC: NORMAL
COLOR, POC UA: YELLOW
GLUCOSE UR QL STRIP: NORMAL
KETONES UR QL STRIP: NORMAL
LEUKOCYTE ESTERASE URINE, POC: NORMAL
NITRITE, POC UA: NORMAL
PH, POC UA: 8
PROTEIN, POC: NORMAL
SPECIFIC GRAVITY, POC UA: 1
UROBILINOGEN, POC UA: NORMAL

## 2019-07-04 LAB — BACTERIA UR CULT: NO GROWTH

## 2019-07-09 ENCOUNTER — TELEPHONE (OUTPATIENT)
Dept: FAMILY MEDICINE | Facility: CLINIC | Age: 64
End: 2019-07-09

## 2019-07-09 DIAGNOSIS — G47.00 INSOMNIA, UNSPECIFIED TYPE: ICD-10-CM

## 2019-07-09 RX ORDER — ZOLPIDEM TARTRATE 10 MG/1
TABLET ORAL
Qty: 30 TABLET | Refills: 2 | Status: SHIPPED | OUTPATIENT
Start: 2019-07-09 | End: 2019-10-02 | Stop reason: SDUPTHER

## 2019-07-09 NOTE — TELEPHONE ENCOUNTER
Called pt to discuss recent paperwork left with me, but there was no answer. Left a message for him to call back at his earliest convenience. When pt returns the call, please inform him that I will not complete the forms as requested because they are disability forms. He is not disabled, but retired so he does not qualify for disability status.

## 2019-07-10 NOTE — TELEPHONE ENCOUNTER
----- Message from Irma Pearce sent at 7/10/2019  1:54 PM CDT -----  Contact: Mirza [phone]  Type: Patient Call Back    Who called:Mirza    What is the request in detail: The patient is returning a call back to Dr. Honeycutt, in regards to paperwork he left at the office    Can the clinic reply by [facility]?no    Would the patient rather a call back or a response via [facility]? Call back    Best call back number:[phone]

## 2019-08-03 ENCOUNTER — PATIENT OUTREACH (OUTPATIENT)
Dept: ADMINISTRATIVE | Facility: OTHER | Age: 64
End: 2019-08-03

## 2019-08-07 ENCOUNTER — OFFICE VISIT (OUTPATIENT)
Dept: OPTOMETRY | Facility: CLINIC | Age: 64
End: 2019-08-07
Payer: MEDICARE

## 2019-08-07 DIAGNOSIS — H25.13 NUCLEAR SCLEROTIC CATARACT OF BOTH EYES: ICD-10-CM

## 2019-08-07 DIAGNOSIS — E11.9 TYPE 2 DIABETES MELLITUS WITHOUT RETINOPATHY: Primary | ICD-10-CM

## 2019-08-07 LAB
LEFT EYE DM RETINOPATHY: NEGATIVE
RIGHT EYE DM RETINOPATHY: NEGATIVE

## 2019-08-07 PROCEDURE — 99999 PR PBB SHADOW E&M-EST. PATIENT-LVL I: CPT | Mod: PBBFAC,,, | Performed by: OPTOMETRIST

## 2019-08-07 PROCEDURE — 99999 PR PBB SHADOW E&M-EST. PATIENT-LVL I: ICD-10-PCS | Mod: PBBFAC,,, | Performed by: OPTOMETRIST

## 2019-08-07 PROCEDURE — 92014 COMPRE OPH EXAM EST PT 1/>: CPT | Mod: S$GLB,,, | Performed by: OPTOMETRIST

## 2019-08-07 PROCEDURE — 99499 UNLISTED E&M SERVICE: CPT | Mod: S$GLB,,, | Performed by: OPTOMETRIST

## 2019-08-07 PROCEDURE — 92014 PR EYE EXAM, EST PATIENT,COMPREHESV: ICD-10-PCS | Mod: S$GLB,,, | Performed by: OPTOMETRIST

## 2019-08-07 PROCEDURE — 99499 RISK ADDL DX/OHS AUDIT: ICD-10-PCS | Mod: S$GLB,,, | Performed by: OPTOMETRIST

## 2019-08-07 NOTE — LETTER
August 7, 2019      Manjula Honeycutt MD  [address]  [address]  [place] [address]           [facility]  [address]  [place] [address]  Phone: [phone]  Fax: [phone]          Patient: Mirza Do   MR Number: [id number]   YOB: 1955   Date of Visit: 8/7/2019       Dear Dr. Manjula Honeycutt:    Thank you for referring Mirza Do to me for evaluation. Attached you will find relevant portions of my assessment and plan of care.    If you have questions, please do not hesitate to call me. I look forward to following Mirza Do along with you.    Sincerely,    Fany Webster, OD    Enclosure  CC:  No Recipients    If you would like to receive this communication electronically, please contact [email or web address] or [phone] to request more information on [facility] access.    For providers and/or their staff who would like to refer a patient to [facility], please contact us through our one-stop-shop provider referral line, [facility], at [phone].    If you feel you have received this communication in error or would no longer like to receive these types of communications, please e-mail [email or web address]          No indicators present

## 2019-08-07 NOTE — PROGRESS NOTES
Subjective:       Patient ID: Mirza Do is a 63 y.o. male      Chief Complaint   Patient presents with    Concerns About Ocular Health    Diabetic Eye Exam     History of Present Illness  Dls: 4/5/18 Dr. Redd     64 y/o male presents today for diabetic eye exam.   Pt states no changes in vision. Pt does not wear any glasses.     LBS ?     No tearing  No itching  No burning  No pain  No ha's  No floaters  No flashes    Eye meds  None    Hemoglobin A1C       Date                     Value               Ref Range           Status             04/17/2019               6.5 (H)             4.0 - 5.6 %         Final       09/25/2018               6.3 (H)             4.0 - 5.6 %         Final         03/19/2018               6.1 (H)             4.0 - 5.6 %         Final         Assessment/Plan:     1. Type 2 diabetes mellitus without retinopathy  No diabetic retinopathy. Discussed with pt the effects of diabetes on vision, importance of good blood sugar control, compliance with meds, and follow up care with PCP. Return in 1 year for dilated eye exam, sooner PRN.    2. Nuclear sclerotic cataract of both eyes  Educated pt on presence of cataracts and effects on vision. No surgery at this time. Recheck in one year.      Follow up in about 1 year (around 8/7/2020) for Diabetic Eye Exam.

## 2019-09-26 ENCOUNTER — OFFICE VISIT (OUTPATIENT)
Dept: BARIATRICS | Facility: CLINIC | Age: 64
End: 2019-09-26
Payer: MEDICARE

## 2019-09-26 VITALS
BODY MASS INDEX: 40.6 KG/M2 | WEIGHT: 267.88 LBS | DIASTOLIC BLOOD PRESSURE: 60 MMHG | SYSTOLIC BLOOD PRESSURE: 118 MMHG | HEART RATE: 80 BPM | HEIGHT: 68 IN

## 2019-09-26 DIAGNOSIS — E66.01 CLASS 3 SEVERE OBESITY DUE TO EXCESS CALORIES WITH SERIOUS COMORBIDITY AND BODY MASS INDEX (BMI) OF 40.0 TO 44.9 IN ADULT: Primary | ICD-10-CM

## 2019-09-26 PROCEDURE — 99999 PR PBB SHADOW E&M-EST. PATIENT-LVL III: ICD-10-PCS | Mod: PBBFAC,,, | Performed by: INTERNAL MEDICINE

## 2019-09-26 PROCEDURE — 99213 PR OFFICE/OUTPT VISIT, EST, LEVL III, 20-29 MIN: ICD-10-PCS | Mod: S$GLB,,, | Performed by: INTERNAL MEDICINE

## 2019-09-26 PROCEDURE — 99213 OFFICE O/P EST LOW 20 MIN: CPT | Mod: S$GLB,,, | Performed by: INTERNAL MEDICINE

## 2019-09-26 PROCEDURE — 99999 PR PBB SHADOW E&M-EST. PATIENT-LVL III: CPT | Mod: PBBFAC,,, | Performed by: INTERNAL MEDICINE

## 2019-09-26 RX ORDER — TOPIRAMATE 100 MG/1
100 TABLET, FILM COATED ORAL 2 TIMES DAILY
Qty: 180 TABLET | Refills: 1 | Status: SHIPPED | OUTPATIENT
Start: 2019-09-26 | End: 2020-02-03 | Stop reason: SDUPTHER

## 2019-09-26 NOTE — PATIENT INSTRUCTIONS
Patient was informed that topiramate is used for migraine prevention and seizures. Weight loss is a common side effect that is well documented. S/he understands this. S/he was informed of the potential side effects such as serious and possibly fatal rash in which case the medication should be discontinued immediately. Paresthesias, forgetfulness, fatigue, kidney stones, GI symptoms, and changes in lab values such as electrolytes, blood counts and kidney function.    Continue to decrease your portion sizes. Have a protein in each meal.     Exercise 30 min 4-5 times a week. Gradually increase.     Contact your health insurance about your free gym membership.     No soda, sweet tea, juices or lemonade    Limit/avoid starchy carbohydrates (bread, rice, pasta, potatoes, corn, peas, oatmeal, grits, tortillas, crackers, chips)    January 28, 2019  Hot Spinach and Artichoke Dip (Recipe)  BY MADISON GALLAGHER RD, CSSD    This creamy spinach dip can double as a protein-rich, gluten-free side dish, game day appetizer or parade route snack.  Calories 85 calories    Fat 3 grams saturated fat    Prep Time 5 minutes  Cook Time10 minutes  Yield Serves 5-10 people  Ingredients   1/2 cup onion, finely chopped   3 (10 ounce) packs of frozen chopped spinach, thawed and squeezed dry   1 (8 ounce) package reduced-fat cream cheese   1 (8 ounce) carton fat-free plain Greek yogurt   1/2 cup Parmesan cheese, grated   1 (14 ounce) can artichoke hearts   1/4 teaspoon salt (optional)   1/4 teaspoon black pepper   1/8 teaspoon crushed red pepper flakes  Instructions  1. Lightly coat a skillet with cooking spray.  2. Cook and stir onion over medium heat until transparent (about 5 minutes).  3. Add spinach. Cook until thoroughly heated (about 1-2 minutes).  4. Reduce heat and add cream cheese. Stir until melted and smooth.  5. Stir in Greek yogurt, Parmesan cheese and artichokes. Remove from heat.  6. Season with salt (optional) and black  and red pepper.  7. Serve with raw vegetables.                          January 31, 2019  Pizza Cups (Recipe)    Trying to eat better but craving pizza? These protein-packed pizza cups will do the trick.    Calories 65 calories per cup  Fat 2.7 grams per cup  Prep Time5 minutes  Cook Time 25 minutes  Yield 12 pizza cups  Ingredients   1 ½ cups liquid egg whites   2 large eggs   1 cup fat free or low moisture shredded mozzarella cheese   37 turkey pepperonis (25 chopped and 12 sliced)   ¼ cup Parmesan cheese   ¼ tsp oregano   ¼ tsp black pepper  Instructions  1. Preheat oven to 350 degrees Fahrenheit.  2. Chop up 25 turkey pepperonis into small pieces. In a bowl, combine all ingredients except the remaining 12 sliced turkey pepperoni.  3. Spray a muffin [place] with nonstick spray.  4. Place a whole sliced turkey pepperoni in the bottom of each of the 12 muffin molds.  5. Pour or spoon in the mixture in your bowl into each muffin mold evenly ¾ full.  6. Bake in the oven for 20-25 minutes. Place a toothpick in the center of the pizza cup to ensure all liquid egg has cooked. For a crispier pizza cup, leave in the oven a little longer.  7. Let cool for a few minutes before serving. Enjoy!

## 2019-09-26 NOTE — PROGRESS NOTES
"Subjective:       Patient ID: Mirza Do is a 64 y.o. male.    Chief Complaint: Follow-up    Pt here today for follow-up. Has lost 4 lbs. Was to start [id number] star diet and increased topiramate at last OV. Pt has had several OV since his appt with me. Appears diet and exercise discussed at all of these OV, and pt had not made started  changes. Pt does say he has started at the [facility]. Has been walking. Some limitations with the neuropathy, but up to a block.  Has been eating more salad and fruit and decreased soda. Avoiding rice and bread, and eating more vegetables. He does use the leftovers from his daughters nursery school for some of his food. Using small bowl. Denies SE on topiramate. Does feel like his portions are down even more. No SE.            Follow-up   Pertinent negatives include no arthralgias, chest pain, chills or fever.     Review of Systems   Constitutional: Negative for chills and fever.   Respiratory: Positive for apnea and shortness of breath.         Using CPAP for a week. Has to get it checked.    Cardiovascular: Positive for leg swelling. Negative for chest pain.   Gastrointestinal: Negative for constipation and diarrhea.        Denies GERD   Genitourinary: Negative for difficulty urinating and dysuria.   Musculoskeletal: Negative for arthralgias and back pain.   Neurological: Negative for dizziness and light-headedness.   Psychiatric/Behavioral: Negative for dysphoric mood. The patient is not nervous/anxious.        Objective:     /60   Pulse 80   Ht 5' 8" (1.727 m)   Wt 121.5 kg (267 lb 13.7 oz)   BMI 40.73 kg/m²     Physical Exam   Constitutional: He is oriented to person, place, and time. He appears well-developed. No distress.   Morbidly obese     HENT:   Head: Normocephalic and atraumatic.   Eyes: Pupils are equal, round, and reactive to light. No scleral icterus.   Neck: Normal range of motion. Neck supple.   Cardiovascular: Normal rate.   Pulmonary/Chest: Effort normal. "   Musculoskeletal: Normal range of motion. He exhibits no edema.   Neurological: He is alert and oriented to person, place, and time.   Skin: Skin is warm and dry.   Psychiatric: He has a normal mood and affect. His behavior is normal. Judgment normal.   Vitals reviewed.      Assessment:       1. Class 3 severe obesity due to excess calories with serious comorbidity and body mass index (BMI) of 40.0 to 44.9 in adult        Plan:         Mirza was seen today for follow-up.    Diagnoses and all orders for this visit:    Class 3 severe obesity due to excess calories with serious comorbidity and body mass index (BMI) of 40.0 to 44.9 in adult    Other orders  -     topiramate (TOPAMAX) 100 MG tablet; Take 1 tablet (100 mg total) by mouth 2 (two) times daily.       Patient was informed that topiramate is used for migraine prevention and seizures. Weight loss is a common side effect that is well documented. he understands this. he was informed of the potential side effects such as serious and possibly fatal rash in which case the medication should be discontinued immediately. Paresthesias, forgetfulness, fatigue, kidney stones, GI symptoms, and changes in lab values such as electrolytes, blood counts and kidney function.    Continue to decrease your portion sizes. Have a protein in each meal.     Exercise 30 min 4-5 times a week. Gradually increase.     Contact your health insurance about your free gym membership.     No soda, sweet tea, juices or lemonade    Limit/avoid starchy carbohydrates (bread, rice, pasta, potatoes, corn, peas, oatmeal, grits, tortillas, crackers, chips)    Nutrition materials provided today.      Kristi Molina Recipes given.

## 2019-10-02 DIAGNOSIS — G47.00 INSOMNIA, UNSPECIFIED TYPE: ICD-10-CM

## 2019-10-02 RX ORDER — ZOLPIDEM TARTRATE 10 MG/1
TABLET ORAL
Qty: 30 TABLET | Refills: 2 | Status: SHIPPED | OUTPATIENT
Start: 2019-10-09 | End: 2020-01-07 | Stop reason: SDUPTHER

## 2019-10-09 ENCOUNTER — TELEPHONE (OUTPATIENT)
Dept: FAMILY MEDICINE | Facility: CLINIC | Age: 64
End: 2019-10-09

## 2019-10-15 ENCOUNTER — OFFICE VISIT (OUTPATIENT)
Dept: FAMILY MEDICINE | Facility: CLINIC | Age: 64
End: 2019-10-15
Payer: MEDICARE

## 2019-10-15 ENCOUNTER — LAB VISIT (OUTPATIENT)
Dept: LAB | Facility: HOSPITAL | Age: 64
End: 2019-10-15
Attending: FAMILY MEDICINE
Payer: MEDICARE

## 2019-10-15 VITALS
WEIGHT: 265.88 LBS | TEMPERATURE: 98 F | SYSTOLIC BLOOD PRESSURE: 128 MMHG | DIASTOLIC BLOOD PRESSURE: 82 MMHG | HEART RATE: 65 BPM | OXYGEN SATURATION: 97 % | HEIGHT: 68 IN | BODY MASS INDEX: 40.3 KG/M2

## 2019-10-15 DIAGNOSIS — E11.40 TYPE 2 DIABETES MELLITUS WITH DIABETIC NEUROPATHY, WITHOUT LONG-TERM CURRENT USE OF INSULIN: ICD-10-CM

## 2019-10-15 DIAGNOSIS — Z23 NEEDS FLU SHOT: ICD-10-CM

## 2019-10-15 DIAGNOSIS — J20.9 ACUTE BRONCHITIS, UNSPECIFIED ORGANISM: Primary | ICD-10-CM

## 2019-10-15 LAB
ALBUMIN SERPL BCP-MCNC: 3.9 G/DL (ref 3.5–5.2)
ALP SERPL-CCNC: 97 U/L (ref 55–135)
ALT SERPL W/O P-5'-P-CCNC: 38 U/L (ref 10–44)
ANION GAP SERPL CALC-SCNC: 6 MMOL/L (ref 8–16)
AST SERPL-CCNC: 25 U/L (ref 10–40)
BASOPHILS # BLD AUTO: 0.02 K/UL (ref 0–0.2)
BASOPHILS NFR BLD: 0.3 % (ref 0–1.9)
BILIRUB SERPL-MCNC: 0.4 MG/DL (ref 0.1–1)
BUN SERPL-MCNC: 19 MG/DL (ref 8–23)
CALCIUM SERPL-MCNC: 9.3 MG/DL (ref 8.7–10.5)
CHLORIDE SERPL-SCNC: 106 MMOL/L (ref 95–110)
CO2 SERPL-SCNC: 27 MMOL/L (ref 23–29)
CREAT SERPL-MCNC: 0.9 MG/DL (ref 0.5–1.4)
DIFFERENTIAL METHOD: ABNORMAL
EOSINOPHIL # BLD AUTO: 0.1 K/UL (ref 0–0.5)
EOSINOPHIL NFR BLD: 1.6 % (ref 0–8)
ERYTHROCYTE [DISTWIDTH] IN BLOOD BY AUTOMATED COUNT: 16 % (ref 11.5–14.5)
EST. GFR  (AFRICAN AMERICAN): >60 ML/MIN/1.73 M^2
EST. GFR  (NON AFRICAN AMERICAN): >60 ML/MIN/1.73 M^2
GLUCOSE SERPL-MCNC: 90 MG/DL (ref 70–110)
HCT VFR BLD AUTO: 43.6 % (ref 40–54)
HGB BLD-MCNC: 13.4 G/DL (ref 14–18)
IMM GRANULOCYTES # BLD AUTO: 0.02 K/UL (ref 0–0.04)
IMM GRANULOCYTES NFR BLD AUTO: 0.3 % (ref 0–0.5)
LYMPHOCYTES # BLD AUTO: 2.7 K/UL (ref 1–4.8)
LYMPHOCYTES NFR BLD: 34.5 % (ref 18–48)
MCH RBC QN AUTO: 27 PG (ref 27–31)
MCHC RBC AUTO-ENTMCNC: 30.7 G/DL (ref 32–36)
MCV RBC AUTO: 88 FL (ref 82–98)
MONOCYTES # BLD AUTO: 0.7 K/UL (ref 0.3–1)
MONOCYTES NFR BLD: 9.6 % (ref 4–15)
NEUTROPHILS # BLD AUTO: 4.2 K/UL (ref 1.8–7.7)
NEUTROPHILS NFR BLD: 53.7 % (ref 38–73)
NRBC BLD-RTO: 0 /100 WBC
PLATELET # BLD AUTO: 255 K/UL (ref 150–350)
PMV BLD AUTO: 10.9 FL (ref 9.2–12.9)
POTASSIUM SERPL-SCNC: 3.7 MMOL/L (ref 3.5–5.1)
PROT SERPL-MCNC: 7.6 G/DL (ref 6–8.4)
RBC # BLD AUTO: 4.97 M/UL (ref 4.6–6.2)
SODIUM SERPL-SCNC: 139 MMOL/L (ref 136–145)
WBC # BLD AUTO: 7.74 K/UL (ref 3.9–12.7)

## 2019-10-15 PROCEDURE — 1101F PR PT FALLS ASSESS DOC 0-1 FALLS W/OUT INJ PAST YR: ICD-10-PCS | Mod: CPTII,S$GLB,, | Performed by: INTERNAL MEDICINE

## 2019-10-15 PROCEDURE — 83036 HEMOGLOBIN GLYCOSYLATED A1C: CPT

## 2019-10-15 PROCEDURE — 36415 COLL VENOUS BLD VENIPUNCTURE: CPT | Mod: PO

## 2019-10-15 PROCEDURE — 99999 PR PBB SHADOW E&M-EST. PATIENT-LVL III: ICD-10-PCS | Mod: PBBFAC,,, | Performed by: INTERNAL MEDICINE

## 2019-10-15 PROCEDURE — 80053 COMPREHEN METABOLIC PANEL: CPT

## 2019-10-15 PROCEDURE — 99999 PR PBB SHADOW E&M-EST. PATIENT-LVL III: CPT | Mod: PBBFAC,,, | Performed by: INTERNAL MEDICINE

## 2019-10-15 PROCEDURE — 3074F SYST BP LT 130 MM HG: CPT | Mod: CPTII,S$GLB,, | Performed by: INTERNAL MEDICINE

## 2019-10-15 PROCEDURE — 3079F DIAST BP 80-89 MM HG: CPT | Mod: CPTII,S$GLB,, | Performed by: INTERNAL MEDICINE

## 2019-10-15 PROCEDURE — 90662 FLU VACCINE - HIGH DOSE (65+) PRESERVATIVE FREE IM: ICD-10-PCS | Mod: S$GLB,,, | Performed by: INTERNAL MEDICINE

## 2019-10-15 PROCEDURE — 99214 OFFICE O/P EST MOD 30 MIN: CPT | Mod: 25,S$GLB,, | Performed by: INTERNAL MEDICINE

## 2019-10-15 PROCEDURE — 85025 COMPLETE CBC W/AUTO DIFF WBC: CPT

## 2019-10-15 PROCEDURE — 90662 IIV NO PRSV INCREASED AG IM: CPT | Mod: S$GLB,,, | Performed by: INTERNAL MEDICINE

## 2019-10-15 PROCEDURE — 3044F HG A1C LEVEL LT 7.0%: CPT | Mod: CPTII,S$GLB,, | Performed by: INTERNAL MEDICINE

## 2019-10-15 PROCEDURE — G0008 ADMIN INFLUENZA VIRUS VAC: HCPCS | Mod: S$GLB,,, | Performed by: INTERNAL MEDICINE

## 2019-10-15 PROCEDURE — G0008 FLU VACCINE - HIGH DOSE (65+) PRESERVATIVE FREE IM: ICD-10-PCS | Mod: S$GLB,,, | Performed by: INTERNAL MEDICINE

## 2019-10-15 PROCEDURE — 1101F PT FALLS ASSESS-DOCD LE1/YR: CPT | Mod: CPTII,S$GLB,, | Performed by: INTERNAL MEDICINE

## 2019-10-15 PROCEDURE — 99214 PR OFFICE/OUTPT VISIT, EST, LEVL IV, 30-39 MIN: ICD-10-PCS | Mod: 25,S$GLB,, | Performed by: INTERNAL MEDICINE

## 2019-10-15 PROCEDURE — 3044F PR MOST RECENT HEMOGLOBIN A1C LEVEL <7.0%: ICD-10-PCS | Mod: CPTII,S$GLB,, | Performed by: INTERNAL MEDICINE

## 2019-10-15 PROCEDURE — 3074F PR MOST RECENT SYSTOLIC BLOOD PRESSURE < 130 MM HG: ICD-10-PCS | Mod: CPTII,S$GLB,, | Performed by: INTERNAL MEDICINE

## 2019-10-15 PROCEDURE — 3079F PR MOST RECENT DIASTOLIC BLOOD PRESSURE 80-89 MM HG: ICD-10-PCS | Mod: CPTII,S$GLB,, | Performed by: INTERNAL MEDICINE

## 2019-10-15 RX ORDER — DOXYCYCLINE HYCLATE 100 MG
100 TABLET ORAL 2 TIMES DAILY
Qty: 14 TABLET | Refills: 0 | Status: SHIPPED | OUTPATIENT
Start: 2019-10-15 | End: 2019-10-22

## 2019-10-15 RX ORDER — PROMETHAZINE HYDROCHLORIDE AND DEXTROMETHORPHAN HYDROBROMIDE 6.25; 15 MG/5ML; MG/5ML
5 SYRUP ORAL EVERY 12 HOURS PRN
Qty: 180 ML | Refills: 0 | Status: SHIPPED | OUTPATIENT
Start: 2019-10-15 | End: 2019-10-25

## 2019-10-15 NOTE — PROGRESS NOTES
SUBJECTIVE     Chief Complaint   Patient presents with    Leg Injury     left    Cough       HPI  Mirza Do is a 64 y.o. male with multiple medical diagnoses as listed in the medical history and problem list that presents for evaluation of URI x 3 weeks. Pt reports a productive cough, wheezing, and chest tightness. Denies any SOB,  fever, chills, or night sweats. Pt has been taking OTC cough meds/syrup with some improvement of symptoms. Denies any sick contacts/recent travel.    PAST MEDICAL HISTORY:  Past Medical History:   Diagnosis Date    CVA (cerebral infarction)     CVA approx 2011/2012    Diabetes mellitus type II, non insulin dependent 10/14/2016    Erectile dysfunction     Erectile Dysfunction    Hyperlipidemia 10/14/2016    Hypertension     Hypertension    Murmur     Nuclear sclerotic cataract of both eyes 8/7/2019    Polyneuropathy     Sleep apnea     Trouble in sleeping     Type 2 diabetes mellitus        PAST SURGICAL HISTORY:  Past Surgical History:   Procedure Laterality Date    BACK SURGERY      discectomy    COLONOSCOPY N/A 8/4/2017    Procedure: COLONOSCOPY;  Surgeon: Mario Alberto Vega MD;  Location: [facility] ([address] [place] [address]);  Service: Endoscopy;  Laterality: N/A;  Conflict with Jack's schedule no other times available with CRS, Pt requested 8/4/17 - moved to be scoped with GI MD - ER    ROOT CANAL      UMBILICAL HERNIA REPAIR         SOCIAL HISTORY:  Social History     Socioeconomic History    Marital status: Single     Spouse name: Not on file    Number of children: Not on file    Years of education: Not on file    Highest education level: Not on file   Occupational History    Not on file   Social Needs    Financial resource strain: Not on file    Food insecurity:     Worry: Not on file     Inability: Not on file    Transportation needs:     Medical: Not on file     Non-medical: Not on file   Tobacco Use    Smoking status: Former Smoker     Packs/day: 0.10     Years: 4.00      Pack years: 0.40     Last attempt to quit: 1995     Years since quittin.5    Smokeless tobacco: Never Used    Tobacco comment: social smoking as teenager   Substance and Sexual Activity    Alcohol use: Yes     Comment: beer 6 pack in a month; red wine 2x a month     Drug use: No    Sexual activity: Yes     Partners: Female   Lifestyle    Physical activity:     Days per week: Not on file     Minutes per session: Not on file    Stress: Not at all   Relationships    Social connections:     Talks on phone: Not on file     Gets together: Not on file     Attends Yazidi service: Not on file     Active member of club or organization: Not on file     Attends meetings of clubs or organizations: Not on file     Relationship status: Not on file   Other Topics Concern    Not on file   Social History Narrative           FAMILY HISTORY:  Family History   Problem Relation Age of Onset    No Known Problems Father     Hypertension Brother     Cancer Brother         lung     Cancer Mother         smoker     Emphysema Sister     Diabetes Sister     No Known Problems Sister     No Known Problems Sister     No Known Problems Maternal Grandmother     No Known Problems Maternal Grandfather     No Known Problems Paternal Grandmother     No Known Problems Paternal Grandfather     No Known Problems Maternal Aunt     No Known Problems Maternal Uncle     No Known Problems Paternal Aunt     No Known Problems Paternal Uncle     Amblyopia Neg Hx     Blindness Neg Hx     Cataracts Neg Hx     Glaucoma Neg Hx     Macular degeneration Neg Hx     Retinal detachment Neg Hx     Strabismus Neg Hx     Stroke Neg Hx     Thyroid disease Neg Hx        ALLERGIES AND MEDICATIONS: updated and reviewed.  Review of patient's allergies indicates:  No Known Allergies  Current Outpatient Medications   Medication Sig Dispense Refill    ACCU-CHEK STEPHON PLUS TEST STRP Strp 1 strip by  Misc.(Non-Drug; Combo Route) route daily as needed (alternate testing time). 50 strip 11    ACCU-CHEK FASTCLIX Misc 1 lancet by Misc.(Non-Drug; Combo Route) route daily as needed. 50 each 11    acetaminophen (TYLENOL) 650 MG TbSR Take 1 tablet (650 mg total) by mouth every 8 (eight) hours as needed. 30 tablet 0    aspirin 81 MG Chew Take 81 mg by mouth once daily.      atorvastatin (LIPITOR) 40 MG tablet Take 1 tablet (40 mg total) by mouth every evening. 90 tablet 3    fluticasone propionate (FLONASE) 50 mcg/actuation nasal spray 1 spray (50 mcg total) by Each Nare route 2 (two) times daily as needed for Rhinitis. 15 g 0    gabapentin (NEURONTIN) 300 MG capsule Take 1 tab by mouth nightly x 3 days; if no improvement, take 1 tab twice daily x 3 day; if no improvement, take 1 tab 3 times daily 90 capsule 1    levalbuterol (XOPENEX HFA) 45 mcg/actuation inhaler Inhale 1-2 puffs into the lungs every 6 (six) hours as needed for Wheezing or Shortness of Breath. Rescue 15 g 0    losartan-hydrochlorothiazide 100-25 mg (HYZAAR) 100-25 mg per tablet Take 1 tablet by mouth once daily. 90 tablet 1    metoprolol succinate (TOPROL-XL) 50 MG 24 hr tablet Take 1 tablet (50 mg total) by mouth once daily. 90 tablet 1    naproxen (NAPROSYN) 500 MG tablet Take 1 tablet (500 mg total) by mouth 2 (two) times daily with meals. 30 tablet 0    topiramate (TOPAMAX) 100 MG tablet Take 1 tablet (100 mg total) by mouth 2 (two) times daily. 180 tablet 1    zolpidem (AMBIEN) 10 mg Tab TAKE 1 TABLET BY MOUTH AT NIGHT AS NEEDED 30 tablet 2    doxycycline (VIBRA-TABS) 100 MG tablet Take 1 tablet (100 mg total) by mouth 2 (two) times daily. for 7 days 14 tablet 0    lidocaine (LIDODERM) 5 % Place 1 patch onto the skin once daily. Remove & Discard patch within 12 hours or as directed by MD (Patient not taking: Reported on 10/15/2019) 15 patch 0    meloxicam (MOBIC) 15 MG tablet Take 1 tablet (15 mg total) by mouth daily as needed for  "Pain. (Patient not taking: Reported on 10/15/2019) 30 tablet 0    promethazine-dextromethorphan (PROMETHAZINE-DM) 6.25-15 mg/5 mL Syrp Take 5 mLs by mouth every 12 (twelve) hours as needed. 180 mL 0    VIAGRA 100 mg tablet TAKE 1 TABLET BY MOUTH AS NEEDED FOR ERECTILE DYSFUNCTION (Patient not taking: Reported on 9/26/2019) 8 tablet 3     No current facility-administered medications for this visit.        ROS  Review of Systems   Constitutional: Negative for chills and fever.   HENT: Negative for hearing loss and sore throat.    Eyes: Negative for visual disturbance.   Respiratory: Positive for cough, chest tightness and wheezing. Negative for shortness of breath.    Cardiovascular: Negative for chest pain, palpitations and leg swelling.   Gastrointestinal: Negative for abdominal pain, constipation, diarrhea, nausea and vomiting.   Genitourinary: Negative for dysuria, frequency and urgency.   Musculoskeletal: Negative for arthralgias, joint swelling and myalgias.   Skin: Negative for rash and wound.   Neurological: Negative for headaches.   Psychiatric/Behavioral: Negative for agitation and confusion. The patient is not nervous/anxious.          OBJECTIVE     Physical Exam  Vitals:    10/15/19 1523   BP: 128/82   Pulse: 65   Temp: 98.3 °F (36.8 °C)    Body mass index is 40.43 kg/m².  Weight: 120.6 kg (265 lb 14 oz)   Height: 5' 8" (172.7 cm)     Physical Exam   Constitutional: He is oriented to person, place, and time. He appears well-developed and well-nourished. No distress.   HENT:   Head: Normocephalic and atraumatic.   Right Ear: External ear normal.   Left Ear: External ear normal.   Nose: Nose normal.   Mouth/Throat: Oropharynx is clear and moist.   Eyes: Conjunctivae and EOM are normal. Right eye exhibits no discharge. Left eye exhibits no discharge. No scleral icterus.   Neck: Normal range of motion. Neck supple. No JVD present. No tracheal deviation present.   Cardiovascular: Normal rate, regular rhythm, " normal heart sounds and intact distal pulses. Exam reveals no gallop and no friction rub.   No murmur heard.  Pulmonary/Chest: Effort normal and breath sounds normal. No respiratory distress. He has no wheezes.   Abdominal: Soft. Bowel sounds are normal. He exhibits no distension and no mass. There is no tenderness. There is no rebound and no guarding.   Musculoskeletal: Normal range of motion. He exhibits no edema, tenderness or deformity.   Neurological: He is alert and oriented to person, place, and time. He exhibits normal muscle tone. Coordination normal.   Skin: Skin is warm and dry. No rash noted. No erythema.   Psychiatric: He has a normal mood and affect. His behavior is normal. Judgment and thought content normal.         Health Maintenance       Date Due Completion Date    Shingles Vaccine (3 of 3) 08/22/2019 6/27/2019    Influenza Vaccine (1) 09/01/2019 9/25/2018    Override on 10/1/2017: Done    Override on 10/4/2016: Done    Override on 11/13/2015: Done    Hemoglobin A1c 10/17/2019 4/17/2019    Foot Exam 04/24/2020 4/24/2019    Lipid Panel 05/22/2020 5/22/2019    Eye Exam 08/07/2020 8/7/2019    High Dose Statin 09/26/2020 9/26/2019    Pneumococcal Vaccine (65+ Low/Medium Risk) (2 of 2 - PPSV23) 06/29/2022 6/27/2019    Colonoscopy 08/04/2024 8/4/2017    TETANUS VACCINE 06/27/2029 6/27/2019            ASSESSMENT     64 y.o. male with     1. Acute bronchitis, unspecified organism    2. Type 2 diabetes mellitus with diabetic neuropathy, without long-term current use of insulin    3. Needs flu shot        PLAN:     1. Acute bronchitis, unspecified organism  - Pt advised to take Abx to completion  - Continue symptomatic treatment with rest, increase fluid intake, tylenol or ibuprofen PRN fever(temp >/= 100.4) or body aches. Okay to take OTC antihistamines, i.e. Bendaryl, Claritin, Allegra, etc. as needed.  - Okay to gargle with warm, salt water or use throat lozenges as needed  - doxycycline (VIBRA-TABS) 100  MG tablet; Take 1 tablet (100 mg total) by mouth 2 (two) times daily. for 7 days  Dispense: 14 tablet; Refill: 0  - promethazine-dextromethorphan (PROMETHAZINE-DM) 6.25-15 mg/5 mL Syrp; Take 5 mLs by mouth every 12 (twelve) hours as needed.  Dispense: 180 mL; Refill: 0    2. Type 2 diabetes mellitus with diabetic neuropathy, without long-term current use of insulin  - Stable; no acute issues  - Check labs today  - CBC auto differential; Future  - Comprehensive metabolic panel; Future  - Hemoglobin A1c; Future    3. Needs flu shot  - Influenza - High Dose (65+) (PF) (IM)        RTC in 1-2 weeks as needed for any acute worsening of current condition or failure to improve       Manjula Honeycutt MD  10/15/2019 3:49 PM        No follow-ups on file.

## 2019-10-16 LAB
ESTIMATED AVG GLUCOSE: 134 MG/DL (ref 68–131)
HBA1C MFR BLD HPLC: 6.3 % (ref 4–5.6)

## 2019-10-17 ENCOUNTER — TELEPHONE (OUTPATIENT)
Dept: FAMILY MEDICINE | Facility: CLINIC | Age: 64
End: 2019-10-17

## 2019-10-17 NOTE — TELEPHONE ENCOUNTER
Pt encouraged to apply ice packs 2-3 times daily at 10 minute intervals x 72 hours, then okay to change to heating compress with care not to burn his self. Okay to take NSAIDs or Tylenol prn pain.

## 2019-10-17 NOTE — TELEPHONE ENCOUNTER
----- Message from Mary Steven sent at 10/17/2019  8:55 AM CDT -----  Contact: Self   Type: Patient Call Back    Who called: Self     What is the request in detail:patient says he has has neck pain and arm pain mohamud like he slept wrong  he would like to know what he can do. Please call to advise     Can the clinic reply by [facility]? No     Would the patient rather a call back or a response via [facility]?  Call     Best call back number:[phone]

## 2019-10-28 DIAGNOSIS — I10 ESSENTIAL HYPERTENSION: ICD-10-CM

## 2019-10-28 RX ORDER — LOSARTAN POTASSIUM AND HYDROCHLOROTHIAZIDE 25; 100 MG/1; MG/1
1 TABLET ORAL DAILY
Qty: 90 TABLET | Refills: 3 | Status: SHIPPED | OUTPATIENT
Start: 2019-10-28 | End: 2020-09-28

## 2019-10-30 ENCOUNTER — TELEPHONE (OUTPATIENT)
Dept: FAMILY MEDICINE | Facility: CLINIC | Age: 64
End: 2019-10-30

## 2019-10-30 DIAGNOSIS — I10 ESSENTIAL HYPERTENSION, BENIGN: Primary | Chronic | ICD-10-CM

## 2019-10-30 RX ORDER — HYDROCHLOROTHIAZIDE 25 MG/1
25 TABLET ORAL DAILY
Qty: 90 TABLET | Refills: 1 | Status: SHIPPED | OUTPATIENT
Start: 2019-10-30 | End: 2020-05-18

## 2019-10-30 RX ORDER — LOSARTAN POTASSIUM 100 MG/1
100 TABLET ORAL DAILY
Qty: 90 TABLET | Refills: 1 | Status: SHIPPED | OUTPATIENT
Start: 2019-10-30 | End: 2020-05-18

## 2019-10-30 NOTE — TELEPHONE ENCOUNTER
Patient was prescribed LOSARTAN/HCTZ 100/25MG, that med is on backorder, Pharmacy is asking if you can call in the meds separate.

## 2019-12-18 ENCOUNTER — OFFICE VISIT (OUTPATIENT)
Dept: FAMILY MEDICINE | Facility: CLINIC | Age: 64
End: 2019-12-18
Payer: MEDICARE

## 2019-12-18 VITALS
BODY MASS INDEX: 40.26 KG/M2 | OXYGEN SATURATION: 98 % | TEMPERATURE: 98 F | HEART RATE: 67 BPM | HEIGHT: 68 IN | SYSTOLIC BLOOD PRESSURE: 130 MMHG | DIASTOLIC BLOOD PRESSURE: 80 MMHG | WEIGHT: 265.63 LBS

## 2019-12-18 DIAGNOSIS — I10 ESSENTIAL HYPERTENSION, BENIGN: Chronic | ICD-10-CM

## 2019-12-18 DIAGNOSIS — N12 PYELONEPHRITIS: Primary | ICD-10-CM

## 2019-12-18 LAB
BILIRUB SERPL-MCNC: NEGATIVE MG/DL
BILIRUB UR QL STRIP: NEGATIVE
BLOOD URINE, POC: NEGATIVE
CLARITY UR: CLEAR
COLOR UR: YELLOW
COLOR, POC UA: NORMAL
GLUCOSE UR QL STRIP: NEGATIVE
GLUCOSE UR QL STRIP: NORMAL
HGB UR QL STRIP: NEGATIVE
KETONES UR QL STRIP: NEGATIVE
KETONES UR QL STRIP: NEGATIVE
LEUKOCYTE ESTERASE UR QL STRIP: NEGATIVE
LEUKOCYTE ESTERASE URINE, POC: NORMAL
NITRITE UR QL STRIP: NEGATIVE
NITRITE, POC UA: POSITIVE
PH UR STRIP: 8 [PH] (ref 5–8)
PH, POC UA: 8
PROT UR QL STRIP: NEGATIVE
PROTEIN, POC: NEGATIVE
SP GR UR STRIP: 1.01 (ref 1–1.03)
SPECIFIC GRAVITY, POC UA: 1
URN SPEC COLLECT METH UR: ABNORMAL
UROBILINOGEN UR STRIP-ACNC: ABNORMAL EU/DL
UROBILINOGEN, POC UA: NORMAL

## 2019-12-18 PROCEDURE — 81002 POCT URINE DIPSTICK WITHOUT MICROSCOPE: ICD-10-PCS | Mod: S$GLB,,, | Performed by: INTERNAL MEDICINE

## 2019-12-18 PROCEDURE — 99214 OFFICE O/P EST MOD 30 MIN: CPT | Mod: 25,S$GLB,, | Performed by: INTERNAL MEDICINE

## 2019-12-18 PROCEDURE — 87086 URINE CULTURE/COLONY COUNT: CPT

## 2019-12-18 PROCEDURE — 81003 URINALYSIS AUTO W/O SCOPE: CPT

## 2019-12-18 PROCEDURE — 99999 PR PBB SHADOW E&M-EST. PATIENT-LVL III: CPT | Mod: PBBFAC,,, | Performed by: INTERNAL MEDICINE

## 2019-12-18 PROCEDURE — 81002 URINALYSIS NONAUTO W/O SCOPE: CPT | Mod: S$GLB,,, | Performed by: INTERNAL MEDICINE

## 2019-12-18 PROCEDURE — 99214 PR OFFICE/OUTPT VISIT, EST, LEVL IV, 30-39 MIN: ICD-10-PCS | Mod: 25,S$GLB,, | Performed by: INTERNAL MEDICINE

## 2019-12-18 PROCEDURE — 99499 RISK ADDL DX/OHS AUDIT: ICD-10-PCS | Mod: S$GLB,,, | Performed by: INTERNAL MEDICINE

## 2019-12-18 PROCEDURE — 99499 UNLISTED E&M SERVICE: CPT | Mod: S$GLB,,, | Performed by: INTERNAL MEDICINE

## 2019-12-18 PROCEDURE — 99999 PR PBB SHADOW E&M-EST. PATIENT-LVL III: ICD-10-PCS | Mod: PBBFAC,,, | Performed by: INTERNAL MEDICINE

## 2019-12-18 RX ORDER — LEVOFLOXACIN 750 MG/1
750 TABLET ORAL DAILY
Qty: 10 TABLET | Refills: 0 | Status: SHIPPED | OUTPATIENT
Start: 2019-12-18 | End: 2019-12-28

## 2019-12-18 NOTE — PROGRESS NOTES
SUBJECTIVE     Chief Complaint   Patient presents with    LOWER SIDE PAIN       HPI  Mirza Do is a 64 y.o. male with multiple medical diagnoses as listed in the medical history and problem list that presents for evaluation of R flank pain x 2 weeks. Pain is burning at a 10/10 and intermittent in nature without radiation. Denies any dysuria, but he does have increased urinary frequency and urgency. Denies any hematuria, cloudiness, or foul smell, but it is very concentrated. He believes this started after increasing his soda intake, but urine is clearing since he started drinking more water. He has been taking Ibuprofen with some relief of symptoms. +subjective fever, chills, and night sweats.     PAST MEDICAL HISTORY:  Past Medical History:   Diagnosis Date    CVA (cerebral infarction)     CVA approx 2011/2012    Diabetes mellitus type II, non insulin dependent 10/14/2016    Erectile dysfunction     Erectile Dysfunction    Hyperlipidemia 10/14/2016    Hypertension     Hypertension    Murmur     Nuclear sclerotic cataract of both eyes 8/7/2019    Polyneuropathy     Sleep apnea     Trouble in sleeping     Type 2 diabetes mellitus        PAST SURGICAL HISTORY:  Past Surgical History:   Procedure Laterality Date    BACK SURGERY      discectomy    COLONOSCOPY N/A 8/4/2017    Procedure: COLONOSCOPY;  Surgeon: Mario Alberto Vega MD;  Location: [facility] ([address] [place] [address]);  Service: Endoscopy;  Laterality: N/A;  Conflict with Jack's schedule no other times available with CRS, Pt requested 8/4/17 - moved to be scoped with GI MD - ER    ROOT CANAL      UMBILICAL HERNIA REPAIR         SOCIAL HISTORY:  Social History     Socioeconomic History    Marital status: Single     Spouse name: Not on file    Number of children: Not on file    Years of education: Not on file    Highest education level: Not on file   Occupational History    Not on file   Social Needs    Financial resource strain: Not on file    Food  insecurity:     Worry: Not on file     Inability: Not on file    Transportation needs:     Medical: Not on file     Non-medical: Not on file   Tobacco Use    Smoking status: Former Smoker     Packs/day: 0.10     Years: 4.00     Pack years: 0.40     Last attempt to quit: 1995     Years since quittin.6    Smokeless tobacco: Never Used    Tobacco comment: social smoking as teenager   Substance and Sexual Activity    Alcohol use: Yes     Comment: beer 6 pack in a month; red wine 2x a month     Drug use: No    Sexual activity: Yes     Partners: Female   Lifestyle    Physical activity:     Days per week: Not on file     Minutes per session: Not on file    Stress: Not at all   Relationships    Social connections:     Talks on phone: Not on file     Gets together: Not on file     Attends Buddhist service: Not on file     Active member of club or organization: Not on file     Attends meetings of clubs or organizations: Not on file     Relationship status: Not on file   Other Topics Concern    Not on file   Social History Narrative           FAMILY HISTORY:  Family History   Problem Relation Age of Onset    No Known Problems Father     Hypertension Brother     Cancer Brother         lung     Cancer Mother         smoker     Emphysema Sister     Diabetes Sister     No Known Problems Sister     No Known Problems Sister     No Known Problems Maternal Grandmother     No Known Problems Maternal Grandfather     No Known Problems Paternal Grandmother     No Known Problems Paternal Grandfather     No Known Problems Maternal Aunt     No Known Problems Maternal Uncle     No Known Problems Paternal Aunt     No Known Problems Paternal Uncle     Amblyopia Neg Hx     Blindness Neg Hx     Cataracts Neg Hx     Glaucoma Neg Hx     Macular degeneration Neg Hx     Retinal detachment Neg Hx     Strabismus Neg Hx     Stroke Neg Hx     Thyroid disease Neg Hx        ALLERGIES AND  MEDICATIONS: updated and reviewed.  Review of patient's allergies indicates:  No Known Allergies  Current Outpatient Medications   Medication Sig Dispense Refill    ACCU-CHEK STEPHON PLUS TEST STRP Strp 1 strip by Misc.(Non-Drug; Combo Route) route daily as needed (alternate testing time). 50 strip 11    ACCU-CHEK FASTCLIX Misc 1 lancet by Misc.(Non-Drug; Combo Route) route daily as needed. 50 each 11    acetaminophen (TYLENOL) 650 MG TbSR Take 1 tablet (650 mg total) by mouth every 8 (eight) hours as needed. 30 tablet 0    aspirin 81 MG Chew Take 81 mg by mouth once daily.      hydroCHLOROthiazide (HYDRODIURIL) 25 MG tablet Take 1 tablet (25 mg total) by mouth once daily. 90 tablet 1    zolpidem (AMBIEN) 10 mg Tab TAKE 1 TABLET BY MOUTH AT NIGHT AS NEEDED 30 tablet 2    atorvastatin (LIPITOR) 40 MG tablet Take 1 tablet (40 mg total) by mouth every evening. (Patient not taking: Reported on 12/18/2019) 90 tablet 3    fluticasone propionate (FLONASE) 50 mcg/actuation nasal spray 1 spray (50 mcg total) by Each Nare route 2 (two) times daily as needed for Rhinitis. (Patient not taking: Reported on 12/18/2019) 15 g 0    gabapentin (NEURONTIN) 300 MG capsule Take 1 tab by mouth nightly x 3 days; if no improvement, take 1 tab twice daily x 3 day; if no improvement, take 1 tab 3 times daily 90 capsule 1    levalbuterol (XOPENEX HFA) 45 mcg/actuation inhaler Inhale 1-2 puffs into the lungs every 6 (six) hours as needed for Wheezing or Shortness of Breath. Rescue 15 g 0    levoFLOXacin (LEVAQUIN) 750 MG tablet Take 1 tablet (750 mg total) by mouth once daily. for 10 days 10 tablet 0    lidocaine (LIDODERM) 5 % Place 1 patch onto the skin once daily. Remove & Discard patch within 12 hours or as directed by MD (Patient not taking: Reported on 10/15/2019) 15 patch 0    losartan (COZAAR) 100 MG tablet Take 1 tablet (100 mg total) by mouth once daily. 90 tablet 1    losartan-hydrochlorothiazide 100-25 mg (HYZAAR)  "100-25 mg per tablet TAKE 1 TABLET BY MOUTH ONCE DAILY (Patient not taking: Reported on 12/18/2019) 90 tablet 3    meloxicam (MOBIC) 15 MG tablet Take 1 tablet (15 mg total) by mouth daily as needed for Pain. (Patient not taking: Reported on 10/15/2019) 30 tablet 0    metoprolol succinate (TOPROL-XL) 50 MG 24 hr tablet Take 1 tablet (50 mg total) by mouth once daily. (Patient not taking: Reported on 12/18/2019) 90 tablet 1    naproxen (NAPROSYN) 500 MG tablet Take 1 tablet (500 mg total) by mouth 2 (two) times daily with meals. (Patient not taking: Reported on 12/18/2019) 30 tablet 0    topiramate (TOPAMAX) 100 MG tablet Take 1 tablet (100 mg total) by mouth 2 (two) times daily. (Patient not taking: Reported on 12/18/2019) 180 tablet 1    VIAGRA 100 mg tablet TAKE 1 TABLET BY MOUTH AS NEEDED FOR ERECTILE DYSFUNCTION (Patient not taking: Reported on 9/26/2019) 8 tablet 3     No current facility-administered medications for this visit.        ROS  Review of Systems   Constitutional: Negative for chills and fever.   HENT: Negative for hearing loss and sore throat.    Eyes: Negative for visual disturbance.   Respiratory: Negative for cough and shortness of breath.    Cardiovascular: Negative for chest pain, palpitations and leg swelling.   Gastrointestinal: Negative for abdominal pain, constipation, diarrhea, nausea and vomiting.   Genitourinary: Positive for flank pain (right), frequency and urgency. Negative for dysuria.   Musculoskeletal: Negative for arthralgias, joint swelling and myalgias.   Skin: Negative for rash and wound.   Neurological: Negative for headaches.   Psychiatric/Behavioral: Negative for agitation and confusion. The patient is not nervous/anxious.          OBJECTIVE     Physical Exam  Vitals:    12/18/19 1457   BP: 130/80   Pulse: 67   Temp: 98.3 °F (36.8 °C)    Body mass index is 40.39 kg/m².  Weight: 120.5 kg (265 lb 10.5 oz)   Height: 5' 8" (172.7 cm)     Physical Exam   Constitutional: He " is oriented to person, place, and time. He appears well-developed and well-nourished. No distress.   HENT:   Head: Normocephalic and atraumatic.   Right Ear: External ear normal.   Left Ear: External ear normal.   Nose: Nose normal.   Mouth/Throat: Oropharynx is clear and moist.   Eyes: Conjunctivae and EOM are normal. Right eye exhibits no discharge. Left eye exhibits no discharge. No scleral icterus.   Neck: Normal range of motion. Neck supple. No JVD present. No tracheal deviation present.   Cardiovascular: Normal rate, regular rhythm, normal heart sounds and intact distal pulses. Exam reveals no gallop and no friction rub.   No murmur heard.  Pulmonary/Chest: Effort normal and breath sounds normal. No respiratory distress. He has no wheezes.   Abdominal: Soft. Bowel sounds are normal. He exhibits no distension and no mass. There is no tenderness. There is CVA tenderness (right). There is no rebound and no guarding.   Musculoskeletal: Normal range of motion. He exhibits no edema, tenderness or deformity.   Neurological: He is alert and oriented to person, place, and time. He exhibits normal muscle tone. Coordination normal.   Skin: Skin is warm and dry. No rash noted. No erythema.   Psychiatric: He has a normal mood and affect. His behavior is normal. Judgment and thought content normal.         Health Maintenance       Date Due Completion Date    Shingles Vaccine (3 of 3) 08/22/2019 6/27/2019    Hemoglobin A1c 04/15/2020 10/15/2019    Foot Exam 04/24/2020 4/24/2019    Lipid Panel 05/22/2020 5/22/2019    Eye Exam 08/07/2020 8/7/2019    High Dose Statin 10/15/2020 10/15/2019    Pneumococcal Vaccine (65+ Low/Medium Risk) (2 of 2 - PPSV23) 06/29/2022 6/27/2019    Colonoscopy 08/04/2024 8/4/2017    TETANUS VACCINE 06/27/2029 6/27/2019            ASSESSMENT     64 y.o. male with     1. Pyelonephritis    2. Essential hypertension, benign        PLAN:     1. Pyelonephritis  - Pt advised to take Abx to completion  -  Urinalysis  - Urine culture  - POCT URINE DIPSTICK WITHOUT MICROSCOPE  - levoFLOXacin (LEVAQUIN) 750 MG tablet; Take 1 tablet (750 mg total) by mouth once daily. for 10 days  Dispense: 10 tablet; Refill: 0    2. Essential hypertension, benign  - BP well controlled; at goal of <140/90  - The current medical regimen is effective;  continue present plan and medications.        RTC in 1-2 weeks as needed for any acute worsening of current condition or failure to improve       Manjula Honeycutt MD  12/18/2019 3:21 PM        No follow-ups on file.

## 2019-12-20 LAB — BACTERIA UR CULT: NO GROWTH

## 2019-12-27 ENCOUNTER — TELEPHONE (OUTPATIENT)
Dept: FAMILY MEDICINE | Facility: CLINIC | Age: 64
End: 2019-12-27

## 2019-12-27 DIAGNOSIS — R10.9 FLANK PAIN: Primary | ICD-10-CM

## 2019-12-27 NOTE — TELEPHONE ENCOUNTER
Call placed to Pt,and informed of Provider response. Pt given the number to scheduling, to schedule US.

## 2019-12-27 NOTE — TELEPHONE ENCOUNTER
Return call to Pt, and he states that he is still having right sided pain. That he has 1 day left of antibiotic therapy, and that it doesn't hurt when he urinates. But, when he gets up to use the bathroom, he can feel his side inflamed. Please advise.

## 2020-01-07 DIAGNOSIS — G47.00 INSOMNIA, UNSPECIFIED TYPE: ICD-10-CM

## 2020-01-07 RX ORDER — ZOLPIDEM TARTRATE 10 MG/1
10 TABLET ORAL NIGHTLY PRN
Qty: 30 TABLET | Refills: 2 | Status: SHIPPED | OUTPATIENT
Start: 2020-01-09 | End: 2020-04-20 | Stop reason: SDUPTHER

## 2020-01-07 NOTE — TELEPHONE ENCOUNTER
----- Message from Kanika Domínguez sent at 1/7/2020 11:08 AM CST -----  Contact: MARIJA PELAYO [[id number]]  Please refill the medication(s) listed below :    Medication # 1 : zolpidem (AMBIEN) 10 mg Tab    Medication # 2 :    Can the clinic reply in [facility] :  No     Please call the patient when the prescription(s) is ready for  :  [phone]    Preferred Pharmacy : [id number] - [place], [place] - [address]

## 2020-01-08 ENCOUNTER — HOSPITAL ENCOUNTER (OUTPATIENT)
Dept: RADIOLOGY | Facility: HOSPITAL | Age: 65
Discharge: HOME | End: 2020-01-08
Attending: INTERNAL MEDICINE
Payer: MEDICARE

## 2020-01-08 DIAGNOSIS — R10.9 FLANK PAIN: ICD-10-CM

## 2020-01-08 DIAGNOSIS — G89.29 CHRONIC RIGHT FLANK PAIN: ICD-10-CM

## 2020-01-08 DIAGNOSIS — N28.1 RENAL CYST, LEFT: Primary | ICD-10-CM

## 2020-01-08 DIAGNOSIS — R10.9 CHRONIC RIGHT FLANK PAIN: ICD-10-CM

## 2020-01-08 PROCEDURE — 76770 US EXAM ABDO BACK WALL COMP: CPT | Mod: 26,,, | Performed by: RADIOLOGY

## 2020-01-08 PROCEDURE — 76770 US EXAM ABDO BACK WALL COMP: CPT | Mod: TC

## 2020-01-08 PROCEDURE — 76770 US RETROPERITONEAL COMPLETE: ICD-10-PCS | Mod: 26,,, | Performed by: RADIOLOGY

## 2020-01-14 ENCOUNTER — TELEPHONE (OUTPATIENT)
Dept: FAMILY MEDICINE | Facility: CLINIC | Age: 65
End: 2020-01-14

## 2020-01-14 NOTE — TELEPHONE ENCOUNTER
Patient informed of Ultrasound results and Dr. Honeycutt' recommendations.  Patient verbalized understanding.

## 2020-01-14 NOTE — TELEPHONE ENCOUNTER
----- Message from Lizzie Vazquez sent at 1/14/2020  3:19 PM CST -----  Contact: Self   Type:  Test Results    Who Called: self   Name of Test (Lab/Mammo/Etc):     Date of Test: 1/8    Ordering Provider:  Yinka   Where the test was performed: [facility]  Would the patient rather a call back or a response via [facility]?  Call   Best Call Back Number: [phone]      Additional Information:    For Clinical Team:Has the provider reviewed the results?

## 2020-01-16 ENCOUNTER — TELEPHONE (OUTPATIENT)
Dept: FAMILY MEDICINE | Facility: CLINIC | Age: 65
End: 2020-01-16

## 2020-01-27 ENCOUNTER — OFFICE VISIT (OUTPATIENT)
Dept: UROLOGY | Facility: CLINIC | Age: 65
End: 2020-01-27
Payer: MEDICARE

## 2020-01-27 VITALS
BODY MASS INDEX: 40.32 KG/M2 | SYSTOLIC BLOOD PRESSURE: 130 MMHG | WEIGHT: 266 LBS | HEIGHT: 68 IN | RESPIRATION RATE: 18 BRPM | DIASTOLIC BLOOD PRESSURE: 82 MMHG

## 2020-01-27 DIAGNOSIS — N28.1 RENAL CYST, LEFT: Primary | ICD-10-CM

## 2020-01-27 DIAGNOSIS — R10.9 RIGHT FLANK PAIN: ICD-10-CM

## 2020-01-27 PROCEDURE — 3075F PR MOST RECENT SYSTOLIC BLOOD PRESS GE 130-139MM HG: ICD-10-PCS | Mod: CPTII,S$GLB,, | Performed by: UROLOGY

## 2020-01-27 PROCEDURE — 3079F DIAST BP 80-89 MM HG: CPT | Mod: CPTII,S$GLB,, | Performed by: UROLOGY

## 2020-01-27 PROCEDURE — 1101F PR PT FALLS ASSESS DOC 0-1 FALLS W/OUT INJ PAST YR: ICD-10-PCS | Mod: CPTII,S$GLB,, | Performed by: UROLOGY

## 2020-01-27 PROCEDURE — 1101F PT FALLS ASSESS-DOCD LE1/YR: CPT | Mod: CPTII,S$GLB,, | Performed by: UROLOGY

## 2020-01-27 PROCEDURE — 3079F PR MOST RECENT DIASTOLIC BLOOD PRESSURE 80-89 MM HG: ICD-10-PCS | Mod: CPTII,S$GLB,, | Performed by: UROLOGY

## 2020-01-27 PROCEDURE — 81002 POCT URINE DIPSTICK WITHOUT MICROSCOPE: ICD-10-PCS | Mod: S$GLB,,, | Performed by: UROLOGY

## 2020-01-27 PROCEDURE — 99999 PR PBB SHADOW E&M-EST. PATIENT-LVL III: ICD-10-PCS | Mod: PBBFAC,,, | Performed by: UROLOGY

## 2020-01-27 PROCEDURE — 81002 URINALYSIS NONAUTO W/O SCOPE: CPT | Mod: S$GLB,,, | Performed by: UROLOGY

## 2020-01-27 PROCEDURE — 1159F PR MEDICATION LIST DOCUMENTED IN MEDICAL RECORD: ICD-10-PCS | Mod: S$GLB,,, | Performed by: UROLOGY

## 2020-01-27 PROCEDURE — 1125F PR PAIN SEVERITY QUANTIFIED, PAIN PRESENT: ICD-10-PCS | Mod: S$GLB,,, | Performed by: UROLOGY

## 2020-01-27 PROCEDURE — 1125F AMNT PAIN NOTED PAIN PRSNT: CPT | Mod: S$GLB,,, | Performed by: UROLOGY

## 2020-01-27 PROCEDURE — 99203 OFFICE O/P NEW LOW 30 MIN: CPT | Mod: 25,S$GLB,, | Performed by: UROLOGY

## 2020-01-27 PROCEDURE — 99203 PR OFFICE/OUTPT VISIT, NEW, LEVL III, 30-44 MIN: ICD-10-PCS | Mod: 25,S$GLB,, | Performed by: UROLOGY

## 2020-01-27 PROCEDURE — 3075F SYST BP GE 130 - 139MM HG: CPT | Mod: CPTII,S$GLB,, | Performed by: UROLOGY

## 2020-01-27 PROCEDURE — 99999 PR PBB SHADOW E&M-EST. PATIENT-LVL III: CPT | Mod: PBBFAC,,, | Performed by: UROLOGY

## 2020-01-27 PROCEDURE — 1159F MED LIST DOCD IN RCRD: CPT | Mod: S$GLB,,, | Performed by: UROLOGY

## 2020-01-27 NOTE — PROGRESS NOTES
"Subjective:       Mirza Do is a 64 y.o. male who is a new patient who was referred by Dr ROSSI Honeycutt for evaluation of renal cyst.      He was noted to have L renal cyst on recent CEDRICK. He was c/o of pain on contralateral side. R kidney normal. Reports that he had pyelonephritis, treated with abx. Completed 1 week ago, still with discomfort. UCx negative. Denies hematuria, prior UTIs, nephrolithiasis. Heating pad helps with pain.      CEDRICK 1/20 - 7.2cm L simple renal cyst; no stones/hydronephrosis    UCx:  7/19 - negative  12/19 - negative      The following portions of the patient's history were reviewed and updated as appropriate: allergies, current medications, past family history, past medical history, past social history, past surgical history and problem list.    Review of Systems  Constitutional: no fever or chills  ENT: no nasal congestion or sore throat  Respiratory: no cough or shortness of breath  Cardiovascular: no chest pain or palpitations  Gastrointestinal: no nausea or vomiting, tolerating diet  Genitourinary: as per HPI  Hematologic/Lymphatic: no easy bruising or lymphadenopathy  Musculoskeletal: no arthralgias or myalgias  Skin: no rashes or lesions  Neurological: no seizures or tremors  Behavioral/Psych: no auditory or visual hallucinations       Objective:    Vitals: /82   Resp 18   Ht 5' 8" (1.727 m)   Wt 120.7 kg (265 lb 15.8 oz)   BMI 40.44 kg/m²     Physical Exam   General: well developed, well nourished in no acute distress  Head: normocephalic, atraumatic  Neck: supple, trachea midline, no obvious enlargement of thyroid  HEENT: EOMI, mucus membranes moist, sclera anicteric, no hearing impairment  Lungs: symmetric expansion, non-labored breathing  Cardiovascular: regular rate and rhythm, normal pulses  Abdomen: soft, non tender, non distended, no palpable masses, no hepatosplenomegaly, no hernias, bladder nonpalpable. No CVA tenderness.  Musculoskeletal: no peripheral edema, " normal ROM in bilateral upper and lower extremities  Lymphatics: no cervical or inguinal lymphadenopathy  Skin: no rashes or lesions  Neuro: alert and oriented x 3, no gross deficits  Psych: normal judgment and insight, normal mood/affect and non-anxious  Genitourinary:   patient declined exam   VICTOR M: patient declined exam      Lab Review   Urine analysis today in clinic shows Negative     Lab Results   Component Value Date    WBC 7.74 10/15/2019    HGB 13.4 (L) 10/15/2019    HCT 43.6 10/15/2019    MCV 88 10/15/2019     10/15/2019     Lab Results   Component Value Date    CREATININE 0.9 10/15/2019    BUN 19 10/15/2019     Lab Results   Component Value Date    PSA 0.29 03/19/2018     No results found for: PSADIAG    Imaging  CEDRICK reviewed  Reports and images were personally reviewed by me and discussed with the patient today         Assessment/Plan:      1. Renal cyst, left    - L simple cyst noted on CEDRICK   - No need for further workup/surveillance     2. Right flank pain    - No  cause noted on recent CEDRICK   - Suspect MSK related       Follow up PRN

## 2020-01-27 NOTE — LETTER
January 27, 2020      Manjula Honeycutt MD  [address] [address]  [address]  [place] [address]           [facility]  [address]  [place] [address]  Phone: [phone]  Fax: [phone]          Patient: Mirza Do   MR Number: [id number]   YOB: 1955   Date of Visit: 1/27/2020       Dear Dr. Manjula Honeycutt:    Thank you for referring Mirza Do to me for evaluation. Attached you will find relevant portions of my assessment and plan of care.    If you have questions, please do not hesitate to call me. I look forward to following Mirza Do along with you.    Sincerely,    Sofia Licea MD    Enclosure  CC:  No Recipients    If you would like to receive this communication electronically, please contact [email or web address] or [phone] to request more information on [facility] access.    For providers and/or their staff who would like to refer a patient to [facility], please contact us through our one-stop-shop provider referral line, [facility], at [phone].    If you feel you have received this communication in error or would no longer like to receive these types of communications, please e-mail [email or web address]

## 2020-01-28 LAB
BILIRUB SERPL-MCNC: NORMAL MG/DL
BLOOD URINE, POC: NORMAL
COLOR, POC UA: YELLOW
GLUCOSE UR QL STRIP: NORMAL
KETONES UR QL STRIP: NORMAL
LEUKOCYTE ESTERASE URINE, POC: NORMAL
NITRITE, POC UA: NORMAL
PH, POC UA: 9
PROTEIN, POC: NORMAL
SPECIFIC GRAVITY, POC UA: 1009
UROBILINOGEN, POC UA: 1

## 2020-02-03 ENCOUNTER — OFFICE VISIT (OUTPATIENT)
Dept: BARIATRICS | Facility: CLINIC | Age: 65
End: 2020-02-03
Payer: MEDICARE

## 2020-02-03 VITALS
BODY MASS INDEX: 40.16 KG/M2 | SYSTOLIC BLOOD PRESSURE: 122 MMHG | WEIGHT: 265 LBS | HEART RATE: 74 BPM | HEIGHT: 68 IN | DIASTOLIC BLOOD PRESSURE: 70 MMHG

## 2020-02-03 DIAGNOSIS — M62.08 DIASTASIS RECTI: ICD-10-CM

## 2020-02-03 DIAGNOSIS — E11.40 TYPE 2 DIABETES MELLITUS WITH DIABETIC NEUROPATHY, WITHOUT LONG-TERM CURRENT USE OF INSULIN: Primary | ICD-10-CM

## 2020-02-03 DIAGNOSIS — E66.01 CLASS 3 SEVERE OBESITY DUE TO EXCESS CALORIES WITH SERIOUS COMORBIDITY AND BODY MASS INDEX (BMI) OF 40.0 TO 44.9 IN ADULT: ICD-10-CM

## 2020-02-03 PROCEDURE — 1126F PR PAIN SEVERITY QUANTIFIED, NO PAIN PRESENT: ICD-10-PCS | Mod: S$GLB,,, | Performed by: INTERNAL MEDICINE

## 2020-02-03 PROCEDURE — 1126F AMNT PAIN NOTED NONE PRSNT: CPT | Mod: S$GLB,,, | Performed by: INTERNAL MEDICINE

## 2020-02-03 PROCEDURE — 99213 PR OFFICE/OUTPT VISIT, EST, LEVL III, 20-29 MIN: ICD-10-PCS | Mod: S$GLB,,, | Performed by: INTERNAL MEDICINE

## 2020-02-03 PROCEDURE — 1101F PR PT FALLS ASSESS DOC 0-1 FALLS W/OUT INJ PAST YR: ICD-10-PCS | Mod: CPTII,S$GLB,, | Performed by: INTERNAL MEDICINE

## 2020-02-03 PROCEDURE — 99999 PR PBB SHADOW E&M-EST. PATIENT-LVL IV: CPT | Mod: PBBFAC,,, | Performed by: INTERNAL MEDICINE

## 2020-02-03 PROCEDURE — 3044F PR MOST RECENT HEMOGLOBIN A1C LEVEL <7.0%: ICD-10-PCS | Mod: CPTII,S$GLB,, | Performed by: INTERNAL MEDICINE

## 2020-02-03 PROCEDURE — 3074F SYST BP LT 130 MM HG: CPT | Mod: CPTII,S$GLB,, | Performed by: INTERNAL MEDICINE

## 2020-02-03 PROCEDURE — 3078F DIAST BP <80 MM HG: CPT | Mod: CPTII,S$GLB,, | Performed by: INTERNAL MEDICINE

## 2020-02-03 PROCEDURE — 99213 OFFICE O/P EST LOW 20 MIN: CPT | Mod: S$GLB,,, | Performed by: INTERNAL MEDICINE

## 2020-02-03 PROCEDURE — 3044F HG A1C LEVEL LT 7.0%: CPT | Mod: CPTII,S$GLB,, | Performed by: INTERNAL MEDICINE

## 2020-02-03 PROCEDURE — 3074F PR MOST RECENT SYSTOLIC BLOOD PRESSURE < 130 MM HG: ICD-10-PCS | Mod: CPTII,S$GLB,, | Performed by: INTERNAL MEDICINE

## 2020-02-03 PROCEDURE — 1159F PR MEDICATION LIST DOCUMENTED IN MEDICAL RECORD: ICD-10-PCS | Mod: S$GLB,,, | Performed by: INTERNAL MEDICINE

## 2020-02-03 PROCEDURE — 99999 PR PBB SHADOW E&M-EST. PATIENT-LVL IV: ICD-10-PCS | Mod: PBBFAC,,, | Performed by: INTERNAL MEDICINE

## 2020-02-03 PROCEDURE — 99499 RISK ADDL DX/OHS AUDIT: ICD-10-PCS | Mod: S$GLB,,, | Performed by: INTERNAL MEDICINE

## 2020-02-03 PROCEDURE — 1101F PT FALLS ASSESS-DOCD LE1/YR: CPT | Mod: CPTII,S$GLB,, | Performed by: INTERNAL MEDICINE

## 2020-02-03 PROCEDURE — 1159F MED LIST DOCD IN RCRD: CPT | Mod: S$GLB,,, | Performed by: INTERNAL MEDICINE

## 2020-02-03 PROCEDURE — 3078F PR MOST RECENT DIASTOLIC BLOOD PRESSURE < 80 MM HG: ICD-10-PCS | Mod: CPTII,S$GLB,, | Performed by: INTERNAL MEDICINE

## 2020-02-03 PROCEDURE — 99499 UNLISTED E&M SERVICE: CPT | Mod: S$GLB,,, | Performed by: INTERNAL MEDICINE

## 2020-02-03 RX ORDER — METFORMIN HYDROCHLORIDE 500 MG/1
500 TABLET ORAL 2 TIMES DAILY WITH MEALS
Qty: 180 TABLET | Refills: 0 | Status: SHIPPED | OUTPATIENT
Start: 2020-02-03 | End: 2020-05-17

## 2020-02-03 RX ORDER — TOPIRAMATE 100 MG/1
100 TABLET, FILM COATED ORAL 2 TIMES DAILY
Qty: 180 TABLET | Refills: 1 | Status: SHIPPED | OUTPATIENT
Start: 2020-02-03 | End: 2021-03-17

## 2020-02-03 NOTE — PATIENT INSTRUCTIONS
Start metformin with dinner for 2 weeks, then breakfast and dinner.    Always take with food.  No Alcohol.     Patient was informed that topiramate is used for migraine prevention and seizures. Weight loss is a common side effect that is well documented. he understands this. he was informed of the potential side effects such as serious and possibly fatal rash in which case the medication should be discontinued immediately. Paresthesias, forgetfulness, fatigue, kidney stones, GI symptoms, and changes in lab values such as electrolytes, blood counts and kidney function.    Continue to decrease your portion sizes. Have a protein in each meal.     Exercise 30 min 4-5 times a week. Gradually increase.     Contact your health insurance about your free gym membership.     No soda, sweet tea, juices or lemonade    Limit/avoid starchy carbohydrates (bread, rice, pasta, potatoes, corn, peas, oatmeal, grits, tortillas, crackers, chips)        Exercises for diastasis recti    Start by performing these rehab exercises daily for at least 4-6 weeks before resuming traditional abdominal exercises like crunches.     1. Abdominal Drawing In    When getting in and out of bed, lifting or carrying or getting up from a chair, pull in your stomach as if you were trying to fit into a tight pair of jeans. You should be able to hold this abdominal muscle contraction and breathe normally for 10 seconds. This exercise will safely start to engage and strengthen your abdominal muscles without compromising them.    2. Belly Breathing    Lie flat on your back on a mat. Take a deep inhale, allowing your belly and then your chest to fully inflate. Then, slowly and forcefully exhale, drawing your abdominal wall in as if you had a corset on as you do so.    3. Heel Slides With Alternating Arms    Lie flat on your back on a mat, with your knees bent and feet flat on the floor. Straighten your arms and raise them directly over your shoulders. Exhale,  and slowly extend one leg out in front of you, letting it hover a few inches above the floor, and simultaneously extend the opposite arm back above the head, just off of the floor. Inhale and slowly return to start. Repeat on the opposite side. Work to keep your hips and core stable through the entire movement.    4. Quadruped Abdominal    Get on all fours (hands under your shoulders and knees under hips) and pull your shoulders wide and away from your ears to form a flat back. From here, take a slow, deep inhale, allowing your abdominal wall to relax and expand toward the floor. Then exhale, drawing the muscles up and in while maintaining a flat back.      Dinner in Under 30 minutes and 400 calories.     Baked Chicken breast with Broccoli Cheese Casserole  2-3 chicken breast (approximately 6-8 oz each)    2 tsp each garlic and herb Mrs. Ulises seasoning   2 tsp your favorite creole seasoning  2 tablespoons of balsamic vinegar  2 - 10 oz packages of frozen broccoli  1 egg   ½ cup skim milk  ½ tsp paprika   ½ tsp cayenne pepper   1 can fat free cream of mushroom soup.   1 .5 cups of shredded cheddar cheese    Pre-heat oven to 450 degrees. Toss 2-3 chicken breast (approximately 6-8 oz each) in 2 tsp each garlic and herb Mrs. Dash seasoning, 2 tsp your favorite creole seasoning, and 2 tablespoons of balsamic vinegar. This can also be done up to 24 hours ahead of time, and the chicken can be left in the refrigerator to marinate. Place on a baking sheet sprayed with non-stick cooking spray and bake on 450 degrees for 10 min, then reduce heat to 350 degrees and cook an addition 10-15 minutes until chicken is cooked through.   While chicken is baking, microwave safe dish, thaw 2 - 10 oz packages of frozen broccoli. Drain any excess water, season with salt, pepper, all-purpose seasoning of your choice. In another bowl, whisk together 1 egg, ½ cup skim milk, ½ tsp paprika, ½ tsp cayenne pepper and 1 can fat free cream of  mushroom soup. Combine broccoli, soup mixture, 1 cup of shredded cheddar cheese in baking dish sprayed with cooking spray. Top with remaining cheese. Bake in the oven with chicken for about 20 min or until set cheese is melted and go.     Makes 4 servings. 389 calories per serving.10 g fat, 13 g carbs, 54g protein     Sheet Pan Pratt Shrimp  1 pound large shrimp, shelled, peeled and deveined.   2 tablespoon olive oil  The zest and the juice of 1 lime  ½ tsp chili powder  ½-1 tsp cayenne pepper  1 tsp cumin  ½ tsp dry oregano  1 red bell pepper  1 green bell pepper  1 red onion  2 cups mushrooms, quartered  1 avocado  Whole karime lettuce leaves  Preheat oven to 375 degrees.  In a bowl combine shrimp, lime juice, lime zest, cumin, cayenne pepper, chili powder, and a pinch of salt. Set aside.   Slice peppers and onions into thin strips. Toss in 1 tablespoon of olive oil. Sprinkle with salt and pepper and arrange in a single layer over 1/3 of a large sheet pan  Toss mushrooms with remaining olive oil, oregano, salt and pepper. Arrange in single layer on sheet pan. Place sheet pan in oven for about 15-20 minutes until vegetables are softened, cooked about ¾ of the way through. Remove the sheet pan from oven.  Change setting on oven to low broil.  If needed, rearrange vegetables to make room for shrimp. Arrange the shrimp in a single layer on the sheet pan and return pan to oven. After 5 minutes, flip shrimp. Cook 3-5 additional minutes, or until  Shrimp are opaque.   Serve shrimp and cooked vegetables on lettuce leaves with sliced avocado.   Makes 4 servings. Calories per servin; 23g fat, 19 g carbohydrates, 27g protein.    Zoodles Primavera with Seasoned Ricotta  8 cups zucchini noodles. These can be purchased already prepared, or you can make them on your own with whole zucchini and a vegetable spiralizer.   1.5 cups cherry tomatoes, quartered  2 cups sliced mushrooms  1 cup chopped fresh broccoli  1 cup  frozen peas and carrots  2 cloves garlic, finely chopped  16 oz part skim ricotta cheese  2 oz Neufchatel cream cream cheese, cut into small cubes  Juice and zest of 1 lemon  1 pinch red pepper flakes    2 tsp Italian seasoning  4-5 leaves fresh basil, thinly sliced  1 tablespoon of olive oil  Parmesan cheese for topping (optional)     In a bowl, combine ricotta cheese, 1 tsp Italian seasoning, ½ teaspoon lemon zest. Season with salt and pepper to taste. Mix well. Fold in half of fresh basil. Set aside.   Heat a large skillet to medium high. Add olive oil. Sautee mushrooms until starting to become tender. Season them with salt and pepper while cooking.  Add broccoli and peas and carrots. Reduce heat to medium. Cover pan and cook for 4-5 minutes until broccoli is tender and peas and carrots are warmed through. Add Neufchatel cheese, Italian seasoning, red pepper flakes, 1 tsp lemon zest and lemon juice. Stir until a smooth sauce is formed. Add zucchini noodles (zoodles) to skillet and toss in sauce, then add cherry tomatoes.  Separate zoodle and vegetables into 4 equal portions. Serve each with a ¼ cup serving of seasoned ricotta cheese. Sprinkle with grated parmesan cheese or fresh basil,  if desired.   Makes 4 servings. Calories per servin calories, 32 g carbs, 33 g protein, 18 g fat

## 2020-02-03 NOTE — PROGRESS NOTES
Subjective:       Patient ID: Mirza Do is a 64 y.o. male.    Chief Complaint: Follow-up    Pt here today for follow-up. Has lost 3 lbs net neg 7 lbs Was to start 0970-3199 star diet and continueded topiramate at last OV. Pt has had several OV since his appt with me. Appears diet and exercise discussed at all of these OV, and pt had not made started  changes.  Has been walking 2-3 laps around the his neighborhood. Also going to [employer]. Complains of Abd feeling a lot of pressure from it.  Has been eating more salad and fruit and decreased soda. Avoiding rice and bread, and eating more vegetables. He does use the leftovers from his daughters nursery school for some of his food. Using small bowl. Denies SE on topiramate. Does feel like his portions are down even more. No SE.        Lab Results       Component                Value               Date                       HGBA1C                   6.3 (H)             10/15/2019                 HGBA1C                   6.5 (H)             04/17/2019                 HGBA1C                   6.3 (H)             09/25/2018            Lab Results       Component                Value               Date                       LDLCALC                  69.2                05/22/2019                 CREATININE               0.9                 10/15/2019                 Follow-up   Pertinent negatives include no arthralgias, chest pain, chills or fever.     Review of Systems   Constitutional: Negative for chills and fever.   Respiratory: Positive for apnea and shortness of breath.         Using CPAP for a week. Has to get it checked.    Cardiovascular: Positive for leg swelling. Negative for chest pain.   Gastrointestinal: Negative for constipation and diarrhea.        Denies GERD   Genitourinary: Negative for difficulty urinating and dysuria.   Musculoskeletal: Negative for arthralgias and back pain.   Neurological: Negative for dizziness and light-headedness.  "  Psychiatric/Behavioral: Negative for dysphoric mood. The patient is not nervous/anxious.        Objective:     /70   Pulse 74   Ht 5' 8" (1.727 m)   Wt 120.2 kg (264 lb 15.9 oz)   BMI 40.29 kg/m²     Physical Exam   Constitutional: He is oriented to person, place, and time. He appears well-developed. No distress.   Morbidly obese     HENT:   Head: Normocephalic and atraumatic.   Eyes: Pupils are equal, round, and reactive to light. No scleral icterus.   Neck: Normal range of motion. Neck supple.   Cardiovascular: Normal rate.   Pulmonary/Chest: Effort normal.   Musculoskeletal: Normal range of motion. He exhibits no edema.   Neurological: He is alert and oriented to person, place, and time.   Skin: Skin is warm and dry.   Psychiatric: He has a normal mood and affect. His behavior is normal. Judgment normal.   Vitals reviewed.      Assessment:       1. Type 2 diabetes mellitus with diabetic neuropathy, without long-term current use of insulin    2. Class 3 severe obesity due to excess calories with serious comorbidity and body mass index (BMI) of 40.0 to 44.9 in adult    3. Diastasis recti        Plan:         Mirza was seen today for follow-up.    Diagnoses and all orders for this visit:    Type 2 diabetes mellitus with diabetic neuropathy, without long-term current use of insulin  -     metFORMIN (GLUCOPHAGE) 500 MG tablet; Take 1 tablet (500 mg total) by mouth 2 (two) times daily with meals.    Class 3 severe obesity due to excess calories with serious comorbidity and body mass index (BMI) of 40.0 to 44.9 in adult    Diastasis recti  Exercises given.   Other orders  -     topiramate (TOPAMAX) 100 MG tablet; Take 1 tablet (100 mg total) by mouth 2 (two) times daily.       Patient was informed that topiramate is used for migraine prevention and seizures. Weight loss is a common side effect that is well documented. he understands this. he was informed of the potential side effects such as serious and " possibly fatal rash in which case the medication should be discontinued immediately. Paresthesias, forgetfulness, fatigue, kidney stones, GI symptoms, and changes in lab values such as electrolytes, blood counts and kidney function.    Continue to decrease your portion sizes. Have a protein in each meal.     Exercise 30 min 4-5 times a week. Gradually increase.     Contact your health insurance about your free gym membership.     No soda, sweet tea, juices or lemonade    Limit/avoid starchy carbohydrates (bread, rice, pasta, potatoes, corn, peas, oatmeal, grits, tortillas, crackers, chips)    30 min Recipes given.

## 2020-03-04 ENCOUNTER — TELEPHONE (OUTPATIENT)
Dept: FAMILY MEDICINE | Facility: CLINIC | Age: 65
End: 2020-03-04

## 2020-03-04 DIAGNOSIS — E11.40 TYPE 2 DIABETES MELLITUS WITH DIABETIC NEUROPATHY, WITHOUT LONG-TERM CURRENT USE OF INSULIN: Primary | ICD-10-CM

## 2020-03-04 NOTE — TELEPHONE ENCOUNTER
Patient requesting a referral to podiatry.  Patient stated he was advised by [facility] to schedule an office visit with a podiatrist.  Please advise.

## 2020-03-04 NOTE — TELEPHONE ENCOUNTER
----- Message from Tiffanie Steven sent at 3/4/2020 11:13 AM CST -----  Contact: Self  Type:  Patient Requesting Referral    Who Called: Self    Referral to What Specialty: Podiatry    Reason for Referral:Feet    Does the patient want the referral with a specific physician?: No    Is the specialist [facility]    Would the patient rather a call back or a response via [facility]? Call    Best Call Back Number: [phone]    Additional Information: n/a

## 2020-03-18 ENCOUNTER — TELEPHONE (OUTPATIENT)
Dept: FAMILY MEDICINE | Facility: CLINIC | Age: 65
End: 2020-03-18

## 2020-03-18 DIAGNOSIS — R05.9 COUGH: Primary | ICD-10-CM

## 2020-03-18 RX ORDER — PROMETHAZINE HYDROCHLORIDE AND DEXTROMETHORPHAN HYDROBROMIDE 6.25; 15 MG/5ML; MG/5ML
5 SYRUP ORAL 2 TIMES DAILY PRN
Qty: 120 ML | Refills: 0 | Status: SHIPPED | OUTPATIENT
Start: 2020-03-18 | End: 2020-03-28

## 2020-03-18 NOTE — TELEPHONE ENCOUNTER
----- Message from Jessie Maurer sent at 3/18/2020  8:52 AM CDT -----  Contact: Patient   Type:  Needs Medical Advice/Symptom-based Call    Who Called: Patient     Symptoms (please be specific): Cough     How long has patient had these symptoms:  2 weeks     Would the patient rather a call back or a response via [facility]? Call back    Best Call Back Number: [phone]      Additional Information:   [facility] - [place], [place] - [address] [place] [address]  Phone: [phone] Fax: [phone]

## 2020-03-18 NOTE — TELEPHONE ENCOUNTER
Return call to Pt, c/o cough -dry, x's 2 weeks. Pt denies fever, body aches. Pt states that he tried OTC Robitussin, but it doesn't help. He cannot rest at night. He states that he slept in front of the fan, and has been having a cough. Pt asked that something be called into his Pharmacy. Please advise.

## 2020-03-20 ENCOUNTER — TELEPHONE (OUTPATIENT)
Dept: PODIATRY | Facility: CLINIC | Age: 65
End: 2020-03-20

## 2020-03-20 NOTE — TELEPHONE ENCOUNTER
Spoke with the pt and offered to reschedule and he wants to keep his original appt.    Sonali DIAMOND

## 2020-03-24 ENCOUNTER — OFFICE VISIT (OUTPATIENT)
Dept: PODIATRY | Facility: CLINIC | Age: 65
End: 2020-03-24
Payer: MEDICARE

## 2020-03-24 VITALS
WEIGHT: 264 LBS | BODY MASS INDEX: 40.01 KG/M2 | DIASTOLIC BLOOD PRESSURE: 84 MMHG | HEIGHT: 68 IN | SYSTOLIC BLOOD PRESSURE: 110 MMHG

## 2020-03-24 DIAGNOSIS — M20.5X1 HALLUX LIMITUS, ACQUIRED, RIGHT: ICD-10-CM

## 2020-03-24 DIAGNOSIS — E11.9 COMPREHENSIVE DIABETIC FOOT EXAMINATION, TYPE 2 DM, ENCOUNTER FOR: Primary | ICD-10-CM

## 2020-03-24 DIAGNOSIS — M21.41 PES PLANUS OF BOTH FEET: ICD-10-CM

## 2020-03-24 DIAGNOSIS — E11.40 TYPE 2 DIABETES MELLITUS WITH DIABETIC NEUROPATHY, WITHOUT LONG-TERM CURRENT USE OF INSULIN: ICD-10-CM

## 2020-03-24 DIAGNOSIS — M20.42 HAMMER TOES OF BOTH FEET: ICD-10-CM

## 2020-03-24 DIAGNOSIS — M20.5X2 HALLUX LIMITUS, ACQUIRED, LEFT: ICD-10-CM

## 2020-03-24 DIAGNOSIS — R20.2 NUMBNESS AND TINGLING OF LEFT LEG: ICD-10-CM

## 2020-03-24 DIAGNOSIS — M21.42 PES PLANUS OF BOTH FEET: ICD-10-CM

## 2020-03-24 DIAGNOSIS — M20.41 HAMMER TOES OF BOTH FEET: ICD-10-CM

## 2020-03-24 DIAGNOSIS — R20.0 NUMBNESS AND TINGLING OF LEFT LEG: ICD-10-CM

## 2020-03-24 PROCEDURE — 99499 RISK ADDL DX/OHS AUDIT: ICD-10-PCS | Mod: S$GLB,,, | Performed by: PODIATRIST

## 2020-03-24 PROCEDURE — 99499 UNLISTED E&M SERVICE: CPT | Mod: S$GLB,,, | Performed by: PODIATRIST

## 2020-03-24 PROCEDURE — 3044F PR MOST RECENT HEMOGLOBIN A1C LEVEL <7.0%: ICD-10-PCS | Mod: CPTII,S$GLB,, | Performed by: PODIATRIST

## 2020-03-24 PROCEDURE — 1126F PR PAIN SEVERITY QUANTIFIED, NO PAIN PRESENT: ICD-10-PCS | Mod: S$GLB,,, | Performed by: PODIATRIST

## 2020-03-24 PROCEDURE — 1101F PT FALLS ASSESS-DOCD LE1/YR: CPT | Mod: CPTII,S$GLB,, | Performed by: PODIATRIST

## 2020-03-24 PROCEDURE — 3079F PR MOST RECENT DIASTOLIC BLOOD PRESSURE 80-89 MM HG: ICD-10-PCS | Mod: CPTII,S$GLB,, | Performed by: PODIATRIST

## 2020-03-24 PROCEDURE — 1101F PR PT FALLS ASSESS DOC 0-1 FALLS W/OUT INJ PAST YR: ICD-10-PCS | Mod: CPTII,S$GLB,, | Performed by: PODIATRIST

## 2020-03-24 PROCEDURE — 3074F PR MOST RECENT SYSTOLIC BLOOD PRESSURE < 130 MM HG: ICD-10-PCS | Mod: CPTII,S$GLB,, | Performed by: PODIATRIST

## 2020-03-24 PROCEDURE — 1159F PR MEDICATION LIST DOCUMENTED IN MEDICAL RECORD: ICD-10-PCS | Mod: S$GLB,,, | Performed by: PODIATRIST

## 2020-03-24 PROCEDURE — 3044F HG A1C LEVEL LT 7.0%: CPT | Mod: CPTII,S$GLB,, | Performed by: PODIATRIST

## 2020-03-24 PROCEDURE — 99999 PR PBB SHADOW E&M-EST. PATIENT-LVL IV: CPT | Mod: PBBFAC,,, | Performed by: PODIATRIST

## 2020-03-24 PROCEDURE — 1126F AMNT PAIN NOTED NONE PRSNT: CPT | Mod: S$GLB,,, | Performed by: PODIATRIST

## 2020-03-24 PROCEDURE — 99203 PR OFFICE/OUTPT VISIT, NEW, LEVL III, 30-44 MIN: ICD-10-PCS | Mod: S$GLB,,, | Performed by: PODIATRIST

## 2020-03-24 PROCEDURE — 99999 PR PBB SHADOW E&M-EST. PATIENT-LVL IV: ICD-10-PCS | Mod: PBBFAC,,, | Performed by: PODIATRIST

## 2020-03-24 PROCEDURE — 3079F DIAST BP 80-89 MM HG: CPT | Mod: CPTII,S$GLB,, | Performed by: PODIATRIST

## 2020-03-24 PROCEDURE — 1159F MED LIST DOCD IN RCRD: CPT | Mod: S$GLB,,, | Performed by: PODIATRIST

## 2020-03-24 PROCEDURE — 99203 OFFICE O/P NEW LOW 30 MIN: CPT | Mod: S$GLB,,, | Performed by: PODIATRIST

## 2020-03-24 PROCEDURE — 3074F SYST BP LT 130 MM HG: CPT | Mod: CPTII,S$GLB,, | Performed by: PODIATRIST

## 2020-03-24 NOTE — PROGRESS NOTES
Subjective:      Patient ID: Mirza Do is a 64 y.o. male.    Chief Complaint: Diabetes Mellitus (Pcp Dr. Honeycutt 12/18/19); Diabetic Foot Exam; and Nail Care    Mirza is a 64 y.o. male who presents to the clinic upon referral from Dr. Honeycutt  for evaluation and treatment of diabetic feet. Mirza has a past medical history of CVA (cerebral infarction), Diabetes mellitus type II, non insulin dependent (10/14/2016), Erectile dysfunction, Hyperlipidemia (10/14/2016), Hypertension, Murmur, Nuclear sclerotic cataract of both eyes (8/7/2019), Polyneuropathy, Renal cyst, left (1/8/2020), Sleep apnea, Trouble in sleeping, and Type 2 diabetes mellitus. Patient relates no major problem with feet. Only complaints today consist of thick discolored toenails    PCP: Manjula Honeycutt MD    Date Last Seen by PCP:   Chief Complaint   Patient presents with    Diabetes Mellitus     Pcp Dr. Honeycutt 12/18/19    Diabetic Foot Exam    Nail Care         Current shoe gear: Flexible tennis shoes    Hemoglobin A1C   Date Value Ref Range Status   10/15/2019 6.3 (H) 4.0 - 5.6 % Final     Comment:     ADA Screening Guidelines:  5.7-6.4%  Consistent with prediabetes  >or=6.5%  Consistent with diabetes  High levels of fetal hemoglobin interfere with the HbA1C  assay. Heterozygous hemoglobin variants (HbS, HgC, etc)do  not significantly interfere with this assay.   However, presence of multiple variants may affect accuracy.     04/17/2019 6.5 (H) 4.0 - 5.6 % Final     Comment:     ADA Screening Guidelines:  5.7-6.4%  Consistent with prediabetes  >or=6.5%  Consistent with diabetes  High levels of fetal hemoglobin interfere with the HbA1C  assay. Heterozygous hemoglobin variants (HbS, HgC, etc)do  not significantly interfere with this assay.   However, presence of multiple variants may affect accuracy.     09/25/2018 6.3 (H) 4.0 - 5.6 % Final     Comment:     ADA Screening Guidelines:  5.7-6.4%  Consistent with prediabetes  >or=6.5%  Consistent  with diabetes  High levels of fetal hemoglobin interfere with the HbA1C  assay. Heterozygous hemoglobin variants (HbS, HgC, etc)do  not significantly interfere with this assay.   However, presence of multiple variants may affect accuracy.                 Patient Active Problem List   Diagnosis    Impotence of organic origin    History of CVA (cerebrovascular accident)    Essential hypertension, benign    Insomnia    History of hiatal hernia    Hypertriglyceridemia    Type 2 diabetes mellitus with diabetic neuropathy, without long-term current use of insulin    Mixed hyperlipidemia    BMI 40.0-44.9, adult    Diastolic dysfunction without heart failure    YUNIEL on CPAP    SOB (shortness of breath) on exertion    Nuclear sclerotic cataract of both eyes    Renal cyst, left    Chronic right flank pain       Current Outpatient Medications on File Prior to Visit   Medication Sig Dispense Refill    ACCU-CHEK STEPHON PLUS TEST STRP Strp 1 strip by Misc.(Non-Drug; Combo Route) route daily as needed (alternate testing time). 50 strip 11    ACCU-CHEK FASTCLIX Misc 1 lancet by Misc.(Non-Drug; Combo Route) route daily as needed. 50 each 11    acetaminophen (TYLENOL) 650 MG TbSR Take 1 tablet (650 mg total) by mouth every 8 (eight) hours as needed. 30 tablet 0    aspirin 81 MG Chew Take 81 mg by mouth once daily.      atorvastatin (LIPITOR) 40 MG tablet Take 1 tablet (40 mg total) by mouth every evening. 90 tablet 3    fluticasone propionate (FLONASE) 50 mcg/actuation nasal spray 1 spray (50 mcg total) by Each Nare route 2 (two) times daily as needed for Rhinitis. 15 g 0    gabapentin (NEURONTIN) 300 MG capsule Take 1 tab by mouth nightly x 3 days; if no improvement, take 1 tab twice daily x 3 day; if no improvement, take 1 tab 3 times daily 90 capsule 1    hydroCHLOROthiazide (HYDRODIURIL) 25 MG tablet Take 1 tablet (25 mg total) by mouth once daily. 90 tablet 1    lidocaine (LIDODERM) 5 % Place 1 patch onto  the skin once daily. Remove & Discard patch within 12 hours or as directed by MD 15 patch 0    losartan (COZAAR) 100 MG tablet Take 1 tablet (100 mg total) by mouth once daily. 90 tablet 1    losartan-hydrochlorothiazide 100-25 mg (HYZAAR) 100-25 mg per tablet TAKE 1 TABLET BY MOUTH ONCE DAILY 90 tablet 3    meloxicam (MOBIC) 15 MG tablet Take 1 tablet (15 mg total) by mouth daily as needed for Pain. 30 tablet 0    metoprolol succinate (TOPROL-XL) 50 MG 24 hr tablet Take 1 tablet (50 mg total) by mouth once daily. 90 tablet 1    naproxen (NAPROSYN) 500 MG tablet Take 1 tablet (500 mg total) by mouth 2 (two) times daily with meals. 30 tablet 0    promethazine-dextromethorphan (PROMETHAZINE-DM) 6.25-15 mg/5 mL Syrp Take 5 mLs by mouth 2 (two) times daily as needed. 120 mL 0    topiramate (TOPAMAX) 100 MG tablet Take 1 tablet (100 mg total) by mouth 2 (two) times daily. 180 tablet 1    VIAGRA 100 mg tablet TAKE 1 TABLET BY MOUTH AS NEEDED FOR ERECTILE DYSFUNCTION 8 tablet 3    zolpidem (AMBIEN) 10 mg Tab Take 1 tablet (10 mg total) by mouth nightly as needed. 30 tablet 2    levalbuterol (XOPENEX HFA) 45 mcg/actuation inhaler Inhale 1-2 puffs into the lungs every 6 (six) hours as needed for Wheezing or Shortness of Breath. Rescue (Patient not taking: Reported on 2/3/2020) 15 g 0    metFORMIN (GLUCOPHAGE) 500 MG tablet Take 1 tablet (500 mg total) by mouth 2 (two) times daily with meals. 180 tablet 0     No current facility-administered medications on file prior to visit.        Review of patient's allergies indicates:  No Known Allergies    Past Surgical History:   Procedure Laterality Date    BACK SURGERY      discectomy    COLONOSCOPY N/A 8/4/2017    Procedure: COLONOSCOPY;  Surgeon: Mario Alberto Vega MD;  Location: [address]);  Service: Endoscopy;  Laterality: N/A;  Conflict with Jack's schedule no other times available with CRS, Pt requested 8/4/17 - moved to be scoped with GI MD - ER    ROOT  CANAL      UMBILICAL HERNIA REPAIR         Family History   Problem Relation Age of Onset    No Known Problems Father     Hypertension Brother     Cancer Brother         lung     Cancer Mother         smoker     Emphysema Sister     Diabetes Sister     No Known Problems Sister     No Known Problems Sister     No Known Problems Maternal Grandmother     No Known Problems Maternal Grandfather     No Known Problems Paternal Grandmother     No Known Problems Paternal Grandfather     No Known Problems Maternal Aunt     No Known Problems Maternal Uncle     No Known Problems Paternal Aunt     No Known Problems Paternal Uncle     Amblyopia Neg Hx     Blindness Neg Hx     Cataracts Neg Hx     Glaucoma Neg Hx     Macular degeneration Neg Hx     Retinal detachment Neg Hx     Strabismus Neg Hx     Stroke Neg Hx     Thyroid disease Neg Hx        Social History     Socioeconomic History    Marital status: Single     Spouse name: Not on file    Number of children: Not on file    Years of education: Not on file    Highest education level: Not on file   Occupational History    Not on file   Social Needs    Financial resource strain: Not on file    Food insecurity:     Worry: Not on file     Inability: Not on file    Transportation needs:     Medical: Not on file     Non-medical: Not on file   Tobacco Use    Smoking status: Former Smoker     Packs/day: 0.10     Years: 4.00     Pack years: 0.40     Last attempt to quit: 1995     Years since quittin.9    Smokeless tobacco: Never Used    Tobacco comment: social smoking as teenager   Substance and Sexual Activity    Alcohol use: Yes     Comment: beer 6 pack in a month; red wine 2x a month     Drug use: No    Sexual activity: Yes     Partners: Female   Lifestyle    Physical activity:     Days per week: Not on file     Minutes per session: Not on file    Stress: Not at all   Relationships    Social connections:     Talks on phone: Not on  "file     Gets together: Not on file     Attends Caodaism service: Not on file     Active member of club or organization: Not on file     Attends meetings of clubs or organizations: Not on file     Relationship status: Not on file   Other Topics Concern    Not on file   Social History Narrative           Review of Systems   Constitution: Negative for chills and fever.   Cardiovascular: Negative for claudication and leg swelling.   Respiratory: Negative for cough and shortness of breath.    Skin: Positive for dry skin and nail changes. Negative for itching and rash.   Musculoskeletal: Positive for back pain (History of back surgery), joint pain and myalgias. Negative for falls, joint swelling and muscle weakness.   Gastrointestinal: Negative for diarrhea, nausea and vomiting.   Neurological: Positive for numbness (Left thigh) and paresthesias (Left thigh). Negative for tremors and weakness.   Psychiatric/Behavioral: Negative for altered mental status and hallucinations.           Objective:      Vitals:    03/24/20 1422   BP: 110/84   Weight: 119.7 kg (264 lb)   Height: 5' 8" (1.727 m)   PainSc: 0-No pain       Physical Exam   Constitutional: He appears well-developed and well-nourished.  Non-toxic appearance. He does not have a sickly appearance. No distress.   alert and oriented x 3.    Cardiovascular:   Pulses:       Dorsalis pedis pulses are 2+ on the right side, and 2+ on the left side.        Posterior tibial pulses are 2+ on the right side, and 2+ on the left side.    Capillary refill time is within normal limits. Digital hair present.    Pulmonary/Chest: No respiratory distress.   Musculoskeletal: He exhibits no deformity.        Right ankle: No tenderness. No lateral malleolus, no medial malleolus, no AITFL, no CF ligament and no posterior TFL tenderness found. Achilles tendon exhibits no pain, no defect and normal Trujillo's test results.        Left ankle: No tenderness. No " lateral malleolus, no medial malleolus, no AITFL, no CF ligament and no posterior TFL tenderness found. Achilles tendon exhibits no pain, no defect and normal Trujillo's test results.        Right foot: There is no tenderness and no bony tenderness.        Left foot: There is no tenderness and no bony tenderness.   Muscle strength is 5/5 in all groups bilaterally.    There is equinus deformity bilateral with decreased dorsiflexion at the ankle joint bilateral.     Gait analysis reveals excessive pronation through midstance and propulsion with early heel off. Shoes reveals lateral heel counter wear bilateral     Decreased first MPJ range of motion both weightbearing and nonweightbearing, no crepitus observed the first MP joint, + dorsal flag sign. Mild  bunion deformity is observed .    Patient has hammertoes of digits 2-5 bilateral partially reducible            Feet:   Right Foot:   Protective Sensation: 5 sites tested. 5 sites sensed.   Left Foot:   Protective Sensation: 5 sites tested. 5 sites sensed.   Lymphadenopathy:   No lymphatic streaking     Neurological: He displays no atrophy. No sensory deficit.   Light touch present     Skin: Skin is warm, dry and intact. No abrasion, no bruising, no lesion and no rash noted. He is not diaphoretic. No cyanosis or erythema. No pallor. Nails show no clubbing.   Skin is of normal turgor.   Normal temperature gradient.  Examination of the skin reveals no evidence of significant rashes, open lesions, suspicious appearing nevi or other concerning lesions.     Toenails 1-5 bilaterally are elongated by 2-3 mm, thickened by 2-3 mm, discolored/yellowed, dystrophic, brittle with subungual debris.   Psychiatric: His mood appears not anxious. His affect is not inappropriate. His speech is not slurred. He is not combative. He is communicative. He is attentive.   Nursing note and vitals reviewed.            Assessment:       Encounter Diagnoses   Name Primary?    Type 2 diabetes  mellitus with diabetic neuropathy, without long-term current use of insulin     Comprehensive diabetic foot examination, type 2 DM, encounter for Yes    Numbness and tingling of left leg     Hallux limitus, acquired, left     Hallux limitus, acquired, right     Hammer toes of both feet     Pes planus of both feet          Plan:     Problem List Items Addressed This Visit     Type 2 diabetes mellitus with diabetic neuropathy, without long-term current use of insulin    Relevant Orders    DIABETIC SHOES FOR HOME USE    Ambulatory referral/consult to Neurology      Other Visit Diagnoses     Comprehensive diabetic foot examination, type 2 DM, encounter for    -  Primary    Relevant Orders    DIABETIC SHOES FOR HOME USE    Numbness and tingling of left leg        Relevant Orders    DIABETIC SHOES FOR HOME USE    Ambulatory referral/consult to Neurology    Hallux limitus, acquired, left        Relevant Orders    DIABETIC SHOES FOR HOME USE    Hallux limitus, acquired, right        Relevant Orders    DIABETIC SHOES FOR HOME USE    Hammer toes of both feet        Relevant Orders    DIABETIC SHOES FOR HOME USE    Pes planus of both feet        Relevant Orders    DIABETIC SHOES FOR HOME USE         I counseled the patient on his conditions, their implications and medical management.    Orders Placed This Encounter    DIABETIC SHOES FOR HOME USE    Ambulatory referral/consult to Neurology       Greater than 50% of this visit spent on counseling and coordination of care.    Education about the diabetic foot, neuropathy, and prevention of limb loss.    Shoe inspection. Diabetic Foot Education. Patient reminded of the importance of good nutrition/healthy diet/weight management and blood sugar control to help prevent podiatric complications of diabetes. Patient instructed on proper foot hygeine. Wear comfortable, proper fitting shoes. Wash feet daily. Dry well. After drying, apply moisturizer to feet (no lotion to  cira). Inspect feet daily for skin breaks, blisters, swelling, or redness. Wear cotton socks (preferably white)  Change socks every day. Do NOT walk barefoot. Do NOT use heating pads or hot water soaks. We discussed wearing proper shoe gear, daily foot inspections, never walking without protective shoe gear.     Discussed edema control and the importance of daily moisturizer to the feet such as Gold bonds diabetic foot cream    Rx diabetic shoes for protection and support    Based on clinical exam, this patient does not qualify for nail care (he does not have criteria for Q modifiers)    Recommend applying vicks vaporub to thick abnormal toenails daily x 6 months to treat fungal nail infection.    He will continue to monitor the areas daily, inspect his feet, wear protective shoe gear when ambulatory, moisturizer to maintain skin integrity and follow in this office in approximately 3 months, sooner p.r.n.    During exam patient mention getting frequent sudden onset of numbness to the left thigh.  He relates history of some sort of low back procedures which he cannot expand on done by an outside neurosurgeon several years ago.  Referral placed in neurology

## 2020-03-24 NOTE — LETTER
March 24, 2020      Manjula Honeycutt MD  [address]  [address]  [place] [address]           [facility]  [address] [place] [address]  Phone: [phone]          Patient: Mirza Do   MR Number: [id number]   YOB: 1955   Date of Visit: 3/24/2020       Dear Dr. Manjula Honeycutt:    Thank you for referring Mirza Do to me for evaluation. Attached you will find relevant portions of my assessment and plan of care.    If you have questions, please do not hesitate to call me. I look forward to following Mirza Do along with you.    Sincerely,    Shania Hoang, TINO    Enclosure  CC:  No Recipients    If you would like to receive this communication electronically, please contact [email or web address] or [phone] to request more information on [employer] Link access.    For providers and/or their staff who would like to refer a patient to [facility], please contact us through our one-stop-shop provider referral line, [facility], at [phone].    If you feel you have received this communication in error or would no longer like to receive these types of communications, please e-mail [email or web address]

## 2020-03-24 NOTE — PATIENT INSTRUCTIONS
Recommend lotions: eucerin, eucerin for diabetics, aquaphor, A&D ointment, gold bond for diabetics, sween, [place]'s Bees all purpose baby ointment,  urea 40 with aloe (found on amazon.com)    Shoe recommendations: (try 6pm.com, zappos.com , nordstromrack.Dindong, or shoes.Dindong for discounted prices) you can visit DSW shoes in [place]  or [employer] in the [place] (there are also several shoe brand outlets in the [place])    Asics (GT 2000 or gel foundations), new balance stability type shoes, saucony (stabil c3),  [place] (GTS or Beast or transcend), propet (tennis shoe)    Sofft Brand or axxiom (women) Denilson&Jomar (men), clarks, crocs, aerosoles, naturalizers, SAS, ecco, born, luann singh, rockports (dress shoes)    Vionic, burkenstocks, fitflops, propet (sandals)  Nike comfort thong sandals, crocs, propet (house shoes)    Nail Home remedy:  Vicks Vapor rub to nails for easier manageability    Diabetes: Inspecting Your Feet  Diabetes increases your chances of developing foot problems. So inspect your feet every day. This helps you find small skin irritations before they become serious infections. If you have trouble seeing the bottoms of your feet, use a mirror or ask a family member or friend to help.     Pressure spots on the bottom of the foot are common areas where problems develop.   How to check your feet  Below are tips to help you look for foot problems. Try to check your feet at the same time each day, such as when you get out of bed in the morning:  · Check the top of each foot. The tops of toes, back of the heel, and outer edge of the foot can get a lot of rubbing from poor-fitting shoes.  · Check the bottom of each foot. Daily wear and tear often leads to problems at pressure spots.  · Check the toes and nails. Fungal infections often occur between toes. Toenail problems can also be a sign of fungal infections or lead to breaks in the skin.  · Check your shoes, too. Loose objects inside a shoe can  injure the foot. Use your hand to feel inside your shoes for things like mya, loose stitching, or rough areas that could irritate your skin.  Warning signs  Look for any color changes in the foot. Redness with streaks can signal a severe infection, which needs immediate medical attention. Tell your doctor right away if you have any of these problems:  · Swelling, sometimes with color changes, may be a sign of poor blood flow or infection. Symptoms include tenderness and an increase in the size of your foot.  · Warm or hot areas on your feet may be signs of infection. A foot that is cold may not be getting enough blood.  · Sensations such as burning, tingling, or pins and needles can be signs of a problem. Also check for areas that may be numb.  · Hot spots are caused by friction or pressure. Look for hot spots in areas that get a lot of rubbing. Hot spots can turn into blisters, calluses, or sores.  · Cracks and sores are caused by dry or irritated skin. They are a sign that the skin is breaking down, which can lead to infection.  · Toenail problems to watch for include nails growing into the skin (ingrown toenail) and causing redness or pain. Thick, yellow, or discolored nails can signal a fungal infection.  · Drainage and odor can develop from untreated sores and ulcers. Call your doctor right away if you notice white or yellow drainage, bleeding, or unpleasant odor.   © 4544-1527 [employer]. [address] [place] [address]. All rights reserved. This information is not intended as a substitute for professional medical care. Always follow your healthcare professional's instructions.        Step-by-Step:  Inspecting Your Feet (Diabetes)    Date Last Reviewed: 10/1/2016  © 5532-5261 [employer]. [address] [place] [address]. All rights reserved. This information is not intended as a substitute for professional medical care. Always follow your healthcare  professional's instructions.

## 2020-04-10 ENCOUNTER — NURSE TRIAGE (OUTPATIENT)
Dept: ADMINISTRATIVE | Facility: CLINIC | Age: 65
End: 2020-04-10
Payer: MEDICARE

## 2020-04-10 NOTE — TELEPHONE ENCOUNTER
Calling about left ear. Thought I had something in it like wax or something. Does not feel anything moving. Put peroxide in it and it seems a little better. No hearing loss. No pain. No other symptoms. Pt informed message being sent to PCP to contact him on Monday.  Instructed to call back if experienced pain, discharge, hearing loss, or any other symptoms. Pt to call Dr Honeycutt' office on Monday if he does not hear back from them by end of day.

## 2020-04-10 NOTE — TELEPHONE ENCOUNTER
Reason for Disposition   Diabetes     borderline    Additional Information   Negative: MODERATE-SEVERE ear pain   Negative: Button battery   Negative: Sharp foreign body (e.g., glass, pin)   Negative: Bleeding from ear canal   Negative: Caller is unable to remove live insect   Negative: Small, smooth foreign body in ear canal   Negative: Live insect inside ear canal   Negative: Injury to the ear   Negative: Injury to ear canal from cotton swab (e.g., Q-tip) or doctor's ear exam   Negative: Foreign body stuck in the ear (e.g., bug, piece of cotton)   Negative: Sudden onset of hearing loss   Negative: Dizziness is main symptom   Negative: Pain or discomfort in or around ear is main symptom   Negative: Patient sounds very sick or weak to the triager   Negative: Sudden onset of ear pain or bleeding after long - thin object was inserted into the ear canal (e.g., pencil, Q-tip)   Negative: [1] Ear pain after ear syringing (i.e., squirting liquid into ear canal to remove wax) AND [2] severe   Negative: [1] Ear pain after ear syringing (i.e., squirting liquid into ear canal to remove wax) AND [2] persists > 1 hour   Negative: Walking is very unsteady  (Exception: brief dizziness that occurs with ear syringing)   Negative: Redness or rash of outer ear   Negative: Pus from the ear canal (e.g., yellow or green discharge)   Negative: History of ear drum perforation, tubes or ear surgery   Negative: Complete hearing loss in either ear    Protocols used: EARWAX-A-AH, EAR - FOREIGN BODY-A-AH

## 2020-04-13 NOTE — TELEPHONE ENCOUNTER
Please call and schedule pt to come in the office to see any of my colleagues for an ear exam if pt still feels ear fullness. If no one is available, I can place a referral to ENT. Pt can take OTC antihistamines(Zyrtec, Allegra, Claritin, or Xyzal) daily until able to get in for an appt. Thanks!

## 2020-04-14 ENCOUNTER — OFFICE VISIT (OUTPATIENT)
Dept: FAMILY MEDICINE | Facility: CLINIC | Age: 65
End: 2020-04-14
Payer: MEDICARE

## 2020-04-14 DIAGNOSIS — B02.21: Primary | ICD-10-CM

## 2020-04-14 PROCEDURE — 1101F PR PT FALLS ASSESS DOC 0-1 FALLS W/OUT INJ PAST YR: ICD-10-PCS | Mod: CPTII,,, | Performed by: FAMILY MEDICINE

## 2020-04-14 PROCEDURE — 1159F PR MEDICATION LIST DOCUMENTED IN MEDICAL RECORD: ICD-10-PCS | Mod: ,,, | Performed by: FAMILY MEDICINE

## 2020-04-14 PROCEDURE — 99214 OFFICE O/P EST MOD 30 MIN: CPT | Mod: 95,,, | Performed by: FAMILY MEDICINE

## 2020-04-14 PROCEDURE — 1159F MED LIST DOCD IN RCRD: CPT | Mod: ,,, | Performed by: FAMILY MEDICINE

## 2020-04-14 PROCEDURE — 1101F PT FALLS ASSESS-DOCD LE1/YR: CPT | Mod: CPTII,,, | Performed by: FAMILY MEDICINE

## 2020-04-14 PROCEDURE — 99214 PR OFFICE/OUTPT VISIT, EST, LEVL IV, 30-39 MIN: ICD-10-PCS | Mod: 95,,, | Performed by: FAMILY MEDICINE

## 2020-04-14 RX ORDER — VALACYCLOVIR HYDROCHLORIDE 1 G/1
1000 TABLET, FILM COATED ORAL 3 TIMES DAILY
Qty: 21 TABLET | Refills: 0 | Status: SHIPPED | OUTPATIENT
Start: 2020-04-14 | End: 2020-08-04

## 2020-04-14 RX ORDER — NEOMYCIN SULFATE, POLYMYXIN B SULFATE AND HYDROCORTISONE 10; 3.5; 1 MG/ML; MG/ML; [USP'U]/ML
3 SUSPENSION/ DROPS AURICULAR (OTIC) 3 TIMES DAILY
Qty: 10 ML | Refills: 0 | Status: SHIPPED | OUTPATIENT
Start: 2020-04-14 | End: 2020-04-24

## 2020-04-14 NOTE — PROGRESS NOTES
Chief Complaint   Patient presents with    Otalgia     SUBJECTIVE:   The patient has a 3 day history of a ear pain and sensation of fullness without much painful rash on the left side of face. PMH: generally healthy. Has not had herpes zoster in the past.  States he doesn't have chicken pox.    OBJECTIVE:  Vital signs are normal, he appears well. Typical zoster lesions noted in the outer auricle and into the canal sparing the TM vesicles on erythematous bases in clusters on the V2 distribution and the ear in a dermatomal pattern.    ASSESSMENT:  Herpes Zoster (shingles) auricularis    PLAN:  The nature of herpes zoster is explained carefully. Lesions should be compressed/soaked with saline, topical antibiotic ointment to any infected lesions; Aloe Vera cream may help minor local pain. Intervention with antiviral agents is extremely helpful early in the course of the disease, less helpful after 2-3 days of symptoms. Postherpetic neuralgia is explained; this may occur especially in the elderly despite every attempt at prevention. Prescription for valacyclovir (Valtrex), which may shorten the course of acute symptoms and reduce the incidence of later neuralgia. The patient understands these issues, and will call as needed for further care.

## 2020-04-20 DIAGNOSIS — G47.00 INSOMNIA, UNSPECIFIED TYPE: ICD-10-CM

## 2020-04-20 RX ORDER — ZOLPIDEM TARTRATE 10 MG/1
10 TABLET ORAL NIGHTLY PRN
Qty: 30 TABLET | Refills: 2 | Status: SHIPPED | OUTPATIENT
Start: 2020-04-20 | End: 2020-09-11

## 2020-04-20 NOTE — TELEPHONE ENCOUNTER
----- Message from Mary Steven sent at 4/20/2020 12:13 PM CDT -----  Type: RX Refill Request    Who Called:  Self     Have you contacted your pharmacy: no   Refill or New Rx: Refill     RX Name and Strength:zolpidem (AMBIEN) 10 mg Tab    Preferred Pharmacy with phone number:[facility] - [place], [place] - [address] [phone] (Phone)  [phone] (Fax)        Local or Mail Order: Local   Ordering Provider: Dr. Honeycutt     Would the patient rather a call back or a response via [facility]?  Call     Best Call Back Number: [phone]

## 2020-05-16 DIAGNOSIS — E11.40 TYPE 2 DIABETES MELLITUS WITH DIABETIC NEUROPATHY, WITHOUT LONG-TERM CURRENT USE OF INSULIN: ICD-10-CM

## 2020-05-16 DIAGNOSIS — I10 ESSENTIAL HYPERTENSION, BENIGN: Chronic | ICD-10-CM

## 2020-05-17 RX ORDER — METFORMIN HYDROCHLORIDE 500 MG/1
TABLET ORAL
Qty: 180 TABLET | Refills: 0 | Status: SHIPPED | OUTPATIENT
Start: 2020-05-17 | End: 2020-09-28

## 2020-05-18 RX ORDER — HYDROCHLOROTHIAZIDE 25 MG/1
TABLET ORAL
Qty: 90 TABLET | Refills: 0 | Status: SHIPPED | OUTPATIENT
Start: 2020-05-18 | End: 2021-03-17 | Stop reason: SDUPTHER

## 2020-05-18 RX ORDER — LOSARTAN POTASSIUM 100 MG/1
TABLET ORAL
Qty: 90 TABLET | Refills: 0 | Status: SHIPPED | OUTPATIENT
Start: 2020-05-18 | End: 2020-09-28

## 2020-06-05 DIAGNOSIS — E11.9 TYPE 2 DIABETES MELLITUS WITHOUT COMPLICATION: ICD-10-CM

## 2020-06-11 ENCOUNTER — OFFICE VISIT (OUTPATIENT)
Dept: NEUROLOGY | Facility: CLINIC | Age: 65
End: 2020-06-11
Payer: MEDICARE

## 2020-06-11 VITALS
SYSTOLIC BLOOD PRESSURE: 136 MMHG | HEIGHT: 68 IN | WEIGHT: 264 LBS | BODY MASS INDEX: 40.01 KG/M2 | HEART RATE: 72 BPM | DIASTOLIC BLOOD PRESSURE: 79 MMHG

## 2020-06-11 DIAGNOSIS — R20.0 NUMBNESS AND TINGLING OF LEFT LEG: ICD-10-CM

## 2020-06-11 DIAGNOSIS — E11.40 TYPE 2 DIABETES MELLITUS WITH DIABETIC NEUROPATHY, WITHOUT LONG-TERM CURRENT USE OF INSULIN: ICD-10-CM

## 2020-06-11 DIAGNOSIS — R20.2 NUMBNESS AND TINGLING OF LEFT LEG: ICD-10-CM

## 2020-06-11 PROCEDURE — 1126F AMNT PAIN NOTED NONE PRSNT: CPT | Mod: S$GLB,,, | Performed by: NEUROLOGICAL SURGERY

## 2020-06-11 PROCEDURE — 99204 PR OFFICE/OUTPT VISIT, NEW, LEVL IV, 45-59 MIN: ICD-10-PCS | Mod: S$GLB,,, | Performed by: NEUROLOGICAL SURGERY

## 2020-06-11 PROCEDURE — 99999 PR PBB SHADOW E&M-EST. PATIENT-LVL IV: ICD-10-PCS | Mod: PBBFAC,,, | Performed by: NEUROLOGICAL SURGERY

## 2020-06-11 PROCEDURE — 1101F PR PT FALLS ASSESS DOC 0-1 FALLS W/OUT INJ PAST YR: ICD-10-PCS | Mod: CPTII,S$GLB,, | Performed by: NEUROLOGICAL SURGERY

## 2020-06-11 PROCEDURE — 1126F PR PAIN SEVERITY QUANTIFIED, NO PAIN PRESENT: ICD-10-PCS | Mod: S$GLB,,, | Performed by: NEUROLOGICAL SURGERY

## 2020-06-11 PROCEDURE — 99499 UNLISTED E&M SERVICE: CPT | Mod: S$GLB,,, | Performed by: NEUROLOGICAL SURGERY

## 2020-06-11 PROCEDURE — 3075F PR MOST RECENT SYSTOLIC BLOOD PRESS GE 130-139MM HG: ICD-10-PCS | Mod: CPTII,S$GLB,, | Performed by: NEUROLOGICAL SURGERY

## 2020-06-11 PROCEDURE — 1101F PT FALLS ASSESS-DOCD LE1/YR: CPT | Mod: CPTII,S$GLB,, | Performed by: NEUROLOGICAL SURGERY

## 2020-06-11 PROCEDURE — 3075F SYST BP GE 130 - 139MM HG: CPT | Mod: CPTII,S$GLB,, | Performed by: NEUROLOGICAL SURGERY

## 2020-06-11 PROCEDURE — 1159F MED LIST DOCD IN RCRD: CPT | Mod: S$GLB,,, | Performed by: NEUROLOGICAL SURGERY

## 2020-06-11 PROCEDURE — 3044F PR MOST RECENT HEMOGLOBIN A1C LEVEL <7.0%: ICD-10-PCS | Mod: CPTII,S$GLB,, | Performed by: NEUROLOGICAL SURGERY

## 2020-06-11 PROCEDURE — 99499 RISK ADDL DX/OHS AUDIT: ICD-10-PCS | Mod: S$GLB,,, | Performed by: NEUROLOGICAL SURGERY

## 2020-06-11 PROCEDURE — 3078F DIAST BP <80 MM HG: CPT | Mod: CPTII,S$GLB,, | Performed by: NEUROLOGICAL SURGERY

## 2020-06-11 PROCEDURE — 99204 OFFICE O/P NEW MOD 45 MIN: CPT | Mod: S$GLB,,, | Performed by: NEUROLOGICAL SURGERY

## 2020-06-11 PROCEDURE — 1159F PR MEDICATION LIST DOCUMENTED IN MEDICAL RECORD: ICD-10-PCS | Mod: S$GLB,,, | Performed by: NEUROLOGICAL SURGERY

## 2020-06-11 PROCEDURE — 3044F HG A1C LEVEL LT 7.0%: CPT | Mod: CPTII,S$GLB,, | Performed by: NEUROLOGICAL SURGERY

## 2020-06-11 PROCEDURE — 3078F PR MOST RECENT DIASTOLIC BLOOD PRESSURE < 80 MM HG: ICD-10-PCS | Mod: CPTII,S$GLB,, | Performed by: NEUROLOGICAL SURGERY

## 2020-06-11 PROCEDURE — 99999 PR PBB SHADOW E&M-EST. PATIENT-LVL IV: CPT | Mod: PBBFAC,,, | Performed by: NEUROLOGICAL SURGERY

## 2020-06-11 RX ORDER — GABAPENTIN 300 MG/1
600 CAPSULE ORAL 3 TIMES DAILY
Qty: 180 CAPSULE | Refills: 11 | Status: SHIPPED | OUTPATIENT
Start: 2020-06-11 | End: 2021-03-17

## 2020-06-11 NOTE — LETTER
June 21, 2020      Shania Hoang, DPM  [address] [facility] [address]           [facility] [address]  [facility] [address]  Phone: [phone]  Fax: [phone]          Patient: Mirza Do   MR Number: [id number]   YOB: 1955   Date of Visit: 6/11/2020       Dear Dr. Shania Hoang:    Thank you for referring Mirza Do to me for evaluation. Attached you will find relevant portions of my assessment and plan of care.    If you have questions, please do not hesitate to call me. I look forward to following Mirza Do along with you.    Sincerely,    Chilo Dodge MD    Enclosure  CC:  No Recipients    If you would like to receive this communication electronically, please contact [email or web address] or [phone] to request more information on [employer] access.    For providers and/or their staff who would like to refer a patient to [facility], please contact us through our one-stop-shop provider referral line, [facility], at [phone].    If you feel you have received this communication in error or would no longer like to receive these types of communications, please e-mail [email or web address]

## 2020-06-13 ENCOUNTER — HOSPITAL ENCOUNTER (EMERGENCY)
Facility: HOSPITAL | Age: 65
Discharge: HOME | End: 2020-06-13
Attending: EMERGENCY MEDICINE
Payer: MEDICARE

## 2020-06-13 VITALS
DIASTOLIC BLOOD PRESSURE: 68 MMHG | SYSTOLIC BLOOD PRESSURE: 117 MMHG | OXYGEN SATURATION: 99 % | HEART RATE: 64 BPM | WEIGHT: 263 LBS | RESPIRATION RATE: 19 BRPM | HEIGHT: 68 IN | TEMPERATURE: 98 F | BODY MASS INDEX: 39.86 KG/M2

## 2020-06-13 DIAGNOSIS — T78.40XA ALLERGIC REACTION, INITIAL ENCOUNTER: ICD-10-CM

## 2020-06-13 DIAGNOSIS — R21 RASH AND NONSPECIFIC SKIN ERUPTION: Primary | ICD-10-CM

## 2020-06-13 LAB — POCT GLUCOSE: 77 MG/DL (ref 70–110)

## 2020-06-13 PROCEDURE — 82962 GLUCOSE BLOOD TEST: CPT

## 2020-06-13 PROCEDURE — 25000003 PHARM REV CODE 250: Performed by: PHYSICIAN ASSISTANT

## 2020-06-13 PROCEDURE — 63600175 PHARM REV CODE 636 W HCPCS: Performed by: PHYSICIAN ASSISTANT

## 2020-06-13 PROCEDURE — 99284 EMERGENCY DEPT VISIT MOD MDM: CPT | Mod: 25

## 2020-06-13 RX ORDER — DIPHENHYDRAMINE HCL 50 MG
50 CAPSULE ORAL EVERY 6 HOURS PRN
Qty: 20 CAPSULE | Refills: 0 | Status: SHIPPED | OUTPATIENT
Start: 2020-06-13 | End: 2021-03-17

## 2020-06-13 RX ORDER — DIPHENHYDRAMINE HCL 50 MG
50 CAPSULE ORAL
Status: COMPLETED | OUTPATIENT
Start: 2020-06-13 | End: 2020-06-13

## 2020-06-13 RX ORDER — PREDNISONE 20 MG/1
40 TABLET ORAL DAILY
Qty: 10 TABLET | Refills: 0 | Status: SHIPPED | OUTPATIENT
Start: 2020-06-13 | End: 2020-06-18

## 2020-06-13 RX ORDER — FAMOTIDINE 20 MG/1
40 TABLET, FILM COATED ORAL
Status: COMPLETED | OUTPATIENT
Start: 2020-06-13 | End: 2020-06-13

## 2020-06-13 RX ORDER — PREDNISONE 20 MG/1
60 TABLET ORAL
Status: COMPLETED | OUTPATIENT
Start: 2020-06-13 | End: 2020-06-13

## 2020-06-13 RX ADMIN — PREDNISONE 60 MG: 20 TABLET ORAL at 01:06

## 2020-06-13 RX ADMIN — FAMOTIDINE 40 MG: 20 TABLET ORAL at 01:06

## 2020-06-13 RX ADMIN — DIPHENHYDRAMINE HYDROCHLORIDE 50 MG: 50 CAPSULE ORAL at 01:06

## 2020-06-13 NOTE — DISCHARGE INSTRUCTIONS
Take Benadryl by mouth every 6 hr as needed for itching.  Take prednisone by mouth once daily for 5 days.  Follow-up with your PCP and allergy for further evaluation.  A referral to Allergy has been placed.  You should receive a call in the next 24-72 hours with your appointment.  If you do not receive a call, use the number provided below to schedule your appointment.  Return to the ED for any concerning symptoms.    Our goal in the emergency department is to always give you outstanding care and exceptional service. You may receive a survey by mail or e-mail in the next week regarding your experience in our ED. We would greatly appreciate your completing and returning the survey. Your feedback provides us with a way to recognize our staff who give very good care and it helps us learn how to improve when your experience was below our aspiration of excellence.

## 2020-06-13 NOTE — ED PROVIDER NOTES
Encounter Date: 6/13/2020       History     Chief Complaint   Patient presents with    Rash     generalized body rash with itching.Started wednesday     Patient is a 64-year-old  male with PMH of DM 2, CVA, HTN, HLD who presents the ED for urgent evaluation of a rash.  He reports pruritic wheals to the abdomen, arms, legs, and back 1st noted 2 days ago.  He has not taken any medications for relief.  He denies taking new medication or use of new soap, detergent, are moist riser.  Denies plant exposure or new pets.  He has no known allergies.  He denies fever, chest pain, shortness of breath, nausea, vomiting, or diarrhea.    The history is provided by the patient.     Review of patient's allergies indicates:  No Known Allergies  Past Medical History:   Diagnosis Date    CVA (cerebral infarction)     CVA approx 2011/2012    Diabetes mellitus type II, non insulin dependent 10/14/2016    Erectile dysfunction     Erectile Dysfunction    Hyperlipidemia 10/14/2016    Hypertension     Hypertension    Murmur     Nuclear sclerotic cataract of both eyes 8/7/2019    Polyneuropathy     Renal cyst, left 1/8/2020    Sleep apnea     Trouble in sleeping     Type 2 diabetes mellitus      Past Surgical History:   Procedure Laterality Date    BACK SURGERY      discectomy    COLONOSCOPY N/A 8/4/2017    Procedure: COLONOSCOPY;  Surgeon: Mario Alberto Vega MD;  Location: [facility] ([address] [place] [address]);  Service: Endoscopy;  Laterality: N/A;  Conflict with Jack's schedule no other times available with CRS, Pt requested 8/4/17 - moved to be scoped with GI MD - ER    ROOT CANAL      UMBILICAL HERNIA REPAIR       Family History   Problem Relation Age of Onset    No Known Problems Father     Hypertension Brother     Cancer Brother         lung     Cancer Mother         smoker     Emphysema Sister     Diabetes Sister     No Known Problems Sister     No Known Problems Sister     No Known Problems Maternal Grandmother      No Known Problems Maternal Grandfather     No Known Problems Paternal Grandmother     No Known Problems Paternal Grandfather     No Known Problems Maternal Aunt     No Known Problems Maternal Uncle     No Known Problems Paternal Aunt     No Known Problems Paternal Uncle     Amblyopia Neg Hx     Blindness Neg Hx     Cataracts Neg Hx     Glaucoma Neg Hx     Macular degeneration Neg Hx     Retinal detachment Neg Hx     Strabismus Neg Hx     Stroke Neg Hx     Thyroid disease Neg Hx      Social History     Tobacco Use    Smoking status: Former Smoker     Packs/day: 0.10     Years: 4.00     Pack years: 0.40     Quit date: 1995     Years since quittin.1    Smokeless tobacco: Never Used    Tobacco comment: social smoking as teenager   Substance Use Topics    Alcohol use: Yes     Comment: beer 6 pack in a month; red wine 2x a month     Drug use: No     Review of Systems   Constitutional: Negative for fever.   HENT: Negative for sore throat.    Respiratory: Negative for shortness of breath.    Cardiovascular: Negative for chest pain.   Gastrointestinal: Negative for nausea.   Genitourinary: Negative for dysuria.   Musculoskeletal: Negative for back pain.   Skin: Positive for rash.   Neurological: Negative for weakness.   Hematological: Does not bruise/bleed easily.   Psychiatric/Behavioral: Negative for dysphoric mood.       Physical Exam     Initial Vitals [20 1236]   BP Pulse Resp Temp SpO2   137/79 70 18 98.1 °F (36.7 °C) 95 %      MAP       --         Physical Exam    Nursing note and vitals reviewed.  Constitutional: He appears well-developed and well-nourished. He is not diaphoretic. No distress.   HENT:   Head: Normocephalic and atraumatic.   Mouth/Throat: Oropharynx is clear and moist. No oropharyngeal exudate.   Oral cavity clear and moist. Normal tongue. No oral lesions.   Eyes: Conjunctivae and EOM are normal. Pupils are equal, round, and reactive to light.   Neck: Normal  range of motion. Neck supple.   Cardiovascular: Normal rate, regular rhythm, normal heart sounds and intact distal pulses.   Pulmonary/Chest: Breath sounds normal. No respiratory distress. He has no wheezes. He has no rales.   Abdominal: Soft. Bowel sounds are normal. There is no abdominal tenderness. There is no rebound and no guarding.   Musculoskeletal: Normal range of motion. No tenderness or edema.   Neurological: He is alert and oriented to person, place, and time. GCS score is 15. GCS eye subscore is 4. GCS verbal subscore is 5. GCS motor subscore is 6.   Skin: Skin is warm and dry. Rash noted.   Diffuse erythematous, papular rash to bilateral upper and lower extremities, abdomen and back. Most pronounced to BUE's.   Psychiatric: He has a normal mood and affect. Thought content normal.         ED Course   Procedures  Labs Reviewed   POCT GLUCOSE          Imaging Results    None                APC / Resident Notes:   64AAM patient presents with pruritic papular rash to BUE, BLE, back and abdomen x 2 days.  No circumferential erythema, warmth, streaking or concern for cellulitis. Not vesicular or crusted following a dermatomal pattern, therefore do not believe this to be Herpes Zoster. Not c/w SJS, TEN or SSS: No mucosal involvement, No systemic complaints, No new meds.    My overall impression is hives. Patient given Benadryl, Pepcid and Prednisone in ED. will give Benadryl and Prednisone 40 mg x 5 days to go home with and Allergy follow up.  Patient was advised to return to the ED with any new or worsening symptoms, verbalized this understanding. They were given the opportunity to ask questions, which were reasonably addressed to the best of my ability and their apparent satisfaction.                              Clinical Impression:       ICD-10-CM ICD-9-CM   1. Rash and nonspecific skin eruption  R21 782.1   2. Allergic reaction, initial encounter  T78.40XA 995.3         Disposition:   Disposition:  Discharged  Condition: Stable     ED Disposition Condition    Discharge Stable        ED Prescriptions     Medication Sig Dispense Start Date End Date Auth. Provider    predniSONE (DELTASONE) 20 MG tablet Take 2 tablets (40 mg total) by mouth once daily. for 5 days 10 tablet 6/13/2020 6/18/2020 Anni Fletcher PA-C    diphenhydrAMINE (BENADRYL) 50 MG capsule Take 1 capsule (50 mg total) by mouth every 6 (six) hours as needed for Itching or Allergies. 20 capsule 6/13/2020  Anni Fletcher PA-C        Follow-up Information     Follow up With Specialties Details Why Contact Info    Manjula Honeycutt MD Internal Medicine, Wound Care Schedule an appointment as soon as possible for a visit   [address] [place] [address]  [address]  [place] [address]  [phone]      [facility] Allergy Schedule an appointment as soon as possible for a visit in 2 days  [address] [facility] [address]  [phone]                                     Anni Fletcher PA-C  06/13/20 0498

## 2020-06-13 NOTE — ED TRIAGE NOTES
Pt presents to the ED via personal transportation reporting a rash to both his arms and legs. Pt denies NKA. Pt denies any other complaints.

## 2020-06-18 ENCOUNTER — OFFICE VISIT (OUTPATIENT)
Dept: FAMILY MEDICINE | Facility: CLINIC | Age: 65
End: 2020-06-18
Payer: MEDICARE

## 2020-06-18 ENCOUNTER — LAB VISIT (OUTPATIENT)
Dept: LAB | Facility: HOSPITAL | Age: 65
End: 2020-06-18
Payer: MEDICARE

## 2020-06-18 VITALS
OXYGEN SATURATION: 97 % | DIASTOLIC BLOOD PRESSURE: 62 MMHG | TEMPERATURE: 97 F | HEART RATE: 84 BPM | HEIGHT: 68 IN | WEIGHT: 255.94 LBS | BODY MASS INDEX: 38.79 KG/M2 | SYSTOLIC BLOOD PRESSURE: 118 MMHG

## 2020-06-18 DIAGNOSIS — E11.40 TYPE 2 DIABETES MELLITUS WITH DIABETIC NEUROPATHY, WITHOUT LONG-TERM CURRENT USE OF INSULIN: ICD-10-CM

## 2020-06-18 DIAGNOSIS — L25.9 CONTACT DERMATITIS, UNSPECIFIED CONTACT DERMATITIS TYPE, UNSPECIFIED TRIGGER: Primary | ICD-10-CM

## 2020-06-18 DIAGNOSIS — Z23 NEED FOR SHINGLES VACCINE: ICD-10-CM

## 2020-06-18 DIAGNOSIS — L29.9 ITCHING: ICD-10-CM

## 2020-06-18 LAB
BASOPHILS # BLD AUTO: 0.01 K/UL (ref 0–0.2)
BASOPHILS NFR BLD: 0.1 % (ref 0–1.9)
DIFFERENTIAL METHOD: ABNORMAL
EOSINOPHIL # BLD AUTO: 0.2 K/UL (ref 0–0.5)
EOSINOPHIL NFR BLD: 2.2 % (ref 0–8)
ERYTHROCYTE [DISTWIDTH] IN BLOOD BY AUTOMATED COUNT: 15.9 % (ref 11.5–14.5)
HCT VFR BLD AUTO: 43.5 % (ref 40–54)
HGB BLD-MCNC: 13.5 G/DL (ref 14–18)
IMM GRANULOCYTES # BLD AUTO: 0.01 K/UL (ref 0–0.04)
IMM GRANULOCYTES NFR BLD AUTO: 0.1 % (ref 0–0.5)
LYMPHOCYTES # BLD AUTO: 2.4 K/UL (ref 1–4.8)
LYMPHOCYTES NFR BLD: 34.9 % (ref 18–48)
MCH RBC QN AUTO: 27.1 PG (ref 27–31)
MCHC RBC AUTO-ENTMCNC: 31 G/DL (ref 32–36)
MCV RBC AUTO: 87 FL (ref 82–98)
MONOCYTES # BLD AUTO: 0.5 K/UL (ref 0.3–1)
MONOCYTES NFR BLD: 7.4 % (ref 4–15)
NEUTROPHILS # BLD AUTO: 3.8 K/UL (ref 1.8–7.7)
NEUTROPHILS NFR BLD: 55.3 % (ref 38–73)
NRBC BLD-RTO: 0 /100 WBC
PLATELET # BLD AUTO: 274 K/UL (ref 150–350)
PMV BLD AUTO: 10.1 FL (ref 9.2–12.9)
RBC # BLD AUTO: 4.98 M/UL (ref 4.6–6.2)
WBC # BLD AUTO: 6.79 K/UL (ref 3.9–12.7)

## 2020-06-18 PROCEDURE — 3044F PR MOST RECENT HEMOGLOBIN A1C LEVEL <7.0%: ICD-10-PCS | Mod: CPTII,S$GLB,, | Performed by: PHYSICIAN ASSISTANT

## 2020-06-18 PROCEDURE — 1126F PR PAIN SEVERITY QUANTIFIED, NO PAIN PRESENT: ICD-10-PCS | Mod: S$GLB,,, | Performed by: PHYSICIAN ASSISTANT

## 2020-06-18 PROCEDURE — 1159F MED LIST DOCD IN RCRD: CPT | Mod: S$GLB,,, | Performed by: PHYSICIAN ASSISTANT

## 2020-06-18 PROCEDURE — 99214 OFFICE O/P EST MOD 30 MIN: CPT | Mod: S$GLB,,, | Performed by: PHYSICIAN ASSISTANT

## 2020-06-18 PROCEDURE — 83036 HEMOGLOBIN GLYCOSYLATED A1C: CPT

## 2020-06-18 PROCEDURE — 3074F PR MOST RECENT SYSTOLIC BLOOD PRESSURE < 130 MM HG: ICD-10-PCS | Mod: CPTII,S$GLB,, | Performed by: PHYSICIAN ASSISTANT

## 2020-06-18 PROCEDURE — 99214 PR OFFICE/OUTPT VISIT, EST, LEVL IV, 30-39 MIN: ICD-10-PCS | Mod: S$GLB,,, | Performed by: PHYSICIAN ASSISTANT

## 2020-06-18 PROCEDURE — 3074F SYST BP LT 130 MM HG: CPT | Mod: CPTII,S$GLB,, | Performed by: PHYSICIAN ASSISTANT

## 2020-06-18 PROCEDURE — 1101F PT FALLS ASSESS-DOCD LE1/YR: CPT | Mod: CPTII,S$GLB,, | Performed by: PHYSICIAN ASSISTANT

## 2020-06-18 PROCEDURE — 85025 COMPLETE CBC W/AUTO DIFF WBC: CPT

## 2020-06-18 PROCEDURE — 99999 PR PBB SHADOW E&M-EST. PATIENT-LVL V: ICD-10-PCS | Mod: PBBFAC,,, | Performed by: PHYSICIAN ASSISTANT

## 2020-06-18 PROCEDURE — 1101F PR PT FALLS ASSESS DOC 0-1 FALLS W/OUT INJ PAST YR: ICD-10-PCS | Mod: CPTII,S$GLB,, | Performed by: PHYSICIAN ASSISTANT

## 2020-06-18 PROCEDURE — 1126F AMNT PAIN NOTED NONE PRSNT: CPT | Mod: S$GLB,,, | Performed by: PHYSICIAN ASSISTANT

## 2020-06-18 PROCEDURE — 99999 PR PBB SHADOW E&M-EST. PATIENT-LVL V: CPT | Mod: PBBFAC,,, | Performed by: PHYSICIAN ASSISTANT

## 2020-06-18 PROCEDURE — 3078F PR MOST RECENT DIASTOLIC BLOOD PRESSURE < 80 MM HG: ICD-10-PCS | Mod: CPTII,S$GLB,, | Performed by: PHYSICIAN ASSISTANT

## 2020-06-18 PROCEDURE — 3078F DIAST BP <80 MM HG: CPT | Mod: CPTII,S$GLB,, | Performed by: PHYSICIAN ASSISTANT

## 2020-06-18 PROCEDURE — 1159F PR MEDICATION LIST DOCUMENTED IN MEDICAL RECORD: ICD-10-PCS | Mod: S$GLB,,, | Performed by: PHYSICIAN ASSISTANT

## 2020-06-18 PROCEDURE — 3044F HG A1C LEVEL LT 7.0%: CPT | Mod: CPTII,S$GLB,, | Performed by: PHYSICIAN ASSISTANT

## 2020-06-18 RX ORDER — LEVOCETIRIZINE DIHYDROCHLORIDE 5 MG/1
5 TABLET, FILM COATED ORAL NIGHTLY
Qty: 30 TABLET | Refills: 0 | Status: SHIPPED | OUTPATIENT
Start: 2020-06-18 | End: 2021-03-17

## 2020-06-18 NOTE — PROGRESS NOTES
Subjective:       Patient ID: Mirza Do is a 64 y.o. male with multiple medical diagnoses as listed in the medical history and problem list that presents for Follow-up  .    Chief Complaint: Follow-up      Follow-up  This is a new problem. The current episode started 1 to 4 weeks ago. The problem has been gradually improving (75% just itchy ). Associated symptoms include a rash. Pertinent negatives include no chest pain, chills, congestion, fever, headaches, myalgias, nausea or vertigo. Treatments tried: had benadryl and prednisone but did not take it all and not currently taking it    Rash  This is a new problem. Pertinent negatives include no congestion or fever.      Review of Systems   Constitutional: Negative for chills and fever.   HENT: Negative for congestion.    Cardiovascular: Negative for chest pain.   Gastrointestinal: Negative for nausea.   Musculoskeletal: Negative for myalgias.   Skin: Positive for rash.   Neurological: Negative for vertigo and headaches.         PAST MEDICAL HISTORY:  Past Medical History:   Diagnosis Date    CVA (cerebral infarction)     CVA approx 2011/2012    Diabetes mellitus type II, non insulin dependent 10/14/2016    Erectile dysfunction     Erectile Dysfunction    Hyperlipidemia 10/14/2016    Hypertension     Hypertension    Murmur     Nuclear sclerotic cataract of both eyes 8/7/2019    Polyneuropathy     Renal cyst, left 1/8/2020    Sleep apnea     Trouble in sleeping     Type 2 diabetes mellitus        SOCIAL HISTORY:  Social History     Socioeconomic History    Marital status: Single     Spouse name: Not on file    Number of children: Not on file    Years of education: Not on file    Highest education level: Not on file   Occupational History    Not on file   Social Needs    Financial resource strain: Not on file    Food insecurity     Worry: Not on file     Inability: Not on file    Transportation needs     Medical: Not on file     Non-medical:  Not on file   Tobacco Use    Smoking status: Former Smoker     Packs/day: 0.10     Years: 4.00     Pack years: 0.40     Quit date: 1995     Years since quittin.1    Smokeless tobacco: Never Used    Tobacco comment: social smoking as teenager   Substance and Sexual Activity    Alcohol use: Yes     Comment: beer 6 pack in a month; red wine 2x a month     Drug use: No    Sexual activity: Yes     Partners: Female   Lifestyle    Physical activity     Days per week: Not on file     Minutes per session: Not on file    Stress: Not at all   Relationships    Social connections     Talks on phone: Not on file     Gets together: Not on file     Attends Anabaptist service: Not on file     Active member of club or organization: Not on file     Attends meetings of clubs or organizations: Not on file     Relationship status: Not on file   Other Topics Concern    Not on file   Social History Narrative           ALLERGIES AND MEDICATIONS: updated and reviewed.  Review of patient's allergies indicates:  No Known Allergies  Current Outpatient Medications   Medication Sig Dispense Refill    ACCU-CHEK STEPHON PLUS TEST STRP Strp 1 strip by Misc.(Non-Drug; Combo Route) route daily as needed (alternate testing time). 50 strip 11    ACCU-CHEK FASTCLIX Misc 1 lancet by Misc.(Non-Drug; Combo Route) route daily as needed. 50 each 11    acetaminophen (TYLENOL) 650 MG TbSR Take 1 tablet (650 mg total) by mouth every 8 (eight) hours as needed. 30 tablet 0    aspirin 81 MG Chew Take 81 mg by mouth once daily.      atorvastatin (LIPITOR) 40 MG tablet Take 1 tablet (40 mg total) by mouth every evening. 90 tablet 3    diphenhydrAMINE (BENADRYL) 50 MG capsule Take 1 capsule (50 mg total) by mouth every 6 (six) hours as needed for Itching or Allergies. 20 capsule 0    fluticasone propionate (FLONASE) 50 mcg/actuation nasal spray 1 spray (50 mcg total) by Each Nare route 2 (two) times daily as needed for  Rhinitis. 15 g 0    gabapentin (NEURONTIN) 300 MG capsule Take 2 capsules (600 mg total) by mouth 3 (three) times daily. 180 capsule 11    hydroCHLOROthiazide (HYDRODIURIL) 25 MG tablet Take 1 tablet by mouth once daily 90 tablet 0    lidocaine (LIDODERM) 5 % Place 1 patch onto the skin once daily. Remove & Discard patch within 12 hours or as directed by MD 15 patch 0    losartan (COZAAR) 100 MG tablet Take 1 tablet by mouth once daily 90 tablet 0    losartan-hydrochlorothiazide 100-25 mg (HYZAAR) 100-25 mg per tablet TAKE 1 TABLET BY MOUTH ONCE DAILY 90 tablet 3    meloxicam (MOBIC) 15 MG tablet Take 1 tablet (15 mg total) by mouth daily as needed for Pain. 30 tablet 0    metFORMIN (GLUCOPHAGE) 500 MG tablet TAKE 1 TABLET BY MOUTH TWICE DAILY WITH MEALS 180 tablet 0    metoprolol succinate (TOPROL-XL) 50 MG 24 hr tablet Take 1 tablet (50 mg total) by mouth once daily. 90 tablet 1    naproxen (NAPROSYN) 500 MG tablet Take 1 tablet (500 mg total) by mouth 2 (two) times daily with meals. 30 tablet 0    predniSONE (DELTASONE) 20 MG tablet Take 2 tablets (40 mg total) by mouth once daily. for 5 days 10 tablet 0    topiramate (TOPAMAX) 100 MG tablet Take 1 tablet (100 mg total) by mouth 2 (two) times daily. 180 tablet 1    VIAGRA 100 mg tablet TAKE 1 TABLET BY MOUTH AS NEEDED FOR ERECTILE DYSFUNCTION 8 tablet 3    zolpidem (AMBIEN) 10 mg Tab Take 1 tablet (10 mg total) by mouth nightly as needed. 30 tablet 2    levalbuterol (XOPENEX HFA) 45 mcg/actuation inhaler Inhale 1-2 puffs into the lungs every 6 (six) hours as needed for Wheezing or Shortness of Breath. Rescue (Patient not taking: Reported on 2/3/2020) 15 g 0    levocetirizine (XYZAL) 5 MG tablet Take 1 tablet (5 mg total) by mouth every evening. 30 tablet 0    valACYclovir (VALTREX) 1000 MG tablet Take 1 tablet (1,000 mg total) by mouth 3 (three) times daily. for 7 days 21 tablet 0    varicella-zoster gE-AS01B, PF, (SHINGRIX) 50 mcg/0.5 mL  "injection Inject 0.5 mLs into the muscle once. for 1 dose 0.5 mL 0     No current facility-administered medications for this visit.          Objective:   /62   Pulse 84   Temp 97 °F (36.1 °C) (Oral)   Ht 5' 8" (1.727 m)   Wt 116.1 kg (255 lb 15.3 oz)   SpO2 97%   BMI 38.92 kg/m²      Physical Exam  Constitutional:       Appearance: He is obese.   HENT:      Head: Normocephalic.   Skin:     General: Skin is dry.      Findings: Rash (arms improving ) present.   Neurological:      Mental Status: He is alert and oriented to person, place, and time.             Assessment:       1. Contact dermatitis, unspecified contact dermatitis type, unspecified trigger    2. Itching    3. Type 2 diabetes mellitus with diabetic neuropathy, without long-term current use of insulin    4. Need for shingles vaccine        Plan:       Itching  -     levocetirizine (XYZAL) 5 MG tablet; Take 1 tablet (5 mg total) by mouth every evening.  Dispense: 30 tablet; Refill: 0    Type 2 diabetes mellitus with diabetic neuropathy, without long-term current use of insulin  -     Hemoglobin A1C; Future; Expected date: 06/18/2020  -     CBC auto differential; Future; Expected date: 06/18/2020    Need for shingles vaccine  -     varicella-zoster gE-AS01B, PF, (SHINGRIX) 50 mcg/0.5 mL injection; Inject 0.5 mLs into the muscle once. for 1 dose  Dispense: 0.5 mL; Refill: 0    Contact dermatitis, unspecified contact dermatitis type, unspecified trigger  Pt has stopped washing his clothes fabulouso   Improving since            No follow-ups on file.  "

## 2020-06-19 LAB
ESTIMATED AVG GLUCOSE: 131 MG/DL (ref 68–131)
HBA1C MFR BLD HPLC: 6.2 % (ref 4–5.6)

## 2020-06-21 NOTE — PROGRESS NOTES
Chief Complaint   Patient presents with    Numbness     both legs        Mirza GOOD Do is a 64 y.o. male with a history of multiple medical diagnoses as listed below that presents for evaluation of numbness and tingling in his feet.  The patient has a history of type 2 diabetes that he has been working to control with his primary care doctor.  He has been complaining of pain, numbness, tingling, and burning sensation which have been mainly prominent along the bottom of the feet occasionally radiating upwards.  He says symptoms have been somewhat progressive over the last several months versus prompted him to discuss this with his healthcare team.  He says that symptoms have been intense enough at time that he does not want to walk on his feet.  His strength is been preserved although the pain has limited his mobility at times..     PAST MEDICAL HISTORY:  Past Medical History:   Diagnosis Date    CVA (cerebral infarction)     CVA approx 2011/2012    Diabetes mellitus type II, non insulin dependent 10/14/2016    Erectile dysfunction     Erectile Dysfunction    Hyperlipidemia 10/14/2016    Hypertension     Hypertension    Murmur     Nuclear sclerotic cataract of both eyes 8/7/2019    Polyneuropathy     Renal cyst, left 1/8/2020    Sleep apnea     Trouble in sleeping     Type 2 diabetes mellitus        PAST SURGICAL HISTORY:  Past Surgical History:   Procedure Laterality Date    BACK SURGERY      discectomy    COLONOSCOPY N/A 8/4/2017    Procedure: COLONOSCOPY;  Surgeon: Mario Alberto Vega MD;  Location: [facility] ([address] [place] [address]);  Service: Endoscopy;  Laterality: N/A;  Conflict with Jack's schedule no other times available with CRS, Pt requested 8/4/17 - moved to be scoped with GI MD - ER    ROOT CANAL      UMBILICAL HERNIA REPAIR         SOCIAL HISTORY:  Social History     Socioeconomic History    Marital status: Single     Spouse name: Not on file    Number of children: Not on file    Years of education:  Not on file    Highest education level: Not on file   Occupational History    Not on file   Social Needs    Financial resource strain: Not on file    Food insecurity     Worry: Not on file     Inability: Not on file    Transportation needs     Medical: Not on file     Non-medical: Not on file   Tobacco Use    Smoking status: Former Smoker     Packs/day: 0.10     Years: 4.00     Pack years: 0.40     Quit date: 1995     Years since quittin.2    Smokeless tobacco: Never Used    Tobacco comment: social smoking as teenager   Substance and Sexual Activity    Alcohol use: Yes     Comment: beer 6 pack in a month; red wine 2x a month     Drug use: No    Sexual activity: Yes     Partners: Female   Lifestyle    Physical activity     Days per week: Not on file     Minutes per session: Not on file    Stress: Not at all   Relationships    Social connections     Talks on phone: Not on file     Gets together: Not on file     Attends Anabaptist service: Not on file     Active member of club or organization: Not on file     Attends meetings of clubs or organizations: Not on file     Relationship status: Not on file   Other Topics Concern    Not on file   Social History Narrative           FAMILY HISTORY:  Family History   Problem Relation Age of Onset    No Known Problems Father     Hypertension Brother     Cancer Brother         lung     Cancer Mother         smoker     Emphysema Sister     Diabetes Sister     No Known Problems Sister     No Known Problems Sister     No Known Problems Maternal Grandmother     No Known Problems Maternal Grandfather     No Known Problems Paternal Grandmother     No Known Problems Paternal Grandfather     No Known Problems Maternal Aunt     No Known Problems Maternal Uncle     No Known Problems Paternal Aunt     No Known Problems Paternal Uncle     Amblyopia Neg Hx     Blindness Neg Hx     Cataracts Neg Hx     Glaucoma Neg Hx      Macular degeneration Neg Hx     Retinal detachment Neg Hx     Strabismus Neg Hx     Stroke Neg Hx     Thyroid disease Neg Hx        ALLERGIES AND MEDICATIONS: updated and reviewed.  Review of patient's allergies indicates:  No Known Allergies  Current Outpatient Medications   Medication Sig Dispense Refill    ACCU-CHEK STEPHON PLUS TEST STRP Strp 1 strip by Misc.(Non-Drug; Combo Route) route daily as needed (alternate testing time). 50 strip 11    ACCU-CHEK FASTCLIX Misc 1 lancet by Misc.(Non-Drug; Combo Route) route daily as needed. 50 each 11    acetaminophen (TYLENOL) 650 MG TbSR Take 1 tablet (650 mg total) by mouth every 8 (eight) hours as needed. 30 tablet 0    aspirin 81 MG Chew Take 81 mg by mouth once daily.      atorvastatin (LIPITOR) 40 MG tablet Take 1 tablet (40 mg total) by mouth every evening. 90 tablet 3    fluticasone propionate (FLONASE) 50 mcg/actuation nasal spray 1 spray (50 mcg total) by Each Nare route 2 (two) times daily as needed for Rhinitis. 15 g 0    gabapentin (NEURONTIN) 300 MG capsule Take 2 capsules (600 mg total) by mouth 3 (three) times daily. 180 capsule 11    hydroCHLOROthiazide (HYDRODIURIL) 25 MG tablet Take 1 tablet by mouth once daily 90 tablet 0    lidocaine (LIDODERM) 5 % Place 1 patch onto the skin once daily. Remove & Discard patch within 12 hours or as directed by MD 15 patch 0    losartan (COZAAR) 100 MG tablet Take 1 tablet by mouth once daily 90 tablet 0    losartan-hydrochlorothiazide 100-25 mg (HYZAAR) 100-25 mg per tablet TAKE 1 TABLET BY MOUTH ONCE DAILY 90 tablet 3    meloxicam (MOBIC) 15 MG tablet Take 1 tablet (15 mg total) by mouth daily as needed for Pain. 30 tablet 0    metFORMIN (GLUCOPHAGE) 500 MG tablet TAKE 1 TABLET BY MOUTH TWICE DAILY WITH MEALS 180 tablet 0    metoprolol succinate (TOPROL-XL) 50 MG 24 hr tablet Take 1 tablet (50 mg total) by mouth once daily. 90 tablet 1    naproxen (NAPROSYN) 500 MG tablet Take 1 tablet (500 mg total)  by mouth 2 (two) times daily with meals. 30 tablet 0    topiramate (TOPAMAX) 100 MG tablet Take 1 tablet (100 mg total) by mouth 2 (two) times daily. 180 tablet 1    VIAGRA 100 mg tablet TAKE 1 TABLET BY MOUTH AS NEEDED FOR ERECTILE DYSFUNCTION 8 tablet 3    zolpidem (AMBIEN) 10 mg Tab Take 1 tablet (10 mg total) by mouth nightly as needed. 30 tablet 2    diphenhydrAMINE (BENADRYL) 50 MG capsule Take 1 capsule (50 mg total) by mouth every 6 (six) hours as needed for Itching or Allergies. 20 capsule 0    levalbuterol (XOPENEX HFA) 45 mcg/actuation inhaler Inhale 1-2 puffs into the lungs every 6 (six) hours as needed for Wheezing or Shortness of Breath. Rescue (Patient not taking: Reported on 2/3/2020) 15 g 0    levocetirizine (XYZAL) 5 MG tablet Take 1 tablet (5 mg total) by mouth every evening. 30 tablet 0    valACYclovir (VALTREX) 1000 MG tablet Take 1 tablet (1,000 mg total) by mouth 3 (three) times daily. for 7 days 21 tablet 0     No current facility-administered medications for this visit.        Review of Systems   Constitutional: Negative for activity change, fatigue and unexpected weight change.   HENT: Negative for trouble swallowing and voice change.    Eyes: Negative for photophobia, pain and visual disturbance.   Respiratory: Negative for apnea and shortness of breath.    Cardiovascular: Negative for chest pain and palpitations.   Gastrointestinal: Negative for constipation, nausea and vomiting.   Genitourinary: Negative for difficulty urinating.   Musculoskeletal: Negative for arthralgias, back pain, gait problem, myalgias and neck pain.   Skin: Negative for color change and rash.   Neurological: Positive for numbness. Negative for dizziness, seizures, syncope, speech difficulty, weakness, light-headedness and headaches.   Psychiatric/Behavioral: Negative for agitation, behavioral problems and confusion.       Neurologic Exam     Mental Status   Oriented to person, place, and time.    Registration: recalls 3 of 3 objects.   Attention: normal. Concentration: normal.   Speech: speech is normal   Level of consciousness: alert  Knowledge: good.     Cranial Nerves     CN II   Visual fields full to confrontation.   Right visual field deficit: none  Left visual field deficit: none     CN III, IV, VI   Pupils are equal, round, and reactive to light.  Extraocular motions are normal.   Right pupil: Size: 3 mm. Shape: regular. Accommodation: intact.   Left pupil: Size: 3 mm. Shape: regular. Accommodation: intact.   CN III: no CN III palsy  CN VI: no CN VI palsy  Nystagmus: none   Diplopia: none  Ophthalmoparesis: none  Upgaze: normal  Downgaze: normal  Conjugate gaze: present    CN V   Facial sensation intact.   Right facial sensation deficit: none  Left facial sensation deficit: none    CN VII   Facial expression full, symmetric.   Right facial weakness: none  Left facial weakness: none    CN VIII   CN VIII normal.     CN IX, X   CN IX normal.   CN X normal.   Palate: symmetric    CN XI   CN XI normal.   Right sternocleidomastoid strength: normal  Left sternocleidomastoid strength: normal  Right trapezius strength: normal  Left trapezius strength: normal    CN XII   CN XII normal.   Tongue deviation: none    Motor Exam   Muscle bulk: normal  Overall muscle tone: normal  Right arm tone: normal  Left arm tone: normal  Right leg tone: normal  Left leg tone: normal    Strength   Strength 5/5 throughout.     Sensory Exam   Right arm light touch: normal  Left arm light touch: normal  Right leg light touch: normal  Left leg light touch: normal  Right arm vibration: normal  Left arm vibration: normal  Right leg vibration: normal  Left leg vibration: normal  Right arm proprioception: normal  Left arm proprioception: normal  Right leg proprioception: normal  Left leg proprioception: normal  Right arm pinprick: normal  Left arm pinprick: normal  Right leg pinprick: decreased from toes  Left leg pinprick: decreased  "from toes    Gait, Coordination, and Reflexes     Gait  Gait: normal    Coordination   Romberg: negative  Finger to nose coordination: normal  Heel to shin coordination: normal  Tandem walking coordination: normal    Tremor   Resting tremor: absent    Reflexes   Right brachioradialis: 2+  Left brachioradialis: 2+  Right biceps: 2+  Left biceps: 2+  Right triceps: 2+  Left triceps: 2+  Right patellar: 2+  Left patellar: 2+  Right achilles: 2+  Left achilles: 2+  Right plantar: normal  Left plantar: normal      Physical Exam  Constitutional:       Appearance: He is well-developed.   HENT:      Head: Normocephalic and atraumatic.   Eyes:      Extraocular Movements: EOM normal.      Pupils: Pupils are equal, round, and reactive to light.   Pulmonary:      Effort: Pulmonary effort is normal. No respiratory distress.   Musculoskeletal: Normal range of motion.   Skin:     General: Skin is warm and dry.   Neurological:      Mental Status: He is alert and oriented to person, place, and time.      Coordination: Finger-Nose-Finger Test, Heel to Shin Test and Romberg Test normal.      Gait: Gait is intact. Tandem walk normal.      Deep Tendon Reflexes: Strength normal.      Reflex Scores:       Tricep reflexes are 2+ on the right side and 2+ on the left side.       Bicep reflexes are 2+ on the right side and 2+ on the left side.       Brachioradialis reflexes are 2+ on the right side and 2+ on the left side.       Patellar reflexes are 2+ on the right side and 2+ on the left side.       Achilles reflexes are 2+ on the right side and 2+ on the left side.  Psychiatric:         Speech: Speech normal.         Behavior: Behavior normal.         Vitals:    06/11/20 1105   BP: 136/79   BP Location: Right arm   Patient Position: Sitting   BP Method: Large (Automatic)   Pulse: 72   Weight: 119.7 kg (264 lb)   Height: 5' 8" (1.727 m)       Assessment & Plan:    Problem List Items Addressed This Visit     Type 2 diabetes mellitus with " diabetic neuropathy, without long-term current use of insulin    Overview     Numbness and tingling likely due to a predominantly small fiber neuropathy secondary to diabetes.  Try symptomatic treatment with gabapentin.  If the patient cannot be afford any relief than EMG/nerve conduction studies can be considered to discern there is any other type of neuropathy which may be causing his symptoms.         Relevant Medications    gabapentin (NEURONTIN) 300 MG capsule      Other Visit Diagnoses     Numbness and tingling of left leg            More than 50% of this 45 minute encounter was spent in counseling and coordinating care of diabetic peripheral neuropathy.    Follow-up: Follow up in about 3 months (around 9/11/2020).    This note was done with the assistance of voice recognition software. Some errors may be present after proofreading.

## 2020-07-23 ENCOUNTER — OFFICE VISIT (OUTPATIENT)
Dept: NEUROLOGY | Facility: CLINIC | Age: 65
End: 2020-07-23
Payer: MEDICARE

## 2020-07-23 VITALS
HEIGHT: 68 IN | DIASTOLIC BLOOD PRESSURE: 82 MMHG | SYSTOLIC BLOOD PRESSURE: 153 MMHG | BODY MASS INDEX: 38.86 KG/M2 | HEART RATE: 62 BPM | WEIGHT: 256.38 LBS

## 2020-07-23 DIAGNOSIS — Z86.73 HISTORY OF CVA (CEREBROVASCULAR ACCIDENT): Primary | ICD-10-CM

## 2020-07-23 DIAGNOSIS — E11.40 TYPE 2 DIABETES MELLITUS WITH DIABETIC NEUROPATHY, WITHOUT LONG-TERM CURRENT USE OF INSULIN: ICD-10-CM

## 2020-07-23 PROCEDURE — 3044F HG A1C LEVEL LT 7.0%: CPT | Mod: CPTII,S$GLB,, | Performed by: NEUROLOGICAL SURGERY

## 2020-07-23 PROCEDURE — 3079F DIAST BP 80-89 MM HG: CPT | Mod: CPTII,S$GLB,, | Performed by: NEUROLOGICAL SURGERY

## 2020-07-23 PROCEDURE — 3044F PR MOST RECENT HEMOGLOBIN A1C LEVEL <7.0%: ICD-10-PCS | Mod: CPTII,S$GLB,, | Performed by: NEUROLOGICAL SURGERY

## 2020-07-23 PROCEDURE — 3077F SYST BP >= 140 MM HG: CPT | Mod: CPTII,S$GLB,, | Performed by: NEUROLOGICAL SURGERY

## 2020-07-23 PROCEDURE — 1101F PR PT FALLS ASSESS DOC 0-1 FALLS W/OUT INJ PAST YR: ICD-10-PCS | Mod: CPTII,S$GLB,, | Performed by: NEUROLOGICAL SURGERY

## 2020-07-23 PROCEDURE — 1159F MED LIST DOCD IN RCRD: CPT | Mod: S$GLB,,, | Performed by: NEUROLOGICAL SURGERY

## 2020-07-23 PROCEDURE — 3077F PR MOST RECENT SYSTOLIC BLOOD PRESSURE >= 140 MM HG: ICD-10-PCS | Mod: CPTII,S$GLB,, | Performed by: NEUROLOGICAL SURGERY

## 2020-07-23 PROCEDURE — 1101F PT FALLS ASSESS-DOCD LE1/YR: CPT | Mod: CPTII,S$GLB,, | Performed by: NEUROLOGICAL SURGERY

## 2020-07-23 PROCEDURE — 99214 OFFICE O/P EST MOD 30 MIN: CPT | Mod: S$GLB,,, | Performed by: NEUROLOGICAL SURGERY

## 2020-07-23 PROCEDURE — 99999 PR PBB SHADOW E&M-EST. PATIENT-LVL III: ICD-10-PCS | Mod: PBBFAC,,, | Performed by: NEUROLOGICAL SURGERY

## 2020-07-23 PROCEDURE — 1159F PR MEDICATION LIST DOCUMENTED IN MEDICAL RECORD: ICD-10-PCS | Mod: S$GLB,,, | Performed by: NEUROLOGICAL SURGERY

## 2020-07-23 PROCEDURE — 99999 PR PBB SHADOW E&M-EST. PATIENT-LVL III: CPT | Mod: PBBFAC,,, | Performed by: NEUROLOGICAL SURGERY

## 2020-07-23 PROCEDURE — 1126F AMNT PAIN NOTED NONE PRSNT: CPT | Mod: S$GLB,,, | Performed by: NEUROLOGICAL SURGERY

## 2020-07-23 PROCEDURE — 3008F PR BODY MASS INDEX (BMI) DOCUMENTED: ICD-10-PCS | Mod: CPTII,S$GLB,, | Performed by: NEUROLOGICAL SURGERY

## 2020-07-23 PROCEDURE — 3079F PR MOST RECENT DIASTOLIC BLOOD PRESSURE 80-89 MM HG: ICD-10-PCS | Mod: CPTII,S$GLB,, | Performed by: NEUROLOGICAL SURGERY

## 2020-07-23 PROCEDURE — 1126F PR PAIN SEVERITY QUANTIFIED, NO PAIN PRESENT: ICD-10-PCS | Mod: S$GLB,,, | Performed by: NEUROLOGICAL SURGERY

## 2020-07-23 PROCEDURE — 3008F BODY MASS INDEX DOCD: CPT | Mod: CPTII,S$GLB,, | Performed by: NEUROLOGICAL SURGERY

## 2020-07-23 PROCEDURE — 99214 PR OFFICE/OUTPT VISIT, EST, LEVL IV, 30-39 MIN: ICD-10-PCS | Mod: S$GLB,,, | Performed by: NEUROLOGICAL SURGERY

## 2020-07-23 NOTE — LETTER
August 13, 2020      JV García  [address] [place] [address]           [facility] [address]  [facility] [address]  Phone: [phone]  Fax: [phone]          Patient: Mirza Do   MR Number: [id number]   YOB: 1955   Date of Visit: 7/23/2020       Dear Jerry Felton:    Thank you for referring Mirza Do to me for evaluation. Attached you will find relevant portions of my assessment and plan of care.    If you have questions, please do not hesitate to call me. I look forward to following Mirza Do along with you.    Sincerely,    Chilo Dodge MD    Enclosure  CC:  No Recipients    If you would like to receive this communication electronically, please contact [email or web address] or [phone] to request more information on [employer] Link access.    For providers and/or their staff who would like to refer a patient to [facility], please contact us through our one-stop-shop provider referral line, Marnie Xie, at [phone].    If you feel you have received this communication in error or would no longer like to receive these types of communications, please e-mail [email or web address]

## 2020-07-24 PROCEDURE — 95886 MUSC TEST DONE W/N TEST COMP: CPT | Mod: S$GLB,,, | Performed by: NEUROLOGICAL SURGERY

## 2020-07-24 PROCEDURE — 95886 PR EMG COMPLETE, W/ NERVE CONDUCTION STUDIES, 5+ MUSCLES: ICD-10-PCS | Mod: S$GLB,,, | Performed by: NEUROLOGICAL SURGERY

## 2020-07-24 PROCEDURE — 95911 PR NERVE CONDUCTION STUDY; 9-10 STUDIES: ICD-10-PCS | Mod: S$GLB,,, | Performed by: NEUROLOGICAL SURGERY

## 2020-07-24 PROCEDURE — 95911 NRV CNDJ TEST 9-10 STUDIES: CPT | Mod: S$GLB,,, | Performed by: NEUROLOGICAL SURGERY

## 2020-07-28 ENCOUNTER — PROCEDURE VISIT (OUTPATIENT)
Dept: NEUROLOGY | Facility: CLINIC | Age: 65
End: 2020-07-28
Payer: MEDICARE

## 2020-07-28 VITALS — HEIGHT: 68 IN | WEIGHT: 256.38 LBS | BODY MASS INDEX: 38.86 KG/M2

## 2020-07-28 DIAGNOSIS — E11.40 TYPE 2 DIABETES MELLITUS WITH DIABETIC NEUROPATHY, WITHOUT LONG-TERM CURRENT USE OF INSULIN: ICD-10-CM

## 2020-08-12 ENCOUNTER — OFFICE VISIT (OUTPATIENT)
Dept: FAMILY MEDICINE | Facility: CLINIC | Age: 65
End: 2020-08-12
Payer: MEDICARE

## 2020-08-12 VITALS
SYSTOLIC BLOOD PRESSURE: 136 MMHG | HEART RATE: 118 BPM | OXYGEN SATURATION: 97 % | BODY MASS INDEX: 39.16 KG/M2 | HEIGHT: 68 IN | WEIGHT: 258.38 LBS | DIASTOLIC BLOOD PRESSURE: 86 MMHG | TEMPERATURE: 99 F

## 2020-08-12 DIAGNOSIS — E66.01 SEVERE OBESITY (BMI 35.0-39.9) WITH COMORBIDITY: ICD-10-CM

## 2020-08-12 DIAGNOSIS — I10 ESSENTIAL HYPERTENSION, BENIGN: Chronic | ICD-10-CM

## 2020-08-12 DIAGNOSIS — B02.21: Primary | ICD-10-CM

## 2020-08-12 PROCEDURE — 99999 PR PBB SHADOW E&M-EST. PATIENT-LVL IV: ICD-10-PCS | Mod: PBBFAC,,, | Performed by: INTERNAL MEDICINE

## 2020-08-12 PROCEDURE — 99214 OFFICE O/P EST MOD 30 MIN: CPT | Mod: S$GLB,,, | Performed by: INTERNAL MEDICINE

## 2020-08-12 PROCEDURE — 3079F DIAST BP 80-89 MM HG: CPT | Mod: CPTII,S$GLB,, | Performed by: INTERNAL MEDICINE

## 2020-08-12 PROCEDURE — 3075F PR MOST RECENT SYSTOLIC BLOOD PRESS GE 130-139MM HG: ICD-10-PCS | Mod: CPTII,S$GLB,, | Performed by: INTERNAL MEDICINE

## 2020-08-12 PROCEDURE — 1126F AMNT PAIN NOTED NONE PRSNT: CPT | Mod: S$GLB,,, | Performed by: INTERNAL MEDICINE

## 2020-08-12 PROCEDURE — 1159F MED LIST DOCD IN RCRD: CPT | Mod: S$GLB,,, | Performed by: INTERNAL MEDICINE

## 2020-08-12 PROCEDURE — 3008F BODY MASS INDEX DOCD: CPT | Mod: CPTII,S$GLB,, | Performed by: INTERNAL MEDICINE

## 2020-08-12 PROCEDURE — 1101F PT FALLS ASSESS-DOCD LE1/YR: CPT | Mod: CPTII,S$GLB,, | Performed by: INTERNAL MEDICINE

## 2020-08-12 PROCEDURE — 99214 PR OFFICE/OUTPT VISIT, EST, LEVL IV, 30-39 MIN: ICD-10-PCS | Mod: S$GLB,,, | Performed by: INTERNAL MEDICINE

## 2020-08-12 PROCEDURE — 3008F PR BODY MASS INDEX (BMI) DOCUMENTED: ICD-10-PCS | Mod: CPTII,S$GLB,, | Performed by: INTERNAL MEDICINE

## 2020-08-12 PROCEDURE — 99999 PR PBB SHADOW E&M-EST. PATIENT-LVL IV: CPT | Mod: PBBFAC,,, | Performed by: INTERNAL MEDICINE

## 2020-08-12 PROCEDURE — 1101F PR PT FALLS ASSESS DOC 0-1 FALLS W/OUT INJ PAST YR: ICD-10-PCS | Mod: CPTII,S$GLB,, | Performed by: INTERNAL MEDICINE

## 2020-08-12 PROCEDURE — 1159F PR MEDICATION LIST DOCUMENTED IN MEDICAL RECORD: ICD-10-PCS | Mod: S$GLB,,, | Performed by: INTERNAL MEDICINE

## 2020-08-12 PROCEDURE — 3079F PR MOST RECENT DIASTOLIC BLOOD PRESSURE 80-89 MM HG: ICD-10-PCS | Mod: CPTII,S$GLB,, | Performed by: INTERNAL MEDICINE

## 2020-08-12 PROCEDURE — 3075F SYST BP GE 130 - 139MM HG: CPT | Mod: CPTII,S$GLB,, | Performed by: INTERNAL MEDICINE

## 2020-08-12 PROCEDURE — 1126F PR PAIN SEVERITY QUANTIFIED, NO PAIN PRESENT: ICD-10-PCS | Mod: S$GLB,,, | Performed by: INTERNAL MEDICINE

## 2020-08-12 RX ORDER — VALACYCLOVIR HYDROCHLORIDE 1 G/1
TABLET, FILM COATED ORAL
Qty: 21 TABLET | Refills: 0 | Status: SHIPPED | OUTPATIENT
Start: 2020-08-12 | End: 2022-04-05

## 2020-08-12 NOTE — PROGRESS NOTES
SUBJECTIVE     No chief complaint on file.      HPI  Mirza Do is a 64 y.o. male with multiple medical diagnoses as listed in the medical history and problem list that presents for evaluation of R ear vesicle x 2 weeks. Pt reports having L ear shingles in 4/2020, but he started with ear fullness in the opposite ear now. Denies any hearing loss, otalgia, tinnitus, or drainage. Denies any fever, chills, or night sweats. He has not taken/applied any meds for his symptoms. Pt is concerned because this is the same way the L ear lesions started.     PAST MEDICAL HISTORY:  Past Medical History:   Diagnosis Date    CVA (cerebral infarction)     CVA approx 2011/2012    Diabetes mellitus type II, non insulin dependent 10/14/2016    Erectile dysfunction     Erectile Dysfunction    Hyperlipidemia 10/14/2016    Hypertension     Hypertension    Murmur     Nuclear sclerotic cataract of both eyes 8/7/2019    Polyneuropathy     Renal cyst, left 1/8/2020    Sleep apnea     Trouble in sleeping     Type 2 diabetes mellitus        PAST SURGICAL HISTORY:  Past Surgical History:   Procedure Laterality Date    BACK SURGERY      discectomy    COLONOSCOPY N/A 8/4/2017    Procedure: COLONOSCOPY;  Surgeon: Mario Alberto Vega MD;  Location: [facility] ([address] [place] [address]);  Service: Endoscopy;  Laterality: N/A;  Conflict with [facility]'s schedule no other times available with CRS, Pt requested 8/4/17 - moved to be scoped with GI MD - ER    ROOT CANAL      UMBILICAL HERNIA REPAIR         SOCIAL HISTORY:  Social History     Socioeconomic History    Marital status: Single     Spouse name: Not on file    Number of children: Not on file    Years of education: Not on file    Highest education level: Not on file   Occupational History    Not on file   Social Needs    Financial resource strain: Not on file    Food insecurity     Worry: Not on file     Inability: Not on file    Transportation needs     Medical: Not on file     Non-medical: Not  on file   Tobacco Use    Smoking status: Former Smoker     Packs/day: 0.10     Years: 4.00     Pack years: 0.40     Quit date: 1995     Years since quittin.3    Smokeless tobacco: Never Used    Tobacco comment: social smoking as teenager   Substance and Sexual Activity    Alcohol use: Yes     Comment: beer 6 pack in a month; red wine 2x a month     Drug use: No    Sexual activity: Yes     Partners: Female   Lifestyle    Physical activity     Days per week: Not on file     Minutes per session: Not on file    Stress: Not at all   Relationships    Social connections     Talks on phone: Not on file     Gets together: Not on file     Attends Congregational service: Not on file     Active member of club or organization: Not on file     Attends meetings of clubs or organizations: Not on file     Relationship status: Not on file   Other Topics Concern    Not on file   Social History Narrative           FAMILY HISTORY:  Family History   Problem Relation Age of Onset    No Known Problems Father     Hypertension Brother     Cancer Brother         lung     Cancer Mother         smoker     Emphysema Sister     Diabetes Sister     No Known Problems Sister     No Known Problems Sister     No Known Problems Maternal Grandmother     No Known Problems Maternal Grandfather     No Known Problems Paternal Grandmother     No Known Problems Paternal Grandfather     No Known Problems Maternal Aunt     No Known Problems Maternal Uncle     No Known Problems Paternal Aunt     No Known Problems Paternal Uncle     Amblyopia Neg Hx     Blindness Neg Hx     Cataracts Neg Hx     Glaucoma Neg Hx     Macular degeneration Neg Hx     Retinal detachment Neg Hx     Strabismus Neg Hx     Stroke Neg Hx     Thyroid disease Neg Hx        ALLERGIES AND MEDICATIONS: updated and reviewed.  Review of patient's allergies indicates:  No Known Allergies  Current Outpatient Medications   Medication Sig  Dispense Refill    ACCU-CHEK STEPHON PLUS TEST STRP Strp 1 strip by Misc.(Non-Drug; Combo Route) route daily as needed (alternate testing time). 50 strip 11    ACCU-CHEK FASTCLIX Misc 1 lancet by Misc.(Non-Drug; Combo Route) route daily as needed. 50 each 11    acetaminophen (TYLENOL) 650 MG TbSR Take 1 tablet (650 mg total) by mouth every 8 (eight) hours as needed. 30 tablet 0    aspirin 81 MG Chew Take 81 mg by mouth once daily.      atorvastatin (LIPITOR) 40 MG tablet Take 1 tablet (40 mg total) by mouth every evening. 90 tablet 3    diphenhydrAMINE (BENADRYL) 50 MG capsule Take 1 capsule (50 mg total) by mouth every 6 (six) hours as needed for Itching or Allergies. 20 capsule 0    fluticasone propionate (FLONASE) 50 mcg/actuation nasal spray 1 spray (50 mcg total) by Each Nare route 2 (two) times daily as needed for Rhinitis. 15 g 0    gabapentin (NEURONTIN) 300 MG capsule Take 2 capsules (600 mg total) by mouth 3 (three) times daily. 180 capsule 11    hydroCHLOROthiazide (HYDRODIURIL) 25 MG tablet Take 1 tablet by mouth once daily 90 tablet 0    levocetirizine (XYZAL) 5 MG tablet Take 1 tablet (5 mg total) by mouth every evening. 30 tablet 0    lidocaine (LIDODERM) 5 % Place 1 patch onto the skin once daily. Remove & Discard patch within 12 hours or as directed by MD 15 patch 0    losartan (COZAAR) 100 MG tablet Take 1 tablet by mouth once daily 90 tablet 0    losartan-hydrochlorothiazide 100-25 mg (HYZAAR) 100-25 mg per tablet TAKE 1 TABLET BY MOUTH ONCE DAILY 90 tablet 3    meloxicam (MOBIC) 15 MG tablet Take 1 tablet (15 mg total) by mouth daily as needed for Pain. 30 tablet 0    metFORMIN (GLUCOPHAGE) 500 MG tablet TAKE 1 TABLET BY MOUTH TWICE DAILY WITH MEALS 180 tablet 0    metoprolol succinate (TOPROL-XL) 50 MG 24 hr tablet Take 1 tablet (50 mg total) by mouth once daily. 90 tablet 1    naproxen (NAPROSYN) 500 MG tablet Take 1 tablet (500 mg total) by mouth 2 (two) times daily with meals.  "30 tablet 0    topiramate (TOPAMAX) 100 MG tablet Take 1 tablet (100 mg total) by mouth 2 (two) times daily. 180 tablet 1    valACYclovir (VALTREX) 1000 MG tablet TAKE 1 TABLET BY MOUTH THREE TIMES DAILY FOR 7 DAYS 21 tablet 0    VIAGRA 100 mg tablet TAKE 1 TABLET BY MOUTH AS NEEDED FOR ERECTILE DYSFUNCTION 8 tablet 3    zolpidem (AMBIEN) 10 mg Tab Take 1 tablet (10 mg total) by mouth nightly as needed. 30 tablet 2    levalbuterol (XOPENEX HFA) 45 mcg/actuation inhaler Inhale 1-2 puffs into the lungs every 6 (six) hours as needed for Wheezing or Shortness of Breath. Rescue (Patient not taking: Reported on 2/3/2020) 15 g 0     No current facility-administered medications for this visit.        ROS  Review of Systems   Constitutional: Negative for chills and fever.   HENT: Negative for hearing loss and sore throat.         R ear fullness   Eyes: Negative for visual disturbance.   Respiratory: Negative for cough and shortness of breath.    Cardiovascular: Negative for chest pain, palpitations and leg swelling.   Gastrointestinal: Negative for abdominal pain, constipation, diarrhea, nausea and vomiting.   Genitourinary: Negative for dysuria, frequency and urgency.   Musculoskeletal: Negative for arthralgias, joint swelling and myalgias.   Skin: Positive for rash (R ear vesicle). Negative for wound.   Neurological: Negative for headaches.   Psychiatric/Behavioral: Negative for agitation and confusion. The patient is not nervous/anxious.          OBJECTIVE     Physical Exam  Vitals:    08/12/20 1127   BP: 136/86   Pulse: (!) 118   Temp: 98.6 °F (37 °C)    Body mass index is 39.29 kg/m².  Weight: 117.2 kg (258 lb 6.1 oz)   Height: 5' 8" (172.7 cm)     Physical Exam  Constitutional:       General: He is not in acute distress.     Appearance: He is well-developed.   HENT:      Head: Normocephalic and atraumatic.      Right Ear: Hearing, tympanic membrane and external ear normal. No decreased hearing noted. No swelling " or tenderness.      Left Ear: Hearing, tympanic membrane and external ear normal.      Ears:      Comments: Erythematous vesicular lesion to the R ear canal     Nose: Nose normal.   Eyes:      General: No scleral icterus.        Right eye: No discharge.         Left eye: No discharge.      Conjunctiva/sclera: Conjunctivae normal.   Neck:      Musculoskeletal: Normal range of motion and neck supple.      Vascular: No JVD.      Trachea: No tracheal deviation.   Cardiovascular:      Rate and Rhythm: Normal rate and regular rhythm.      Heart sounds: Normal heart sounds. No murmur. No friction rub. No gallop.    Pulmonary:      Effort: Pulmonary effort is normal. No respiratory distress.      Breath sounds: Normal breath sounds. No wheezing.   Abdominal:      General: Bowel sounds are normal. There is no distension.      Palpations: Abdomen is soft. There is no mass.      Tenderness: There is no abdominal tenderness. There is no guarding or rebound.   Musculoskeletal: Normal range of motion.         General: No tenderness or deformity.   Skin:     General: Skin is warm and dry.      Findings: No erythema or rash.   Neurological:      Mental Status: He is alert and oriented to person, place, and time.      Motor: No abnormal muscle tone.      Coordination: Coordination normal.   Psychiatric:         Behavior: Behavior normal.         Thought Content: Thought content normal.         Judgment: Judgment normal.           Health Maintenance       Date Due Completion Date    Lipid Panel 05/22/2020 5/22/2019    Eye Exam 08/07/2020 8/7/2019    Influenza Vaccine (1) 09/01/2020 10/15/2019    Override on 10/1/2017: Done    Override on 10/4/2016: Done    Override on 11/13/2015: Done    Hemoglobin A1c 12/18/2020 6/18/2020    Foot Exam 03/24/2021 3/24/2020 (Done)    Override on 3/24/2020: Done    High Dose Statin 08/12/2021 8/12/2020    Pneumococcal Vaccine (65+ Low/Medium Risk) (2 of 2 - PPSV23) 06/29/2022 6/27/2019    Colorectal  Cancer Screening 08/04/2027 8/4/2017    TETANUS VACCINE 06/27/2029 6/27/2019            ASSESSMENT     64 y.o. male with     1. Herpes zoster auricularis    2. Essential hypertension, benign    3. Severe obesity (BMI 35.0-39.9) with comorbidity        PLAN:     1. Herpes zoster auricularis  - resume Valtrex as below; patient advised to take to completion  - valACYclovir (VALTREX) 1000 MG tablet; TAKE 1 TABLET BY MOUTH THREE TIMES DAILY FOR 7 DAYS  Dispense: 21 tablet; Refill: 0    2. Essential hypertension, benign  - BP well controlled; at goal of <140/90  - The current medical regimen is effective;  continue present plan and medications.    3. Severe obesity (BMI 35.0-39.9) with comorbidity  - patient aware of importance of eating a prudent diet and exercising        RTC in 1-2 weeks as needed for any acute worsening of current condition or failure to improve       Manjula Honeycutt MD  08/12/2020 11:46 AM        No follow-ups on file.               36.9

## 2020-08-13 NOTE — PROGRESS NOTES
Chief Complaint   Patient presents with    Numbness        Mirza GOOD Do is a 64 y.o. male with a history of multiple medical diagnoses as listed below that presents for evaluation of numbness and tingling in his feet.  The patient has a history of type 2 diabetes that he has been working to control with his primary care doctor.  He has been complaining of pain, numbness, tingling, and burning sensation which have been mainly prominent along the bottom of the feet occasionally radiating upwards.  He says symptoms have been somewhat progressive over the last several months versus prompted him to discuss this with his healthcare team.  He says that symptoms have been intense enough at time that he does not want to walk on his feet.  His strength is been preserved although the pain has limited his mobility at times.    Interval History  07/23/2020  Despite compliance with medication he continues to have numbness and tingling a few which seems to be getting worse progressively.  The patient says that he has not noticed any significant improvement with his current medication regimen.     PAST MEDICAL HISTORY:  Past Medical History:   Diagnosis Date    CVA (cerebral infarction)     CVA approx 2011/2012    Diabetes mellitus type II, non insulin dependent 10/14/2016    Erectile dysfunction     Erectile Dysfunction    Hyperlipidemia 10/14/2016    Hypertension     Hypertension    Murmur     Nuclear sclerotic cataract of both eyes 8/7/2019    Polyneuropathy     Renal cyst, left 1/8/2020    Sleep apnea     Trouble in sleeping     Type 2 diabetes mellitus        PAST SURGICAL HISTORY:  Past Surgical History:   Procedure Laterality Date    BACK SURGERY      discectomy    COLONOSCOPY N/A 8/4/2017    Procedure: COLONOSCOPY;  Surgeon: Mario Alberto Vega MD;  Location: [facility] ([address] [place] [address]);  Service: Endoscopy;  Laterality: N/A;  Conflict with Jack's schedule no other times available with CRS, Pt requested 8/4/17 - moved  to be scoped with GI MD - ER    ROOT CANAL      UMBILICAL HERNIA REPAIR         SOCIAL HISTORY:  Social History     Socioeconomic History    Marital status: Single     Spouse name: Not on file    Number of children: Not on file    Years of education: Not on file    Highest education level: Not on file   Occupational History    Not on file   Social Needs    Financial resource strain: Not on file    Food insecurity     Worry: Not on file     Inability: Not on file    Transportation needs     Medical: Not on file     Non-medical: Not on file   Tobacco Use    Smoking status: Former Smoker     Packs/day: 0.10     Years: 4.00     Pack years: 0.40     Quit date: 1995     Years since quittin.3    Smokeless tobacco: Never Used    Tobacco comment: social smoking as teenager   Substance and Sexual Activity    Alcohol use: Yes     Comment: beer 6 pack in a month; red wine 2x a month     Drug use: No    Sexual activity: Yes     Partners: Female   Lifestyle    Physical activity     Days per week: Not on file     Minutes per session: Not on file    Stress: Not at all   Relationships    Social connections     Talks on phone: Not on file     Gets together: Not on file     Attends Latter day service: Not on file     Active member of club or organization: Not on file     Attends meetings of clubs or organizations: Not on file     Relationship status: Not on file   Other Topics Concern    Not on file   Social History Narrative           FAMILY HISTORY:  Family History   Problem Relation Age of Onset    No Known Problems Father     Hypertension Brother     Cancer Brother         lung     Cancer Mother         smoker     Emphysema Sister     Diabetes Sister     No Known Problems Sister     No Known Problems Sister     No Known Problems Maternal Grandmother     No Known Problems Maternal Grandfather     No Known Problems Paternal Grandmother     No Known Problems Paternal  Grandfather     No Known Problems Maternal Aunt     No Known Problems Maternal Uncle     No Known Problems Paternal Aunt     No Known Problems Paternal Uncle     Amblyopia Neg Hx     Blindness Neg Hx     Cataracts Neg Hx     Glaucoma Neg Hx     Macular degeneration Neg Hx     Retinal detachment Neg Hx     Strabismus Neg Hx     Stroke Neg Hx     Thyroid disease Neg Hx        ALLERGIES AND MEDICATIONS: updated and reviewed.  Review of patient's allergies indicates:  No Known Allergies  Current Outpatient Medications   Medication Sig Dispense Refill    ACCU-CHEK STEPHON PLUS TEST STRP Strp 1 strip by Misc.(Non-Drug; Combo Route) route daily as needed (alternate testing time). 50 strip 11    ACCU-CHEK FASTCLIX Misc 1 lancet by Misc.(Non-Drug; Combo Route) route daily as needed. 50 each 11    acetaminophen (TYLENOL) 650 MG TbSR Take 1 tablet (650 mg total) by mouth every 8 (eight) hours as needed. 30 tablet 0    aspirin 81 MG Chew Take 81 mg by mouth once daily.      atorvastatin (LIPITOR) 40 MG tablet Take 1 tablet (40 mg total) by mouth every evening. 90 tablet 3    diphenhydrAMINE (BENADRYL) 50 MG capsule Take 1 capsule (50 mg total) by mouth every 6 (six) hours as needed for Itching or Allergies. 20 capsule 0    fluticasone propionate (FLONASE) 50 mcg/actuation nasal spray 1 spray (50 mcg total) by Each Nare route 2 (two) times daily as needed for Rhinitis. 15 g 0    gabapentin (NEURONTIN) 300 MG capsule Take 2 capsules (600 mg total) by mouth 3 (three) times daily. 180 capsule 11    hydroCHLOROthiazide (HYDRODIURIL) 25 MG tablet Take 1 tablet by mouth once daily 90 tablet 0    levalbuterol (XOPENEX HFA) 45 mcg/actuation inhaler Inhale 1-2 puffs into the lungs every 6 (six) hours as needed for Wheezing or Shortness of Breath. Rescue (Patient not taking: Reported on 2/3/2020) 15 g 0    levocetirizine (XYZAL) 5 MG tablet Take 1 tablet (5 mg total) by mouth every evening. 30 tablet 0    lidocaine  (LIDODERM) 5 % Place 1 patch onto the skin once daily. Remove & Discard patch within 12 hours or as directed by MD 15 patch 0    losartan (COZAAR) 100 MG tablet Take 1 tablet by mouth once daily 90 tablet 0    losartan-hydrochlorothiazide 100-25 mg (HYZAAR) 100-25 mg per tablet TAKE 1 TABLET BY MOUTH ONCE DAILY 90 tablet 3    meloxicam (MOBIC) 15 MG tablet Take 1 tablet (15 mg total) by mouth daily as needed for Pain. 30 tablet 0    metFORMIN (GLUCOPHAGE) 500 MG tablet TAKE 1 TABLET BY MOUTH TWICE DAILY WITH MEALS 180 tablet 0    metoprolol succinate (TOPROL-XL) 50 MG 24 hr tablet Take 1 tablet (50 mg total) by mouth once daily. 90 tablet 1    naproxen (NAPROSYN) 500 MG tablet Take 1 tablet (500 mg total) by mouth 2 (two) times daily with meals. 30 tablet 0    topiramate (TOPAMAX) 100 MG tablet Take 1 tablet (100 mg total) by mouth 2 (two) times daily. 180 tablet 1    valACYclovir (VALTREX) 1000 MG tablet TAKE 1 TABLET BY MOUTH THREE TIMES DAILY FOR 7 DAYS 21 tablet 0    VIAGRA 100 mg tablet TAKE 1 TABLET BY MOUTH AS NEEDED FOR ERECTILE DYSFUNCTION 8 tablet 3    zolpidem (AMBIEN) 10 mg Tab Take 1 tablet (10 mg total) by mouth nightly as needed. 30 tablet 2     No current facility-administered medications for this visit.        Review of Systems   Constitutional: Negative for activity change, fatigue and unexpected weight change.   HENT: Negative for trouble swallowing and voice change.    Eyes: Negative for photophobia, pain and visual disturbance.   Respiratory: Negative for apnea and shortness of breath.    Cardiovascular: Negative for chest pain and palpitations.   Gastrointestinal: Negative for constipation, nausea and vomiting.   Genitourinary: Negative for difficulty urinating.   Musculoskeletal: Negative for arthralgias, back pain, gait problem, myalgias and neck pain.   Skin: Negative for color change and rash.   Neurological: Positive for numbness. Negative for dizziness, seizures, syncope, speech  difficulty, weakness, light-headedness and headaches.   Psychiatric/Behavioral: Negative for agitation, behavioral problems and confusion.       Neurologic Exam     Mental Status   Oriented to person, place, and time.   Registration: recalls 3 of 3 objects.   Attention: normal. Concentration: normal.   Speech: speech is normal   Level of consciousness: alert  Knowledge: good.     Cranial Nerves     CN II   Visual fields full to confrontation.   Right visual field deficit: none  Left visual field deficit: none     CN III, IV, VI   Pupils are equal, round, and reactive to light.  Extraocular motions are normal.   Right pupil: Size: 3 mm. Shape: regular. Accommodation: intact.   Left pupil: Size: 3 mm. Shape: regular. Accommodation: intact.   CN III: no CN III palsy  CN VI: no CN VI palsy  Nystagmus: none   Diplopia: none  Ophthalmoparesis: none  Upgaze: normal  Downgaze: normal  Conjugate gaze: present    CN V   Facial sensation intact.   Right facial sensation deficit: none  Left facial sensation deficit: none    CN VII   Facial expression full, symmetric.   Right facial weakness: none  Left facial weakness: none    CN VIII   CN VIII normal.     CN IX, X   CN IX normal.   CN X normal.   Palate: symmetric    CN XI   CN XI normal.   Right sternocleidomastoid strength: normal  Left sternocleidomastoid strength: normal  Right trapezius strength: normal  Left trapezius strength: normal    CN XII   CN XII normal.   Tongue deviation: none    Motor Exam   Muscle bulk: normal  Overall muscle tone: normal  Right arm tone: normal  Left arm tone: normal  Right leg tone: normal  Left leg tone: normal    Strength   Strength 5/5 throughout.     Sensory Exam   Right arm light touch: normal  Left arm light touch: normal  Right leg light touch: normal  Left leg light touch: normal  Right arm vibration: normal  Left arm vibration: normal  Right leg vibration: normal  Left leg vibration: normal  Right arm proprioception: normal  Left  arm proprioception: normal  Right leg proprioception: normal  Left leg proprioception: normal  Right arm pinprick: normal  Left arm pinprick: normal  Right leg pinprick: decreased from toes  Left leg pinprick: decreased from toes    Gait, Coordination, and Reflexes     Gait  Gait: normal    Coordination   Romberg: negative  Finger to nose coordination: normal  Heel to shin coordination: normal  Tandem walking coordination: normal    Tremor   Resting tremor: absent    Reflexes   Right brachioradialis: 2+  Left brachioradialis: 2+  Right biceps: 2+  Left biceps: 2+  Right triceps: 2+  Left triceps: 2+  Right patellar: 2+  Left patellar: 2+  Right achilles: 2+  Left achilles: 2+  Right plantar: normal  Left plantar: normal      Physical Exam  Constitutional:       Appearance: He is well-developed.   HENT:      Head: Normocephalic and atraumatic.   Eyes:      Extraocular Movements: EOM normal.      Pupils: Pupils are equal, round, and reactive to light.   Pulmonary:      Effort: Pulmonary effort is normal. No respiratory distress.   Musculoskeletal: Normal range of motion.   Skin:     General: Skin is warm and dry.   Neurological:      Mental Status: He is alert and oriented to person, place, and time.      Coordination: Finger-Nose-Finger Test, Heel to Shin Test and Romberg Test normal.      Gait: Gait is intact. Tandem walk normal.      Deep Tendon Reflexes: Strength normal.      Reflex Scores:       Tricep reflexes are 2+ on the right side and 2+ on the left side.       Bicep reflexes are 2+ on the right side and 2+ on the left side.       Brachioradialis reflexes are 2+ on the right side and 2+ on the left side.       Patellar reflexes are 2+ on the right side and 2+ on the left side.       Achilles reflexes are 2+ on the right side and 2+ on the left side.  Psychiatric:         Speech: Speech normal.         Behavior: Behavior normal.         Vitals:    07/23/20 1333   BP: (!) 153/82   BP Location: Right arm  "  Patient Position: Sitting   BP Method: Large (Automatic)   Pulse: 62   Weight: 116.3 kg (256 lb 6.3 oz)   Height: 5' 8" (1.727 m)       Assessment & Plan:    Problem List Items Addressed This Visit     History of CVA (cerebrovascular accident) - Primary    Overview     CVA         Type 2 diabetes mellitus with diabetic neuropathy, without long-term current use of insulin    Overview     Numbness and tingling likely due to a predominantly small fiber neuropathy secondary to diabetes.  Try symptomatic treatment with gabapentin.  If the patient cannot be afford any relief than EMG/nerve conduction studies can be considered to discern there is any other type of neuropathy which may be causing his symptoms.         Relevant Orders    EMG W/ ULTRASOUND AND NERVE CONDUCTION TEST 2 Extremities        More than 50% of this 25 minute encounter was spent in counseling and coordinating care of diabetic peripheral neuropathy.    Follow-up: Follow up in about 1 month (around 8/23/2020) for EMG/NCS.    This note was done with the assistance of voice recognition software. Some errors may be present after proofreading.          "

## 2020-09-27 DIAGNOSIS — E11.40 TYPE 2 DIABETES MELLITUS WITH DIABETIC NEUROPATHY, WITHOUT LONG-TERM CURRENT USE OF INSULIN: ICD-10-CM

## 2020-09-28 RX ORDER — METFORMIN HYDROCHLORIDE 500 MG/1
TABLET ORAL
Qty: 180 TABLET | Refills: 0 | Status: SHIPPED | OUTPATIENT
Start: 2020-09-28 | End: 2021-06-22

## 2020-10-05 ENCOUNTER — PATIENT MESSAGE (OUTPATIENT)
Dept: ADMINISTRATIVE | Facility: HOSPITAL | Age: 65
End: 2020-10-05

## 2020-11-09 PROBLEM — H25.13 NUCLEAR SCLEROSIS OF BOTH EYES: Status: ACTIVE | Noted: 2020-11-09

## 2020-11-10 ENCOUNTER — OFFICE VISIT (OUTPATIENT)
Dept: OPTOMETRY | Facility: CLINIC | Age: 65
End: 2020-11-10
Payer: MEDICARE

## 2020-11-10 DIAGNOSIS — E11.36 TYPE 2 DIABETES MELLITUS WITH DIABETIC CATARACT, WITHOUT LONG-TERM CURRENT USE OF INSULIN: Primary | ICD-10-CM

## 2020-11-10 DIAGNOSIS — H25.13 NUCLEAR SCLEROSIS OF BOTH EYES: ICD-10-CM

## 2020-11-10 PROCEDURE — 99999 PR PBB SHADOW E&M-EST. PATIENT-LVL III: CPT | Mod: PBBFAC,,, | Performed by: OPTOMETRIST

## 2020-11-10 PROCEDURE — 99499 UNLISTED E&M SERVICE: CPT | Mod: S$GLB,,, | Performed by: OPTOMETRIST

## 2020-11-10 PROCEDURE — 99999 PR PBB SHADOW E&M-EST. PATIENT-LVL III: ICD-10-PCS | Mod: PBBFAC,,, | Performed by: OPTOMETRIST

## 2020-11-10 PROCEDURE — 92014 PR EYE EXAM, EST PATIENT,COMPREHESV: ICD-10-PCS | Mod: S$GLB,,, | Performed by: OPTOMETRIST

## 2020-11-10 PROCEDURE — 92014 COMPRE OPH EXAM EST PT 1/>: CPT | Mod: S$GLB,,, | Performed by: OPTOMETRIST

## 2020-11-10 PROCEDURE — 99499 RISK ADDL DX/OHS AUDIT: ICD-10-PCS | Mod: S$GLB,,, | Performed by: OPTOMETRIST

## 2020-11-10 NOTE — PROGRESS NOTES
Subjective:       Patient ID: Mirza Do is a 65 y.o. male      Chief Complaint   Patient presents with    Concerns About Ocular Health    Diabetic Eye Exam     History of Present Illness  Dls: 8/7/19 Dr. Webster     64 y/o male presents today for diabetic eye exam.   Pt states no changes in vision. Pt does not wear any glasses.     LBS ?     No tearing  No itching  No burning  No pain  No ha's  No floaters  No flashes    Eye meds  None    Hemoglobin A1C       Date                     Value               Ref Range           Status                06/18/2020               6.2 (H)             4.0 - 5.6 %         Final                 10/15/2019               6.3 (H)             4.0 - 5.6 %         Final                 04/17/2019               6.5 (H)             4.0 - 5.6 %         Final                 Assessment/Plan:     1. Type 2 diabetes mellitus with diabetic cataract, without long-term current use of insulin  No diabetic retinopathy. Discussed with pt the effects of diabetes on vision, importance of good blood sugar control, compliance with meds, and follow up care with PCP. Return in 1 year for dilated eye exam, sooner PRN.    2. Nuclear sclerosis of both eyes  Educated pt on presence of cataracts and effects on vision. No surgery at this time. Recheck in one year.      Follow up in about 1 year (around 11/10/2021) for Diabetic Eye Exam.

## 2021-01-05 ENCOUNTER — PATIENT MESSAGE (OUTPATIENT)
Dept: ADMINISTRATIVE | Facility: HOSPITAL | Age: 66
End: 2021-01-05

## 2021-02-03 DIAGNOSIS — E11.9 TYPE 2 DIABETES MELLITUS WITHOUT COMPLICATION: ICD-10-CM

## 2021-02-03 DIAGNOSIS — E11.40 TYPE 2 DIABETES MELLITUS WITH DIABETIC NEUROPATHY, WITHOUT LONG-TERM CURRENT USE OF INSULIN: ICD-10-CM

## 2021-02-13 PROBLEM — I77.1 STRICTURE OF ARTERY: Status: ACTIVE | Noted: 2021-02-13

## 2021-02-13 PROBLEM — I47.10 PAROXYSMAL SUPRAVENTRICULAR TACHYCARDIA: Status: ACTIVE | Noted: 2021-02-13

## 2021-02-13 PROBLEM — I42.5 OTHER RESTRICTIVE CARDIOMYOPATHY: Status: ACTIVE | Noted: 2021-02-13

## 2021-03-17 ENCOUNTER — LAB VISIT (OUTPATIENT)
Dept: LAB | Facility: HOSPITAL | Age: 66
End: 2021-03-17
Attending: INTERNAL MEDICINE
Payer: MEDICARE

## 2021-03-17 ENCOUNTER — OFFICE VISIT (OUTPATIENT)
Dept: FAMILY MEDICINE | Facility: CLINIC | Age: 66
End: 2021-03-17
Payer: MEDICARE

## 2021-03-17 VITALS
HEART RATE: 66 BPM | WEIGHT: 265.88 LBS | OXYGEN SATURATION: 96 % | DIASTOLIC BLOOD PRESSURE: 60 MMHG | BODY MASS INDEX: 40.3 KG/M2 | HEIGHT: 68 IN | SYSTOLIC BLOOD PRESSURE: 130 MMHG | TEMPERATURE: 98 F

## 2021-03-17 DIAGNOSIS — I42.9 CARDIOMYOPATHY, UNSPECIFIED TYPE: ICD-10-CM

## 2021-03-17 DIAGNOSIS — I47.10 SUPRAVENTRICULAR TACHYCARDIA: ICD-10-CM

## 2021-03-17 DIAGNOSIS — E11.36 TYPE 2 DIABETES MELLITUS WITH DIABETIC CATARACT, WITHOUT LONG-TERM CURRENT USE OF INSULIN: ICD-10-CM

## 2021-03-17 DIAGNOSIS — I77.1 STRICTURE OF ARTERY: ICD-10-CM

## 2021-03-17 DIAGNOSIS — R22.43 LOCALIZED SWELLING OF BOTH LOWER LEGS: ICD-10-CM

## 2021-03-17 DIAGNOSIS — J43.9 PULMONARY EMPHYSEMA, UNSPECIFIED EMPHYSEMA TYPE: ICD-10-CM

## 2021-03-17 DIAGNOSIS — I10 ESSENTIAL HYPERTENSION, BENIGN: Chronic | ICD-10-CM

## 2021-03-17 DIAGNOSIS — G47.00 INSOMNIA, UNSPECIFIED TYPE: Primary | ICD-10-CM

## 2021-03-17 PROBLEM — E66.01 SEVERE OBESITY (BMI 35.0-39.9) WITH COMORBIDITY: Status: RESOLVED | Noted: 2018-04-30 | Resolved: 2021-03-17

## 2021-03-17 LAB
ALBUMIN SERPL BCP-MCNC: 3.7 G/DL (ref 3.5–5.2)
ALP SERPL-CCNC: 112 U/L (ref 55–135)
ALT SERPL W/O P-5'-P-CCNC: 35 U/L (ref 10–44)
ANION GAP SERPL CALC-SCNC: 10 MMOL/L (ref 8–16)
AST SERPL-CCNC: 27 U/L (ref 10–40)
BASOPHILS # BLD AUTO: 0.02 K/UL (ref 0–0.2)
BASOPHILS NFR BLD: 0.3 % (ref 0–1.9)
BILIRUB SERPL-MCNC: 0.6 MG/DL (ref 0.1–1)
BNP SERPL-MCNC: 15 PG/ML (ref 0–99)
BUN SERPL-MCNC: 12 MG/DL (ref 8–23)
CALCIUM SERPL-MCNC: 9.1 MG/DL (ref 8.7–10.5)
CHLORIDE SERPL-SCNC: 108 MMOL/L (ref 95–110)
CHOLEST SERPL-MCNC: 205 MG/DL (ref 120–199)
CHOLEST/HDLC SERPL: 4.8 {RATIO} (ref 2–5)
CO2 SERPL-SCNC: 27 MMOL/L (ref 23–29)
CREAT SERPL-MCNC: 0.8 MG/DL (ref 0.5–1.4)
DIFFERENTIAL METHOD: ABNORMAL
EOSINOPHIL # BLD AUTO: 0.1 K/UL (ref 0–0.5)
EOSINOPHIL NFR BLD: 1.6 % (ref 0–8)
ERYTHROCYTE [DISTWIDTH] IN BLOOD BY AUTOMATED COUNT: 15.8 % (ref 11.5–14.5)
EST. GFR  (AFRICAN AMERICAN): >60 ML/MIN/1.73 M^2
EST. GFR  (NON AFRICAN AMERICAN): >60 ML/MIN/1.73 M^2
GLUCOSE SERPL-MCNC: 86 MG/DL (ref 70–110)
HCT VFR BLD AUTO: 43.1 % (ref 40–54)
HDLC SERPL-MCNC: 43 MG/DL (ref 40–75)
HDLC SERPL: 21 % (ref 20–50)
HGB BLD-MCNC: 13.6 G/DL (ref 14–18)
IMM GRANULOCYTES # BLD AUTO: 0.01 K/UL (ref 0–0.04)
IMM GRANULOCYTES NFR BLD AUTO: 0.2 % (ref 0–0.5)
LDLC SERPL CALC-MCNC: 128.4 MG/DL (ref 63–159)
LYMPHOCYTES # BLD AUTO: 2.3 K/UL (ref 1–4.8)
LYMPHOCYTES NFR BLD: 36.7 % (ref 18–48)
MCH RBC QN AUTO: 27 PG (ref 27–31)
MCHC RBC AUTO-ENTMCNC: 31.6 G/DL (ref 32–36)
MCV RBC AUTO: 86 FL (ref 82–98)
MONOCYTES # BLD AUTO: 0.6 K/UL (ref 0.3–1)
MONOCYTES NFR BLD: 8.8 % (ref 4–15)
NEUTROPHILS # BLD AUTO: 3.3 K/UL (ref 1.8–7.7)
NEUTROPHILS NFR BLD: 52.4 % (ref 38–73)
NONHDLC SERPL-MCNC: 162 MG/DL
NRBC BLD-RTO: 0 /100 WBC
PLATELET # BLD AUTO: 239 K/UL (ref 150–350)
PMV BLD AUTO: 11.1 FL (ref 9.2–12.9)
POTASSIUM SERPL-SCNC: 3.8 MMOL/L (ref 3.5–5.1)
PROT SERPL-MCNC: 7.5 G/DL (ref 6–8.4)
RBC # BLD AUTO: 5.03 M/UL (ref 4.6–6.2)
SODIUM SERPL-SCNC: 145 MMOL/L (ref 136–145)
TRIGL SERPL-MCNC: 168 MG/DL (ref 30–150)
TSH SERPL DL<=0.005 MIU/L-ACNC: 1.03 UIU/ML (ref 0.4–4)
WBC # BLD AUTO: 6.26 K/UL (ref 3.9–12.7)

## 2021-03-17 PROCEDURE — 1159F MED LIST DOCD IN RCRD: CPT | Mod: S$GLB,,, | Performed by: INTERNAL MEDICINE

## 2021-03-17 PROCEDURE — 3044F PR MOST RECENT HEMOGLOBIN A1C LEVEL <7.0%: ICD-10-PCS | Mod: CPTII,S$GLB,, | Performed by: INTERNAL MEDICINE

## 2021-03-17 PROCEDURE — 85025 COMPLETE CBC W/AUTO DIFF WBC: CPT | Performed by: INTERNAL MEDICINE

## 2021-03-17 PROCEDURE — 3075F PR MOST RECENT SYSTOLIC BLOOD PRESS GE 130-139MM HG: ICD-10-PCS | Mod: CPTII,S$GLB,, | Performed by: INTERNAL MEDICINE

## 2021-03-17 PROCEDURE — 84443 ASSAY THYROID STIM HORMONE: CPT | Performed by: INTERNAL MEDICINE

## 2021-03-17 PROCEDURE — 1126F AMNT PAIN NOTED NONE PRSNT: CPT | Mod: S$GLB,,, | Performed by: INTERNAL MEDICINE

## 2021-03-17 PROCEDURE — 99999 PR PBB SHADOW E&M-EST. PATIENT-LVL IV: CPT | Mod: PBBFAC,,, | Performed by: INTERNAL MEDICINE

## 2021-03-17 PROCEDURE — 3288F FALL RISK ASSESSMENT DOCD: CPT | Mod: CPTII,S$GLB,, | Performed by: INTERNAL MEDICINE

## 2021-03-17 PROCEDURE — 99214 PR OFFICE/OUTPT VISIT, EST, LEVL IV, 30-39 MIN: ICD-10-PCS | Mod: S$GLB,,, | Performed by: INTERNAL MEDICINE

## 2021-03-17 PROCEDURE — 3008F PR BODY MASS INDEX (BMI) DOCUMENTED: ICD-10-PCS | Mod: CPTII,S$GLB,, | Performed by: INTERNAL MEDICINE

## 2021-03-17 PROCEDURE — 99499 UNLISTED E&M SERVICE: CPT | Mod: S$GLB,,, | Performed by: INTERNAL MEDICINE

## 2021-03-17 PROCEDURE — 80053 COMPREHEN METABOLIC PANEL: CPT | Performed by: INTERNAL MEDICINE

## 2021-03-17 PROCEDURE — 99499 RISK ADDL DX/OHS AUDIT: ICD-10-PCS | Mod: S$GLB,,, | Performed by: INTERNAL MEDICINE

## 2021-03-17 PROCEDURE — 3078F DIAST BP <80 MM HG: CPT | Mod: CPTII,S$GLB,, | Performed by: INTERNAL MEDICINE

## 2021-03-17 PROCEDURE — 1126F PR PAIN SEVERITY QUANTIFIED, NO PAIN PRESENT: ICD-10-PCS | Mod: S$GLB,,, | Performed by: INTERNAL MEDICINE

## 2021-03-17 PROCEDURE — 3044F HG A1C LEVEL LT 7.0%: CPT | Mod: CPTII,S$GLB,, | Performed by: INTERNAL MEDICINE

## 2021-03-17 PROCEDURE — 1101F PT FALLS ASSESS-DOCD LE1/YR: CPT | Mod: CPTII,S$GLB,, | Performed by: INTERNAL MEDICINE

## 2021-03-17 PROCEDURE — 99214 OFFICE O/P EST MOD 30 MIN: CPT | Mod: S$GLB,,, | Performed by: INTERNAL MEDICINE

## 2021-03-17 PROCEDURE — 83036 HEMOGLOBIN GLYCOSYLATED A1C: CPT | Performed by: INTERNAL MEDICINE

## 2021-03-17 PROCEDURE — 3288F PR FALLS RISK ASSESSMENT DOCUMENTED: ICD-10-PCS | Mod: CPTII,S$GLB,, | Performed by: INTERNAL MEDICINE

## 2021-03-17 PROCEDURE — 3075F SYST BP GE 130 - 139MM HG: CPT | Mod: CPTII,S$GLB,, | Performed by: INTERNAL MEDICINE

## 2021-03-17 PROCEDURE — 99999 PR PBB SHADOW E&M-EST. PATIENT-LVL IV: ICD-10-PCS | Mod: PBBFAC,,, | Performed by: INTERNAL MEDICINE

## 2021-03-17 PROCEDURE — 83880 ASSAY OF NATRIURETIC PEPTIDE: CPT | Performed by: INTERNAL MEDICINE

## 2021-03-17 PROCEDURE — 36415 COLL VENOUS BLD VENIPUNCTURE: CPT | Mod: PO | Performed by: INTERNAL MEDICINE

## 2021-03-17 PROCEDURE — 80061 LIPID PANEL: CPT | Performed by: INTERNAL MEDICINE

## 2021-03-17 PROCEDURE — 3008F BODY MASS INDEX DOCD: CPT | Mod: CPTII,S$GLB,, | Performed by: INTERNAL MEDICINE

## 2021-03-17 PROCEDURE — 1159F PR MEDICATION LIST DOCUMENTED IN MEDICAL RECORD: ICD-10-PCS | Mod: S$GLB,,, | Performed by: INTERNAL MEDICINE

## 2021-03-17 PROCEDURE — 1101F PR PT FALLS ASSESS DOC 0-1 FALLS W/OUT INJ PAST YR: ICD-10-PCS | Mod: CPTII,S$GLB,, | Performed by: INTERNAL MEDICINE

## 2021-03-17 PROCEDURE — 3078F PR MOST RECENT DIASTOLIC BLOOD PRESSURE < 80 MM HG: ICD-10-PCS | Mod: CPTII,S$GLB,, | Performed by: INTERNAL MEDICINE

## 2021-03-17 RX ORDER — HYDROCHLOROTHIAZIDE 25 MG/1
25 TABLET ORAL DAILY
Qty: 90 TABLET | Refills: 1 | Status: SHIPPED | OUTPATIENT
Start: 2021-03-17 | End: 2021-10-28

## 2021-03-17 RX ORDER — ZOLPIDEM TARTRATE 10 MG/1
TABLET ORAL
Qty: 30 TABLET | Refills: 5 | Status: SHIPPED | OUTPATIENT
Start: 2021-03-17 | End: 2021-10-04

## 2021-03-17 RX ORDER — METOPROLOL SUCCINATE 50 MG/1
50 TABLET, EXTENDED RELEASE ORAL DAILY
Qty: 90 TABLET | Refills: 1 | Status: SHIPPED | OUTPATIENT
Start: 2021-03-17 | End: 2021-10-28

## 2021-03-18 LAB
ESTIMATED AVG GLUCOSE: 131 MG/DL (ref 68–131)
HBA1C MFR BLD: 6.2 % (ref 4–5.6)

## 2021-03-19 ENCOUNTER — TELEPHONE (OUTPATIENT)
Dept: FAMILY MEDICINE | Facility: CLINIC | Age: 66
End: 2021-03-19

## 2021-04-16 ENCOUNTER — HOSPITAL ENCOUNTER (OUTPATIENT)
Dept: CARDIOLOGY | Facility: HOSPITAL | Age: 66
Discharge: HOME | End: 2021-04-16
Attending: INTERNAL MEDICINE
Payer: MEDICARE

## 2021-04-16 VITALS — HEIGHT: 68 IN | BODY MASS INDEX: 40.16 KG/M2 | WEIGHT: 265 LBS

## 2021-04-16 DIAGNOSIS — I42.9 CARDIOMYOPATHY, UNSPECIFIED TYPE: ICD-10-CM

## 2021-04-16 DIAGNOSIS — R22.43 LOCALIZED SWELLING OF BOTH LOWER LEGS: ICD-10-CM

## 2021-04-16 LAB
AORTIC ROOT ANNULUS: 3.48 CM
AORTIC VALVE CUSP SEPERATION: 2.07 CM
ASCENDING AORTA: 3.4 CM
AV INDEX (PROSTH): 0.74
AV MEAN GRADIENT: 5 MMHG
AV PEAK GRADIENT: 8 MMHG
AV VALVE AREA: 2.58 CM2
AV VELOCITY RATIO: 0.7
BSA FOR ECHO PROCEDURE: 2.4 M2
CV ECHO LV RWT: 0.76 CM
DOP CALC AO PEAK VEL: 1.4 M/S
DOP CALC AO VTI: 35.61 CM
DOP CALC LVOT AREA: 3.5 CM2
DOP CALC LVOT DIAMETER: 2.11 CM
DOP CALC LVOT PEAK VEL: 0.98 M/S
DOP CALC LVOT STROKE VOLUME: 91.74 CM3
DOP CALCLVOT PEAK VEL VTI: 26.25 CM
E WAVE DECELERATION TIME: 188.11 MSEC
E/A RATIO: 0.67
E/E' RATIO: 7.41 M/S
ECHO LV POSTERIOR WALL: 1.68 CM (ref 0.6–1.1)
EJECTION FRACTION: 65 %
FRACTIONAL SHORTENING: 33 % (ref 28–44)
INTERVENTRICULAR SEPTUM: 1.76 CM (ref 0.6–1.1)
IVRT: 133.79 MSEC
LA MAJOR: 5.39 CM
LA MINOR: 5.22 CM
LA WIDTH: 3.74 CM
LEFT ATRIUM SIZE: 4.22 CM
LEFT ATRIUM VOLUME INDEX: 30.9 ML/M2
LEFT ATRIUM VOLUME: 71.15 CM3
LEFT INTERNAL DIMENSION IN SYSTOLE: 2.96 CM (ref 2.1–4)
LEFT VENTRICLE DIASTOLIC VOLUME INDEX: 38.39 ML/M2
LEFT VENTRICLE DIASTOLIC VOLUME: 88.3 ML
LEFT VENTRICLE MASS INDEX: 144 G/M2
LEFT VENTRICLE SYSTOLIC VOLUME INDEX: 14.8 ML/M2
LEFT VENTRICLE SYSTOLIC VOLUME: 33.99 ML
LEFT VENTRICULAR INTERNAL DIMENSION IN DIASTOLE: 4.41 CM (ref 3.5–6)
LEFT VENTRICULAR MASS: 331.71 G
LV LATERAL E/E' RATIO: 5.73 M/S
LV SEPTAL E/E' RATIO: 10.5 M/S
MV PEAK A VEL: 0.94 M/S
MV PEAK E VEL: 0.63 M/S
MV STENOSIS PRESSURE HALF TIME: 54.55 MS
MV VALVE AREA P 1/2 METHOD: 4.03 CM2
PISA TR MAX VEL: 3 M/S
PULM VEIN S/D RATIO: 1.19
PV PEAK D VEL: 0.32 M/S
PV PEAK S VEL: 0.38 M/S
PV PEAK VELOCITY: 0.94 CM/S
RA MAJOR: 4.71 CM
RA PRESSURE: 3 MMHG
RA WIDTH: 3.59 CM
RIGHT VENTRICULAR END-DIASTOLIC DIMENSION: 3.63 CM
RV TISSUE DOPPLER FREE WALL SYSTOLIC VELOCITY 1 (APICAL 4 CHAMBER VIEW): 9.53 CM/S
SINUS: 3.14 CM
STJ: 2.35 CM
TDI LATERAL: 0.11 M/S
TDI SEPTAL: 0.06 M/S
TDI: 0.09 M/S
TR MAX PG: 36 MMHG
TRICUSPID ANNULAR PLANE SYSTOLIC EXCURSION: 1.8 CM
TV REST PULMONARY ARTERY PRESSURE: 39 MMHG

## 2021-04-16 PROCEDURE — 93306 TTE W/DOPPLER COMPLETE: CPT

## 2021-04-16 PROCEDURE — 93306 TTE W/DOPPLER COMPLETE: CPT | Mod: 26,,, | Performed by: INTERNAL MEDICINE

## 2021-04-16 PROCEDURE — 93306 ECHO (CUPID ONLY): ICD-10-PCS | Mod: 26,,, | Performed by: INTERNAL MEDICINE

## 2021-05-05 DIAGNOSIS — E11.9 TYPE 2 DIABETES MELLITUS WITHOUT COMPLICATION: ICD-10-CM

## 2021-07-06 ENCOUNTER — PATIENT MESSAGE (OUTPATIENT)
Dept: ADMINISTRATIVE | Facility: HOSPITAL | Age: 66
End: 2021-07-06

## 2021-07-28 ENCOUNTER — OFFICE VISIT (OUTPATIENT)
Dept: FAMILY MEDICINE | Facility: CLINIC | Age: 66
End: 2021-07-28
Payer: MEDICARE

## 2021-07-28 VITALS
SYSTOLIC BLOOD PRESSURE: 138 MMHG | OXYGEN SATURATION: 97 % | HEART RATE: 78 BPM | TEMPERATURE: 99 F | BODY MASS INDEX: 40.76 KG/M2 | DIASTOLIC BLOOD PRESSURE: 80 MMHG | WEIGHT: 268.94 LBS | HEIGHT: 68 IN

## 2021-07-28 DIAGNOSIS — R68.2 DRY MOUTH: ICD-10-CM

## 2021-07-28 DIAGNOSIS — R35.0 BENIGN PROSTATIC HYPERPLASIA WITH URINARY FREQUENCY: Primary | ICD-10-CM

## 2021-07-28 DIAGNOSIS — N40.1 BENIGN PROSTATIC HYPERPLASIA WITH URINARY FREQUENCY: Primary | ICD-10-CM

## 2021-07-28 LAB
BILIRUB SERPL-MCNC: NEGATIVE MG/DL
BLOOD URINE, POC: NORMAL
COLOR, POC UA: NORMAL
GLUCOSE UR QL STRIP: NORMAL
KETONES UR QL STRIP: NEGATIVE
LEUKOCYTE ESTERASE URINE, POC: NEGATIVE
NITRITE, POC UA: NEGATIVE
PH, POC UA: 7
PROTEIN, POC: NEGATIVE
SPECIFIC GRAVITY, POC UA: 1
UROBILINOGEN, POC UA: NORMAL

## 2021-07-28 PROCEDURE — 3044F HG A1C LEVEL LT 7.0%: CPT | Mod: CPTII,S$GLB,, | Performed by: INTERNAL MEDICINE

## 2021-07-28 PROCEDURE — 1159F MED LIST DOCD IN RCRD: CPT | Mod: CPTII,S$GLB,, | Performed by: INTERNAL MEDICINE

## 2021-07-28 PROCEDURE — 3288F PR FALLS RISK ASSESSMENT DOCUMENTED: ICD-10-PCS | Mod: CPTII,S$GLB,, | Performed by: INTERNAL MEDICINE

## 2021-07-28 PROCEDURE — 81001 POCT URINALYSIS, DIPSTICK OR TABLET REAGENT, AUTOMATED, WITH MICROSCOP: ICD-10-PCS | Mod: S$GLB,,, | Performed by: INTERNAL MEDICINE

## 2021-07-28 PROCEDURE — 1160F RVW MEDS BY RX/DR IN RCRD: CPT | Mod: CPTII,S$GLB,, | Performed by: INTERNAL MEDICINE

## 2021-07-28 PROCEDURE — 3079F PR MOST RECENT DIASTOLIC BLOOD PRESSURE 80-89 MM HG: ICD-10-PCS | Mod: CPTII,S$GLB,, | Performed by: INTERNAL MEDICINE

## 2021-07-28 PROCEDURE — 81001 URINALYSIS AUTO W/SCOPE: CPT | Mod: S$GLB,,, | Performed by: INTERNAL MEDICINE

## 2021-07-28 PROCEDURE — 1101F PR PT FALLS ASSESS DOC 0-1 FALLS W/OUT INJ PAST YR: ICD-10-PCS | Mod: CPTII,S$GLB,, | Performed by: INTERNAL MEDICINE

## 2021-07-28 PROCEDURE — 3008F PR BODY MASS INDEX (BMI) DOCUMENTED: ICD-10-PCS | Mod: CPTII,S$GLB,, | Performed by: INTERNAL MEDICINE

## 2021-07-28 PROCEDURE — 99999 PR PBB SHADOW E&M-EST. PATIENT-LVL IV: ICD-10-PCS | Mod: PBBFAC,,, | Performed by: INTERNAL MEDICINE

## 2021-07-28 PROCEDURE — 1101F PT FALLS ASSESS-DOCD LE1/YR: CPT | Mod: CPTII,S$GLB,, | Performed by: INTERNAL MEDICINE

## 2021-07-28 PROCEDURE — 1126F PR PAIN SEVERITY QUANTIFIED, NO PAIN PRESENT: ICD-10-PCS | Mod: CPTII,S$GLB,, | Performed by: INTERNAL MEDICINE

## 2021-07-28 PROCEDURE — 1160F PR REVIEW ALL MEDS BY PRESCRIBER/CLIN PHARMACIST DOCUMENTED: ICD-10-PCS | Mod: CPTII,S$GLB,, | Performed by: INTERNAL MEDICINE

## 2021-07-28 PROCEDURE — 3044F PR MOST RECENT HEMOGLOBIN A1C LEVEL <7.0%: ICD-10-PCS | Mod: CPTII,S$GLB,, | Performed by: INTERNAL MEDICINE

## 2021-07-28 PROCEDURE — 1126F AMNT PAIN NOTED NONE PRSNT: CPT | Mod: CPTII,S$GLB,, | Performed by: INTERNAL MEDICINE

## 2021-07-28 PROCEDURE — 99999 PR PBB SHADOW E&M-EST. PATIENT-LVL IV: CPT | Mod: PBBFAC,,, | Performed by: INTERNAL MEDICINE

## 2021-07-28 PROCEDURE — 99214 OFFICE O/P EST MOD 30 MIN: CPT | Mod: 25,S$GLB,, | Performed by: INTERNAL MEDICINE

## 2021-07-28 PROCEDURE — 3079F DIAST BP 80-89 MM HG: CPT | Mod: CPTII,S$GLB,, | Performed by: INTERNAL MEDICINE

## 2021-07-28 PROCEDURE — 1159F PR MEDICATION LIST DOCUMENTED IN MEDICAL RECORD: ICD-10-PCS | Mod: CPTII,S$GLB,, | Performed by: INTERNAL MEDICINE

## 2021-07-28 PROCEDURE — 99214 PR OFFICE/OUTPT VISIT, EST, LEVL IV, 30-39 MIN: ICD-10-PCS | Mod: 25,S$GLB,, | Performed by: INTERNAL MEDICINE

## 2021-07-28 PROCEDURE — 3288F FALL RISK ASSESSMENT DOCD: CPT | Mod: CPTII,S$GLB,, | Performed by: INTERNAL MEDICINE

## 2021-07-28 PROCEDURE — 3075F SYST BP GE 130 - 139MM HG: CPT | Mod: CPTII,S$GLB,, | Performed by: INTERNAL MEDICINE

## 2021-07-28 PROCEDURE — 3075F PR MOST RECENT SYSTOLIC BLOOD PRESS GE 130-139MM HG: ICD-10-PCS | Mod: CPTII,S$GLB,, | Performed by: INTERNAL MEDICINE

## 2021-07-28 PROCEDURE — 3008F BODY MASS INDEX DOCD: CPT | Mod: CPTII,S$GLB,, | Performed by: INTERNAL MEDICINE

## 2021-07-28 RX ORDER — TAMSULOSIN HYDROCHLORIDE 0.4 MG/1
0.4 CAPSULE ORAL DAILY
Qty: 90 CAPSULE | Refills: 1 | Status: SHIPPED | OUTPATIENT
Start: 2021-07-28 | End: 2022-02-22 | Stop reason: SDUPTHER

## 2021-07-28 RX ORDER — ZOLPIDEM TARTRATE 10 MG/1
TABLET ORAL
Qty: 30 TABLET | Refills: 5 | Status: CANCELLED | OUTPATIENT
Start: 2021-07-28

## 2021-10-04 ENCOUNTER — PATIENT MESSAGE (OUTPATIENT)
Dept: ADMINISTRATIVE | Facility: HOSPITAL | Age: 66
End: 2021-10-04

## 2021-10-04 ENCOUNTER — TELEPHONE (OUTPATIENT)
Dept: FAMILY MEDICINE | Facility: CLINIC | Age: 66
End: 2021-10-04
Payer: MEDICARE

## 2021-10-04 NOTE — TELEPHONE ENCOUNTER
LOV 7/28/2021  Last refill 7/28/2021    Patient informed that script has a refill at the pharmacy.  Patient verbalized understanding.

## 2021-10-04 NOTE — TELEPHONE ENCOUNTER
----- Message from Adrianna Randall sent at 10/4/2021  8:49 AM CDT -----  Type: RX Refill Request    Who Called: pt    Have you contacted your pharmacy:    Refill or New Rx:tamsulosin (FLOMAX) 0.4 mg Cap    RX Name and Strength:    How is the patient currently taking it? (ex. 1XDay):    Is this a 30 day or 90 day RX:    Preferred Pharmacy with phone number:  [facility] [address] [place], [place] - [address]  [address] [place] [address]  Phone: [phone] Fax: [phone]        Local or Mail Order:    Ordering Provider:    Would the patient rather a call back or a response via My [facility]? call    Best Call Back Number:[phone] (home)       Additional Information:

## 2021-10-13 ENCOUNTER — PES CALL (OUTPATIENT)
Dept: ADMINISTRATIVE | Facility: CLINIC | Age: 66
End: 2021-10-13

## 2021-12-15 ENCOUNTER — PES CALL (OUTPATIENT)
Dept: ADMINISTRATIVE | Facility: CLINIC | Age: 66
End: 2021-12-15
Payer: MEDICARE

## 2022-01-11 ENCOUNTER — PATIENT MESSAGE (OUTPATIENT)
Dept: ADMINISTRATIVE | Facility: HOSPITAL | Age: 67
End: 2022-01-11
Payer: MEDICARE

## 2022-02-11 DIAGNOSIS — G47.00 INSOMNIA, UNSPECIFIED TYPE: ICD-10-CM

## 2022-02-11 RX ORDER — ZOLPIDEM TARTRATE 10 MG/1
10 TABLET ORAL NIGHTLY PRN
Qty: 30 TABLET | Refills: 2 | OUTPATIENT
Start: 2022-02-11

## 2022-02-11 NOTE — TELEPHONE ENCOUNTER
----- Message from Kylee Dahl sent at 2/11/2022  3:47 PM CST -----  Refill :   zolpidem (AMBIEN) 10 mg Tab    [facility] - [place], [place] - [address] [place] [address]  Phone: [phone] Fax: [phone]

## 2022-02-11 NOTE — TELEPHONE ENCOUNTER
Care Due:                  Date            Visit Type   Department     Provider  --------------------------------------------------------------------------------                                [facility]                              PRIMARY      MEDICINE/INTERN  Last Visit: 07-      CARE ([facility])   STEVE Honeycutt                              [facility]                              PRIMARY      MEDICINE/INTERN  Next Visit: 02-      CARE ([facility])   STEVE Honeycutt                                                            Last  Test          Frequency    Reason                     Performed    Due Date  --------------------------------------------------------------------------------    CBC.........  12 months..  valACYclovir.............  03- 03-    CMP.........  12 months..  hydroCHLOROthiazide,       03- 03-                             losartan, valACYclovir...    Powered by [employer]. Reference number: [id number].   2/11/2022 3:59:47 PM CST

## 2022-02-14 ENCOUNTER — TELEPHONE (OUTPATIENT)
Dept: FAMILY MEDICINE | Facility: CLINIC | Age: 67
End: 2022-02-14
Payer: MEDICARE

## 2022-02-14 RX ORDER — ZOLPIDEM TARTRATE 10 MG/1
10 TABLET ORAL NIGHTLY PRN
Qty: 30 TABLET | Refills: 2 | OUTPATIENT
Start: 2022-02-14

## 2022-02-14 NOTE — TELEPHONE ENCOUNTER
----- Message from Nicole Falcon sent at 2/14/2022 10:39 AM CST -----  Regarding: Self/   [phone]  Type: RX Refill Request    Who Called:  Patient    Refill or New Rx:  Refill    RX Name and Strength:  zolpidem (AMBIEN) 10 mg Tab    Preferred Pharmacy with phone number:  [facility] - [place], [place] - [address]    Local or Mail Order:  Local    Ordering Provider:  ROSSI Honeycutt    Would the patient rather a call back or a response via My [facility]?   Call back    Best Call Back Number:  [phone] (home)       Additional Information:   Patient would like a call from staff when prescription is called in.  Thank you

## 2022-02-15 ENCOUNTER — TELEPHONE (OUTPATIENT)
Dept: FAMILY MEDICINE | Facility: CLINIC | Age: 67
End: 2022-02-15
Payer: MEDICARE

## 2022-02-15 NOTE — TELEPHONE ENCOUNTER
Pt needs an appt. As previously explained, Ambien is a controlled substance that won't be refilled without an appt.

## 2022-02-15 NOTE — TELEPHONE ENCOUNTER
Last Office Visit Info:   The patient's last visit with Manjula Honeycutt MD was on 7/28/2021.    The patient's last visit in current department was on Visit date not found.        Last CBC Results:   Lab Results   Component Value Date    WBC 6.26 03/17/2021    HGB 13.6 (L) 03/17/2021    HCT 43.1 03/17/2021     03/17/2021       Last CMP Results  Lab Results   Component Value Date     03/17/2021    K 3.8 03/17/2021     03/17/2021    CO2 27 03/17/2021    BUN 12 03/17/2021    CREATININE 0.8 03/17/2021    CALCIUM 9.1 03/17/2021    ALBUMIN 3.7 03/17/2021    AST 27 03/17/2021    ALT 35 03/17/2021       Last Lipids  Lab Results   Component Value Date    CHOL 205 (H) 03/17/2021    TRIG 168 (H) 03/17/2021    HDL 43 03/17/2021    LDLCALC 128.4 03/17/2021       Last A1C  Lab Results   Component Value Date    HGBA1C 6.2 (H) 03/17/2021       Last TSH  Lab Results   Component Value Date    TSH 1.035 03/17/2021             Current Med Refills  Medication List with Changes/Refills   Current Medications    ACCU-CHEK STEPHON PLUS TEST STRP STRP    1 strip by Misc.(Non-Drug; Combo Route) route daily as needed (alternate testing time).       Start Date: 10/20/2016End Date: --    ACCU-CHEK FASTCLIX MISC    1 lancet by Misc.(Non-Drug; Combo Route) route daily as needed.       Start Date: 10/20/2016End Date: --    ACETAMINOPHEN (TYLENOL) 650 MG TBSR    Take 1 tablet (650 mg total) by mouth every 8 (eight) hours as needed.       Start Date: 5/7/2019  End Date: --    ASPIRIN 81 MG CHEW    Take 81 mg by mouth once daily.       Start Date: --        End Date: --    ATORVASTATIN (LIPITOR) 40 MG TABLET    Take 1 tablet (40 mg total) by mouth every evening.       Start Date: 2/21/2019 End Date: --    HYDROCHLOROTHIAZIDE (HYDRODIURIL) 25 MG TABLET    Take 1 tablet by mouth once daily       Start Date: 10/28/2021End Date: --    LIDOCAINE (LIDODERM) 5 %    Place 1 patch onto the skin once daily. Remove & Discard patch within 12 hours  or as directed by MD       Start Date: 6/26/2019 End Date: --    LOSARTAN (COZAAR) 100 MG TABLET    Take 1 tablet by mouth once daily       Start Date: 5/21/2021 End Date: --    METFORMIN (GLUCOPHAGE) 500 MG TABLET    TAKE 1 TABLET BY MOUTH TWICE DAILY WITH MEALS       Start Date: 10/5/2021 End Date: --    METOPROLOL SUCCINATE (TOPROL-XL) 50 MG 24 HR TABLET    Take 1 tablet by mouth once daily       Start Date: 10/28/2021End Date: --    NAPROXEN (NAPROSYN) 500 MG TABLET    Take 1 tablet (500 mg total) by mouth 2 (two) times daily with meals.       Start Date: 6/26/2019 End Date: --    TAMSULOSIN (FLOMAX) 0.4 MG CAP    Take 1 capsule (0.4 mg total) by mouth once daily.       Start Date: 7/28/2021 End Date: 7/28/2022    VALACYCLOVIR (VALTREX) 1000 MG TABLET    TAKE 1 TABLET BY MOUTH THREE TIMES DAILY FOR 7 DAYS       Start Date: 8/12/2020 End Date: --    VIAGRA 100 MG TABLET    TAKE 1 TABLET BY MOUTH AS NEEDED FOR ERECTILE DYSFUNCTION       Start Date: 12/4/2018 End Date: --    ZOLPIDEM (AMBIEN) 10 MG TAB    TAKE 1 TABLET BY MOUTH NIGHTLY AS NEEDED       Start Date: 10/4/2021 End Date: --       Order(s) placed per written order guidelines:     Please advise.

## 2022-02-15 NOTE — TELEPHONE ENCOUNTER
----- Message from Carleen Martinez sent at 2/15/2022 11:38 AM CST -----  Contact: Patient [phone]  Type: RX Refill Request    Who Called: Patient     Have you contacted your pharmacy: No    Refill or New Rx: Refill     RX Name and Strength: zolpidem (AMBIEN) 10 mg Tab    Is this a 30 day or 90 day RX: 90 day    Preferred Pharmacy with phone number: .  [facility] [place], [place] - [address] [place] [address]  Phone: [phone] Fax: [phone]    Local or Mail Order: Local    Would the patient rather a call back or a response via My [facility]? Call back    Best Call Back Number: [phone]

## 2022-02-22 ENCOUNTER — LAB VISIT (OUTPATIENT)
Dept: LAB | Facility: HOSPITAL | Age: 67
End: 2022-02-22
Attending: INTERNAL MEDICINE
Payer: MEDICARE

## 2022-02-22 ENCOUNTER — OFFICE VISIT (OUTPATIENT)
Dept: FAMILY MEDICINE | Facility: CLINIC | Age: 67
End: 2022-02-22
Payer: MEDICARE

## 2022-02-22 VITALS
BODY MASS INDEX: 40.76 KG/M2 | TEMPERATURE: 98 F | DIASTOLIC BLOOD PRESSURE: 90 MMHG | RESPIRATION RATE: 20 BRPM | SYSTOLIC BLOOD PRESSURE: 164 MMHG | WEIGHT: 268.94 LBS | OXYGEN SATURATION: 96 % | HEIGHT: 68 IN | HEART RATE: 63 BPM

## 2022-02-22 DIAGNOSIS — N40.1 BENIGN PROSTATIC HYPERPLASIA WITH URINARY FREQUENCY: ICD-10-CM

## 2022-02-22 DIAGNOSIS — R35.0 BENIGN PROSTATIC HYPERPLASIA WITH URINARY FREQUENCY: ICD-10-CM

## 2022-02-22 DIAGNOSIS — E11.36 TYPE 2 DIABETES MELLITUS WITH DIABETIC CATARACT, WITHOUT LONG-TERM CURRENT USE OF INSULIN: ICD-10-CM

## 2022-02-22 DIAGNOSIS — I10 ESSENTIAL HYPERTENSION, BENIGN: Chronic | ICD-10-CM

## 2022-02-22 DIAGNOSIS — G47.00 INSOMNIA, UNSPECIFIED TYPE: Primary | ICD-10-CM

## 2022-02-22 DIAGNOSIS — I47.10 SUPRAVENTRICULAR TACHYCARDIA: ICD-10-CM

## 2022-02-22 DIAGNOSIS — I77.1 STRICTURE OF ARTERY: ICD-10-CM

## 2022-02-22 DIAGNOSIS — I42.9 CARDIOMYOPATHY, UNSPECIFIED TYPE: ICD-10-CM

## 2022-02-22 DIAGNOSIS — J43.9 PULMONARY EMPHYSEMA, UNSPECIFIED EMPHYSEMA TYPE: ICD-10-CM

## 2022-02-22 DIAGNOSIS — E11.69 HYPERLIPIDEMIA ASSOCIATED WITH TYPE 2 DIABETES MELLITUS: ICD-10-CM

## 2022-02-22 DIAGNOSIS — E78.5 HYPERLIPIDEMIA ASSOCIATED WITH TYPE 2 DIABETES MELLITUS: ICD-10-CM

## 2022-02-22 LAB
ALBUMIN SERPL BCP-MCNC: 4 G/DL (ref 3.5–5.2)
ALP SERPL-CCNC: 102 U/L (ref 55–135)
ALT SERPL W/O P-5'-P-CCNC: 49 U/L (ref 10–44)
ANION GAP SERPL CALC-SCNC: 9 MMOL/L (ref 8–16)
AST SERPL-CCNC: 27 U/L (ref 10–40)
BASOPHILS # BLD AUTO: 0.02 K/UL (ref 0–0.2)
BASOPHILS NFR BLD: 0.3 % (ref 0–1.9)
BILIRUB SERPL-MCNC: 0.5 MG/DL (ref 0.1–1)
BUN SERPL-MCNC: 9 MG/DL (ref 8–23)
CALCIUM SERPL-MCNC: 9.8 MG/DL (ref 8.7–10.5)
CHLORIDE SERPL-SCNC: 105 MMOL/L (ref 95–110)
CHOLEST SERPL-MCNC: 232 MG/DL (ref 120–199)
CHOLEST/HDLC SERPL: 4.8 {RATIO} (ref 2–5)
CO2 SERPL-SCNC: 29 MMOL/L (ref 23–29)
CREAT SERPL-MCNC: 0.9 MG/DL (ref 0.5–1.4)
DIFFERENTIAL METHOD: ABNORMAL
EOSINOPHIL # BLD AUTO: 0.1 K/UL (ref 0–0.5)
EOSINOPHIL NFR BLD: 1.9 % (ref 0–8)
ERYTHROCYTE [DISTWIDTH] IN BLOOD BY AUTOMATED COUNT: 15.8 % (ref 11.5–14.5)
EST. GFR  (AFRICAN AMERICAN): >60 ML/MIN/1.73 M^2
EST. GFR  (NON AFRICAN AMERICAN): >60 ML/MIN/1.73 M^2
GLUCOSE SERPL-MCNC: 85 MG/DL (ref 70–110)
HCT VFR BLD AUTO: 47.1 % (ref 40–54)
HDLC SERPL-MCNC: 48 MG/DL (ref 40–75)
HDLC SERPL: 20.7 % (ref 20–50)
HGB BLD-MCNC: 14.4 G/DL (ref 14–18)
IMM GRANULOCYTES # BLD AUTO: 0.01 K/UL (ref 0–0.04)
IMM GRANULOCYTES NFR BLD AUTO: 0.1 % (ref 0–0.5)
LDLC SERPL CALC-MCNC: 142.4 MG/DL (ref 63–159)
LYMPHOCYTES # BLD AUTO: 2.8 K/UL (ref 1–4.8)
LYMPHOCYTES NFR BLD: 40.1 % (ref 18–48)
MCH RBC QN AUTO: 27.3 PG (ref 27–31)
MCHC RBC AUTO-ENTMCNC: 30.6 G/DL (ref 32–36)
MCV RBC AUTO: 89 FL (ref 82–98)
MONOCYTES # BLD AUTO: 0.6 K/UL (ref 0.3–1)
MONOCYTES NFR BLD: 8.5 % (ref 4–15)
NEUTROPHILS # BLD AUTO: 3.4 K/UL (ref 1.8–7.7)
NEUTROPHILS NFR BLD: 49.1 % (ref 38–73)
NONHDLC SERPL-MCNC: 184 MG/DL
NRBC BLD-RTO: 0 /100 WBC
PLATELET # BLD AUTO: 248 K/UL (ref 150–450)
PMV BLD AUTO: 10.5 FL (ref 9.2–12.9)
POTASSIUM SERPL-SCNC: 4.2 MMOL/L (ref 3.5–5.1)
PROT SERPL-MCNC: 7.9 G/DL (ref 6–8.4)
RBC # BLD AUTO: 5.28 M/UL (ref 4.6–6.2)
SODIUM SERPL-SCNC: 143 MMOL/L (ref 136–145)
TRIGL SERPL-MCNC: 208 MG/DL (ref 30–150)
TSH SERPL DL<=0.005 MIU/L-ACNC: 1.22 UIU/ML (ref 0.4–4)
WBC # BLD AUTO: 6.91 K/UL (ref 3.9–12.7)

## 2022-02-22 PROCEDURE — 99999 PR PBB SHADOW E&M-EST. PATIENT-LVL IV: ICD-10-PCS | Mod: PBBFAC,,, | Performed by: INTERNAL MEDICINE

## 2022-02-22 PROCEDURE — 36415 COLL VENOUS BLD VENIPUNCTURE: CPT | Mod: PO | Performed by: INTERNAL MEDICINE

## 2022-02-22 PROCEDURE — 4010F ACE/ARB THERAPY RXD/TAKEN: CPT | Mod: CPTII,S$GLB,, | Performed by: INTERNAL MEDICINE

## 2022-02-22 PROCEDURE — 1126F AMNT PAIN NOTED NONE PRSNT: CPT | Mod: CPTII,S$GLB,, | Performed by: INTERNAL MEDICINE

## 2022-02-22 PROCEDURE — 99499 RISK ADDL DX/OHS AUDIT: ICD-10-PCS | Mod: S$GLB,,, | Performed by: INTERNAL MEDICINE

## 2022-02-22 PROCEDURE — 99499 UNLISTED E&M SERVICE: CPT | Mod: S$GLB,,, | Performed by: INTERNAL MEDICINE

## 2022-02-22 PROCEDURE — 1101F PR PT FALLS ASSESS DOC 0-1 FALLS W/OUT INJ PAST YR: ICD-10-PCS | Mod: CPTII,S$GLB,, | Performed by: INTERNAL MEDICINE

## 2022-02-22 PROCEDURE — 4010F PR ACE/ARB THEARPY RXD/TAKEN: ICD-10-PCS | Mod: CPTII,S$GLB,, | Performed by: INTERNAL MEDICINE

## 2022-02-22 PROCEDURE — 84443 ASSAY THYROID STIM HORMONE: CPT | Performed by: INTERNAL MEDICINE

## 2022-02-22 PROCEDURE — 3288F PR FALLS RISK ASSESSMENT DOCUMENTED: ICD-10-PCS | Mod: CPTII,S$GLB,, | Performed by: INTERNAL MEDICINE

## 2022-02-22 PROCEDURE — 1160F RVW MEDS BY RX/DR IN RCRD: CPT | Mod: CPTII,S$GLB,, | Performed by: INTERNAL MEDICINE

## 2022-02-22 PROCEDURE — 99214 PR OFFICE/OUTPT VISIT, EST, LEVL IV, 30-39 MIN: ICD-10-PCS | Mod: S$GLB,,, | Performed by: INTERNAL MEDICINE

## 2022-02-22 PROCEDURE — 1159F PR MEDICATION LIST DOCUMENTED IN MEDICAL RECORD: ICD-10-PCS | Mod: CPTII,S$GLB,, | Performed by: INTERNAL MEDICINE

## 2022-02-22 PROCEDURE — 3077F SYST BP >= 140 MM HG: CPT | Mod: CPTII,S$GLB,, | Performed by: INTERNAL MEDICINE

## 2022-02-22 PROCEDURE — 1159F MED LIST DOCD IN RCRD: CPT | Mod: CPTII,S$GLB,, | Performed by: INTERNAL MEDICINE

## 2022-02-22 PROCEDURE — 3077F PR MOST RECENT SYSTOLIC BLOOD PRESSURE >= 140 MM HG: ICD-10-PCS | Mod: CPTII,S$GLB,, | Performed by: INTERNAL MEDICINE

## 2022-02-22 PROCEDURE — 83036 HEMOGLOBIN GLYCOSYLATED A1C: CPT | Performed by: INTERNAL MEDICINE

## 2022-02-22 PROCEDURE — 3080F PR MOST RECENT DIASTOLIC BLOOD PRESSURE >= 90 MM HG: ICD-10-PCS | Mod: CPTII,S$GLB,, | Performed by: INTERNAL MEDICINE

## 2022-02-22 PROCEDURE — 99214 OFFICE O/P EST MOD 30 MIN: CPT | Mod: S$GLB,,, | Performed by: INTERNAL MEDICINE

## 2022-02-22 PROCEDURE — 85025 COMPLETE CBC W/AUTO DIFF WBC: CPT | Performed by: INTERNAL MEDICINE

## 2022-02-22 PROCEDURE — 3008F PR BODY MASS INDEX (BMI) DOCUMENTED: ICD-10-PCS | Mod: CPTII,S$GLB,, | Performed by: INTERNAL MEDICINE

## 2022-02-22 PROCEDURE — 99999 PR PBB SHADOW E&M-EST. PATIENT-LVL IV: CPT | Mod: PBBFAC,,, | Performed by: INTERNAL MEDICINE

## 2022-02-22 PROCEDURE — 1101F PT FALLS ASSESS-DOCD LE1/YR: CPT | Mod: CPTII,S$GLB,, | Performed by: INTERNAL MEDICINE

## 2022-02-22 PROCEDURE — 1160F PR REVIEW ALL MEDS BY PRESCRIBER/CLIN PHARMACIST DOCUMENTED: ICD-10-PCS | Mod: CPTII,S$GLB,, | Performed by: INTERNAL MEDICINE

## 2022-02-22 PROCEDURE — 3080F DIAST BP >= 90 MM HG: CPT | Mod: CPTII,S$GLB,, | Performed by: INTERNAL MEDICINE

## 2022-02-22 PROCEDURE — 80053 COMPREHEN METABOLIC PANEL: CPT | Performed by: INTERNAL MEDICINE

## 2022-02-22 PROCEDURE — 3008F BODY MASS INDEX DOCD: CPT | Mod: CPTII,S$GLB,, | Performed by: INTERNAL MEDICINE

## 2022-02-22 PROCEDURE — 1126F PR PAIN SEVERITY QUANTIFIED, NO PAIN PRESENT: ICD-10-PCS | Mod: CPTII,S$GLB,, | Performed by: INTERNAL MEDICINE

## 2022-02-22 PROCEDURE — 80061 LIPID PANEL: CPT | Performed by: INTERNAL MEDICINE

## 2022-02-22 PROCEDURE — 3288F FALL RISK ASSESSMENT DOCD: CPT | Mod: CPTII,S$GLB,, | Performed by: INTERNAL MEDICINE

## 2022-02-22 RX ORDER — TAMSULOSIN HYDROCHLORIDE 0.4 MG/1
0.4 CAPSULE ORAL DAILY
Qty: 90 CAPSULE | Refills: 3 | Status: SHIPPED | OUTPATIENT
Start: 2022-02-22 | End: 2023-02-27 | Stop reason: SDUPTHER

## 2022-02-22 RX ORDER — METOPROLOL SUCCINATE 50 MG/1
50 TABLET, EXTENDED RELEASE ORAL DAILY
Qty: 90 TABLET | Refills: 0 | Status: SHIPPED | OUTPATIENT
Start: 2022-02-22 | End: 2022-06-15

## 2022-02-22 RX ORDER — LOSARTAN POTASSIUM 100 MG/1
100 TABLET ORAL DAILY
Qty: 90 TABLET | Refills: 0 | Status: SHIPPED | OUTPATIENT
Start: 2022-02-22 | End: 2022-06-15

## 2022-02-22 RX ORDER — ZOLPIDEM TARTRATE 10 MG/1
10 TABLET ORAL NIGHTLY PRN
Qty: 30 TABLET | Refills: 5 | Status: SHIPPED | OUTPATIENT
Start: 2022-02-22 | End: 2022-08-25 | Stop reason: SDUPTHER

## 2022-02-22 RX ORDER — HYDROCHLOROTHIAZIDE 25 MG/1
25 TABLET ORAL DAILY
Qty: 90 TABLET | Refills: 0 | Status: SHIPPED | OUTPATIENT
Start: 2022-02-22 | End: 2022-06-15

## 2022-02-22 RX ORDER — ATORVASTATIN CALCIUM 40 MG/1
40 TABLET, FILM COATED ORAL NIGHTLY
Qty: 90 TABLET | Refills: 3 | Status: SHIPPED | OUTPATIENT
Start: 2022-02-22 | End: 2023-02-27 | Stop reason: SDUPTHER
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
## 2022-02-22 NOTE — PROGRESS NOTES
Health Maintenance Due   Topic Date Due    Diabetes Urine Screening  03/16/2019    Foot Exam  03/24/2021    Influenza Vaccine (1) Refused     Hemoglobin A1c  09/17/2021    Eye Exam  11/10/2021

## 2022-02-23 LAB
ESTIMATED AVG GLUCOSE: 140 MG/DL (ref 68–131)
HBA1C MFR BLD: 6.5 % (ref 4–5.6)

## 2022-03-08 ENCOUNTER — TELEPHONE (OUTPATIENT)
Dept: FAMILY MEDICINE | Facility: CLINIC | Age: 67
End: 2022-03-08

## 2022-03-08 ENCOUNTER — CLINICAL SUPPORT (OUTPATIENT)
Dept: FAMILY MEDICINE | Facility: CLINIC | Age: 67
End: 2022-03-08
Attending: INTERNAL MEDICINE
Payer: MEDICARE

## 2022-03-08 ENCOUNTER — CLINICAL SUPPORT (OUTPATIENT)
Dept: FAMILY MEDICINE | Facility: CLINIC | Age: 67
End: 2022-03-08
Payer: MEDICARE

## 2022-03-08 VITALS — SYSTOLIC BLOOD PRESSURE: 136 MMHG | DIASTOLIC BLOOD PRESSURE: 72 MMHG

## 2022-03-08 DIAGNOSIS — E11.36 TYPE 2 DIABETES MELLITUS WITH DIABETIC CATARACT, WITHOUT LONG-TERM CURRENT USE OF INSULIN: Primary | ICD-10-CM

## 2022-03-08 DIAGNOSIS — I10 BENIGN ESSENTIAL HTN: Primary | ICD-10-CM

## 2022-03-08 DIAGNOSIS — E11.36 TYPE 2 DIABETES MELLITUS WITH DIABETIC CATARACT, WITHOUT LONG-TERM CURRENT USE OF INSULIN: ICD-10-CM

## 2022-03-08 NOTE — PROGRESS NOTES
Mirza Do 66 y.o. male is here today for Blood Pressure check.   History of HTN yes.    Review of patient's allergies indicates:  No Known Allergies  Creatinine   Date Value Ref Range Status   02/22/2022 0.9 0.5 - 1.4 mg/dL Final     Sodium   Date Value Ref Range Status   02/22/2022 143 136 - 145 mmol/L Final     Potassium   Date Value Ref Range Status   02/22/2022 4.2 3.5 - 5.1 mmol/L Final   ]  Patient verifies taking blood pressure medications on a regular basis at the same time of the day.     Current Outpatient Medications:     ACCU-CHEK STEPHON PLUS TEST STRP Strp, 1 strip by Misc.(Non-Drug; Combo Route) route daily as needed (alternate testing time)., Disp: 50 strip, Rfl: 11    ACCU-CHEK FASTCLIX Misc, 1 lancet by Misc.(Non-Drug; Combo Route) route daily as needed., Disp: 50 each, Rfl: 11    acetaminophen (TYLENOL) 650 MG TbSR, Take 1 tablet (650 mg total) by mouth every 8 (eight) hours as needed., Disp: 30 tablet, Rfl: 0    aspirin 81 MG Chew, Take 81 mg by mouth once daily., Disp: , Rfl:     atorvastatin (LIPITOR) 40 MG tablet, Take 1 tablet (40 mg total) by mouth every evening., Disp: 90 tablet, Rfl: 3    hydroCHLOROthiazide (HYDRODIURIL) 25 MG tablet, Take 1 tablet (25 mg total) by mouth once daily., Disp: 90 tablet, Rfl: 0    lidocaine (LIDODERM) 5 %, Place 1 patch onto the skin once daily. Remove & Discard patch within 12 hours or as directed by MD, Disp: 15 patch, Rfl: 0    losartan (COZAAR) 100 MG tablet, Take 1 tablet (100 mg total) by mouth once daily., Disp: 90 tablet, Rfl: 0    metFORMIN (GLUCOPHAGE) 500 MG tablet, TAKE 1 TABLET BY MOUTH TWICE DAILY WITH MEALS, Disp: 180 tablet, Rfl: 0    metoprolol succinate (TOPROL-XL) 50 MG 24 hr tablet, Take 1 tablet (50 mg total) by mouth once daily., Disp: 90 tablet, Rfl: 0    naproxen (NAPROSYN) 500 MG tablet, Take 1 tablet (500 mg total) by mouth 2 (two) times daily with meals., Disp: 30 tablet, Rfl: 0    tamsulosin (FLOMAX) 0.4 mg Cap,  Take 1 capsule (0.4 mg total) by mouth once daily., Disp: 90 capsule, Rfl: 3    valACYclovir (VALTREX) 1000 MG tablet, TAKE 1 TABLET BY MOUTH THREE TIMES DAILY FOR 7 DAYS (Patient not taking: Reported on 2/22/2022), Disp: 21 tablet, Rfl: 0    VIAGRA 100 mg tablet, TAKE 1 TABLET BY MOUTH AS NEEDED FOR ERECTILE DYSFUNCTION, Disp: 8 tablet, Rfl: 3    zolpidem (AMBIEN) 10 mg Tab, Take 1 tablet (10 mg total) by mouth nightly as needed (INSOMNIA)., Disp: 30 tablet, Rfl: 5  Does patient have record of home blood pressure readings no.  Last dose of blood pressure medication was taken at 7am.   Patient is asymptomatic.   BP- 136/72.       ,   Dr. Honeycutt notified.

## 2022-03-08 NOTE — PROGRESS NOTES
Mirza Do is a 66 y.o. male here for a diabetic eye screening     Patient cooperative?: Yes  Small pupils?: Yes  Last eye exam: 11/10/2020    For exam results, see Encounter Report.

## 2022-03-23 ENCOUNTER — PES CALL (OUTPATIENT)
Dept: ADMINISTRATIVE | Facility: CLINIC | Age: 67
End: 2022-03-23
Payer: MEDICARE

## 2022-04-01 ENCOUNTER — TELEPHONE (OUTPATIENT)
Dept: ADMINISTRATIVE | Facility: CLINIC | Age: 67
End: 2022-04-01
Payer: MEDICARE

## 2022-04-05 ENCOUNTER — OFFICE VISIT (OUTPATIENT)
Dept: FAMILY MEDICINE | Facility: CLINIC | Age: 67
End: 2022-04-05
Payer: MEDICARE

## 2022-04-05 VITALS
DIASTOLIC BLOOD PRESSURE: 76 MMHG | HEIGHT: 68 IN | TEMPERATURE: 98 F | RESPIRATION RATE: 16 BRPM | HEART RATE: 55 BPM | BODY MASS INDEX: 41.67 KG/M2 | WEIGHT: 274.94 LBS | OXYGEN SATURATION: 96 % | SYSTOLIC BLOOD PRESSURE: 126 MMHG

## 2022-04-05 DIAGNOSIS — J43.9 PULMONARY EMPHYSEMA, UNSPECIFIED EMPHYSEMA TYPE: ICD-10-CM

## 2022-04-05 DIAGNOSIS — E11.40 TYPE 2 DIABETES MELLITUS WITH DIABETIC NEUROPATHY, WITHOUT LONG-TERM CURRENT USE OF INSULIN: ICD-10-CM

## 2022-04-05 DIAGNOSIS — I10 PRIMARY HYPERTENSION: ICD-10-CM

## 2022-04-05 DIAGNOSIS — E78.5 HYPERLIPIDEMIA ASSOCIATED WITH TYPE 2 DIABETES MELLITUS: ICD-10-CM

## 2022-04-05 DIAGNOSIS — G47.33 OSA ON CPAP: ICD-10-CM

## 2022-04-05 DIAGNOSIS — E11.69 HYPERLIPIDEMIA ASSOCIATED WITH TYPE 2 DIABETES MELLITUS: ICD-10-CM

## 2022-04-05 DIAGNOSIS — I77.1 STRICTURE OF ARTERY: ICD-10-CM

## 2022-04-05 DIAGNOSIS — R35.0 BENIGN PROSTATIC HYPERPLASIA WITH URINARY FREQUENCY: ICD-10-CM

## 2022-04-05 DIAGNOSIS — E11.9 ENCOUNTER FOR COMPREHENSIVE DIABETIC FOOT EXAMINATION, TYPE 2 DIABETES MELLITUS: ICD-10-CM

## 2022-04-05 DIAGNOSIS — I42.9 CARDIOMYOPATHY, UNSPECIFIED TYPE: ICD-10-CM

## 2022-04-05 DIAGNOSIS — G47.00 INSOMNIA, UNSPECIFIED TYPE: ICD-10-CM

## 2022-04-05 DIAGNOSIS — I47.10 PAROXYSMAL SUPRAVENTRICULAR TACHYCARDIA: ICD-10-CM

## 2022-04-05 DIAGNOSIS — N40.1 BENIGN PROSTATIC HYPERPLASIA WITH URINARY FREQUENCY: ICD-10-CM

## 2022-04-05 DIAGNOSIS — E11.36 TYPE 2 DIABETES MELLITUS WITH DIABETIC CATARACT, WITHOUT LONG-TERM CURRENT USE OF INSULIN: ICD-10-CM

## 2022-04-05 DIAGNOSIS — Z00.00 ENCOUNTER FOR PREVENTIVE HEALTH EXAMINATION: Primary | ICD-10-CM

## 2022-04-05 DIAGNOSIS — I51.89 DIASTOLIC DYSFUNCTION WITHOUT HEART FAILURE: ICD-10-CM

## 2022-04-05 PROBLEM — R06.02 SOB (SHORTNESS OF BREATH) ON EXERTION: Status: RESOLVED | Noted: 2019-02-26 | Resolved: 2022-04-05

## 2022-04-05 PROBLEM — Z86.19 HISTORY OF HERPES ZOSTER: Status: ACTIVE | Noted: 2020-08-12

## 2022-04-05 PROCEDURE — 3008F PR BODY MASS INDEX (BMI) DOCUMENTED: ICD-10-PCS | Mod: CPTII,S$GLB,, | Performed by: NURSE PRACTITIONER

## 2022-04-05 PROCEDURE — 1126F AMNT PAIN NOTED NONE PRSNT: CPT | Mod: CPTII,S$GLB,, | Performed by: NURSE PRACTITIONER

## 2022-04-05 PROCEDURE — 3008F BODY MASS INDEX DOCD: CPT | Mod: CPTII,S$GLB,, | Performed by: NURSE PRACTITIONER

## 2022-04-05 PROCEDURE — 3288F FALL RISK ASSESSMENT DOCD: CPT | Mod: CPTII,S$GLB,, | Performed by: NURSE PRACTITIONER

## 2022-04-05 PROCEDURE — 3061F PR NEG MICROALBUMINURIA RESULT DOCUMENTED/REVIEW: ICD-10-PCS | Mod: CPTII,S$GLB,, | Performed by: NURSE PRACTITIONER

## 2022-04-05 PROCEDURE — 3078F PR MOST RECENT DIASTOLIC BLOOD PRESSURE < 80 MM HG: ICD-10-PCS | Mod: CPTII,S$GLB,, | Performed by: NURSE PRACTITIONER

## 2022-04-05 PROCEDURE — 1170F FXNL STATUS ASSESSED: CPT | Mod: CPTII,S$GLB,, | Performed by: NURSE PRACTITIONER

## 2022-04-05 PROCEDURE — 3044F PR MOST RECENT HEMOGLOBIN A1C LEVEL <7.0%: ICD-10-PCS | Mod: CPTII,S$GLB,, | Performed by: NURSE PRACTITIONER

## 2022-04-05 PROCEDURE — 4010F ACE/ARB THERAPY RXD/TAKEN: CPT | Mod: CPTII,S$GLB,, | Performed by: NURSE PRACTITIONER

## 2022-04-05 PROCEDURE — 1101F PR PT FALLS ASSESS DOC 0-1 FALLS W/OUT INJ PAST YR: ICD-10-PCS | Mod: CPTII,S$GLB,, | Performed by: NURSE PRACTITIONER

## 2022-04-05 PROCEDURE — G0439 PR MEDICARE ANNUAL WELLNESS SUBSEQUENT VISIT: ICD-10-PCS | Mod: S$GLB,,, | Performed by: NURSE PRACTITIONER

## 2022-04-05 PROCEDURE — 3074F SYST BP LT 130 MM HG: CPT | Mod: CPTII,S$GLB,, | Performed by: NURSE PRACTITIONER

## 2022-04-05 PROCEDURE — 3044F HG A1C LEVEL LT 7.0%: CPT | Mod: CPTII,S$GLB,, | Performed by: NURSE PRACTITIONER

## 2022-04-05 PROCEDURE — 99999 PR PBB SHADOW E&M-EST. PATIENT-LVL V: CPT | Mod: PBBFAC,,, | Performed by: NURSE PRACTITIONER

## 2022-04-05 PROCEDURE — 99499 UNLISTED E&M SERVICE: CPT | Mod: S$GLB,,, | Performed by: NURSE PRACTITIONER

## 2022-04-05 PROCEDURE — 3061F NEG MICROALBUMINURIA REV: CPT | Mod: CPTII,S$GLB,, | Performed by: NURSE PRACTITIONER

## 2022-04-05 PROCEDURE — 3066F PR DOCUMENTATION OF TREATMENT FOR NEPHROPATHY: ICD-10-PCS | Mod: CPTII,S$GLB,, | Performed by: NURSE PRACTITIONER

## 2022-04-05 PROCEDURE — 3078F DIAST BP <80 MM HG: CPT | Mod: CPTII,S$GLB,, | Performed by: NURSE PRACTITIONER

## 2022-04-05 PROCEDURE — 1159F MED LIST DOCD IN RCRD: CPT | Mod: CPTII,S$GLB,, | Performed by: NURSE PRACTITIONER

## 2022-04-05 PROCEDURE — 1101F PT FALLS ASSESS-DOCD LE1/YR: CPT | Mod: CPTII,S$GLB,, | Performed by: NURSE PRACTITIONER

## 2022-04-05 PROCEDURE — 1160F PR REVIEW ALL MEDS BY PRESCRIBER/CLIN PHARMACIST DOCUMENTED: ICD-10-PCS | Mod: CPTII,S$GLB,, | Performed by: NURSE PRACTITIONER

## 2022-04-05 PROCEDURE — 1160F RVW MEDS BY RX/DR IN RCRD: CPT | Mod: CPTII,S$GLB,, | Performed by: NURSE PRACTITIONER

## 2022-04-05 PROCEDURE — 4010F PR ACE/ARB THEARPY RXD/TAKEN: ICD-10-PCS | Mod: CPTII,S$GLB,, | Performed by: NURSE PRACTITIONER

## 2022-04-05 PROCEDURE — G0439 PPPS, SUBSEQ VISIT: HCPCS | Mod: S$GLB,,, | Performed by: NURSE PRACTITIONER

## 2022-04-05 PROCEDURE — 3074F PR MOST RECENT SYSTOLIC BLOOD PRESSURE < 130 MM HG: ICD-10-PCS | Mod: CPTII,S$GLB,, | Performed by: NURSE PRACTITIONER

## 2022-04-05 PROCEDURE — 3288F PR FALLS RISK ASSESSMENT DOCUMENTED: ICD-10-PCS | Mod: CPTII,S$GLB,, | Performed by: NURSE PRACTITIONER

## 2022-04-05 PROCEDURE — 99499 RISK ADDL DX/OHS AUDIT: ICD-10-PCS | Mod: S$GLB,,, | Performed by: NURSE PRACTITIONER

## 2022-04-05 PROCEDURE — 1126F PR PAIN SEVERITY QUANTIFIED, NO PAIN PRESENT: ICD-10-PCS | Mod: CPTII,S$GLB,, | Performed by: NURSE PRACTITIONER

## 2022-04-05 PROCEDURE — 1170F PR FUNCTIONAL STATUS ASSESSED: ICD-10-PCS | Mod: CPTII,S$GLB,, | Performed by: NURSE PRACTITIONER

## 2022-04-05 PROCEDURE — 99999 PR PBB SHADOW E&M-EST. PATIENT-LVL V: ICD-10-PCS | Mod: PBBFAC,,, | Performed by: NURSE PRACTITIONER

## 2022-04-05 PROCEDURE — 1159F PR MEDICATION LIST DOCUMENTED IN MEDICAL RECORD: ICD-10-PCS | Mod: CPTII,S$GLB,, | Performed by: NURSE PRACTITIONER

## 2022-04-05 PROCEDURE — 3066F NEPHROPATHY DOC TX: CPT | Mod: CPTII,S$GLB,, | Performed by: NURSE PRACTITIONER

## 2022-04-05 RX ORDER — METFORMIN HYDROCHLORIDE 500 MG/1
500 TABLET ORAL 2 TIMES DAILY WITH MEALS
Qty: 180 TABLET | Refills: 1 | Status: SHIPPED | OUTPATIENT
Start: 2022-04-05 | End: 2023-02-27 | Stop reason: SDUPTHER

## 2022-04-05 NOTE — PATIENT INSTRUCTIONS
Counseling and Referral of Other Preventative  (Italic type indicates deductible and co-insurance are waived)    Patient Name: Mirza Do  Today's Date: 4/5/2022    Health Maintenance       Date Due Completion Date    PROSTATE-SPECIFIC ANTIGEN 03/19/2019 3/19/2018    Foot Exam 03/24/2021 3/24/2020 (Done)    Override on 3/24/2020: Done    Influenza Vaccine (1) 09/01/2021 10/15/2019    Override on 10/1/2017: Done    Override on 10/4/2016: Done    Override on 11/13/2015: Done    Pneumococcal Vaccines (Age 65+) (2 of 2 - PPSV23) 06/29/2022 6/27/2019    Diabetes Urine Screening 02/22/2023 2/22/2022    Lipid Panel 02/22/2023 2/22/2022    Hemoglobin A1c 02/22/2023 2/22/2022    Eye Exam 03/08/2023 3/8/2022    Override on 11/10/2020: Done    High Dose Statin 04/05/2023 4/5/2022    Colorectal Cancer Screening 08/04/2027 8/4/2017    TETANUS VACCINE 06/27/2029 6/27/2019        Orders Placed This Encounter   Procedures    Ambulatory referral/consult to Ophthalmology     The following information is provided to all patients.  This information is to help you find resources for any of the problems found today that may be affecting your health:                Living healthy guide: www.Pending sale to [facility].[place].gov      Understanding Diabetes: www.diabetes.org      Eating healthy: www.cdc.gov/healthyweight      CDC home safety checklist: www.cdc.gov/steadi/patient.html      Agency on Aging: www.goea.[place].[facility]      Alcoholics anonymous (AA): www.aa.org      Physical Activity: www.erin.nih.gov/uu5tqtp      Tobacco use: www.quitwithusla.org

## 2022-04-05 NOTE — PROGRESS NOTES
"  Mirza Do presented for a  Medicare AWV and comprehensive Health Risk Assessment today. The following components were reviewed and updated:    · Medical history  · Family History  · Social history  · Allergies and Current Medications  · Health Risk Assessment  · Health Maintenance  · Care Team         ** See Completed Assessments for Annual Wellness Visit within the encounter summary.**         The following assessments were completed:  · Living Situation  · CAGE  · Depression Screening  · Timed Get Up and Go  · Whisper Test  · Cognitive Function Screening  · Nutrition Screening  · ADL Screening  · PAQ Screening        Vitals:    04/05/22 1031   BP: 126/76   Pulse: (!) 55   Resp: 16   Temp: 98.1 °F (36.7 °C)   TempSrc: Oral   SpO2: 96%   Weight: 124.7 kg (274 lb 14.6 oz)   Height: 5' 8" (1.727 m)     Body mass index is 41.8 kg/m².  Physical Exam  Vitals and nursing note reviewed.   Constitutional:       General: He is not in acute distress.     Appearance: Normal appearance. He is well-developed and well-groomed. He is obese. He is not ill-appearing.   HENT:      Head: Normocephalic and atraumatic.      Right Ear: External ear normal.      Left Ear: External ear normal.      Nose: Nose normal.      Mouth/Throat:      Lips: Pink.      Mouth: Mucous membranes are moist.   Eyes:      General: Lids are normal. Vision grossly intact. Gaze aligned appropriately. No scleral icterus.        Right eye: No discharge.         Left eye: No discharge.      Conjunctiva/sclera: Conjunctivae normal.   Neck:      Trachea: Phonation normal.   Pulmonary:      Effort: Pulmonary effort is normal. No accessory muscle usage or respiratory distress.   Abdominal:      General: Abdomen is flat. There is no distension.      Palpations: Abdomen is soft.      Tenderness: There is no abdominal tenderness.   Musculoskeletal:      Cervical back: Neck supple.   Feet:      Right foot:      Protective Sensation: 5 sites tested. 8 sites sensed. "      Skin integrity: Skin integrity normal.      Toenail Condition: Right toenails are normal.      Left foot:      Protective Sensation: 6 sites tested. 8 sites sensed.      Skin integrity: Skin integrity normal.      Toenail Condition: Left toenails are normal.   Skin:     General: Skin is warm and dry.      Findings: No rash.   Neurological:      General: No focal deficit present.      Mental Status: He is alert and oriented to person, place, and time. Mental status is at baseline.      Sensory: Sensory deficit present.      Motor: Motor function is intact. No abnormal muscle tone.      Coordination: Coordination is intact.      Gait: Gait normal.   Psychiatric:         Attention and Perception: Attention and perception normal.         Mood and Affect: Mood and affect normal.         Speech: Speech normal.         Behavior: Behavior normal. Behavior is cooperative.         Thought Content: Thought content normal.         Cognition and Memory: Cognition and memory normal.         Judgment: Judgment normal.             Diagnoses and health risks identified today and associated recommendations/orders:    1. Encounter for preventive health examination  Health maintenance reviewed, will f/u with PCP for routine preventative care    2. Type 2 diabetes mellitus with diabetic neuropathy, without long-term current use of insulin  DM controlled, HgA1c 6.5, asymptomatic neuropathy  - metFORMIN (GLUCOPHAGE) 500 MG tablet; Take 1 tablet (500 mg total) by mouth 2 (two) times daily with meals.  Dispense: 180 tablet; Refill: 1  - Ambulatory referral/consult to Ophthalmology; Future    3. Type 2 diabetes mellitus with diabetic cataract, without long-term current use of insulin  DM controlled, vision stable, due for DM eye exam since DM screen inconclusive, referral placed today  - metFORMIN (GLUCOPHAGE) 500 MG tablet; Take 1 tablet (500 mg total) by mouth 2 (two) times daily with meals.  Dispense: 180 tablet; Refill: 1  -  Ambulatory referral/consult to Ophthalmology; Future    4. Diastolic dysfunction without heart failure  Asymptomatic and stable, no CHF, BP controlled and no CKD    5. Cardiomyopathy, unspecified type  Asymptomatic, followed by cards    6. Paroxysmal supraventricular tachycardia  Rate controlled on BB, asymptomatic, followed by cards    7. Primary hypertension  BP controlled on ARB/BB/thiazide diuretic     8. Hyperlipidemia associated with type 2 diabetes mellitus  Controlled on statin, DM stable     9. Stricture of artery  No acute infarct, on ASA and statin    10. Pulmonary emphysema, unspecified emphysema type  Asymptomatic, no daily preventative    11. BMI 40.0-44.9, adult  The patient is asked to make an attempt to improve diet and exercise patterns to aid in medical management of this problem.    12. Benign prostatic hyperplasia with urinary frequency  Improved with ambien nightly    13. YUNIEL on CPAP  Noncompliant with CPAP    14. Encounter for comprehensive diabetic foot examination, type 2 diabetes mellitus  Performed today      Provided Mirza with a 5-10 year written screening schedule and personal prevention plan. Recommendations were developed using the USPSTF age appropriate recommendations. Education, counseling, and referrals were provided as needed. After Visit Summary printed and given to patient which includes a list of additional screenings\tests needed.    Follow up in about 1 year (around 4/5/2023).    Batsheva Zacarias NP  I offered to discuss advanced care planning, including how to pick a person who would make decisions for you if you were unable to make them for yourself, called a health care power of , and what kind of decisions you might make such as use of life sustaining treatments such as ventilators and tube feeding when faced with a life limiting illness recorded on a living will that they will need to know. (How you want to be cared for as you near the end of your natural  life)     X Patient is interested in learning more about how to make advanced directives.  I provided them paperwork and offered to discuss this with them.

## 2022-06-15 DIAGNOSIS — I10 ESSENTIAL HYPERTENSION, BENIGN: Chronic | ICD-10-CM

## 2022-06-15 DIAGNOSIS — I42.9 CARDIOMYOPATHY, UNSPECIFIED TYPE: ICD-10-CM

## 2022-06-15 RX ORDER — HYDROCHLOROTHIAZIDE 25 MG/1
TABLET ORAL
Qty: 90 TABLET | Refills: 2 | Status: SHIPPED | OUTPATIENT
Start: 2022-06-15 | End: 2023-02-27 | Stop reason: SDUPTHER

## 2022-06-15 RX ORDER — METOPROLOL SUCCINATE 50 MG/1
TABLET, EXTENDED RELEASE ORAL
Qty: 90 TABLET | Refills: 2 | Status: SHIPPED | OUTPATIENT
Start: 2022-06-15 | End: 2023-02-27 | Stop reason: SDUPTHER

## 2022-06-15 RX ORDER — LOSARTAN POTASSIUM 100 MG/1
TABLET ORAL
Qty: 90 TABLET | Refills: 2 | Status: SHIPPED | OUTPATIENT
Start: 2022-06-15 | End: 2023-02-27 | Stop reason: SDUPTHER

## 2022-06-15 NOTE — TELEPHONE ENCOUNTER
No new care gaps identified.  [facility] Embedded Care Gaps. Reference number: [id number]. 6/15/2022   10:48:32 AM CDT

## 2022-06-15 NOTE — TELEPHONE ENCOUNTER
Refill Decision Note   Mirza Do  is requesting a refill authorization.  Brief Assessment and Rationale for Refill:  Approve     Medication Therapy Plan:       Medication Reconciliation Completed: No   Comments:     No Care Gaps recommended.     Note composed:6:30 PM 06/15/2022

## 2022-06-23 NOTE — TELEPHONE ENCOUNTER
Left message to call office.   Plan:    MRI of the cervical Spine.    Neurology consult, Dr Abbasi.    Follow with Dr Gonzalez or neurosurgery pending neurologic evaluation for chronic upper arm shoulder weakness.        Visit time 65 minutes including communication with DR Rothman and Dr Abbasi. During her visit.     Hold Methocarbamol the morning of your MRI as you are taking Alprazolam. Please have someone bring you to your MRI appointment and drive you home.     Sedation is a side effect of the Alprazolam    Restart the Venlafaxine as prescribed.

## 2022-06-28 ENCOUNTER — OFFICE VISIT (OUTPATIENT)
Dept: FAMILY MEDICINE | Facility: CLINIC | Age: 67
End: 2022-06-28
Payer: MEDICARE

## 2022-06-28 VITALS
RESPIRATION RATE: 18 BRPM | BODY MASS INDEX: 42.74 KG/M2 | HEIGHT: 68 IN | OXYGEN SATURATION: 96 % | DIASTOLIC BLOOD PRESSURE: 82 MMHG | HEART RATE: 90 BPM | SYSTOLIC BLOOD PRESSURE: 136 MMHG | TEMPERATURE: 99 F | WEIGHT: 282 LBS

## 2022-06-28 DIAGNOSIS — J44.1 COPD WITH ACUTE EXACERBATION: Primary | ICD-10-CM

## 2022-06-28 PROCEDURE — 3008F BODY MASS INDEX DOCD: CPT | Mod: CPTII,S$GLB,, | Performed by: INTERNAL MEDICINE

## 2022-06-28 PROCEDURE — 3044F HG A1C LEVEL LT 7.0%: CPT | Mod: CPTII,S$GLB,, | Performed by: INTERNAL MEDICINE

## 2022-06-28 PROCEDURE — 3288F FALL RISK ASSESSMENT DOCD: CPT | Mod: CPTII,S$GLB,, | Performed by: INTERNAL MEDICINE

## 2022-06-28 PROCEDURE — 1101F PR PT FALLS ASSESS DOC 0-1 FALLS W/OUT INJ PAST YR: ICD-10-PCS | Mod: CPTII,S$GLB,, | Performed by: INTERNAL MEDICINE

## 2022-06-28 PROCEDURE — 3061F NEG MICROALBUMINURIA REV: CPT | Mod: CPTII,S$GLB,, | Performed by: INTERNAL MEDICINE

## 2022-06-28 PROCEDURE — 99999 PR PBB SHADOW E&M-EST. PATIENT-LVL IV: ICD-10-PCS | Mod: PBBFAC,,, | Performed by: INTERNAL MEDICINE

## 2022-06-28 PROCEDURE — 1101F PT FALLS ASSESS-DOCD LE1/YR: CPT | Mod: CPTII,S$GLB,, | Performed by: INTERNAL MEDICINE

## 2022-06-28 PROCEDURE — 3066F NEPHROPATHY DOC TX: CPT | Mod: CPTII,S$GLB,, | Performed by: INTERNAL MEDICINE

## 2022-06-28 PROCEDURE — 3288F PR FALLS RISK ASSESSMENT DOCUMENTED: ICD-10-PCS | Mod: CPTII,S$GLB,, | Performed by: INTERNAL MEDICINE

## 2022-06-28 PROCEDURE — 1159F PR MEDICATION LIST DOCUMENTED IN MEDICAL RECORD: ICD-10-PCS | Mod: CPTII,S$GLB,, | Performed by: INTERNAL MEDICINE

## 2022-06-28 PROCEDURE — 3061F PR NEG MICROALBUMINURIA RESULT DOCUMENTED/REVIEW: ICD-10-PCS | Mod: CPTII,S$GLB,, | Performed by: INTERNAL MEDICINE

## 2022-06-28 PROCEDURE — 99214 OFFICE O/P EST MOD 30 MIN: CPT | Mod: S$GLB,,, | Performed by: INTERNAL MEDICINE

## 2022-06-28 PROCEDURE — 3008F PR BODY MASS INDEX (BMI) DOCUMENTED: ICD-10-PCS | Mod: CPTII,S$GLB,, | Performed by: INTERNAL MEDICINE

## 2022-06-28 PROCEDURE — 3075F PR MOST RECENT SYSTOLIC BLOOD PRESS GE 130-139MM HG: ICD-10-PCS | Mod: CPTII,S$GLB,, | Performed by: INTERNAL MEDICINE

## 2022-06-28 PROCEDURE — 3079F PR MOST RECENT DIASTOLIC BLOOD PRESSURE 80-89 MM HG: ICD-10-PCS | Mod: CPTII,S$GLB,, | Performed by: INTERNAL MEDICINE

## 2022-06-28 PROCEDURE — 99499 RISK ADDL DX/OHS AUDIT: ICD-10-PCS | Mod: S$GLB,,, | Performed by: INTERNAL MEDICINE

## 2022-06-28 PROCEDURE — 1160F RVW MEDS BY RX/DR IN RCRD: CPT | Mod: CPTII,S$GLB,, | Performed by: INTERNAL MEDICINE

## 2022-06-28 PROCEDURE — 99499 UNLISTED E&M SERVICE: CPT | Mod: S$GLB,,, | Performed by: INTERNAL MEDICINE

## 2022-06-28 PROCEDURE — 4010F ACE/ARB THERAPY RXD/TAKEN: CPT | Mod: CPTII,S$GLB,, | Performed by: INTERNAL MEDICINE

## 2022-06-28 PROCEDURE — 3044F PR MOST RECENT HEMOGLOBIN A1C LEVEL <7.0%: ICD-10-PCS | Mod: CPTII,S$GLB,, | Performed by: INTERNAL MEDICINE

## 2022-06-28 PROCEDURE — 3075F SYST BP GE 130 - 139MM HG: CPT | Mod: CPTII,S$GLB,, | Performed by: INTERNAL MEDICINE

## 2022-06-28 PROCEDURE — 1126F AMNT PAIN NOTED NONE PRSNT: CPT | Mod: CPTII,S$GLB,, | Performed by: INTERNAL MEDICINE

## 2022-06-28 PROCEDURE — 1126F PR PAIN SEVERITY QUANTIFIED, NO PAIN PRESENT: ICD-10-PCS | Mod: CPTII,S$GLB,, | Performed by: INTERNAL MEDICINE

## 2022-06-28 PROCEDURE — 1160F PR REVIEW ALL MEDS BY PRESCRIBER/CLIN PHARMACIST DOCUMENTED: ICD-10-PCS | Mod: CPTII,S$GLB,, | Performed by: INTERNAL MEDICINE

## 2022-06-28 PROCEDURE — 1159F MED LIST DOCD IN RCRD: CPT | Mod: CPTII,S$GLB,, | Performed by: INTERNAL MEDICINE

## 2022-06-28 PROCEDURE — 99999 PR PBB SHADOW E&M-EST. PATIENT-LVL IV: CPT | Mod: PBBFAC,,, | Performed by: INTERNAL MEDICINE

## 2022-06-28 PROCEDURE — 99214 PR OFFICE/OUTPT VISIT, EST, LEVL IV, 30-39 MIN: ICD-10-PCS | Mod: S$GLB,,, | Performed by: INTERNAL MEDICINE

## 2022-06-28 PROCEDURE — 3066F PR DOCUMENTATION OF TREATMENT FOR NEPHROPATHY: ICD-10-PCS | Mod: CPTII,S$GLB,, | Performed by: INTERNAL MEDICINE

## 2022-06-28 PROCEDURE — 4010F PR ACE/ARB THEARPY RXD/TAKEN: ICD-10-PCS | Mod: CPTII,S$GLB,, | Performed by: INTERNAL MEDICINE

## 2022-06-28 PROCEDURE — 3079F DIAST BP 80-89 MM HG: CPT | Mod: CPTII,S$GLB,, | Performed by: INTERNAL MEDICINE

## 2022-06-28 RX ORDER — PROMETHAZINE HYDROCHLORIDE AND DEXTROMETHORPHAN HYDROBROMIDE 6.25; 15 MG/5ML; MG/5ML
5 SYRUP ORAL 2 TIMES DAILY PRN
Qty: 120 ML | Refills: 0 | Status: SHIPPED | OUTPATIENT
Start: 2022-06-28 | End: 2022-07-08

## 2022-06-28 RX ORDER — PREDNISONE 50 MG/1
50 TABLET ORAL DAILY
Qty: 5 TABLET | Refills: 0 | Status: SHIPPED | OUTPATIENT
Start: 2022-06-28 | End: 2022-07-03

## 2022-06-28 NOTE — PROGRESS NOTES
SUBJECTIVE     Chief Complaint   Patient presents with    Cough       HPI  Mirza Do is a 66 y.o. male with multiple medical diagnoses as listed in the medical history and problem list that presents for evaluation of URI x 2 weeks. Pt reports a productive cough, chest congestion, SOB, and wheezing. Denies any chest tightness, fever, chills, or night sweats. Pt has not been taking any meds for his symptoms. Denies any sick contacts/recent travel.    PAST MEDICAL HISTORY:  Past Medical History:   Diagnosis Date    COPD with acute exacerbation 2022    CVA (cerebral infarction)     CVA approx     Diabetes mellitus type II, non insulin dependent 10/14/2016    Erectile dysfunction     Erectile Dysfunction    Eye injury     hit os playing basketball     Hyperlipidemia 10/14/2016    Hypertension     Hypertension    Murmur     Nuclear sclerotic cataract of both eyes 2019    Polyneuropathy     Renal cyst, left 2020    Sleep apnea     Trouble in sleeping     Type 2 diabetes mellitus        PAST SURGICAL HISTORY:  Past Surgical History:   Procedure Laterality Date    BACK SURGERY      discectomy    COLONOSCOPY N/A 2017    Procedure: COLONOSCOPY;  Surgeon: Mario Alberto Vega MD;  Location: [facility] ([address] [place] [address]);  Service: Endoscopy;  Laterality: N/A;  Conflict with [facility]'s schedule no other times available with CRS, Pt requested 17 - moved to be scoped with GI MD - ER    ROOT CANAL      UMBILICAL HERNIA REPAIR         SOCIAL HISTORY:  Social History     Socioeconomic History    Marital status: Single   Tobacco Use    Smoking status: Former Smoker     Packs/day: 0.10     Years: 4.00     Pack years: 0.40     Quit date: 1995     Years since quittin.2    Smokeless tobacco: Never Used    Tobacco comment: social smoking as teenager   Substance and Sexual Activity    Alcohol use: Yes     Comment: beer 6 pack in a month; red wine 2x a month     Drug use: No    Sexual  activity: Yes     Partners: Female   Social History Narrative         Social Determinants of Health     Financial Resource Strain: Low Risk     Difficulty of Paying Living Expenses: Not hard at all   Food Insecurity: No Food Insecurity    Worried About Running Out of Food in the Last Year: Never true    Ran Out of Food in the Last Year: Never true   Transportation Needs: No Transportation Needs    Lack of Transportation (Medical): No    Lack of Transportation (Non-Medical): No   Physical Activity: Insufficiently Active    Days of Exercise per Week: 3 days    Minutes of Exercise per Session: 30 min   Stress: No Stress Concern Present    Feeling of Stress : Only a little   Social Connections: Moderately Isolated    Frequency of Communication with Friends and Family: More than three times a week    Frequency of Social Gatherings with Friends and Family: More than three times a week    Attends Religion Services: More than 4 times per year    Active Member of Clubs or Organizations: No    Attends Club or Organization Meetings: Never    Marital Status:    Housing Stability: High Risk    Unable to Pay for Housing in the Last Year: No    Number of Places Lived in the Last Year: 2    Unstable Housing in the Last Year: Yes       FAMILY HISTORY:  Family History   Problem Relation Age of Onset    No Known Problems Father     Hypertension Brother     Cancer Brother         lung     Cancer Mother         smoker     Emphysema Sister     Diabetes Sister     No Known Problems Sister     No Known Problems Sister     No Known Problems Maternal Grandmother     No Known Problems Maternal Grandfather     No Known Problems Paternal Grandmother     No Known Problems Paternal Grandfather     No Known Problems Maternal Aunt     No Known Problems Maternal Uncle     No Known Problems Paternal Aunt     No Known Problems Paternal Uncle     Amblyopia Neg Hx     Blindness Neg Hx      Cataracts Neg Hx     Glaucoma Neg Hx     Macular degeneration Neg Hx     Retinal detachment Neg Hx     Strabismus Neg Hx     Stroke Neg Hx     Thyroid disease Neg Hx        ALLERGIES AND MEDICATIONS: updated and reviewed.  Review of patient's allergies indicates:  No Known Allergies  Current Outpatient Medications   Medication Sig Dispense Refill    ACCU-CHEK TSEPHON PLUS TEST STRP Strp 1 strip by Misc.(Non-Drug; Combo Route) route daily as needed (alternate testing time). 50 strip 11    ACCU-CHEK FASTCLIX Misc 1 lancet by Misc.(Non-Drug; Combo Route) route daily as needed. 50 each 11    acetaminophen (TYLENOL) 650 MG TbSR Take 1 tablet (650 mg total) by mouth every 8 (eight) hours as needed. 30 tablet 0    aspirin 81 MG Chew Take 81 mg by mouth once daily.      atorvastatin (LIPITOR) 40 MG tablet Take 1 tablet (40 mg total) by mouth every evening. 90 tablet 3    hydroCHLOROthiazide (HYDRODIURIL) 25 MG tablet Take 1 tablet by mouth once daily 90 tablet 2    losartan (COZAAR) 100 MG tablet Take 1 tablet by mouth once daily 90 tablet 2    metFORMIN (GLUCOPHAGE) 500 MG tablet Take 1 tablet (500 mg total) by mouth 2 (two) times daily with meals. 180 tablet 1    metoprolol succinate (TOPROL-XL) 50 MG 24 hr tablet Take 1 tablet by mouth once daily 90 tablet 2    tamsulosin (FLOMAX) 0.4 mg Cap Take 1 capsule (0.4 mg total) by mouth once daily. 90 capsule 3    zolpidem (AMBIEN) 10 mg Tab Take 1 tablet (10 mg total) by mouth nightly as needed (INSOMNIA). 30 tablet 5    predniSONE (DELTASONE) 50 MG Tab Take 1 tablet (50 mg total) by mouth once daily. for 5 days 5 tablet 0    promethazine-dextromethorphan (PROMETHAZINE-DM) 6.25-15 mg/5 mL Syrp Take 5 mLs by mouth 2 (two) times daily as needed (cough). 120 mL 0     No current facility-administered medications for this visit.       ROS  Review of Systems   Constitutional: Negative for chills and fever.   HENT: Negative for hearing loss and sore throat.   "  Eyes: Negative for visual disturbance.   Respiratory: Positive for cough and shortness of breath. Negative for chest tightness.    Cardiovascular: Negative for chest pain, palpitations and leg swelling.   Gastrointestinal: Negative for abdominal pain, constipation, diarrhea, nausea and vomiting.   Genitourinary: Negative for dysuria, frequency and urgency.   Musculoskeletal: Negative for arthralgias, joint swelling and myalgias.   Skin: Negative for rash and wound.   Neurological: Negative for headaches.   Psychiatric/Behavioral: Negative for agitation and confusion. The patient is not nervous/anxious.          OBJECTIVE     Physical Exam  Vitals:    06/28/22 0931   BP: 136/82   Pulse: 90   Resp: 18   Temp: 98.6 °F (37 °C)    Body mass index is 42.88 kg/m².  Weight: 127.9 kg (282 lb)   Height: 5' 8" (172.7 cm)     Physical Exam  Constitutional:       General: He is not in acute distress.     Appearance: He is well-developed.   HENT:      Head: Normocephalic and atraumatic.      Right Ear: External ear normal.      Left Ear: External ear normal.      Nose: Nose normal.   Eyes:      General: No scleral icterus.        Right eye: No discharge.         Left eye: No discharge.      Conjunctiva/sclera: Conjunctivae normal.   Neck:      Vascular: No JVD.      Trachea: No tracheal deviation.   Cardiovascular:      Rate and Rhythm: Normal rate and regular rhythm.      Heart sounds: Normal heart sounds. No murmur heard.    No friction rub. No gallop.   Pulmonary:      Effort: Pulmonary effort is normal. No respiratory distress.      Breath sounds: Normal breath sounds. No wheezing.   Abdominal:      General: Bowel sounds are normal. There is no distension.      Palpations: Abdomen is soft. There is no mass.      Tenderness: There is no abdominal tenderness. There is no guarding or rebound.   Musculoskeletal:         General: No tenderness or deformity. Normal range of motion.      Cervical back: Normal range of motion and " neck supple.   Skin:     General: Skin is warm and dry.      Findings: No erythema or rash.   Neurological:      Mental Status: He is alert and oriented to person, place, and time.      Motor: No abnormal muscle tone.      Coordination: Coordination normal.   Psychiatric:         Behavior: Behavior normal.         Thought Content: Thought content normal.         Judgment: Judgment normal.           Health Maintenance       Date Due Completion Date    PROSTATE-SPECIFIC ANTIGEN 03/19/2019 3/19/2018    Foot Exam 03/24/2021 3/24/2020 (Done)    Override on 3/24/2020: Done    COVID-19 Vaccine (4 - Booster for Moderna series) 02/28/2022 10/28/2021    Pneumococcal Vaccines (Age 65+) (2 - PPSV23 or PCV20) 06/29/2022 6/27/2019    Influenza Vaccine (Season Ended) 09/01/2022 10/15/2019    Override on 10/1/2017: Done    Override on 10/4/2016: Done    Override on 11/13/2015: Done    Diabetes Urine Screening 02/22/2023 2/22/2022    Lipid Panel 02/22/2023 2/22/2022    Hemoglobin A1c 02/22/2023 2/22/2022    Eye Exam 03/08/2023 3/8/2022    Override on 11/10/2020: Done    High Dose Statin 04/05/2023 4/5/2022    Colorectal Cancer Screening 08/04/2027 8/4/2017    TETANUS VACCINE 06/27/2029 6/27/2019            ASSESSMENT     66 y.o. male with     1. COPD with acute exacerbation        PLAN:     1. COPD with acute exacerbation  - Pt advised to take steroids to completion; no indication for antibiotics at this time  - predniSONE (DELTASONE) 50 MG Tab; Take 1 tablet (50 mg total) by mouth once daily. for 5 days  Dispense: 5 tablet; Refill: 0  - promethazine-dextromethorphan (PROMETHAZINE-DM) 6.25-15 mg/5 mL Syrp; Take 5 mLs by mouth 2 (two) times daily as needed (cough).  Dispense: 120 mL; Refill: 0        RTC in 1-2 weeks as needed for any acute worsening of current condition or failure to improve        Manjula Honeycutt MD  06/28/2022 9:45 AM        No follow-ups on file.

## 2022-06-28 NOTE — LETTER
June 28, 2022      [facility]  [address] [place] [address]  Phone: [phone]  Fax: [phone]       Patient: Mirza Do   YOB: 1955  Date of Visit: 06/28/2022    To Whom It May Concern:    Stanford Do  was at [facility] on 06/28/2022. The patient has medical conditions that prohibit excessive walking/climbing. Please permit him to have a handicap lift available for access to his new home. If you have any questions or concerns, or if I can be of further assistance, please do not hesitate to contact me.    Sincerely,    Manjula Honeycutt MD

## 2022-08-25 DIAGNOSIS — G47.00 INSOMNIA, UNSPECIFIED TYPE: ICD-10-CM

## 2022-08-25 NOTE — TELEPHONE ENCOUNTER
No new care gaps identified.  [facility] Embedded Care Gaps. Reference number: [id number]. 8/25/2022   11:42:23 AM QIT

## 2022-08-25 NOTE — TELEPHONE ENCOUNTER
Last Office Visit Info:   The patient's last visit with Manjula Honeycutt MD was on 6/28/2022.    The patient's last visit in current department was on 6/28/2022.        Last CBC Results:   Lab Results   Component Value Date    WBC 6.91 02/22/2022    HGB 14.4 02/22/2022    HCT 47.1 02/22/2022     02/22/2022       Last CMP Results  Lab Results   Component Value Date     02/22/2022    K 4.2 02/22/2022     02/22/2022    CO2 29 02/22/2022    BUN 9 02/22/2022    CREATININE 0.9 02/22/2022    CALCIUM 9.8 02/22/2022    ALBUMIN 4.0 02/22/2022    AST 27 02/22/2022    ALT 49 (H) 02/22/2022       Last Lipids  Lab Results   Component Value Date    CHOL 232 (H) 02/22/2022    TRIG 208 (H) 02/22/2022    HDL 48 02/22/2022    LDLCALC 142.4 02/22/2022       Last A1C  Lab Results   Component Value Date    HGBA1C 6.5 (H) 02/22/2022       Last TSH  Lab Results   Component Value Date    TSH 1.216 02/22/2022             Current Med Refills  Medication List with Changes/Refills   Current Medications    ACCU-CHEK STEPHON PLUS TEST STRP STRP    1 strip by Misc.(Non-Drug; Combo Route) route daily as needed (alternate testing time).       Start Date: 10/20/2016End Date: --    ACCU-CHEK FASTCLIX MISC    1 lancet by Misc.(Non-Drug; Combo Route) route daily as needed.       Start Date: 10/20/2016End Date: --    ACETAMINOPHEN (TYLENOL) 650 MG TBSR    Take 1 tablet (650 mg total) by mouth every 8 (eight) hours as needed.       Start Date: 5/7/2019  End Date: --    ASPIRIN 81 MG CHEW    Take 81 mg by mouth once daily.       Start Date: --        End Date: --    ATORVASTATIN (LIPITOR) 40 MG TABLET    Take 1 tablet (40 mg total) by mouth every evening.       Start Date: 2/22/2022 End Date: --    HYDROCHLOROTHIAZIDE (HYDRODIURIL) 25 MG TABLET    Take 1 tablet by mouth once daily       Start Date: 6/15/2022 End Date: --    LOSARTAN (COZAAR) 100 MG TABLET    Take 1 tablet by mouth once daily       Start Date: 6/15/2022 End Date: --     METFORMIN (GLUCOPHAGE) 500 MG TABLET    Take 1 tablet (500 mg total) by mouth 2 (two) times daily with meals.       Start Date: 4/5/2022  End Date: --    METOPROLOL SUCCINATE (TOPROL-XL) 50 MG 24 HR TABLET    Take 1 tablet by mouth once daily       Start Date: 6/15/2022 End Date: --    TAMSULOSIN (FLOMAX) 0.4 MG CAP    Take 1 capsule (0.4 mg total) by mouth once daily.       Start Date: 2/22/2022 End Date: 2/22/2023    ZOLPIDEM (AMBIEN) 10 MG TAB    Take 1 tablet (10 mg total) by mouth nightly as needed (INSOMNIA).       Start Date: 2/22/2022 End Date: --

## 2022-08-25 NOTE — TELEPHONE ENCOUNTER
----- Message from Avani Whitaker sent at 8/25/2022 11:18 AM CDT -----  Type: RX Refill Request    Who Called: self     Have you contacted your pharmacy: no     Refill or New Rx: refill     RX Name and Strength: zolpidem (AMBIEN) 10 mg Tab    Preferred Pharmacy with phone number:   [facility] [address] [facility] [address] [place] [address]  Phone: [phone] Fax: [phone]    Local or Mail Order: local     Would the patient rather a call back or a response via [facility]? Call back     Best Call Back Number: [phone]

## 2022-08-26 RX ORDER — ZOLPIDEM TARTRATE 10 MG/1
10 TABLET ORAL NIGHTLY PRN
Qty: 30 TABLET | Refills: 3 | Status: SHIPPED | OUTPATIENT
Start: 2022-08-26 | End: 2023-02-27 | Stop reason: SDUPTHER

## 2022-09-15 ENCOUNTER — LAB VISIT (OUTPATIENT)
Dept: LAB | Facility: HOSPITAL | Age: 67
End: 2022-09-15
Attending: INTERNAL MEDICINE
Payer: MEDICARE

## 2022-09-15 ENCOUNTER — OFFICE VISIT (OUTPATIENT)
Dept: FAMILY MEDICINE | Facility: CLINIC | Age: 67
End: 2022-09-15
Payer: MEDICARE

## 2022-09-15 VITALS
HEART RATE: 75 BPM | WEIGHT: 282 LBS | OXYGEN SATURATION: 98 % | BODY MASS INDEX: 42.74 KG/M2 | DIASTOLIC BLOOD PRESSURE: 86 MMHG | TEMPERATURE: 98 F | HEIGHT: 68 IN | RESPIRATION RATE: 20 BRPM | SYSTOLIC BLOOD PRESSURE: 138 MMHG

## 2022-09-15 DIAGNOSIS — E11.40 TYPE 2 DIABETES MELLITUS WITH DIABETIC NEUROPATHY, WITHOUT LONG-TERM CURRENT USE OF INSULIN: ICD-10-CM

## 2022-09-15 DIAGNOSIS — R82.998 DARK URINE: Primary | ICD-10-CM

## 2022-09-15 LAB
ALBUMIN SERPL BCP-MCNC: 3.7 G/DL (ref 3.5–5.2)
ALP SERPL-CCNC: 90 U/L (ref 55–135)
ALT SERPL W/O P-5'-P-CCNC: 43 U/L (ref 10–44)
ANION GAP SERPL CALC-SCNC: 12 MMOL/L (ref 8–16)
AST SERPL-CCNC: 25 U/L (ref 10–40)
BASOPHILS # BLD AUTO: 0.01 K/UL (ref 0–0.2)
BASOPHILS NFR BLD: 0.2 % (ref 0–1.9)
BILIRUB SERPL-MCNC: 0.6 MG/DL (ref 0.1–1)
BILIRUB SERPL-MCNC: ABNORMAL MG/DL
BILIRUB UR QL STRIP: NEGATIVE
BLOOD URINE, POC: ABNORMAL
BUN SERPL-MCNC: 13 MG/DL (ref 8–23)
CALCIUM SERPL-MCNC: 9.5 MG/DL (ref 8.7–10.5)
CHLORIDE SERPL-SCNC: 104 MMOL/L (ref 95–110)
CLARITY UR: CLEAR
CO2 SERPL-SCNC: 24 MMOL/L (ref 23–29)
COLOR UR: YELLOW
COLOR, POC UA: ABNORMAL
CREAT SERPL-MCNC: 0.9 MG/DL (ref 0.5–1.4)
DIFFERENTIAL METHOD: ABNORMAL
EOSINOPHIL # BLD AUTO: 0.1 K/UL (ref 0–0.5)
EOSINOPHIL NFR BLD: 1.5 % (ref 0–8)
ERYTHROCYTE [DISTWIDTH] IN BLOOD BY AUTOMATED COUNT: 16.1 % (ref 11.5–14.5)
EST. GFR  (NO RACE VARIABLE): >60 ML/MIN/1.73 M^2
GLUCOSE SERPL-MCNC: 205 MG/DL (ref 70–110)
GLUCOSE UR QL STRIP: ABNORMAL
GLUCOSE UR QL STRIP: NORMAL
HCT VFR BLD AUTO: 42.3 % (ref 40–54)
HGB BLD-MCNC: 13.5 G/DL (ref 14–18)
HGB UR QL STRIP: NEGATIVE
IMM GRANULOCYTES # BLD AUTO: 0.01 K/UL (ref 0–0.04)
IMM GRANULOCYTES NFR BLD AUTO: 0.2 % (ref 0–0.5)
KETONES UR QL STRIP: ABNORMAL
KETONES UR QL STRIP: NEGATIVE
LEUKOCYTE ESTERASE UR QL STRIP: NEGATIVE
LEUKOCYTE ESTERASE URINE, POC: ABNORMAL
LYMPHOCYTES # BLD AUTO: 2 K/UL (ref 1–4.8)
LYMPHOCYTES NFR BLD: 32.4 % (ref 18–48)
MCH RBC QN AUTO: 27.4 PG (ref 27–31)
MCHC RBC AUTO-ENTMCNC: 31.9 G/DL (ref 32–36)
MCV RBC AUTO: 86 FL (ref 82–98)
MONOCYTES # BLD AUTO: 0.4 K/UL (ref 0.3–1)
MONOCYTES NFR BLD: 6.3 % (ref 4–15)
NEUTROPHILS # BLD AUTO: 3.6 K/UL (ref 1.8–7.7)
NEUTROPHILS NFR BLD: 59.4 % (ref 38–73)
NITRITE UR QL STRIP: NEGATIVE
NITRITE, POC UA: ABNORMAL
NRBC BLD-RTO: 0 /100 WBC
PH UR STRIP: 7 [PH] (ref 5–8)
PH, POC UA: 7
PLATELET # BLD AUTO: 224 K/UL (ref 150–450)
PMV BLD AUTO: 11.5 FL (ref 9.2–12.9)
POTASSIUM SERPL-SCNC: 3.9 MMOL/L (ref 3.5–5.1)
PROT SERPL-MCNC: 7.5 G/DL (ref 6–8.4)
PROT UR QL STRIP: NEGATIVE
PROTEIN, POC: ABNORMAL
RBC # BLD AUTO: 4.92 M/UL (ref 4.6–6.2)
SODIUM SERPL-SCNC: 140 MMOL/L (ref 136–145)
SP GR UR STRIP: 1.01 (ref 1–1.03)
SPECIFIC GRAVITY, POC UA: 1.01
URN SPEC COLLECT METH UR: ABNORMAL
UROBILINOGEN UR STRIP-ACNC: NEGATIVE EU/DL
UROBILINOGEN, POC UA: NORMAL
WBC # BLD AUTO: 6.02 K/UL (ref 3.9–12.7)

## 2022-09-15 PROCEDURE — 3066F PR DOCUMENTATION OF TREATMENT FOR NEPHROPATHY: ICD-10-PCS | Mod: CPTII,S$GLB,, | Performed by: INTERNAL MEDICINE

## 2022-09-15 PROCEDURE — 3061F PR NEG MICROALBUMINURIA RESULT DOCUMENTED/REVIEW: ICD-10-PCS | Mod: CPTII,S$GLB,, | Performed by: INTERNAL MEDICINE

## 2022-09-15 PROCEDURE — 3075F SYST BP GE 130 - 139MM HG: CPT | Mod: CPTII,S$GLB,, | Performed by: INTERNAL MEDICINE

## 2022-09-15 PROCEDURE — 81001 POCT URINALYSIS, DIPSTICK OR TABLET REAGENT, AUTOMATED, WITH MICROSCOP: ICD-10-PCS | Mod: S$GLB,,, | Performed by: INTERNAL MEDICINE

## 2022-09-15 PROCEDURE — 1160F PR REVIEW ALL MEDS BY PRESCRIBER/CLIN PHARMACIST DOCUMENTED: ICD-10-PCS | Mod: CPTII,S$GLB,, | Performed by: INTERNAL MEDICINE

## 2022-09-15 PROCEDURE — 3008F PR BODY MASS INDEX (BMI) DOCUMENTED: ICD-10-PCS | Mod: CPTII,S$GLB,, | Performed by: INTERNAL MEDICINE

## 2022-09-15 PROCEDURE — 3008F BODY MASS INDEX DOCD: CPT | Mod: CPTII,S$GLB,, | Performed by: INTERNAL MEDICINE

## 2022-09-15 PROCEDURE — 1159F MED LIST DOCD IN RCRD: CPT | Mod: CPTII,S$GLB,, | Performed by: INTERNAL MEDICINE

## 2022-09-15 PROCEDURE — 1159F PR MEDICATION LIST DOCUMENTED IN MEDICAL RECORD: ICD-10-PCS | Mod: CPTII,S$GLB,, | Performed by: INTERNAL MEDICINE

## 2022-09-15 PROCEDURE — 80053 COMPREHEN METABOLIC PANEL: CPT | Performed by: INTERNAL MEDICINE

## 2022-09-15 PROCEDURE — 81003 URINALYSIS AUTO W/O SCOPE: CPT | Performed by: INTERNAL MEDICINE

## 2022-09-15 PROCEDURE — 99214 PR OFFICE/OUTPT VISIT, EST, LEVL IV, 30-39 MIN: ICD-10-PCS | Mod: S$GLB,,, | Performed by: INTERNAL MEDICINE

## 2022-09-15 PROCEDURE — 83036 HEMOGLOBIN GLYCOSYLATED A1C: CPT | Performed by: INTERNAL MEDICINE

## 2022-09-15 PROCEDURE — 3288F FALL RISK ASSESSMENT DOCD: CPT | Mod: CPTII,S$GLB,, | Performed by: INTERNAL MEDICINE

## 2022-09-15 PROCEDURE — 3079F PR MOST RECENT DIASTOLIC BLOOD PRESSURE 80-89 MM HG: ICD-10-PCS | Mod: CPTII,S$GLB,, | Performed by: INTERNAL MEDICINE

## 2022-09-15 PROCEDURE — 3044F PR MOST RECENT HEMOGLOBIN A1C LEVEL <7.0%: ICD-10-PCS | Mod: CPTII,S$GLB,, | Performed by: INTERNAL MEDICINE

## 2022-09-15 PROCEDURE — 36415 COLL VENOUS BLD VENIPUNCTURE: CPT | Mod: PO | Performed by: INTERNAL MEDICINE

## 2022-09-15 PROCEDURE — 4010F ACE/ARB THERAPY RXD/TAKEN: CPT | Mod: CPTII,S$GLB,, | Performed by: INTERNAL MEDICINE

## 2022-09-15 PROCEDURE — 1160F RVW MEDS BY RX/DR IN RCRD: CPT | Mod: CPTII,S$GLB,, | Performed by: INTERNAL MEDICINE

## 2022-09-15 PROCEDURE — 1101F PR PT FALLS ASSESS DOC 0-1 FALLS W/OUT INJ PAST YR: ICD-10-PCS | Mod: CPTII,S$GLB,, | Performed by: INTERNAL MEDICINE

## 2022-09-15 PROCEDURE — 81001 URINALYSIS AUTO W/SCOPE: CPT | Mod: S$GLB,,, | Performed by: INTERNAL MEDICINE

## 2022-09-15 PROCEDURE — 3066F NEPHROPATHY DOC TX: CPT | Mod: CPTII,S$GLB,, | Performed by: INTERNAL MEDICINE

## 2022-09-15 PROCEDURE — 3288F PR FALLS RISK ASSESSMENT DOCUMENTED: ICD-10-PCS | Mod: CPTII,S$GLB,, | Performed by: INTERNAL MEDICINE

## 2022-09-15 PROCEDURE — 99999 PR PBB SHADOW E&M-EST. PATIENT-LVL IV: CPT | Mod: PBBFAC,,, | Performed by: INTERNAL MEDICINE

## 2022-09-15 PROCEDURE — 3079F DIAST BP 80-89 MM HG: CPT | Mod: CPTII,S$GLB,, | Performed by: INTERNAL MEDICINE

## 2022-09-15 PROCEDURE — 99214 OFFICE O/P EST MOD 30 MIN: CPT | Mod: S$GLB,,, | Performed by: INTERNAL MEDICINE

## 2022-09-15 PROCEDURE — 87086 URINE CULTURE/COLONY COUNT: CPT | Performed by: INTERNAL MEDICINE

## 2022-09-15 PROCEDURE — 4010F PR ACE/ARB THEARPY RXD/TAKEN: ICD-10-PCS | Mod: CPTII,S$GLB,, | Performed by: INTERNAL MEDICINE

## 2022-09-15 PROCEDURE — 3061F NEG MICROALBUMINURIA REV: CPT | Mod: CPTII,S$GLB,, | Performed by: INTERNAL MEDICINE

## 2022-09-15 PROCEDURE — 99999 PR PBB SHADOW E&M-EST. PATIENT-LVL IV: ICD-10-PCS | Mod: PBBFAC,,, | Performed by: INTERNAL MEDICINE

## 2022-09-15 PROCEDURE — 1101F PT FALLS ASSESS-DOCD LE1/YR: CPT | Mod: CPTII,S$GLB,, | Performed by: INTERNAL MEDICINE

## 2022-09-15 PROCEDURE — 3044F HG A1C LEVEL LT 7.0%: CPT | Mod: CPTII,S$GLB,, | Performed by: INTERNAL MEDICINE

## 2022-09-15 PROCEDURE — 1126F PR PAIN SEVERITY QUANTIFIED, NO PAIN PRESENT: ICD-10-PCS | Mod: CPTII,S$GLB,, | Performed by: INTERNAL MEDICINE

## 2022-09-15 PROCEDURE — 1126F AMNT PAIN NOTED NONE PRSNT: CPT | Mod: CPTII,S$GLB,, | Performed by: INTERNAL MEDICINE

## 2022-09-15 PROCEDURE — 3075F PR MOST RECENT SYSTOLIC BLOOD PRESS GE 130-139MM HG: ICD-10-PCS | Mod: CPTII,S$GLB,, | Performed by: INTERNAL MEDICINE

## 2022-09-15 PROCEDURE — 85025 COMPLETE CBC W/AUTO DIFF WBC: CPT | Performed by: INTERNAL MEDICINE

## 2022-09-15 NOTE — PROGRESS NOTES
Chief Complaint  Chief Complaint   Patient presents with    Urinary Tract Infection       HPI  Mirza Do is a 67 y.o. male with multiple medical diagnoses as listed in the medical history and problem list that presents for dark urine.  Their last appointment with primary care was 6/28/2022.      Mr. Do reports occasional episodes of dark urine for the last few weeks. He describes the urine as a dark orange, without obvious blood. Episodes occur every now and again during the night. He normal wakes up 3x per night to use the bathroom, and states this is his baseline. He has not noticed any episodes of dark urine during the day. He denies any recent illnesses, fevers, night sweats, abdominal or flank pain, foul smell, dysuria, or frequency. He drinks 4 bottles urine throughout the day, but stops drinking liquid at dinner around 7pm. He goes to bed at 11pm. He reports dry mouth most nights, but denies light-headedness or syncope when using the bathroom.     POCT urine was unremarkable - no blood noted.       PAST MEDICAL HISTORY:  Past Medical History:   Diagnosis Date    COPD with acute exacerbation 6/28/2022    CVA (cerebral infarction)     CVA approx 2011/2012    Diabetes mellitus type II, non insulin dependent 10/14/2016    Erectile dysfunction     Erectile Dysfunction    Eye injury     hit os playing basketball     Hyperlipidemia 10/14/2016    Hypertension     Hypertension    Murmur     Nuclear sclerotic cataract of both eyes 8/7/2019    Polyneuropathy     Renal cyst, left 1/8/2020    Sleep apnea     Trouble in sleeping     Type 2 diabetes mellitus        PAST SURGICAL HISTORY:  Past Surgical History:   Procedure Laterality Date    BACK SURGERY      discectomy    COLONOSCOPY N/A 8/4/2017    Procedure: COLONOSCOPY;  Surgeon: Mario Alberto Vega MD;  [facility] ([address] [place] [address]);  Service: Endoscopy;  Laterality: N/A;  Conflict with [facility]'s schedule no other times available with CRS, Pt requested 8/4/17 - moved  to be scoped with GI MD - ER    ROOT CANAL      UMBILICAL HERNIA REPAIR         SOCIAL HISTORY:  Social History     Socioeconomic History    Marital status: Single   Tobacco Use    Smoking status: Former     Packs/day: 0.10     Years: 4.00     Pack years: 0.40     Types: Cigarettes     Quit date: 1995     Years since quittin.4    Smokeless tobacco: Never    Tobacco comments:     social smoking as teenager   Substance and Sexual Activity    Alcohol use: Yes     Comment: beer 6 pack in a month; red wine 2x a month     Drug use: No    Sexual activity: Yes     Partners: Female   Social History Narrative         Social Determinants of Health     Financial Resource Strain: Low Risk     Difficulty of Paying Living Expenses: Not hard at all   Food Insecurity: No Food Insecurity    Worried About Running Out of Food in the Last Year: Never true    Ran Out of Food in the Last Year: Never true   Transportation Needs: No Transportation Needs    Lack of Transportation (Medical): No    Lack of Transportation (Non-Medical): No   Physical Activity: Insufficiently Active    Days of Exercise per Week: 3 days    Minutes of Exercise per Session: 30 min   Stress: No Stress Concern Present    Feeling of Stress : Only a little   Social Connections: Moderately Isolated    Frequency of Communication with Friends and Family: More than three times a week    Frequency of Social Gatherings with Friends and Family: More than three times a week    Attends Evangelical Services: More than 4 times per year    Active Member of Clubs or Organizations: No    Attends Club or Organization Meetings: Never    Marital Status:    Housing Stability: High Risk    Unable to Pay for Housing in the Last Year: No    Number of Places Lived in the Last Year: 2    Unstable Housing in the Last Year: Yes       FAMILY HISTORY:  Family History   Problem Relation Age of Onset    No Known Problems Father     Hypertension Brother      Cancer Brother         lung     Cancer Mother         smoker     Emphysema Sister     Diabetes Sister     No Known Problems Sister     No Known Problems Sister     No Known Problems Maternal Grandmother     No Known Problems Maternal Grandfather     No Known Problems Paternal Grandmother     No Known Problems Paternal Grandfather     No Known Problems Maternal Aunt     No Known Problems Maternal Uncle     No Known Problems Paternal Aunt     No Known Problems Paternal Uncle     Amblyopia Neg Hx     Blindness Neg Hx     Cataracts Neg Hx     Glaucoma Neg Hx     Macular degeneration Neg Hx     Retinal detachment Neg Hx     Strabismus Neg Hx     Stroke Neg Hx     Thyroid disease Neg Hx        ALLERGIES AND MEDICATIONS: updated and reviewed.  Review of patient's allergies indicates:  No Known Allergies  Current Outpatient Medications   Medication Sig Dispense Refill    ACCU-CHEK STEPHON PLUS TEST STRP Strp 1 strip by Misc.(Non-Drug; Combo Route) route daily as needed (alternate testing time). 50 strip 11    ACCU-CHEK FASTCLIX Misc 1 lancet by Misc.(Non-Drug; Combo Route) route daily as needed. 50 each 11    acetaminophen (TYLENOL) 650 MG TbSR Take 1 tablet (650 mg total) by mouth every 8 (eight) hours as needed. 30 tablet 0    aspirin 81 MG Chew Take 81 mg by mouth once daily.      atorvastatin (LIPITOR) 40 MG tablet Take 1 tablet (40 mg total) by mouth every evening. 90 tablet 3    hydroCHLOROthiazide (HYDRODIURIL) 25 MG tablet Take 1 tablet by mouth once daily 90 tablet 2    losartan (COZAAR) 100 MG tablet Take 1 tablet by mouth once daily 90 tablet 2    metFORMIN (GLUCOPHAGE) 500 MG tablet Take 1 tablet (500 mg total) by mouth 2 (two) times daily with meals. 180 tablet 1    metoprolol succinate (TOPROL-XL) 50 MG 24 hr tablet Take 1 tablet by mouth once daily 90 tablet 2    tamsulosin (FLOMAX) 0.4 mg Cap Take 1 capsule (0.4 mg total) by mouth once daily. 90 capsule 3    zolpidem (AMBIEN) 10 mg Tab Take 1 tablet (10 mg  "total) by mouth nightly as needed (INSOMNIA). 30 tablet 3     No current facility-administered medications for this visit.         ROS  Review of Systems   Constitutional:  Negative for chills and fever.   HENT:  Negative for sore throat.    Respiratory:  Negative for chest tightness and shortness of breath.    Cardiovascular:  Negative for chest pain.   Gastrointestinal:  Negative for abdominal pain, diarrhea, nausea and vomiting.   Endocrine: Negative for polyuria.   Genitourinary:  Negative for difficulty urinating, dysuria, flank pain, hematuria and urgency.   Musculoskeletal:  Negative for back pain.   Neurological:  Negative for syncope.         Physical Exam  Vitals:    09/15/22 1102   BP: 138/86   BP Location: Left arm   Patient Position: Sitting   BP Method: Large (Manual)   Pulse: 75   Resp: 20   Temp: 98.3 °F (36.8 °C)   TempSrc: Oral   SpO2: 98%   Weight: 127.9 kg (282 lb)   Height: 5' 8" (1.727 m)    Body mass index is 42.88 kg/m².  Weight: 127.9 kg (282 lb)   Height: 5' 8" (172.7 cm)   Physical Exam  Constitutional:       Appearance: Normal appearance.   HENT:      Head: Normocephalic and atraumatic.   Cardiovascular:      Rate and Rhythm: Normal rate and regular rhythm.      Pulses: Normal pulses.   Pulmonary:      Effort: Pulmonary effort is normal.      Breath sounds: Normal breath sounds.   Abdominal:      General: Abdomen is flat. Bowel sounds are normal.      Palpations: Abdomen is soft.      Tenderness: There is no abdominal tenderness. There is no guarding.   Skin:     General: Skin is warm and dry.   Neurological:      General: No focal deficit present.      Mental Status: He is alert and oriented to person, place, and time. Mental status is at baseline.         Health Maintenance         Date Due Completion Date    PROSTATE-SPECIFIC ANTIGEN 03/19/2019 3/19/2018    Foot Exam 03/24/2021 3/24/2020 (Done)    Override on 3/24/2020: Done    COVID-19 Vaccine (4 - Booster for Moderna series) " 02/28/2022 10/28/2021    Pneumococcal Vaccines (Age 65+) (3 - PPSV23 or PCV20) 06/29/2022 6/27/2019    Influenza Vaccine (1) 09/01/2022 10/15/2019    Override on 10/1/2017: Done    Override on 10/4/2016: Done    Override on 11/13/2015: Done    Diabetes Urine Screening 02/22/2023 2/22/2022    Lipid Panel 02/22/2023 2/22/2022    Hemoglobin A1c 02/22/2023 2/22/2022    Eye Exam 03/08/2023 3/8/2022    Override on 11/10/2020: Done    Low Dose Statin 09/15/2023 9/15/2022    Colorectal Cancer Screening 08/04/2027 8/4/2017    TETANUS VACCINE 06/27/2029 6/27/2019              Assessment and Plan:  Dark urine  Given normal urine dipstick, absence of red flag symptoms, and signs of dehydration at night (decreased fluid intake, dry mouth), dark urine likely due to concentration. Reassured patient urine is normal, but will send off for further evaluation and follow-up with results.   -     POCT urinalysis, dipstick or tablet reag  -     Urinalysis  -     Urine culture

## 2022-09-16 LAB
ESTIMATED AVG GLUCOSE: 146 MG/DL (ref 68–131)
HBA1C MFR BLD: 6.7 % (ref 4–5.6)

## 2022-09-16 NOTE — PROGRESS NOTES
1. Dark urine  - likely secondary to concentrated urine, so patient advised to maintain appropriate water intake daily  - POCT urinalysis, dipstick or tablet reag  - Urinalysis  - Urine culture    2. Type 2 diabetes mellitus with diabetic neuropathy, without long-term current use of insulin  - Check labs  - CBC Auto Differential; Future  - Comprehensive Metabolic Panel; Future  - Hemoglobin A1C; Future        RTC in 3-6 months based on hemoglobin A1c

## 2022-09-17 LAB — BACTERIA UR CULT: NO GROWTH

## 2023-01-03 ENCOUNTER — PATIENT OUTREACH (OUTPATIENT)
Dept: ADMINISTRATIVE | Facility: HOSPITAL | Age: 68
End: 2023-01-03
Payer: MEDICARE

## 2023-01-10 ENCOUNTER — PES CALL (OUTPATIENT)
Dept: ADMINISTRATIVE | Facility: CLINIC | Age: 68
End: 2023-01-10
Payer: MEDICARE

## 2023-01-12 ENCOUNTER — PATIENT OUTREACH (OUTPATIENT)
Dept: ADMINISTRATIVE | Facility: HOSPITAL | Age: 68
End: 2023-01-12
Payer: MEDICARE

## 2023-01-19 ENCOUNTER — HOSPITAL ENCOUNTER (EMERGENCY)
Facility: HOSPITAL | Age: 68
Discharge: HOME | End: 2023-01-19
Attending: EMERGENCY MEDICINE
Payer: MEDICARE

## 2023-01-19 VITALS
SYSTOLIC BLOOD PRESSURE: 146 MMHG | BODY MASS INDEX: 40.16 KG/M2 | HEART RATE: 66 BPM | WEIGHT: 265 LBS | DIASTOLIC BLOOD PRESSURE: 79 MMHG | RESPIRATION RATE: 20 BRPM | OXYGEN SATURATION: 99 % | TEMPERATURE: 98 F | HEIGHT: 68 IN

## 2023-01-19 DIAGNOSIS — R00.2 PALPITATIONS: Primary | ICD-10-CM

## 2023-01-19 LAB
ALBUMIN SERPL BCP-MCNC: 3.9 G/DL (ref 3.5–5.2)
ALP SERPL-CCNC: 96 U/L (ref 55–135)
ALT SERPL W/O P-5'-P-CCNC: 34 U/L (ref 10–44)
ANION GAP SERPL CALC-SCNC: 10 MMOL/L (ref 8–16)
AST SERPL-CCNC: 24 U/L (ref 10–40)
BASOPHILS # BLD AUTO: 0.01 K/UL (ref 0–0.2)
BASOPHILS NFR BLD: 0.1 % (ref 0–1.9)
BILIRUB SERPL-MCNC: 0.7 MG/DL (ref 0.1–1)
BNP SERPL-MCNC: 51 PG/ML (ref 0–99)
BUN SERPL-MCNC: 14 MG/DL (ref 8–23)
CALCIUM SERPL-MCNC: 9.4 MG/DL (ref 8.7–10.5)
CHLORIDE SERPL-SCNC: 104 MMOL/L (ref 95–110)
CO2 SERPL-SCNC: 29 MMOL/L (ref 23–29)
CREAT SERPL-MCNC: 0.9 MG/DL (ref 0.5–1.4)
DIFFERENTIAL METHOD: ABNORMAL
EOSINOPHIL # BLD AUTO: 0.1 K/UL (ref 0–0.5)
EOSINOPHIL NFR BLD: 1.7 % (ref 0–8)
ERYTHROCYTE [DISTWIDTH] IN BLOOD BY AUTOMATED COUNT: 15.2 % (ref 11.5–14.5)
EST. GFR  (NO RACE VARIABLE): >60 ML/MIN/1.73 M^2
GLUCOSE SERPL-MCNC: 120 MG/DL (ref 70–110)
HCT VFR BLD AUTO: 45.8 % (ref 40–54)
HGB BLD-MCNC: 14.7 G/DL (ref 14–18)
IMM GRANULOCYTES # BLD AUTO: 0.02 K/UL (ref 0–0.04)
IMM GRANULOCYTES NFR BLD AUTO: 0.2 % (ref 0–0.5)
LYMPHOCYTES # BLD AUTO: 2.6 K/UL (ref 1–4.8)
LYMPHOCYTES NFR BLD: 32.8 % (ref 18–48)
MCH RBC QN AUTO: 26.8 PG (ref 27–31)
MCHC RBC AUTO-ENTMCNC: 32.1 G/DL (ref 32–36)
MCV RBC AUTO: 84 FL (ref 82–98)
MONOCYTES # BLD AUTO: 0.7 K/UL (ref 0.3–1)
MONOCYTES NFR BLD: 8.8 % (ref 4–15)
NEUTROPHILS # BLD AUTO: 4.5 K/UL (ref 1.8–7.7)
NEUTROPHILS NFR BLD: 56.4 % (ref 38–73)
NRBC BLD-RTO: 0 /100 WBC
PLATELET # BLD AUTO: 254 K/UL (ref 150–450)
PMV BLD AUTO: 10.1 FL (ref 9.2–12.9)
POTASSIUM SERPL-SCNC: 3.5 MMOL/L (ref 3.5–5.1)
PROT SERPL-MCNC: 8.1 G/DL (ref 6–8.4)
RBC # BLD AUTO: 5.48 M/UL (ref 4.6–6.2)
SODIUM SERPL-SCNC: 143 MMOL/L (ref 136–145)
TROPONIN I SERPL DL<=0.01 NG/ML-MCNC: 0.02 NG/ML (ref 0–0.03)
WBC # BLD AUTO: 8.03 K/UL (ref 3.9–12.7)

## 2023-01-19 PROCEDURE — 93005 ELECTROCARDIOGRAM TRACING: CPT

## 2023-01-19 PROCEDURE — 84484 ASSAY OF TROPONIN QUANT: CPT | Performed by: PHYSICIAN ASSISTANT

## 2023-01-19 PROCEDURE — 85025 COMPLETE CBC W/AUTO DIFF WBC: CPT | Performed by: PHYSICIAN ASSISTANT

## 2023-01-19 PROCEDURE — 83880 ASSAY OF NATRIURETIC PEPTIDE: CPT | Performed by: PHYSICIAN ASSISTANT

## 2023-01-19 PROCEDURE — 80053 COMPREHEN METABOLIC PANEL: CPT | Performed by: PHYSICIAN ASSISTANT

## 2023-01-19 PROCEDURE — 93010 ELECTROCARDIOGRAM REPORT: CPT | Mod: ,,, | Performed by: INTERNAL MEDICINE

## 2023-01-19 PROCEDURE — 99285 EMERGENCY DEPT VISIT HI MDM: CPT | Mod: 25

## 2023-01-19 PROCEDURE — 93010 EKG 12-LEAD: ICD-10-PCS | Mod: ,,, | Performed by: INTERNAL MEDICINE

## 2023-01-19 NOTE — ED PROVIDER NOTES
"Encounter Date: 1/19/2023    SCRIBE #1 NOTE: I, Francesca Bansal, am scribing for, and in the presence of,  Adrien Cee MD. I have scribed the following portions of the note - Other sections scribed: HPI, ROS.     History     Chief Complaint   Patient presents with    Palpitations     Pt c/o palpitations since Sunday. Pt denies any other symptoms.      Mirza Do is a 67 y.o. male, with a PMHx of COPD, CVA, DM, HLD, HTN, who presents to the ED with intermittent palpitation that began three day ago. Patient describes his heart is "racing" and lasts for half an hour for each episode. Patient additionally reports bilateral legs swelling. Patient reports no history of heart trouble, heart attack, or chest pain walking up the stairs. Patient denies drinking energy drinks or caffeine. No exacerbating or alleviating factors. Denies any eye problems, sore throat, chest pain, cough, shortness of breath, abdominal pain, vomiting, diarrhea, dysuria, hematuria, headache, or arm problems.     The history is provided by the patient. No  was used.   Review of patient's allergies indicates:  No Known Allergies  Past Medical History:   Diagnosis Date    COPD with acute exacerbation 6/28/2022    CVA (cerebral infarction)     CVA approx 2011/2012    Diabetes mellitus type II, non insulin dependent 10/14/2016    Erectile dysfunction     Erectile Dysfunction    Eye injury     hit os playing basketball     Hyperlipidemia 10/14/2016    Hypertension     Hypertension    Murmur     Nuclear sclerotic cataract of both eyes 8/7/2019    Polyneuropathy     Renal cyst, left 1/8/2020    Sleep apnea     Trouble in sleeping     Type 2 diabetes mellitus      Past Surgical History:   Procedure Laterality Date    BACK SURGERY      discectomy    COLONOSCOPY N/A 8/4/2017    Procedure: COLONOSCOPY;  Surgeon: Mario Alberto Vega MD;  Location: [facility] ([address] [place] [address]);  Service: Endoscopy;  Laterality: N/A;  Conflict with [facility]'s schedule no other " times available with CRS, Pt requested 17 - moved to be scoped with GI MD - ER    ROOT CANAL      UMBILICAL HERNIA REPAIR       Family History   Problem Relation Age of Onset    No Known Problems Father     Hypertension Brother     Cancer Brother         lung     Cancer Mother         smoker     Emphysema Sister     Diabetes Sister     No Known Problems Sister     No Known Problems Sister     No Known Problems Maternal Grandmother     No Known Problems Maternal Grandfather     No Known Problems Paternal Grandmother     No Known Problems Paternal Grandfather     No Known Problems Maternal Aunt     No Known Problems Maternal Uncle     No Known Problems Paternal Aunt     No Known Problems Paternal Uncle     Amblyopia Neg Hx     Blindness Neg Hx     Cataracts Neg Hx     Glaucoma Neg Hx     Macular degeneration Neg Hx     Retinal detachment Neg Hx     Strabismus Neg Hx     Stroke Neg Hx     Thyroid disease Neg Hx      Social History     Tobacco Use    Smoking status: Former     Packs/day: 0.10     Years: 4.00     Pack years: 0.40     Types: Cigarettes     Quit date: 1995     Years since quittin.7    Smokeless tobacco: Never    Tobacco comments:     social smoking as teenager   Substance Use Topics    Alcohol use: Yes     Comment: beer 6 pack in a month; red wine 2x a month     Drug use: No     Review of Systems   Constitutional:  Negative for chills, diaphoresis and fever.   HENT:  Negative for ear pain and sore throat.    Eyes:  Negative for pain.   Respiratory:  Negative for cough, chest tightness and shortness of breath.    Cardiovascular:  Positive for palpitations (Intermittent) and leg swelling (Bilateral). Negative for chest pain.   Gastrointestinal:  Negative for abdominal pain, diarrhea, nausea and vomiting.   Genitourinary:  Negative for dysuria and hematuria.   Musculoskeletal:  Negative for back pain.        (-) Arm trouble.    Skin:  Negative for rash.   Neurological:  Negative for headaches.    Psychiatric/Behavioral:  Negative for confusion.      Physical Exam     Initial Vitals [01/19/23 1129]   BP Pulse Resp Temp SpO2   (!) 170/87 70 18 97.9 °F (36.6 °C) 97 %      MAP       --         Physical Exam  The patient was examined specifically for the following:   General:No significant distress, Good color, Warm and dry. Head and neck:Scalp atraumatic, Neck supple. Neurological:Appropriate conversation, Gross motor deficits. Eyes:Conjugate gaze, Clear corneas. ENT: No epistaxis. Cardiac: Regular rate and rhythm, Grossly normal heart tones. Pulmonary: Wheezing, Rales. Gastrointestinal: Abdominal tenderness, Abdominal distention. Musculoskeletal: Extremity deformity, Apparent pain with range of motion of the joints. Skin: Rash.   The findings on examination were normal except for the following:  Patient has an elevated BMI.  Lungs are clear.  The heart tones are normal.  The abdomen is soft.  Extremities are nontender.  There is no pain with range of motion any joints.  The patient has no rash.  ED Course   Procedures  Labs Reviewed   CBC W/ AUTO DIFFERENTIAL - Abnormal; Notable for the following components:       Result Value    MCH 26.8 (*)     RDW 15.2 (*)     All other components within normal limits   COMPREHENSIVE METABOLIC PANEL - Abnormal; Notable for the following components:    Glucose 120 (*)     All other components within normal limits   TROPONIN I   B-TYPE NATRIURETIC PEPTIDE     EKG Readings: (Independently Interpreted)   This patient is in a normal sinus rhythm heart rate of 72.  There are diffuse nonspecific T-wave changes.  There are no significant ST segment changes.  There is no definite evidence of acute myocardial infarction or malignant arrhythmia.  This is doctor Cee dictating an I independently interpreted this EKG.  There is no ectopy.     Imaging Results              X-Ray Chest PA And Lateral (Final result)  Result time 01/19/23 12:40:33      Final result by Emil Amador,  MD (01/19/23 12:40:33)                   Impression:      1. No acute cardiopulmonary process, hypoventilatory exam.      Electronically signed by: Emil Amador MD  Date:    01/19/2023  Time:    12:40               Narrative:    EXAMINATION:  XR CHEST PA AND LATERAL    CLINICAL HISTORY:  palpitations;    TECHNIQUE:  PA and lateral views of the chest were performed.    COMPARISON:  05/07/2019    FINDINGS:  The cardiomediastinal silhouette is not enlarged.  There is elevation of the right hemidiaphragm..  There is no pleural effusion.  The trachea is midline.  The lungs are symmetrically expanded bilaterally with mildly coarse interstitial attenuation in a perihilar distribution, accentuated by habitus and shallow inspiratory effort..  No large focal consolidation seen.  There is no pneumothorax.  The osseous structures are remarkable for degenerative change..                                    Medical decision making: Given the above this patient presents to the emergency room with a sensation of his heart beating fast.  We could not identify a cardiac arrhythmia in the emergency room.  His EKG is remarkable for some nonspecific changes.  The patient remains in a sinus rhythm with a heart rate of 66.  The lungs are clear.  The heart tones are normal.  The abdomen is soft.  Patient has an elevated BMI.  There is no evidence of congestive heart failure, myocardial infarction.  There are nonspecific EKG changes but the patient has no chest pain pressure tightness.  I doubt ischemia.  I could find no malignant arrhythmia.             Medications - No data to display           Scribe Attestation:   Scribe #1: I performed the above scribed service and the documentation accurately describes the services I performed. I attest to the accuracy of the note.            I personally performed the services described in this documentation.  All medical record  entries made by the scribe are at my direction and in my  presence.  Signed, Dr. Cee       Clinical Impression:   Final diagnoses:  [R00.2] Palpitations (Primary)        ED Disposition Condition    Discharge Stable          ED Prescriptions    None       Follow-up Information       Follow up With Specialties Details Why Contact Info    Paul Fernandez MD Cardiology, Interventional Cardiology In 3 days  [address]  [address]  [facility] [address]  [phone]               Adrien Cee MD  01/19/23 1488

## 2023-01-19 NOTE — FIRST PROVIDER EVALUATION
Emergency Department TeleTriage Encounter Note      CHIEF COMPLAINT    Chief Complaint   Patient presents with    Palpitations     Pt c/o palpitations since Sunday. Pt denies any other symptoms.        VITAL SIGNS   Initial Vitals [01/19/23 1129]   BP Pulse Resp Temp SpO2   (!) 170/87 70 18 97.9 °F (36.6 °C) 97 %      MAP       --            ALLERGIES    Review of patient's allergies indicates:  No Known Allergies    PROVIDER TRIAGE NOTE  Patient presents with complaint of palpitations and racing heart.  He reports symptoms have been present for approximately 5 days.  Denies shortness of breath.  He denies chest pain.      Phy:   Constitutional: well nourished, well developed, appearing stated age, NAD   HEENT: NCAT, symmetrical lids, No obvious facial deformity.  Normal phonation. Normal Conjunctiva   Neck: NAROM   Respiratory: Normal effort.  No obvious use of accessory muscles   Musculoskeletal: Moved upper extremities well   Neuro: Alert, answers questions appropriately    Psych: appropriate mood and affect      Initial orders will be placed and care will be transferred to an alternate provider when patient is roomed for a full evaluation. Any additional orders and the final disposition will be determined by that provider.        ORDERS  Labs Reviewed   CBC W/ AUTO DIFFERENTIAL   COMPREHENSIVE METABOLIC PANEL   TROPONIN I   B-TYPE NATRIURETIC PEPTIDE       ED Orders (720h ago, onward)      Start Ordered     Status Ordering Provider    01/19/23 1137 01/19/23 1136  X-Ray Chest PA And Lateral  1 time imaging         Ordered JOYCE BROWN    01/19/23 1137 01/19/23 1136  B-Type natriuretic peptide (BNP)  STAT         Ordered JOYCE BROWN    01/19/23 1136 01/19/23 1136  Vital signs  Every 15 min         Ordered JOYCE BROWN    01/19/23 1136 01/19/23 1136  Cardiac Monitoring - Adult  Continuous        Comments: Notify Physician If:    Ordered JOYCE BROWN     01/19/23 1136 01/19/23 1136  Pulse Oximetry Continuous  Continuous         Ordered NEGROTTO DIANA, JOYCE    01/19/23 1136 01/19/23 1136  Diet NPO  Diet effective now         Ordered NEGROTTO DIANA, JOYCE    01/19/23 1136 01/19/23 1136  Saline lock IV  Once         Ordered NEGROTTO DIANA, JOYCE    01/19/23 1136 01/19/23 1136  CBC auto differential  STAT         Ordered NEGROTTO DIANA, JOYCE    01/19/23 1136 01/19/23 1136  Comprehensive metabolic panel  STAT         Ordered NEGROTTO DIANA, JOYCE    01/19/23 1136 01/19/23 1136  Troponin I #1  STAT         Ordered NEGROTTO DIANA, JOYCE    01/19/23 1131 01/19/23 1131  EKG 12-lead  Once         Ordered JACY FERGUSON              Virtual Visit Note: The provider triage portion of this emergency department evaluation and documentation was performed via [employer], a HIPAA-compliant telemedicine application, in concert with a tele-presenter in the room. A face to face patient evaluation with one of my colleagues will occur once the patient is placed in an emergency department room.      DISCLAIMER: This note was prepared with [employer] voice recognition transcription software. Garbled syntax, mangled pronouns, and other bizarre constructions may be attributed to that software system.

## 2023-01-19 NOTE — ED TRIAGE NOTES
Pt comes in w/complaint of heart palpitations, denies pain or SOB. Pt is ambulatory w/steady gait, a&ox3, skin w/d/I, resp e/u. Pt also denies N/V/D

## 2023-01-19 NOTE — DISCHARGE INSTRUCTIONS
Please return immediately if you get worse or if new problems develop.  Please follow-up with the cardiologist above.  You may follow-up with your primary care doctor.  Please avoid caffeine stimulants diet pills cold medicines energy drinks.

## 2023-01-23 ENCOUNTER — TELEPHONE (OUTPATIENT)
Dept: ADMINISTRATIVE | Facility: CLINIC | Age: 68
End: 2023-01-23
Payer: MEDICARE

## 2023-01-24 ENCOUNTER — OFFICE VISIT (OUTPATIENT)
Dept: FAMILY MEDICINE | Facility: CLINIC | Age: 68
End: 2023-01-24
Payer: MEDICARE

## 2023-01-24 VITALS
HEART RATE: 62 BPM | DIASTOLIC BLOOD PRESSURE: 78 MMHG | BODY MASS INDEX: 43.23 KG/M2 | WEIGHT: 285.25 LBS | RESPIRATION RATE: 16 BRPM | SYSTOLIC BLOOD PRESSURE: 144 MMHG | HEIGHT: 68 IN | OXYGEN SATURATION: 97 % | TEMPERATURE: 98 F

## 2023-01-24 DIAGNOSIS — I27.9 PULMONARY HEART DISEASE: ICD-10-CM

## 2023-01-24 DIAGNOSIS — E11.40 TYPE 2 DIABETES MELLITUS WITH DIABETIC NEUROPATHY, WITHOUT LONG-TERM CURRENT USE OF INSULIN: ICD-10-CM

## 2023-01-24 DIAGNOSIS — I77.1 STRICTURE OF ARTERY: ICD-10-CM

## 2023-01-24 DIAGNOSIS — R35.0 BENIGN PROSTATIC HYPERPLASIA WITH URINARY FREQUENCY: ICD-10-CM

## 2023-01-24 DIAGNOSIS — G47.00 INSOMNIA, UNSPECIFIED TYPE: ICD-10-CM

## 2023-01-24 DIAGNOSIS — J43.9 PULMONARY EMPHYSEMA, UNSPECIFIED EMPHYSEMA TYPE: ICD-10-CM

## 2023-01-24 DIAGNOSIS — I42.9 CARDIOMYOPATHY, UNSPECIFIED TYPE: ICD-10-CM

## 2023-01-24 DIAGNOSIS — G47.33 OSA ON CPAP: ICD-10-CM

## 2023-01-24 DIAGNOSIS — N40.1 BENIGN PROSTATIC HYPERPLASIA WITH URINARY FREQUENCY: ICD-10-CM

## 2023-01-24 DIAGNOSIS — E11.36 TYPE 2 DIABETES MELLITUS WITH DIABETIC CATARACT, WITHOUT LONG-TERM CURRENT USE OF INSULIN: ICD-10-CM

## 2023-01-24 DIAGNOSIS — I47.10 PAROXYSMAL SUPRAVENTRICULAR TACHYCARDIA: ICD-10-CM

## 2023-01-24 DIAGNOSIS — E11.69 HYPERLIPIDEMIA ASSOCIATED WITH TYPE 2 DIABETES MELLITUS: ICD-10-CM

## 2023-01-24 DIAGNOSIS — Z00.00 ENCOUNTER FOR PREVENTIVE HEALTH EXAMINATION: Primary | ICD-10-CM

## 2023-01-24 DIAGNOSIS — E78.5 HYPERLIPIDEMIA ASSOCIATED WITH TYPE 2 DIABETES MELLITUS: ICD-10-CM

## 2023-01-24 PROCEDURE — 1159F MED LIST DOCD IN RCRD: CPT | Mod: CPTII,S$GLB,, | Performed by: NURSE PRACTITIONER

## 2023-01-24 PROCEDURE — 3288F PR FALLS RISK ASSESSMENT DOCUMENTED: ICD-10-PCS | Mod: CPTII,S$GLB,, | Performed by: NURSE PRACTITIONER

## 2023-01-24 PROCEDURE — 3078F DIAST BP <80 MM HG: CPT | Mod: CPTII,S$GLB,, | Performed by: NURSE PRACTITIONER

## 2023-01-24 PROCEDURE — 99499 RISK ADDL DX/OHS AUDIT: ICD-10-PCS | Mod: S$GLB,,, | Performed by: NURSE PRACTITIONER

## 2023-01-24 PROCEDURE — 1126F PR PAIN SEVERITY QUANTIFIED, NO PAIN PRESENT: ICD-10-PCS | Mod: CPTII,S$GLB,, | Performed by: NURSE PRACTITIONER

## 2023-01-24 PROCEDURE — 1159F PR MEDICATION LIST DOCUMENTED IN MEDICAL RECORD: ICD-10-PCS | Mod: CPTII,S$GLB,, | Performed by: NURSE PRACTITIONER

## 2023-01-24 PROCEDURE — 3008F PR BODY MASS INDEX (BMI) DOCUMENTED: ICD-10-PCS | Mod: CPTII,S$GLB,, | Performed by: NURSE PRACTITIONER

## 2023-01-24 PROCEDURE — 3078F PR MOST RECENT DIASTOLIC BLOOD PRESSURE < 80 MM HG: ICD-10-PCS | Mod: CPTII,S$GLB,, | Performed by: NURSE PRACTITIONER

## 2023-01-24 PROCEDURE — G0439 PR MEDICARE ANNUAL WELLNESS SUBSEQUENT VISIT: ICD-10-PCS | Mod: S$GLB,,, | Performed by: NURSE PRACTITIONER

## 2023-01-24 PROCEDURE — 4010F ACE/ARB THERAPY RXD/TAKEN: CPT | Mod: CPTII,S$GLB,, | Performed by: NURSE PRACTITIONER

## 2023-01-24 PROCEDURE — 3008F BODY MASS INDEX DOCD: CPT | Mod: CPTII,S$GLB,, | Performed by: NURSE PRACTITIONER

## 2023-01-24 PROCEDURE — 99499 UNLISTED E&M SERVICE: CPT | Mod: S$GLB,,, | Performed by: NURSE PRACTITIONER

## 2023-01-24 PROCEDURE — 3077F PR MOST RECENT SYSTOLIC BLOOD PRESSURE >= 140 MM HG: ICD-10-PCS | Mod: CPTII,S$GLB,, | Performed by: NURSE PRACTITIONER

## 2023-01-24 PROCEDURE — 3288F FALL RISK ASSESSMENT DOCD: CPT | Mod: CPTII,S$GLB,, | Performed by: NURSE PRACTITIONER

## 2023-01-24 PROCEDURE — G0439 PPPS, SUBSEQ VISIT: HCPCS | Mod: S$GLB,,, | Performed by: NURSE PRACTITIONER

## 2023-01-24 PROCEDURE — 99999 PR PBB SHADOW E&M-EST. PATIENT-LVL IV: ICD-10-PCS | Mod: PBBFAC,,, | Performed by: NURSE PRACTITIONER

## 2023-01-24 PROCEDURE — 99999 PR PBB SHADOW E&M-EST. PATIENT-LVL IV: CPT | Mod: PBBFAC,,, | Performed by: NURSE PRACTITIONER

## 2023-01-24 PROCEDURE — 1101F PT FALLS ASSESS-DOCD LE1/YR: CPT | Mod: CPTII,S$GLB,, | Performed by: NURSE PRACTITIONER

## 2023-01-24 PROCEDURE — 1101F PR PT FALLS ASSESS DOC 0-1 FALLS W/OUT INJ PAST YR: ICD-10-PCS | Mod: CPTII,S$GLB,, | Performed by: NURSE PRACTITIONER

## 2023-01-24 PROCEDURE — 4010F PR ACE/ARB THEARPY RXD/TAKEN: ICD-10-PCS | Mod: CPTII,S$GLB,, | Performed by: NURSE PRACTITIONER

## 2023-01-24 PROCEDURE — 3077F SYST BP >= 140 MM HG: CPT | Mod: CPTII,S$GLB,, | Performed by: NURSE PRACTITIONER

## 2023-01-24 PROCEDURE — 1126F AMNT PAIN NOTED NONE PRSNT: CPT | Mod: CPTII,S$GLB,, | Performed by: NURSE PRACTITIONER

## 2023-01-25 PROBLEM — I27.9 PULMONARY HEART DISEASE: Status: ACTIVE | Noted: 2023-01-25

## 2023-01-25 PROBLEM — J44.1 COPD WITH ACUTE EXACERBATION: Status: RESOLVED | Noted: 2022-06-28 | Resolved: 2023-01-25

## 2023-01-25 NOTE — PATIENT INSTRUCTIONS
Counseling and Referral of Other Preventative  (Italic type indicates deductible and co-insurance are waived)    Patient Name: Mirza Do  Today's Date: 1/25/2023    Health Maintenance       Date Due Completion Date    PROSTATE-SPECIFIC ANTIGEN 03/19/2019 3/19/2018    Foot Exam 03/24/2021 3/24/2020 (Done)    Override on 3/24/2020: Done    Pneumococcal Vaccines (Age 65+) (3 - PPSV23 if available, else PCV20) 06/29/2022 6/27/2019    Influenza Vaccine (1) 09/01/2022 10/15/2019    Override on 10/1/2017: Done    Override on 10/4/2016: Done    Override on 11/13/2015: Done    COVID-19 Vaccine (5 - Booster for Moderna series) 09/02/2022 7/8/2022    Diabetes Urine Screening 02/22/2023 2/22/2022    Lipid Panel 02/22/2023 2/22/2022    Eye Exam 03/08/2023 3/8/2022    Override on 11/10/2020: Done    Hemoglobin A1c 03/15/2023 9/15/2022    Low Dose Statin 01/24/2024 1/24/2023    Colorectal Cancer Screening 08/04/2027 8/4/2017    TETANUS VACCINE 06/27/2029 6/27/2019        No orders of the defined types were placed in this encounter.      The following information is provided to all patients.  This information is to help you find resources for any of the problems found today that may be affecting your health:                Living healthy guide: www.[facility].[place].gov      Understanding Diabetes: www.diabetes.org      Eating healthy: www.cdc.gov/healthyweight      CDC home safety checklist: www.cdc.gov/steadi/patient.html      Agency on Aging: www.goea.[place].[facility]      Alcoholics anonymous (AA): www.aa.org      Physical Activity: www.erin.nih.gov/jp4rari      Tobacco use: www.quitwithusla.org

## 2023-01-25 NOTE — PROGRESS NOTES
"  Mirza Do presented for a  Medicare AWV and comprehensive Health Risk Assessment today. The following components were reviewed and updated:    Medical history  Family History  Social history  Allergies and Current Medications  Health Risk Assessment  Health Maintenance  Care Team         ** See Completed Assessments for Annual Wellness Visit within the encounter summary.**         The following assessments were completed:  Living Situation  CAGE  Depression Screening  Timed Get Up and Go  Whisper Test  Cognitive Function Screening  Nutrition Screening  ADL Screening  PAQ Screening        Vitals:    01/24/23 1017   BP: (!) 144/78   Pulse: 62   Resp: 16   Temp: 98.4 °F (36.9 °C)   TempSrc: Oral   SpO2: 97%   Weight: 129.4 kg (285 lb 4.4 oz)   Height: 5' 8" (1.727 m)     Body mass index is 43.38 kg/m².  Physical Exam  Vitals and nursing note reviewed.   Constitutional:       General: He is not in acute distress.     Appearance: Normal appearance. He is well-developed and well-groomed. He is obese. He is not ill-appearing.   HENT:      Head: Normocephalic and atraumatic.      Right Ear: External ear normal.      Left Ear: External ear normal.      Nose: Nose normal.      Mouth/Throat:      Lips: Pink.      Mouth: Mucous membranes are moist.   Eyes:      General: Lids are normal. Vision grossly intact. Gaze aligned appropriately. No scleral icterus.        Right eye: No discharge.         Left eye: No discharge.      Conjunctiva/sclera: Conjunctivae normal.   Neck:      Trachea: Phonation normal.   Pulmonary:      Effort: Pulmonary effort is normal. No accessory muscle usage or respiratory distress.   Musculoskeletal:      Cervical back: Neck supple.   Skin:     General: Skin is warm and dry.      Findings: No rash.   Neurological:      General: No focal deficit present.      Mental Status: He is alert and oriented to person, place, and time. Mental status is at baseline.      Motor: No abnormal muscle tone.      " Gait: Gait normal.   Psychiatric:         Attention and Perception: Attention and perception normal.         Mood and Affect: Mood and affect normal.         Speech: Speech normal.         Behavior: Behavior normal. Behavior is cooperative.         Thought Content: Thought content normal.         Cognition and Memory: Cognition and memory normal.         Judgment: Judgment normal.         Diagnoses and health risks identified today and associated recommendations/orders:    1. Encounter for preventive health examination  Health maintenance reviewed and up to date, will f/u with PCP for routine preventative care  Review for Opioid Screening: Pt does not have Rx for Opioids   Review for Substance Use Disorders: Patient does not use substance     2. Pulmonary emphysema, unspecified emphysema type  Stable without acute exacerbation, episodic treatment only     3. Pulmonary heart disease  Without acute exacerbation, followed by PCP     4. Cardiomyopathy, unspecified type  Without CHF, followed by PCP     5. Paroxysmal supraventricular tachycardia  Asymptomatic, rate controlled with BB, on ASA daily    6. Stricture of artery  BP controlled, on statin and ASA, without acute infarct or edema     7. Hyperlipidemia associated with type 2 diabetes mellitus  Controlled on statin, DM stable on metformin, last HgA1c 6.7    8. Type 2 diabetes mellitus with diabetic neuropathy, without long-term current use of insulin  DM controlled, last HgA1c 6.7    9. Type 2 diabetes mellitus with diabetic cataract, without long-term current use of insulin  DM controlled, last HgA1c 6.7, vision stable, up to date on eye exam     10. Benign prostatic hyperplasia with urinary frequency  Stable with flomax daily, PSA normal     11. Insomnia, unspecified type  Treated with ambien, compliant with medication, prescribed by PCP     12. YUNIEL on CPAP  Compliant with CPAP    13. BMI 40.0-44.9, adult  The patient is asked to make an attempt to improve diet  and exercise patterns to aid in medical management of this problem.      Provided Mirza with a 5-10 year written screening schedule and personal prevention plan. Recommendations were developed using the USPSTF age appropriate recommendations. Education, counseling, and referrals were provided as needed. After Visit Summary printed and given to patient which includes a list of additional screenings\tests needed.    Follow up in about 1 year (around 1/24/2024).    Batsheva Zacarias, TATIANA  I offered to discuss advanced care planning, including how to pick a person who would make decisions for you if you were unable to make them for yourself, called a health care power of , and what kind of decisions you might make such as use of life sustaining treatments such as ventilators and tube feeding when faced with a life limiting illness recorded on a living will that they will need to know. (How you want to be cared for as you near the end of your natural life)     X Patient is interested in learning more about how to make advanced directives.  I provided them paperwork and offered to discuss this with them.

## 2023-02-27 ENCOUNTER — LAB VISIT (OUTPATIENT)
Dept: LAB | Facility: HOSPITAL | Age: 68
End: 2023-02-27
Attending: INTERNAL MEDICINE
Payer: MEDICARE

## 2023-02-27 ENCOUNTER — OFFICE VISIT (OUTPATIENT)
Dept: FAMILY MEDICINE | Facility: CLINIC | Age: 68
End: 2023-02-27
Payer: MEDICARE

## 2023-02-27 VITALS
SYSTOLIC BLOOD PRESSURE: 134 MMHG | OXYGEN SATURATION: 96 % | HEART RATE: 66 BPM | RESPIRATION RATE: 20 BRPM | DIASTOLIC BLOOD PRESSURE: 82 MMHG | HEIGHT: 68 IN | WEIGHT: 278 LBS | TEMPERATURE: 98 F | BODY MASS INDEX: 42.13 KG/M2

## 2023-02-27 DIAGNOSIS — I10 ESSENTIAL HYPERTENSION, BENIGN: Chronic | ICD-10-CM

## 2023-02-27 DIAGNOSIS — E78.5 HYPERLIPIDEMIA ASSOCIATED WITH TYPE 2 DIABETES MELLITUS: ICD-10-CM

## 2023-02-27 DIAGNOSIS — E11.40 TYPE 2 DIABETES MELLITUS WITH DIABETIC NEUROPATHY, WITHOUT LONG-TERM CURRENT USE OF INSULIN: ICD-10-CM

## 2023-02-27 DIAGNOSIS — R22.41 LOCALIZED SWELLING, MASS AND LUMP, RIGHT LOWER LIMB: ICD-10-CM

## 2023-02-27 DIAGNOSIS — Z12.5 SCREENING PSA (PROSTATE SPECIFIC ANTIGEN): ICD-10-CM

## 2023-02-27 DIAGNOSIS — E11.69 HYPERLIPIDEMIA ASSOCIATED WITH TYPE 2 DIABETES MELLITUS: ICD-10-CM

## 2023-02-27 DIAGNOSIS — E11.36 TYPE 2 DIABETES MELLITUS WITH DIABETIC CATARACT, WITHOUT LONG-TERM CURRENT USE OF INSULIN: ICD-10-CM

## 2023-02-27 DIAGNOSIS — N40.1 BENIGN PROSTATIC HYPERPLASIA WITH URINARY FREQUENCY: ICD-10-CM

## 2023-02-27 DIAGNOSIS — E11.9 TYPE 2 DIABETES MELLITUS WITHOUT COMPLICATION: ICD-10-CM

## 2023-02-27 DIAGNOSIS — E11.40 TYPE 2 DIABETES MELLITUS WITH DIABETIC NEUROPATHY, WITHOUT LONG-TERM CURRENT USE OF INSULIN: Primary | ICD-10-CM

## 2023-02-27 DIAGNOSIS — R22.9 LOCALIZED SOFT TISSUE SWELLING: ICD-10-CM

## 2023-02-27 DIAGNOSIS — R35.0 BENIGN PROSTATIC HYPERPLASIA WITH URINARY FREQUENCY: ICD-10-CM

## 2023-02-27 DIAGNOSIS — I42.9 CARDIOMYOPATHY, UNSPECIFIED TYPE: ICD-10-CM

## 2023-02-27 DIAGNOSIS — G47.00 INSOMNIA, UNSPECIFIED TYPE: ICD-10-CM

## 2023-02-27 LAB
ALBUMIN SERPL BCP-MCNC: 3.9 G/DL (ref 3.5–5.2)
ALP SERPL-CCNC: 86 U/L (ref 55–135)
ALT SERPL W/O P-5'-P-CCNC: 40 U/L (ref 10–44)
ANION GAP SERPL CALC-SCNC: 12 MMOL/L (ref 8–16)
AST SERPL-CCNC: 28 U/L (ref 10–40)
BASOPHILS # BLD AUTO: 0.02 K/UL (ref 0–0.2)
BASOPHILS NFR BLD: 0.3 % (ref 0–1.9)
BILIRUB SERPL-MCNC: 0.7 MG/DL (ref 0.1–1)
BUN SERPL-MCNC: 17 MG/DL (ref 8–23)
CALCIUM SERPL-MCNC: 9.2 MG/DL (ref 8.7–10.5)
CHLORIDE SERPL-SCNC: 103 MMOL/L (ref 95–110)
CHOLEST SERPL-MCNC: 205 MG/DL (ref 120–199)
CHOLEST/HDLC SERPL: 4.4 {RATIO} (ref 2–5)
CO2 SERPL-SCNC: 26 MMOL/L (ref 23–29)
CREAT SERPL-MCNC: 0.9 MG/DL (ref 0.5–1.4)
DIFFERENTIAL METHOD: ABNORMAL
EOSINOPHIL # BLD AUTO: 0.1 K/UL (ref 0–0.5)
EOSINOPHIL NFR BLD: 1.7 % (ref 0–8)
ERYTHROCYTE [DISTWIDTH] IN BLOOD BY AUTOMATED COUNT: 16.3 % (ref 11.5–14.5)
EST. GFR  (NO RACE VARIABLE): >60 ML/MIN/1.73 M^2
GLUCOSE SERPL-MCNC: 80 MG/DL (ref 70–110)
HCT VFR BLD AUTO: 44.9 % (ref 40–54)
HDLC SERPL-MCNC: 47 MG/DL (ref 40–75)
HDLC SERPL: 22.9 % (ref 20–50)
HGB BLD-MCNC: 14 G/DL (ref 14–18)
IMM GRANULOCYTES # BLD AUTO: 0.01 K/UL (ref 0–0.04)
IMM GRANULOCYTES NFR BLD AUTO: 0.1 % (ref 0–0.5)
LDLC SERPL CALC-MCNC: 136.6 MG/DL (ref 63–159)
LYMPHOCYTES # BLD AUTO: 2.4 K/UL (ref 1–4.8)
LYMPHOCYTES NFR BLD: 34.9 % (ref 18–48)
MCH RBC QN AUTO: 27 PG (ref 27–31)
MCHC RBC AUTO-ENTMCNC: 31.2 G/DL (ref 32–36)
MCV RBC AUTO: 87 FL (ref 82–98)
MONOCYTES # BLD AUTO: 0.7 K/UL (ref 0.3–1)
MONOCYTES NFR BLD: 10 % (ref 4–15)
NEUTROPHILS # BLD AUTO: 3.7 K/UL (ref 1.8–7.7)
NEUTROPHILS NFR BLD: 53 % (ref 38–73)
NONHDLC SERPL-MCNC: 158 MG/DL
NRBC BLD-RTO: 0 /100 WBC
PLATELET # BLD AUTO: 248 K/UL (ref 150–450)
PMV BLD AUTO: 10.6 FL (ref 9.2–12.9)
POTASSIUM SERPL-SCNC: 4 MMOL/L (ref 3.5–5.1)
PROT SERPL-MCNC: 7.9 G/DL (ref 6–8.4)
RBC # BLD AUTO: 5.19 M/UL (ref 4.6–6.2)
SODIUM SERPL-SCNC: 141 MMOL/L (ref 136–145)
TRIGL SERPL-MCNC: 107 MG/DL (ref 30–150)
TSH SERPL DL<=0.005 MIU/L-ACNC: 1.28 UIU/ML (ref 0.4–4)
WBC # BLD AUTO: 6.9 K/UL (ref 3.9–12.7)

## 2023-02-27 PROCEDURE — 3008F BODY MASS INDEX DOCD: CPT | Mod: CPTII,S$GLB,, | Performed by: INTERNAL MEDICINE

## 2023-02-27 PROCEDURE — 3288F FALL RISK ASSESSMENT DOCD: CPT | Mod: CPTII,S$GLB,, | Performed by: INTERNAL MEDICINE

## 2023-02-27 PROCEDURE — 1126F PR PAIN SEVERITY QUANTIFIED, NO PAIN PRESENT: ICD-10-PCS | Mod: CPTII,S$GLB,, | Performed by: INTERNAL MEDICINE

## 2023-02-27 PROCEDURE — 3079F PR MOST RECENT DIASTOLIC BLOOD PRESSURE 80-89 MM HG: ICD-10-PCS | Mod: CPTII,S$GLB,, | Performed by: INTERNAL MEDICINE

## 2023-02-27 PROCEDURE — 3288F PR FALLS RISK ASSESSMENT DOCUMENTED: ICD-10-PCS | Mod: CPTII,S$GLB,, | Performed by: INTERNAL MEDICINE

## 2023-02-27 PROCEDURE — 84443 ASSAY THYROID STIM HORMONE: CPT | Performed by: INTERNAL MEDICINE

## 2023-02-27 PROCEDURE — 1160F PR REVIEW ALL MEDS BY PRESCRIBER/CLIN PHARMACIST DOCUMENTED: ICD-10-PCS | Mod: CPTII,S$GLB,, | Performed by: INTERNAL MEDICINE

## 2023-02-27 PROCEDURE — 99214 PR OFFICE/OUTPT VISIT, EST, LEVL IV, 30-39 MIN: ICD-10-PCS | Mod: S$GLB,,, | Performed by: INTERNAL MEDICINE

## 2023-02-27 PROCEDURE — 1160F RVW MEDS BY RX/DR IN RCRD: CPT | Mod: CPTII,S$GLB,, | Performed by: INTERNAL MEDICINE

## 2023-02-27 PROCEDURE — 3008F PR BODY MASS INDEX (BMI) DOCUMENTED: ICD-10-PCS | Mod: CPTII,S$GLB,, | Performed by: INTERNAL MEDICINE

## 2023-02-27 PROCEDURE — 84153 ASSAY OF PSA TOTAL: CPT | Performed by: INTERNAL MEDICINE

## 2023-02-27 PROCEDURE — 36415 COLL VENOUS BLD VENIPUNCTURE: CPT | Mod: PO | Performed by: INTERNAL MEDICINE

## 2023-02-27 PROCEDURE — 3075F SYST BP GE 130 - 139MM HG: CPT | Mod: CPTII,S$GLB,, | Performed by: INTERNAL MEDICINE

## 2023-02-27 PROCEDURE — 3075F PR MOST RECENT SYSTOLIC BLOOD PRESS GE 130-139MM HG: ICD-10-PCS | Mod: CPTII,S$GLB,, | Performed by: INTERNAL MEDICINE

## 2023-02-27 PROCEDURE — 1126F AMNT PAIN NOTED NONE PRSNT: CPT | Mod: CPTII,S$GLB,, | Performed by: INTERNAL MEDICINE

## 2023-02-27 PROCEDURE — 4010F ACE/ARB THERAPY RXD/TAKEN: CPT | Mod: CPTII,S$GLB,, | Performed by: INTERNAL MEDICINE

## 2023-02-27 PROCEDURE — 99214 OFFICE O/P EST MOD 30 MIN: CPT | Mod: S$GLB,,, | Performed by: INTERNAL MEDICINE

## 2023-02-27 PROCEDURE — 4010F PR ACE/ARB THEARPY RXD/TAKEN: ICD-10-PCS | Mod: CPTII,S$GLB,, | Performed by: INTERNAL MEDICINE

## 2023-02-27 PROCEDURE — 1101F PT FALLS ASSESS-DOCD LE1/YR: CPT | Mod: CPTII,S$GLB,, | Performed by: INTERNAL MEDICINE

## 2023-02-27 PROCEDURE — 1159F PR MEDICATION LIST DOCUMENTED IN MEDICAL RECORD: ICD-10-PCS | Mod: CPTII,S$GLB,, | Performed by: INTERNAL MEDICINE

## 2023-02-27 PROCEDURE — 3079F DIAST BP 80-89 MM HG: CPT | Mod: CPTII,S$GLB,, | Performed by: INTERNAL MEDICINE

## 2023-02-27 PROCEDURE — 99999 PR PBB SHADOW E&M-EST. PATIENT-LVL III: CPT | Mod: PBBFAC,,, | Performed by: INTERNAL MEDICINE

## 2023-02-27 PROCEDURE — 85025 COMPLETE CBC W/AUTO DIFF WBC: CPT | Performed by: INTERNAL MEDICINE

## 2023-02-27 PROCEDURE — 80061 LIPID PANEL: CPT | Performed by: INTERNAL MEDICINE

## 2023-02-27 PROCEDURE — 82570 ASSAY OF URINE CREATININE: CPT | Performed by: INTERNAL MEDICINE

## 2023-02-27 PROCEDURE — 1159F MED LIST DOCD IN RCRD: CPT | Mod: CPTII,S$GLB,, | Performed by: INTERNAL MEDICINE

## 2023-02-27 PROCEDURE — 1101F PR PT FALLS ASSESS DOC 0-1 FALLS W/OUT INJ PAST YR: ICD-10-PCS | Mod: CPTII,S$GLB,, | Performed by: INTERNAL MEDICINE

## 2023-02-27 PROCEDURE — 99999 PR PBB SHADOW E&M-EST. PATIENT-LVL III: ICD-10-PCS | Mod: PBBFAC,,, | Performed by: INTERNAL MEDICINE

## 2023-02-27 PROCEDURE — 83036 HEMOGLOBIN GLYCOSYLATED A1C: CPT | Performed by: INTERNAL MEDICINE

## 2023-02-27 PROCEDURE — 80053 COMPREHEN METABOLIC PANEL: CPT | Performed by: INTERNAL MEDICINE

## 2023-02-27 RX ORDER — HYDROCHLOROTHIAZIDE 25 MG/1
25 TABLET ORAL DAILY
Qty: 90 TABLET | Refills: 3 | Status: SHIPPED | OUTPATIENT
Start: 2023-02-27 | End: 2023-12-21 | Stop reason: SDUPTHER

## 2023-02-27 RX ORDER — METFORMIN HYDROCHLORIDE 500 MG/1
500 TABLET ORAL 2 TIMES DAILY WITH MEALS
Qty: 180 TABLET | Refills: 1 | Status: SHIPPED | OUTPATIENT
Start: 2023-02-27

## 2023-02-27 RX ORDER — MODERNA COVID-19 VACCINE, BIVALENT 25; 25 UG/.5ML; UG/.5ML
INJECTION, SUSPENSION INTRAMUSCULAR
COMMUNITY
Start: 2023-01-27 | End: 2023-11-17 | Stop reason: CLARIF

## 2023-02-27 RX ORDER — TAMSULOSIN HYDROCHLORIDE 0.4 MG/1
0.4 CAPSULE ORAL DAILY
Qty: 90 CAPSULE | Refills: 3 | Status: SHIPPED | OUTPATIENT
Start: 2023-02-27 | End: 2024-02-27

## 2023-02-27 RX ORDER — ZOLPIDEM TARTRATE 10 MG/1
10 TABLET ORAL NIGHTLY PRN
Qty: 30 TABLET | Refills: 5 | Status: SHIPPED | OUTPATIENT
Start: 2023-02-27 | End: 2024-03-05

## 2023-02-27 RX ORDER — METOPROLOL SUCCINATE 50 MG/1
50 TABLET, EXTENDED RELEASE ORAL DAILY
Qty: 90 TABLET | Refills: 3 | Status: SHIPPED | OUTPATIENT
Start: 2023-02-27

## 2023-02-27 RX ORDER — ATORVASTATIN CALCIUM 40 MG/1
40 TABLET, FILM COATED ORAL NIGHTLY
Qty: 90 TABLET | Refills: 3 | Status: SHIPPED | OUTPATIENT
Start: 2023-02-27 | End: 2023-05-09

## 2023-02-27 RX ORDER — LOSARTAN POTASSIUM 100 MG/1
100 TABLET ORAL DAILY
Qty: 90 TABLET | Refills: 3 | Status: SHIPPED | OUTPATIENT
Start: 2023-02-27

## 2023-02-27 NOTE — PROGRESS NOTES
SUBJECTIVE     Chief Complaint   Patient presents with    Follow-up       HPI  Mirza Do is a 67 y.o. male with multiple medical diagnoses as listed in the medical history and problem list that presents for follow-up for DM2. Pt has been doing well since his last visit. He is fully compliant with meds and denies any adverse side effects. Pt has been compliant with an ADA diet and walks for exercise 3 times weekly. His blood sugars have been normal and he denies any hypoglycemia. Pt is concerned about some soft tissue swelling down in his RLE.    PAST MEDICAL HISTORY:  Past Medical History:   Diagnosis Date    COPD with acute exacerbation 2022    CVA (cerebral infarction)     CVA approx     Diabetes mellitus type II, non insulin dependent 10/14/2016    Erectile dysfunction     Erectile Dysfunction    Eye injury     hit os playing basketball     Hyperlipidemia 10/14/2016    Hypertension     Hypertension    Murmur     Nuclear sclerotic cataract of both eyes 2019    Polyneuropathy     Pulmonary heart disease 2023    Renal cyst, left 2020    Sleep apnea     Trouble in sleeping     Type 2 diabetes mellitus        PAST SURGICAL HISTORY:  Past Surgical History:   Procedure Laterality Date    BACK SURGERY      discectomy    COLONOSCOPY N/A 2017    Procedure: COLONOSCOPY;  Surgeon: Mario Alberto Vega MD;  Location: [facility] ([address] [place] [address]);  Service: Endoscopy;  Laterality: N/A;  Conflict with Jack's schedule no other times available with CRS, Pt requested 17 - moved to be scoped with GI MD - ER    ROOT CANAL      UMBILICAL HERNIA REPAIR         SOCIAL HISTORY:  Social History     Socioeconomic History    Marital status: Single   Tobacco Use    Smoking status: Former     Packs/day: 0.10     Years: 4.00     Pack years: 0.40     Types: Cigarettes     Quit date: 1995     Years since quittin.8    Smokeless tobacco: Never    Tobacco comments:     social smoking as teenager   Substance and  Sexual Activity    Alcohol use: Yes     Comment: beer 6 pack in a month; red wine 2x a month     Drug use: No    Sexual activity: Yes     Partners: Female   Social History Narrative         Social Determinants of Health     Financial Resource Strain: Low Risk     Difficulty of Paying Living Expenses: Not hard at all   Food Insecurity: No Food Insecurity    Worried About Running Out of Food in the Last Year: Never true    Ran Out of Food in the Last Year: Never true   Transportation Needs: No Transportation Needs    Lack of Transportation (Medical): No    Lack of Transportation (Non-Medical): No   Physical Activity: Sufficiently Active    Days of Exercise per Week: 3 days    Minutes of Exercise per Session: 60 min   Stress: No Stress Concern Present    Feeling of Stress : Only a little   Social Connections: Moderately Isolated    Frequency of Communication with Friends and Family: More than three times a week    Frequency of Social Gatherings with Friends and Family: More than three times a week    Attends Yarsanism Services: More than 4 times per year    Active Member of Clubs or Organizations: No    Attends Club or Organization Meetings: Never    Marital Status:    Housing Stability: Low Risk     Unable to Pay for Housing in the Last Year: No    Number of Places Lived in the Last Year: 1    Unstable Housing in the Last Year: No       FAMILY HISTORY:  Family History   Problem Relation Age of Onset    No Known Problems Father     Hypertension Brother     Cancer Brother         lung     Cancer Mother         smoker     Emphysema Sister     Diabetes Sister     No Known Problems Sister     No Known Problems Sister     No Known Problems Maternal Grandmother     No Known Problems Maternal Grandfather     No Known Problems Paternal Grandmother     No Known Problems Paternal Grandfather     No Known Problems Maternal Aunt     No Known Problems Maternal Uncle     No Known Problems Paternal Aunt      No Known Problems Paternal Uncle     Amblyopia Neg Hx     Blindness Neg Hx     Cataracts Neg Hx     Glaucoma Neg Hx     Macular degeneration Neg Hx     Retinal detachment Neg Hx     Strabismus Neg Hx     Stroke Neg Hx     Thyroid disease Neg Hx        ALLERGIES AND MEDICATIONS: updated and reviewed.  Review of patient's allergies indicates:  No Known Allergies  Current Outpatient Medications   Medication Sig Dispense Refill    ACCU-CHEK STEPHON PLUS TEST STRP Strp 1 strip by Misc.(Non-Drug; Combo Route) route daily as needed (alternate testing time). 50 strip 11    ACCU-CHEK FASTCLIX Misc 1 lancet by Misc.(Non-Drug; Combo Route) route daily as needed. 50 each 11    acetaminophen (TYLENOL) 650 MG TbSR Take 1 tablet (650 mg total) by mouth every 8 (eight) hours as needed. 30 tablet 0    aspirin 81 MG Chew Take 81 mg by mouth once daily.      atorvastatin (LIPITOR) 40 MG tablet Take 1 tablet (40 mg total) by mouth every evening. 90 tablet 3    hydroCHLOROthiazide (HYDRODIURIL) 25 MG tablet Take 1 tablet (25 mg total) by mouth once daily. 90 tablet 3    losartan (COZAAR) 100 MG tablet Take 1 tablet (100 mg total) by mouth once daily. 90 tablet 3    metFORMIN (GLUCOPHAGE) 500 MG tablet Take 1 tablet (500 mg total) by mouth 2 (two) times daily with meals. 180 tablet 1    metoprolol succinate (TOPROL-XL) 50 MG 24 hr tablet Take 1 tablet (50 mg total) by mouth once daily. 90 tablet 3    MODERNA COVID BIVAL,6Y UP,,PF, 50 mcg/0.5 mL injection       tamsulosin (FLOMAX) 0.4 mg Cap Take 1 capsule (0.4 mg total) by mouth once daily. 90 capsule 3    zolpidem (AMBIEN) 10 mg Tab Take 1 tablet (10 mg total) by mouth nightly as needed (INSOMNIA). 30 tablet 5     No current facility-administered medications for this visit.       ROS  Review of Systems   Constitutional:  Negative for chills and fever.   HENT:  Negative for hearing loss and sore throat.    Eyes:  Negative for visual disturbance.   Respiratory:  Negative for cough and  "shortness of breath.    Cardiovascular:  Negative for chest pain, palpitations and leg swelling.   Gastrointestinal:  Negative for abdominal pain, constipation, diarrhea, nausea and vomiting.   Genitourinary:  Negative for dysuria, frequency and urgency.   Musculoskeletal:  Negative for arthralgias, joint swelling and myalgias.        RLE soft tissue swelling   Skin:  Negative for rash and wound.   Neurological:  Negative for headaches.   Psychiatric/Behavioral:  Negative for agitation and confusion. The patient is not nervous/anxious.        OBJECTIVE     Physical Exam  Vitals:    02/27/23 1412   BP: 134/82   Pulse: 66   Resp: 20   Temp: 98.1 °F (36.7 °C)    Body mass index is 42.27 kg/m².  Weight: 126.1 kg (278 lb)   Height: 5' 8" (172.7 cm)     Physical Exam  Constitutional:       General: He is not in acute distress.     Appearance: He is well-developed.   HENT:      Head: Normocephalic and atraumatic.      Right Ear: External ear normal.      Left Ear: External ear normal.      Nose: Nose normal.   Eyes:      General: No scleral icterus.        Right eye: No discharge.         Left eye: No discharge.      Conjunctiva/sclera: Conjunctivae normal.   Neck:      Vascular: No JVD.      Trachea: No tracheal deviation.   Cardiovascular:      Rate and Rhythm: Normal rate and regular rhythm.      Heart sounds: Normal heart sounds. No murmur heard.    No friction rub. No gallop.   Pulmonary:      Effort: Pulmonary effort is normal. No respiratory distress.      Breath sounds: Normal breath sounds. No wheezing.   Abdominal:      General: Bowel sounds are normal. There is no distension.      Palpations: Abdomen is soft. There is no mass.      Tenderness: There is no abdominal tenderness. There is no guarding or rebound.   Musculoskeletal:         General: No tenderness or deformity. Normal range of motion.      Cervical back: Normal range of motion and neck supple.   Skin:     General: Skin is warm and dry.      Findings: " No erythema or rash.      Comments: Soft tissue swelling to RLE medially   Neurological:      Mental Status: He is alert and oriented to person, place, and time.      Motor: No abnormal muscle tone.      Coordination: Coordination normal.   Psychiatric:         Behavior: Behavior normal.         Thought Content: Thought content normal.         Judgment: Judgment normal.         Health Maintenance         Date Due Completion Date    PROSTATE-SPECIFIC ANTIGEN 03/19/2019 3/19/2018    Foot Exam 03/24/2021 3/24/2020 (Done)    Override on 3/24/2020: Done    Pneumococcal Vaccines (Age 65+) (3 - PPSV23 if available, else PCV20) 06/29/2022 6/27/2019    Influenza Vaccine (1) 09/01/2022 10/15/2019    Override on 10/1/2017: Done    Override on 10/4/2016: Done    Override on 11/13/2015: Done    Lipid Panel 02/22/2023 2/22/2022    Diabetes Urine Screening 02/22/2023 2/22/2022    Eye Exam 03/08/2023 3/8/2022    Override on 11/10/2020: Done    Hemoglobin A1c 03/15/2023 9/15/2022    Low Dose Statin 01/24/2024 1/24/2023    Colorectal Cancer Screening 08/04/2027 8/4/2017    TETANUS VACCINE 06/27/2029 6/27/2019              ASSESSMENT     67 y.o. male with     1. Type 2 diabetes mellitus with diabetic neuropathy, without long-term current use of insulin    2. Type 2 diabetes mellitus with diabetic cataract, without long-term current use of insulin    3. Insomnia, unspecified type    4. Benign prostatic hyperplasia with urinary frequency    5. Cardiomyopathy, unspecified type    6. Essential hypertension, benign    7. Hyperlipidemia associated with type 2 diabetes mellitus    8. Screening PSA (prostate specific antigen)    9. Localized soft tissue swelling    10. Localized swelling, mass and lump, right lower limb        PLAN:     1. Type 2 diabetes mellitus with diabetic neuropathy, without long-term current use of insulin  - Check labs  - metFORMIN (GLUCOPHAGE) 500 MG tablet; Take 1 tablet (500 mg total) by mouth 2 (two) times daily  with meals.  Dispense: 180 tablet; Refill: 1  - CBC Auto Differential; Future  - Comprehensive Metabolic Panel; Future  - Hemoglobin A1C; Future  - TSH; Future  - Lipid Panel; Future  - Microalbumin/Creatinine Ratio, Urine; Future    2. Type 2 diabetes mellitus with diabetic cataract, without long-term current use of insulin  - metFORMIN (GLUCOPHAGE) 500 MG tablet; Take 1 tablet (500 mg total) by mouth 2 (two) times daily with meals.  Dispense: 180 tablet; Refill: 1  - atorvastatin (LIPITOR) 40 MG tablet; Take 1 tablet (40 mg total) by mouth every evening.  Dispense: 90 tablet; Refill: 3    3. Insomnia, unspecified type  - Stable; no acute issues  - The current medical regimen is effective;  continue present plan and medications.  - zolpidem (AMBIEN) 10 mg Tab; Take 1 tablet (10 mg total) by mouth nightly as needed (INSOMNIA).  Dispense: 30 tablet; Refill: 5    4. Benign prostatic hyperplasia with urinary frequency  - Stable; no acute issues  - The current medical regimen is effective;  continue present plan and medications.  - tamsulosin (FLOMAX) 0.4 mg Cap; Take 1 capsule (0.4 mg total) by mouth once daily.  Dispense: 90 capsule; Refill: 3    5. Cardiomyopathy, unspecified type  - Stable; no acute issues  - The current medical regimen is effective;  continue present plan and medications.  - metoprolol succinate (TOPROL-XL) 50 MG 24 hr tablet; Take 1 tablet (50 mg total) by mouth once daily.  Dispense: 90 tablet; Refill: 3    6. Essential hypertension, benign  - BP well controlled; at goal of <140/90  - The current medical regimen is effective;  continue present plan and medications.  - metoprolol succinate (TOPROL-XL) 50 MG 24 hr tablet; Take 1 tablet (50 mg total) by mouth once daily.  Dispense: 90 tablet; Refill: 3  - losartan (COZAAR) 100 MG tablet; Take 1 tablet (100 mg total) by mouth once daily.  Dispense: 90 tablet; Refill: 3  - hydroCHLOROthiazide (HYDRODIURIL) 25 MG tablet; Take 1 tablet (25 mg total) by  mouth once daily.  Dispense: 90 tablet; Refill: 3  - CBC Auto Differential; Future  - Comprehensive Metabolic Panel; Future  - TSH; Future    7. Hyperlipidemia associated with type 2 diabetes mellitus  - Stable; no acute issues  - The current medical regimen is effective;  continue present plan and medications.  - atorvastatin (LIPITOR) 40 MG tablet; Take 1 tablet (40 mg total) by mouth every evening.  Dispense: 90 tablet; Refill: 3  - Lipid Panel; Future    8. Screening PSA (prostate specific antigen)  - PSA, Screening; Future    9. Localized soft tissue swelling  - US Extremity Non Vascular Complete Right; Future    10. Localized swelling, mass and lump, right lower limb  - US Extremity Non Vascular Complete Right; Future        RTC in 6 months     Manjula Honeycutt MD  02/27/2023 2:38 PM        No follow-ups on file.

## 2023-02-28 LAB
ALBUMIN/CREAT UR: 15.5 UG/MG (ref 0–30)
COMPLEXED PSA SERPL-MCNC: 0.27 NG/ML (ref 0–4)
CREAT UR-MCNC: 116 MG/DL (ref 23–375)
ESTIMATED AVG GLUCOSE: 148 MG/DL (ref 68–131)
HBA1C MFR BLD: 6.8 % (ref 4–5.6)
MICROALBUMIN UR DL<=1MG/L-MCNC: 18 UG/ML

## 2023-03-31 ENCOUNTER — HOSPITAL ENCOUNTER (EMERGENCY)
Facility: HOSPITAL | Age: 68
Discharge: HOME | End: 2023-03-31
Attending: EMERGENCY MEDICINE
Payer: MEDICARE

## 2023-03-31 VITALS
SYSTOLIC BLOOD PRESSURE: 182 MMHG | DIASTOLIC BLOOD PRESSURE: 85 MMHG | TEMPERATURE: 98 F | OXYGEN SATURATION: 96 % | WEIGHT: 280 LBS | HEART RATE: 65 BPM | HEIGHT: 68 IN | RESPIRATION RATE: 18 BRPM | BODY MASS INDEX: 42.44 KG/M2

## 2023-03-31 DIAGNOSIS — H10.31 ACUTE CONJUNCTIVITIS OF RIGHT EYE, UNSPECIFIED ACUTE CONJUNCTIVITIS TYPE: Primary | ICD-10-CM

## 2023-03-31 PROCEDURE — 99283 EMERGENCY DEPT VISIT LOW MDM: CPT

## 2023-03-31 PROCEDURE — 25000003 PHARM REV CODE 250

## 2023-03-31 RX ORDER — TETRACAINE HYDROCHLORIDE 5 MG/ML
2 SOLUTION OPHTHALMIC
Status: COMPLETED | OUTPATIENT
Start: 2023-03-31 | End: 2023-03-31

## 2023-03-31 RX ORDER — ERYTHROMYCIN 5 MG/G
OINTMENT OPHTHALMIC
Qty: 5 G | Refills: 0 | Status: SHIPPED | OUTPATIENT
Start: 2023-03-31 | End: 2023-11-17

## 2023-03-31 RX ORDER — CLONIDINE HYDROCHLORIDE 0.1 MG/1
0.1 TABLET ORAL
Status: COMPLETED | OUTPATIENT
Start: 2023-03-31 | End: 2023-03-31

## 2023-03-31 RX ADMIN — CLONIDINE HYDROCHLORIDE 0.1 MG: 0.1 TABLET ORAL at 10:03

## 2023-03-31 RX ADMIN — TETRACAINE HYDROCHLORIDE 2 DROP: 5 SOLUTION OPHTHALMIC at 09:03

## 2023-03-31 RX ADMIN — FLUORESCEIN SODIUM 1 EACH: 1 STRIP OPHTHALMIC at 09:03

## 2023-03-31 NOTE — DISCHARGE INSTRUCTIONS
Thank you for coming to our Emergency Department today. It is important to remember that some problems are difficult to diagnose and may not be found during your first visit. Be sure to follow up with your primary care doctor and review any labs/imaging that was performed with them. If you do not have a primary care doctor, you may contact the one listed on your discharge paperwork or you may also call the [facility] Appointment Desk at [phone] to schedule an appointment with one.     All medications may potentially have side effects and it is impossible to predict which medications may give you side effects. If you feel that you are having a negative effect of any medication you should immediately stop taking them and seek medical attention.    Return to the ER with any questions/concerns, new/concerning symptoms, worsening or failure to improve. Do not drive or make any important decisions for 24 hours if you have received any pain medications, sedatives or mood altering drugs during your ER visit.

## 2023-03-31 NOTE — ED TRIAGE NOTES
Pt reported to ED with c/o of right eye drainage and redness that started yesterday. Feels like something is in his eye. Used visine last night without relief.

## 2023-04-24 ENCOUNTER — OFFICE VISIT (OUTPATIENT)
Dept: OPTOMETRY | Facility: CLINIC | Age: 68
End: 2023-04-24
Payer: MEDICARE

## 2023-04-24 DIAGNOSIS — E11.36 TYPE 2 DIABETES MELLITUS WITH DIABETIC CATARACT, WITHOUT LONG-TERM CURRENT USE OF INSULIN: Primary | ICD-10-CM

## 2023-04-24 DIAGNOSIS — H25.13 NUCLEAR SCLEROSIS OF BOTH EYES: ICD-10-CM

## 2023-04-24 PROCEDURE — 3061F PR NEG MICROALBUMINURIA RESULT DOCUMENTED/REVIEW: ICD-10-PCS | Mod: CPTII,S$GLB,, | Performed by: OPTOMETRIST

## 2023-04-24 PROCEDURE — 1126F AMNT PAIN NOTED NONE PRSNT: CPT | Mod: CPTII,S$GLB,, | Performed by: OPTOMETRIST

## 2023-04-24 PROCEDURE — 1159F PR MEDICATION LIST DOCUMENTED IN MEDICAL RECORD: ICD-10-PCS | Mod: CPTII,S$GLB,, | Performed by: OPTOMETRIST

## 2023-04-24 PROCEDURE — 1160F PR REVIEW ALL MEDS BY PRESCRIBER/CLIN PHARMACIST DOCUMENTED: ICD-10-PCS | Mod: CPTII,S$GLB,, | Performed by: OPTOMETRIST

## 2023-04-24 PROCEDURE — 1159F MED LIST DOCD IN RCRD: CPT | Mod: CPTII,S$GLB,, | Performed by: OPTOMETRIST

## 2023-04-24 PROCEDURE — 4010F ACE/ARB THERAPY RXD/TAKEN: CPT | Mod: CPTII,S$GLB,, | Performed by: OPTOMETRIST

## 2023-04-24 PROCEDURE — 3061F NEG MICROALBUMINURIA REV: CPT | Mod: CPTII,S$GLB,, | Performed by: OPTOMETRIST

## 2023-04-24 PROCEDURE — 3066F NEPHROPATHY DOC TX: CPT | Mod: CPTII,S$GLB,, | Performed by: OPTOMETRIST

## 2023-04-24 PROCEDURE — 99999 PR PBB SHADOW E&M-EST. PATIENT-LVL III: ICD-10-PCS | Mod: PBBFAC,,, | Performed by: OPTOMETRIST

## 2023-04-24 PROCEDURE — 4010F PR ACE/ARB THEARPY RXD/TAKEN: ICD-10-PCS | Mod: CPTII,S$GLB,, | Performed by: OPTOMETRIST

## 2023-04-24 PROCEDURE — 1160F RVW MEDS BY RX/DR IN RCRD: CPT | Mod: CPTII,S$GLB,, | Performed by: OPTOMETRIST

## 2023-04-24 PROCEDURE — 2023F DILAT RTA XM W/O RTNOPTHY: CPT | Mod: CPTII,S$GLB,, | Performed by: OPTOMETRIST

## 2023-04-24 PROCEDURE — 3288F FALL RISK ASSESSMENT DOCD: CPT | Mod: CPTII,S$GLB,, | Performed by: OPTOMETRIST

## 2023-04-24 PROCEDURE — 3288F PR FALLS RISK ASSESSMENT DOCUMENTED: ICD-10-PCS | Mod: CPTII,S$GLB,, | Performed by: OPTOMETRIST

## 2023-04-24 PROCEDURE — 3044F PR MOST RECENT HEMOGLOBIN A1C LEVEL <7.0%: ICD-10-PCS | Mod: CPTII,S$GLB,, | Performed by: OPTOMETRIST

## 2023-04-24 PROCEDURE — 1101F PT FALLS ASSESS-DOCD LE1/YR: CPT | Mod: CPTII,S$GLB,, | Performed by: OPTOMETRIST

## 2023-04-24 PROCEDURE — 1101F PR PT FALLS ASSESS DOC 0-1 FALLS W/OUT INJ PAST YR: ICD-10-PCS | Mod: CPTII,S$GLB,, | Performed by: OPTOMETRIST

## 2023-04-24 PROCEDURE — 3066F PR DOCUMENTATION OF TREATMENT FOR NEPHROPATHY: ICD-10-PCS | Mod: CPTII,S$GLB,, | Performed by: OPTOMETRIST

## 2023-04-24 PROCEDURE — 1126F PR PAIN SEVERITY QUANTIFIED, NO PAIN PRESENT: ICD-10-PCS | Mod: CPTII,S$GLB,, | Performed by: OPTOMETRIST

## 2023-04-24 PROCEDURE — 3044F HG A1C LEVEL LT 7.0%: CPT | Mod: CPTII,S$GLB,, | Performed by: OPTOMETRIST

## 2023-04-24 PROCEDURE — 2023F PR DILATED RETINAL EXAM W/O EVID OF RETINOPATHY: ICD-10-PCS | Mod: CPTII,S$GLB,, | Performed by: OPTOMETRIST

## 2023-04-24 PROCEDURE — 92014 COMPRE OPH EXAM EST PT 1/>: CPT | Mod: S$GLB,,, | Performed by: OPTOMETRIST

## 2023-04-24 PROCEDURE — 99999 PR PBB SHADOW E&M-EST. PATIENT-LVL III: CPT | Mod: PBBFAC,,, | Performed by: OPTOMETRIST

## 2023-04-24 PROCEDURE — 92014 PR EYE EXAM, EST PATIENT,COMPREHESV: ICD-10-PCS | Mod: S$GLB,,, | Performed by: OPTOMETRIST

## 2023-04-24 NOTE — PROGRESS NOTES
Subjective:       Patient ID: Mirza Do is a 67 y.o. male      Chief Complaint   Patient presents with    Concerns About Ocular Health     History of Present Illness  Dls: 11/10/20 Dr. Webster     66 y/o male presents today for diabetic eye exam.   Pt states no changes in vision. Pt does not wear any glasses.   Pt states does not want new rx for glasses.     LBS ?     No tearing  No itching  No burning  No pain  No ha's  No floaters  No flashes    Eye meds  None    Hemoglobin A1C       Date                     Value               Ref Range             Status                02/27/2023               6.8 (H)             4.0 - 5.6 %           Final                  09/15/2022               6.7 (H)             4.0 - 5.6 %           Final                  02/22/2022               6.5 (H)             4.0 - 5.6 %           Final                   Assessment/Plan:     1. Type 2 diabetes mellitus with diabetic cataract, without long-term current use of insulin  No diabetic retinopathy. Discussed with pt the effects of diabetes on vision, importance of good blood sugar control, compliance with meds, and follow up care with PCP. Return in 1 year for dilated eye exam, sooner PRN.    2. Nuclear sclerosis of both eyes  Educated pt on presence of cataracts and effects on vision. No surgery at this time. Recheck in one year, sooner PRN.      Follow up in about 1 year (around 4/24/2024) for Diabetic Eye Exam.

## 2023-05-09 DIAGNOSIS — E78.5 HYPERLIPIDEMIA ASSOCIATED WITH TYPE 2 DIABETES MELLITUS: ICD-10-CM

## 2023-05-09 DIAGNOSIS — E11.36 TYPE 2 DIABETES MELLITUS WITH DIABETIC CATARACT, WITHOUT LONG-TERM CURRENT USE OF INSULIN: ICD-10-CM

## 2023-05-09 DIAGNOSIS — E11.69 HYPERLIPIDEMIA ASSOCIATED WITH TYPE 2 DIABETES MELLITUS: ICD-10-CM

## 2023-05-09 RX ORDER — ATORVASTATIN CALCIUM 40 MG/1
TABLET, FILM COATED ORAL
Qty: 90 TABLET | Refills: 2 | Status: SHIPPED | OUTPATIENT
Start: 2023-05-09

## 2023-05-09 NOTE — TELEPHONE ENCOUNTER
No care due was identified.  [facility] Embedded Care Due Messages. Reference number: [id number].   5/09/2023 10:13:22 AM CDT

## 2023-06-22 ENCOUNTER — TELEPHONE (OUTPATIENT)
Dept: OPHTHALMOLOGY | Facility: CLINIC | Age: 68
End: 2023-06-22
Payer: MEDICARE

## 2023-06-22 NOTE — TELEPHONE ENCOUNTER
----- Message from Yumi Neal sent at 6/22/2023  4:03 PM CDT -----  Regarding: Urgent appt  Contact: Pt  Pt is requesting a callback regarding appt. Pt stated his left ey feels like something is stuck in the corner. Please adv pt      Confirmed contact below:   Contact Name:Mirza Do  Phone Number: [phone]

## 2023-06-27 ENCOUNTER — OFFICE VISIT (OUTPATIENT)
Dept: OPTOMETRY | Facility: CLINIC | Age: 68
End: 2023-06-27
Payer: MEDICARE

## 2023-06-27 DIAGNOSIS — H57.8A9 SENSATION OF FOREIGN BODY IN EYE: Primary | ICD-10-CM

## 2023-06-27 PROCEDURE — 99213 OFFICE O/P EST LOW 20 MIN: CPT | Mod: S$GLB,,, | Performed by: OPTOMETRIST

## 2023-06-27 PROCEDURE — 1126F PR PAIN SEVERITY QUANTIFIED, NO PAIN PRESENT: ICD-10-PCS | Mod: CPTII,S$GLB,, | Performed by: OPTOMETRIST

## 2023-06-27 PROCEDURE — 1159F PR MEDICATION LIST DOCUMENTED IN MEDICAL RECORD: ICD-10-PCS | Mod: CPTII,S$GLB,, | Performed by: OPTOMETRIST

## 2023-06-27 PROCEDURE — 3061F PR NEG MICROALBUMINURIA RESULT DOCUMENTED/REVIEW: ICD-10-PCS | Mod: CPTII,S$GLB,, | Performed by: OPTOMETRIST

## 2023-06-27 PROCEDURE — 4010F PR ACE/ARB THEARPY RXD/TAKEN: ICD-10-PCS | Mod: CPTII,S$GLB,, | Performed by: OPTOMETRIST

## 2023-06-27 PROCEDURE — 1160F RVW MEDS BY RX/DR IN RCRD: CPT | Mod: CPTII,S$GLB,, | Performed by: OPTOMETRIST

## 2023-06-27 PROCEDURE — 1159F MED LIST DOCD IN RCRD: CPT | Mod: CPTII,S$GLB,, | Performed by: OPTOMETRIST

## 2023-06-27 PROCEDURE — 4010F ACE/ARB THERAPY RXD/TAKEN: CPT | Mod: CPTII,S$GLB,, | Performed by: OPTOMETRIST

## 2023-06-27 PROCEDURE — 3044F HG A1C LEVEL LT 7.0%: CPT | Mod: CPTII,S$GLB,, | Performed by: OPTOMETRIST

## 2023-06-27 PROCEDURE — 3066F NEPHROPATHY DOC TX: CPT | Mod: CPTII,S$GLB,, | Performed by: OPTOMETRIST

## 2023-06-27 PROCEDURE — 1160F PR REVIEW ALL MEDS BY PRESCRIBER/CLIN PHARMACIST DOCUMENTED: ICD-10-PCS | Mod: CPTII,S$GLB,, | Performed by: OPTOMETRIST

## 2023-06-27 PROCEDURE — 99999 PR PBB SHADOW E&M-EST. PATIENT-LVL II: CPT | Mod: PBBFAC,,, | Performed by: OPTOMETRIST

## 2023-06-27 PROCEDURE — 3061F NEG MICROALBUMINURIA REV: CPT | Mod: CPTII,S$GLB,, | Performed by: OPTOMETRIST

## 2023-06-27 PROCEDURE — 3044F PR MOST RECENT HEMOGLOBIN A1C LEVEL <7.0%: ICD-10-PCS | Mod: CPTII,S$GLB,, | Performed by: OPTOMETRIST

## 2023-06-27 PROCEDURE — 3066F PR DOCUMENTATION OF TREATMENT FOR NEPHROPATHY: ICD-10-PCS | Mod: CPTII,S$GLB,, | Performed by: OPTOMETRIST

## 2023-06-27 PROCEDURE — 99999 PR PBB SHADOW E&M-EST. PATIENT-LVL II: ICD-10-PCS | Mod: PBBFAC,,, | Performed by: OPTOMETRIST

## 2023-06-27 PROCEDURE — 1126F AMNT PAIN NOTED NONE PRSNT: CPT | Mod: CPTII,S$GLB,, | Performed by: OPTOMETRIST

## 2023-06-27 PROCEDURE — 99213 PR OFFICE/OUTPT VISIT, EST, LEVL III, 20-29 MIN: ICD-10-PCS | Mod: S$GLB,,, | Performed by: OPTOMETRIST

## 2023-06-27 NOTE — PROGRESS NOTES
Subjective:       Patient ID: Mirza Do is a 67 y.o. male      Chief Complaint   Patient presents with    Eye Problem     History of Present Illness     Dls: 4/24/23 Dr. Webster     66 y/o male presents today with c/o x 2 weeks fbs os tearing os itching os no photophobia no changes in vision no pain.   Pt did not use any gtts.        Assessment/Plan:     1. Sensation of foreign body in eye  Pt c/o FBS left UL. No FB on exam, eyelid everted and swept. Also flushed with BSS solution. Recommend AT next few days.     Follow up if symptoms worsen or fail to improve.

## 2023-11-17 ENCOUNTER — OFFICE VISIT (OUTPATIENT)
Dept: FAMILY MEDICINE | Facility: CLINIC | Age: 68
End: 2023-11-17
Payer: MEDICARE

## 2023-11-17 VITALS
HEART RATE: 80 BPM | TEMPERATURE: 98 F | DIASTOLIC BLOOD PRESSURE: 68 MMHG | OXYGEN SATURATION: 92 % | HEIGHT: 68 IN | BODY MASS INDEX: 43.47 KG/M2 | WEIGHT: 286.81 LBS | SYSTOLIC BLOOD PRESSURE: 128 MMHG

## 2023-11-17 DIAGNOSIS — E66.01 CLASS 3 OBESITY: ICD-10-CM

## 2023-11-17 DIAGNOSIS — M62.838 MUSCLE SPASM OF RIGHT SHOULDER: Primary | ICD-10-CM

## 2023-11-17 PROBLEM — E66.813 CLASS 3 OBESITY: Status: ACTIVE | Noted: 2023-11-17

## 2023-11-17 PROCEDURE — 99214 OFFICE O/P EST MOD 30 MIN: CPT | Mod: S$GLB,,, | Performed by: FAMILY MEDICINE

## 2023-11-17 PROCEDURE — 3288F FALL RISK ASSESSMENT DOCD: CPT | Mod: CPTII,S$GLB,, | Performed by: FAMILY MEDICINE

## 2023-11-17 PROCEDURE — 3044F HG A1C LEVEL LT 7.0%: CPT | Mod: CPTII,S$GLB,, | Performed by: FAMILY MEDICINE

## 2023-11-17 PROCEDURE — 99214 PR OFFICE/OUTPT VISIT, EST, LEVL IV, 30-39 MIN: ICD-10-PCS | Mod: S$GLB,,, | Performed by: FAMILY MEDICINE

## 2023-11-17 PROCEDURE — 3078F DIAST BP <80 MM HG: CPT | Mod: CPTII,S$GLB,, | Performed by: FAMILY MEDICINE

## 2023-11-17 PROCEDURE — 3288F PR FALLS RISK ASSESSMENT DOCUMENTED: ICD-10-PCS | Mod: CPTII,S$GLB,, | Performed by: FAMILY MEDICINE

## 2023-11-17 PROCEDURE — 3061F PR NEG MICROALBUMINURIA RESULT DOCUMENTED/REVIEW: ICD-10-PCS | Mod: CPTII,S$GLB,, | Performed by: FAMILY MEDICINE

## 2023-11-17 PROCEDURE — 3008F BODY MASS INDEX DOCD: CPT | Mod: CPTII,S$GLB,, | Performed by: FAMILY MEDICINE

## 2023-11-17 PROCEDURE — 3066F NEPHROPATHY DOC TX: CPT | Mod: CPTII,S$GLB,, | Performed by: FAMILY MEDICINE

## 2023-11-17 PROCEDURE — 3078F PR MOST RECENT DIASTOLIC BLOOD PRESSURE < 80 MM HG: ICD-10-PCS | Mod: CPTII,S$GLB,, | Performed by: FAMILY MEDICINE

## 2023-11-17 PROCEDURE — 3074F SYST BP LT 130 MM HG: CPT | Mod: CPTII,S$GLB,, | Performed by: FAMILY MEDICINE

## 2023-11-17 PROCEDURE — 3066F PR DOCUMENTATION OF TREATMENT FOR NEPHROPATHY: ICD-10-PCS | Mod: CPTII,S$GLB,, | Performed by: FAMILY MEDICINE

## 2023-11-17 PROCEDURE — 1159F PR MEDICATION LIST DOCUMENTED IN MEDICAL RECORD: ICD-10-PCS | Mod: CPTII,S$GLB,, | Performed by: FAMILY MEDICINE

## 2023-11-17 PROCEDURE — 3008F PR BODY MASS INDEX (BMI) DOCUMENTED: ICD-10-PCS | Mod: CPTII,S$GLB,, | Performed by: FAMILY MEDICINE

## 2023-11-17 PROCEDURE — 99999 PR PBB SHADOW E&M-EST. PATIENT-LVL IV: ICD-10-PCS | Mod: PBBFAC,,, | Performed by: FAMILY MEDICINE

## 2023-11-17 PROCEDURE — 3061F NEG MICROALBUMINURIA REV: CPT | Mod: CPTII,S$GLB,, | Performed by: FAMILY MEDICINE

## 2023-11-17 PROCEDURE — 1101F PR PT FALLS ASSESS DOC 0-1 FALLS W/OUT INJ PAST YR: ICD-10-PCS | Mod: CPTII,S$GLB,, | Performed by: FAMILY MEDICINE

## 2023-11-17 PROCEDURE — 1126F PR PAIN SEVERITY QUANTIFIED, NO PAIN PRESENT: ICD-10-PCS | Mod: CPTII,S$GLB,, | Performed by: FAMILY MEDICINE

## 2023-11-17 PROCEDURE — 1126F AMNT PAIN NOTED NONE PRSNT: CPT | Mod: CPTII,S$GLB,, | Performed by: FAMILY MEDICINE

## 2023-11-17 PROCEDURE — 3044F PR MOST RECENT HEMOGLOBIN A1C LEVEL <7.0%: ICD-10-PCS | Mod: CPTII,S$GLB,, | Performed by: FAMILY MEDICINE

## 2023-11-17 PROCEDURE — 3074F PR MOST RECENT SYSTOLIC BLOOD PRESSURE < 130 MM HG: ICD-10-PCS | Mod: CPTII,S$GLB,, | Performed by: FAMILY MEDICINE

## 2023-11-17 PROCEDURE — 4010F ACE/ARB THERAPY RXD/TAKEN: CPT | Mod: CPTII,S$GLB,, | Performed by: FAMILY MEDICINE

## 2023-11-17 PROCEDURE — 1101F PT FALLS ASSESS-DOCD LE1/YR: CPT | Mod: CPTII,S$GLB,, | Performed by: FAMILY MEDICINE

## 2023-11-17 PROCEDURE — 1159F MED LIST DOCD IN RCRD: CPT | Mod: CPTII,S$GLB,, | Performed by: FAMILY MEDICINE

## 2023-11-17 PROCEDURE — 99999 PR PBB SHADOW E&M-EST. PATIENT-LVL IV: CPT | Mod: PBBFAC,,, | Performed by: FAMILY MEDICINE

## 2023-11-17 PROCEDURE — 4010F PR ACE/ARB THEARPY RXD/TAKEN: ICD-10-PCS | Mod: CPTII,S$GLB,, | Performed by: FAMILY MEDICINE

## 2023-11-17 RX ORDER — METHOCARBAMOL 500 MG/1
500 TABLET, FILM COATED ORAL 2 TIMES DAILY PRN
Qty: 30 TABLET | Refills: 0 | Status: SHIPPED | OUTPATIENT
Start: 2023-11-17 | End: 2023-11-27

## 2023-11-17 RX ORDER — DICLOFENAC SODIUM 50 MG/1
50 TABLET, DELAYED RELEASE ORAL 2 TIMES DAILY PRN
Qty: 30 TABLET | Refills: 0 | Status: SHIPPED | OUTPATIENT
Start: 2023-11-17 | End: 2024-03-04

## 2023-11-17 NOTE — PROGRESS NOTES
"Subjective     Patient ID: Mirza Do is a 68 y.o. male.    Chief Complaint: Back Pain and Shoulder Pain (Back and shoulder pain that occurs when he deep breathes.)    68 year old male patient of Dr. Honeycutt presents to me for an urgent care issue with a pain in the back of his  right shoulder with deep breaths. He states the pain comes and goes and can be in the shoulder. He states nothing makes it worse. He has been taking advil and this seems to help. He states it is not a daily constant pain, but it comes ad goes. He states he can feel it with leaning back. The pain can radiate under his arm pit. He has no pain down his arm. He states movement helps the pain dissipate. He has a cough with some phlegm. He has no fever or chills. He has no SOB.       Review of Systems       Objective   Vitals:    11/17/23 1309 11/17/23 1329   BP: (!) 142/74 128/68   Pulse: 80    Temp: 98 °F (36.7 °C)    TempSrc: Oral    SpO2: (!) 92%    Weight: 130.1 kg (286 lb 13.1 oz)    Height: 5' 8" (1.727 m)        Physical Exam  Constitutional:       Appearance: Normal appearance.   HENT:      Head: Normocephalic and atraumatic.   Cardiovascular:      Rate and Rhythm: Normal rate and regular rhythm.      Heart sounds: Normal heart sounds. No murmur heard.     No friction rub. No gallop.   Pulmonary:      Effort: Pulmonary effort is normal. No respiratory distress.      Breath sounds: Normal breath sounds. No stridor. No wheezing or rhonchi.   Musculoskeletal:      Right shoulder: Tenderness present. Normal range of motion.        Arms:       Comments: SPASM NOTED IN TRAPEZIUS   Neurological:      Mental Status: He is alert.            Assessment and Plan     Mirza was seen today for back pain and shoulder pain.    Diagnoses and all orders for this visit:    Muscle spasm of right shoulder  -     methocarbamoL (ROBAXIN) 500 MG Tab; Take 1 tablet (500 mg total) by mouth 2 (two) times daily as needed (MUSCLE SPASM).  -     diclofenac " (VOLTAREN) 50 MG EC tablet; Take 1 tablet (50 mg total) by mouth 2 (two) times daily as needed.    Class 3 obesity                   No follow-ups on file.

## 2023-11-17 NOTE — PROGRESS NOTES
"Subjective     Patient ID: Mirza Do is a 68 y.o. male.    Chief Complaint: Back Pain and Shoulder Pain (Back and shoulder pain that occurs when he deep breathes.)    HPI  Review of Systems       Objective   Vitals:    11/17/23 1309   BP: (!) 142/74   Pulse: 80   Temp: 98 °F (36.7 °C)   TempSrc: Oral   SpO2: (!) 92%   Weight: 130.1 kg (286 lb 13.1 oz)   Height: 5' 8" (1.727 m)       Physical Exam       Assessment and Plan     {There are no diagnoses linked to this encounter. (Refresh or delete this SmartLink)}    ***         No follow-ups on file.    "

## 2023-12-07 ENCOUNTER — OFFICE VISIT (OUTPATIENT)
Dept: FAMILY MEDICINE | Facility: CLINIC | Age: 68
End: 2023-12-07
Payer: MEDICARE

## 2023-12-07 ENCOUNTER — TELEPHONE (OUTPATIENT)
Dept: FAMILY MEDICINE | Facility: CLINIC | Age: 68
End: 2023-12-07
Payer: MEDICARE

## 2023-12-07 VITALS
HEART RATE: 54 BPM | SYSTOLIC BLOOD PRESSURE: 190 MMHG | DIASTOLIC BLOOD PRESSURE: 86 MMHG | DIASTOLIC BLOOD PRESSURE: 98 MMHG | SYSTOLIC BLOOD PRESSURE: 192 MMHG

## 2023-12-07 DIAGNOSIS — E11.40 TYPE 2 DIABETES MELLITUS WITH DIABETIC NEUROPATHY, WITHOUT LONG-TERM CURRENT USE OF INSULIN: ICD-10-CM

## 2023-12-07 DIAGNOSIS — I10 PRIMARY HYPERTENSION: Primary | ICD-10-CM

## 2023-12-07 DIAGNOSIS — Z01.30 BP CHECK: ICD-10-CM

## 2023-12-07 PROCEDURE — 3044F PR MOST RECENT HEMOGLOBIN A1C LEVEL <7.0%: ICD-10-PCS | Mod: CPTII,S$GLB,, | Performed by: INTERNAL MEDICINE

## 2023-12-07 PROCEDURE — 3077F SYST BP >= 140 MM HG: CPT | Mod: CPTII,S$GLB,, | Performed by: INTERNAL MEDICINE

## 2023-12-07 PROCEDURE — 99214 PR OFFICE/OUTPT VISIT, EST, LEVL IV, 30-39 MIN: ICD-10-PCS | Mod: S$GLB,,, | Performed by: INTERNAL MEDICINE

## 2023-12-07 PROCEDURE — 4010F ACE/ARB THERAPY RXD/TAKEN: CPT | Mod: CPTII,S$GLB,, | Performed by: INTERNAL MEDICINE

## 2023-12-07 PROCEDURE — 3066F PR DOCUMENTATION OF TREATMENT FOR NEPHROPATHY: ICD-10-PCS | Mod: CPTII,S$GLB,, | Performed by: INTERNAL MEDICINE

## 2023-12-07 PROCEDURE — 3061F PR NEG MICROALBUMINURIA RESULT DOCUMENTED/REVIEW: ICD-10-PCS | Mod: CPTII,S$GLB,, | Performed by: INTERNAL MEDICINE

## 2023-12-07 PROCEDURE — 3066F NEPHROPATHY DOC TX: CPT | Mod: CPTII,S$GLB,, | Performed by: INTERNAL MEDICINE

## 2023-12-07 PROCEDURE — 99999 PR PBB SHADOW E&M-EST. PATIENT-LVL III: CPT | Mod: PBBFAC,,, | Performed by: INTERNAL MEDICINE

## 2023-12-07 PROCEDURE — 1159F MED LIST DOCD IN RCRD: CPT | Mod: CPTII,S$GLB,, | Performed by: INTERNAL MEDICINE

## 2023-12-07 PROCEDURE — 3079F PR MOST RECENT DIASTOLIC BLOOD PRESSURE 80-89 MM HG: ICD-10-PCS | Mod: CPTII,S$GLB,, | Performed by: INTERNAL MEDICINE

## 2023-12-07 PROCEDURE — 3077F PR MOST RECENT SYSTOLIC BLOOD PRESSURE >= 140 MM HG: ICD-10-PCS | Mod: CPTII,S$GLB,, | Performed by: INTERNAL MEDICINE

## 2023-12-07 PROCEDURE — 3079F DIAST BP 80-89 MM HG: CPT | Mod: CPTII,S$GLB,, | Performed by: INTERNAL MEDICINE

## 2023-12-07 PROCEDURE — 3061F NEG MICROALBUMINURIA REV: CPT | Mod: CPTII,S$GLB,, | Performed by: INTERNAL MEDICINE

## 2023-12-07 PROCEDURE — 1159F PR MEDICATION LIST DOCUMENTED IN MEDICAL RECORD: ICD-10-PCS | Mod: CPTII,S$GLB,, | Performed by: INTERNAL MEDICINE

## 2023-12-07 PROCEDURE — 4010F PR ACE/ARB THEARPY RXD/TAKEN: ICD-10-PCS | Mod: CPTII,S$GLB,, | Performed by: INTERNAL MEDICINE

## 2023-12-07 PROCEDURE — 99999 PR PBB SHADOW E&M-EST. PATIENT-LVL III: ICD-10-PCS | Mod: PBBFAC,,, | Performed by: INTERNAL MEDICINE

## 2023-12-07 PROCEDURE — 99214 OFFICE O/P EST MOD 30 MIN: CPT | Mod: S$GLB,,, | Performed by: INTERNAL MEDICINE

## 2023-12-07 PROCEDURE — 3044F HG A1C LEVEL LT 7.0%: CPT | Mod: CPTII,S$GLB,, | Performed by: INTERNAL MEDICINE

## 2023-12-07 RX ORDER — HYDRALAZINE HYDROCHLORIDE 50 MG/1
50 TABLET, FILM COATED ORAL EVERY 12 HOURS
Qty: 60 TABLET | Refills: 0 | Status: SHIPPED | OUTPATIENT
Start: 2023-12-07 | End: 2024-12-06

## 2023-12-07 RX ORDER — HYDRALAZINE HYDROCHLORIDE 50 MG/1
50 TABLET, FILM COATED ORAL EVERY 12 HOURS
Qty: 60 TABLET | Refills: 0 | Status: SHIPPED | OUTPATIENT
Start: 2023-12-07 | End: 2023-12-07 | Stop reason: SDUPTHER

## 2023-12-07 NOTE — PROGRESS NOTES
SUBJECTIVE     No chief complaint on file.      HPI  Mirza Do is a 68 y.o. male with multiple medical diagnoses as listed in the medical history and problem list that presents for evaluation for HTN. Pt has been doing  okay  since last visit, but went to the dentist this morning for tooth extraction and was sent here as his BP was too high to proceed with the procedure. he is fully compliant with meds and denies any adverse side effects. Pt is  not compliant  with a low Na diet and does exercise by walking 3 times per week. Pt does check his BP at home and notes it has been elevated. Pt presents for med refills today and is without any other complaints.     PAST MEDICAL HISTORY:  Past Medical History:   Diagnosis Date    COPD with acute exacerbation 6/28/2022    CVA (cerebral infarction)     CVA approx 2011/2012    Diabetes mellitus type II, non insulin dependent 10/14/2016    Erectile dysfunction     Erectile Dysfunction    Eye injury     hit os playing basketball     Hyperlipidemia 10/14/2016    Hypertension     Hypertension    Murmur     Nuclear sclerotic cataract of both eyes 8/7/2019    Polyneuropathy     Pulmonary heart disease 1/25/2023    Renal cyst, left 1/8/2020    Sleep apnea     Trouble in sleeping     Type 2 diabetes mellitus        PAST SURGICAL HISTORY:  Past Surgical History:   Procedure Laterality Date    BACK SURGERY      discectomy    COLONOSCOPY N/A 8/4/2017    Procedure: COLONOSCOPY;  Surgeon: Mario Alberto Vega MD;  Location: [facility] ([address] [place] [address]);  Service: Endoscopy;  Laterality: N/A;  Conflict with [facility]'s schedule no other times available with CRS, Pt requested 8/4/17 - moved to be scoped with GI MD - ER    ROOT CANAL      UMBILICAL HERNIA REPAIR         SOCIAL HISTORY:  Social History     Socioeconomic History    Marital status: Single   Tobacco Use    Smoking status: Former     Current packs/day: 0.00     Average packs/day: 0.1 packs/day for 4.0 years (0.4 ttl pk-yrs)     Types:  Cigarettes     Start date: 1991     Quit date: 1995     Years since quittin.6    Smokeless tobacco: Never    Tobacco comments:     social smoking as teenager   Substance and Sexual Activity    Alcohol use: Yes     Comment: beer 6 pack in a month; red wine 2x a month     Drug use: No    Sexual activity: Yes     Partners: Female   Social History Narrative         Social Determinants of Health     Financial Resource Strain: Low Risk  (2023)    Overall Financial Resource Strain (CARDIA)     Difficulty of Paying Living Expenses: Not hard at all   Food Insecurity: No Food Insecurity (2023)    Hunger Vital Sign     Worried About Running Out of Food in the Last Year: Never true     Ran Out of Food in the Last Year: Never true   Transportation Needs: No Transportation Needs (2023)    PRAPARE - Transportation     Lack of Transportation (Medical): No     Lack of Transportation (Non-Medical): No   Physical Activity: Sufficiently Active (2023)    Exercise Vital Sign     Days of Exercise per Week: 3 days     Minutes of Exercise per Session: 60 min   Stress: No Stress Concern Present (2023)    [employer] [employer] [employer] - Occupational Stress Questionnaire     Feeling of Stress : Only a little   Social Connections: Moderately Isolated (2023)    Social Connection and Isolation Panel [NHANES]     Frequency of Communication with Friends and Family: More than three times a week     Frequency of Social Gatherings with Friends and Family: More than three times a week     Attends [employer] Services: More than 4 times per year     Active Member of Clubs or Organizations: No     Attends Club or Organization Meetings: Never     Marital Status:    Housing Stability: Low Risk  (2023)    Housing Stability Vital Sign     Unable to Pay for Housing in the Last Year: No     Number of Places Lived in the Last Year: 1     Unstable Housing in the Last Year:  No       FAMILY HISTORY:  Family History   Problem Relation Age of Onset    Cancer Mother         smoker     No Known Problems Father     Emphysema Sister     Diabetes Sister     No Known Problems Sister     No Known Problems Sister     Hypertension Brother     Cancer Brother         lung     No Known Problems Maternal Aunt     No Known Problems Maternal Uncle     No Known Problems Paternal Aunt     No Known Problems Paternal Uncle     No Known Problems Maternal Grandmother     No Known Problems Maternal Grandfather     No Known Problems Paternal Grandmother     No Known Problems Paternal Grandfather     No Known Problems Other     Amblyopia Neg Hx     Blindness Neg Hx     Cataracts Neg Hx     Glaucoma Neg Hx     Macular degeneration Neg Hx     Retinal detachment Neg Hx     Strabismus Neg Hx     Stroke Neg Hx     Thyroid disease Neg Hx        ALLERGIES AND MEDICATIONS: updated and reviewed.  Review of patient's allergies indicates:  No Known Allergies  Current Outpatient Medications   Medication Sig Dispense Refill    ACCU-CHEK STEPHON PLUS TEST STRP Strp 1 strip by Misc.(Non-Drug; Combo Route) route daily as needed (alternate testing time). 50 strip 11    ACCU-CHEK FASTCLIX Misc 1 lancet by Misc.(Non-Drug; Combo Route) route daily as needed. 50 each 11    acetaminophen (TYLENOL) 650 MG TbSR Take 1 tablet (650 mg total) by mouth every 8 (eight) hours as needed. 30 tablet 0    aspirin 81 MG Chew Take 81 mg by mouth once daily.      atorvastatin (LIPITOR) 40 MG tablet TAKE 1 TABLET BY MOUTH ONCE DAILY IN THE EVENING 90 tablet 2    diclofenac (VOLTAREN) 50 MG EC tablet Take 1 tablet (50 mg total) by mouth 2 (two) times daily as needed. 30 tablet 0    hydroCHLOROthiazide (HYDRODIURIL) 25 MG tablet Take 1 tablet (25 mg total) by mouth once daily. 90 tablet 3    losartan (COZAAR) 100 MG tablet Take 1 tablet (100 mg total) by mouth once daily. 90 tablet 3    metFORMIN (GLUCOPHAGE) 500 MG tablet Take 1 tablet (500 mg total)  by mouth 2 (two) times daily with meals. (Patient not taking: Reported on 11/17/2023) 180 tablet 1    metoprolol succinate (TOPROL-XL) 50 MG 24 hr tablet Take 1 tablet (50 mg total) by mouth once daily. 90 tablet 3    tamsulosin (FLOMAX) 0.4 mg Cap Take 1 capsule (0.4 mg total) by mouth once daily. 90 capsule 3    zolpidem (AMBIEN) 10 mg Tab Take 1 tablet (10 mg total) by mouth nightly as needed (INSOMNIA). 30 tablet 5     No current facility-administered medications for this visit.       ROS  Review of Systems   Constitutional:  Negative for chills and fever.   HENT:  Negative for hearing loss and sore throat.    Eyes:  Negative for visual disturbance.   Respiratory:  Negative for cough and shortness of breath.    Cardiovascular:  Negative for chest pain, palpitations and leg swelling.   Gastrointestinal:  Negative for abdominal pain, constipation, diarrhea, nausea and vomiting.   Genitourinary:  Negative for dysuria, frequency and urgency.   Musculoskeletal:  Negative for arthralgias, joint swelling and myalgias.   Skin:  Negative for rash and wound.   Neurological:  Negative for headaches.   Psychiatric/Behavioral:  Negative for agitation and confusion. The patient is not nervous/anxious.          OBJECTIVE     Physical Exam  Vitals:    12/07/23 1128   BP: (!) 192/98   Pulse: (!) 54    There is no height or weight on file to calculate BMI.            Physical Exam  Constitutional:       General: He is not in acute distress.     Appearance: He is well-developed.   HENT:      Head: Normocephalic and atraumatic.      Right Ear: External ear normal.      Left Ear: External ear normal.      Nose: Nose normal.   Eyes:      General: No scleral icterus.        Right eye: No discharge.         Left eye: No discharge.      Conjunctiva/sclera: Conjunctivae normal.   Neck:      Vascular: No JVD.      Trachea: No tracheal deviation.   Cardiovascular:      Rate and Rhythm: Normal rate and regular rhythm.      Heart sounds:  Normal heart sounds. No murmur heard.     No friction rub. No gallop.   Pulmonary:      Effort: Pulmonary effort is normal. No respiratory distress.      Breath sounds: Normal breath sounds. No wheezing.   Abdominal:      General: Bowel sounds are normal. There is no distension.      Palpations: Abdomen is soft. There is no mass.      Tenderness: There is no abdominal tenderness. There is no guarding or rebound.   Musculoskeletal:         General: No tenderness or deformity. Normal range of motion.      Cervical back: Normal range of motion and neck supple.   Skin:     General: Skin is warm and dry.      Findings: No erythema or rash.   Neurological:      Mental Status: He is alert and oriented to person, place, and time.      Motor: No abnormal muscle tone.      Coordination: Coordination normal.   Psychiatric:         Behavior: Behavior normal.         Thought Content: Thought content normal.         Judgment: Judgment normal.           Health Maintenance         Date Due Completion Date    RSV Vaccine (Age 60+ and Pregnant patients) (1 - 1-dose 60+ series) Never done ---    Foot Exam 03/24/2021 3/24/2020 (Done)    Override on 3/24/2020: Done    Pneumococcal Vaccines (Age 65+) (3 - PPSV23 or PCV20) 06/29/2022 6/27/2019    Hemoglobin A1c 08/27/2023 2/27/2023    Influenza Vaccine (1) 09/01/2023 10/15/2019    Override on 10/1/2017: Done    Override on 10/4/2016: Done    Override on 11/13/2015: Done    COVID-19 Vaccine (6 - 2023-24 season) 09/01/2023 1/30/2023    PROSTATE-SPECIFIC ANTIGEN 02/27/2024 2/27/2023    Diabetes Urine Screening 02/27/2024 2/27/2023    Lipid Panel 02/27/2024 2/27/2023    Eye Exam 04/24/2024 4/24/2023    Override on 11/10/2020: Done    Low Dose Statin 11/17/2024 11/17/2023    Colorectal Cancer Screening 08/04/2027 8/4/2017    TETANUS VACCINE 06/27/2029 6/27/2019              ASSESSMENT     68 y.o. male with     1. BP check    2. Primary hypertension        PLAN:     1. BP check  ***    2.  Primary hypertension  ***          RTC in 2 weeks for nurse visit BP check     Manjula Honeycutt MD  12/07/2023 1:22 PM        No follow-ups on file.

## 2023-12-07 NOTE — TELEPHONE ENCOUNTER
----- Message from Ralf Batista sent at 12/7/2023 10:47 AM CST -----  Regarding: pt called  Type: Patient Call Back         Who called:MARIJA PELAYO [[id number]]         What is the request in detail: Pt states he was instructed to get his blood pressure checked for dental work.          Can the clinic reply by [facility]? No         Would the patient rather a call back or a response via [facility]? Call back         Best call back number: Telephone Information:  Mobile          [phone]             Additional Information:         Thank you.

## 2023-12-07 NOTE — PROGRESS NOTES
Mirza Do 68 y.o. male is here today for Blood Pressure check.   History of HTN yes.    Review of patient's allergies indicates:  No Known Allergies  Creatinine   Date Value Ref Range Status   02/27/2023 0.9 0.5 - 1.4 mg/dL Final     Sodium   Date Value Ref Range Status   02/27/2023 141 136 - 145 mmol/L Final     Potassium   Date Value Ref Range Status   02/27/2023 4.0 3.5 - 5.1 mmol/L Final   ]  Patient verifies taking blood pressure medications on a regular basis at the same time of the day.     Current Outpatient Medications:     ACCU-CHEK STEPHON PLUS TEST STRP Strp, 1 strip by Misc.(Non-Drug; Combo Route) route daily as needed (alternate testing time)., Disp: 50 strip, Rfl: 11    ACCU-CHEK FASTCLIX Misc, 1 lancet by Misc.(Non-Drug; Combo Route) route daily as needed., Disp: 50 each, Rfl: 11    acetaminophen (TYLENOL) 650 MG TbSR, Take 1 tablet (650 mg total) by mouth every 8 (eight) hours as needed., Disp: 30 tablet, Rfl: 0    aspirin 81 MG Chew, Take 81 mg by mouth once daily., Disp: , Rfl:     atorvastatin (LIPITOR) 40 MG tablet, TAKE 1 TABLET BY MOUTH ONCE DAILY IN THE EVENING, Disp: 90 tablet, Rfl: 2    diclofenac (VOLTAREN) 50 MG EC tablet, Take 1 tablet (50 mg total) by mouth 2 (two) times daily as needed., Disp: 30 tablet, Rfl: 0    hydroCHLOROthiazide (HYDRODIURIL) 25 MG tablet, Take 1 tablet (25 mg total) by mouth once daily., Disp: 90 tablet, Rfl: 3    losartan (COZAAR) 100 MG tablet, Take 1 tablet (100 mg total) by mouth once daily., Disp: 90 tablet, Rfl: 3    metFORMIN (GLUCOPHAGE) 500 MG tablet, Take 1 tablet (500 mg total) by mouth 2 (two) times daily with meals. (Patient not taking: Reported on 11/17/2023), Disp: 180 tablet, Rfl: 1    metoprolol succinate (TOPROL-XL) 50 MG 24 hr tablet, Take 1 tablet (50 mg total) by mouth once daily., Disp: 90 tablet, Rfl: 3    tamsulosin (FLOMAX) 0.4 mg Cap, Take 1 capsule (0.4 mg total) by mouth once daily., Disp: 90 capsule, Rfl: 3    zolpidem (AMBIEN)  10 mg Tab, Take 1 tablet (10 mg total) by mouth nightly as needed (INSOMNIA)., Disp: 30 tablet, Rfl: 5  Does patient have record of home blood pressure readings no.    Last dose of blood pressure medication was taken at 0600 today.  Patient is asymptomatic.   Complains of tooth pain.    BP: (!) 192/98 , Pulse: (!) 54 .    Blood pressure reading after 15 minutes was 190/86  Dr. Honeycutt notified.      Patient arrived to clinic per Dentist due to elevated BP prior to tooth extraction. Dentist requested patient to have BP re evaluated at PCP clinic .

## 2023-12-07 NOTE — Clinical Note
The following users have progress notes with incomplete variables or SmartLists that need to be completed: Manjula Honeycutt MD
normal...

## 2023-12-07 NOTE — PROGRESS NOTES
SUBJECTIVE     Chief Complaint   Patient presents with    Hypertension       HPI  Mirza Do is a 68 y.o. male with multiple medical diagnoses as listed in the medical history and problem list that presents for evaluation for HTN. Pt has been doing  okay  since last visit, but went to the dentist this morning for tooth extraction and was sent here as his BP was too high to proceed with the procedure. he is fully compliant with meds and denies any adverse side effects. Pt is  not compliant  with a low Na diet and does exercise by walking 3 times per week. Pt does check his BP at home and notes it has been elevated. Pt presents for med refills today and is without any other complaints.     PAST MEDICAL HISTORY:  Past Medical History:   Diagnosis Date    COPD with acute exacerbation 6/28/2022    CVA (cerebral infarction)     CVA approx 2011/2012    Diabetes mellitus type II, non insulin dependent 10/14/2016    Erectile dysfunction     Erectile Dysfunction    Eye injury     hit os playing basketball     Hyperlipidemia 10/14/2016    Hypertension     Hypertension    Murmur     Nuclear sclerotic cataract of both eyes 8/7/2019    Polyneuropathy     Pulmonary heart disease 1/25/2023    Renal cyst, left 1/8/2020    Sleep apnea     Trouble in sleeping     Type 2 diabetes mellitus        PAST SURGICAL HISTORY:  Past Surgical History:   Procedure Laterality Date    BACK SURGERY      discectomy    COLONOSCOPY N/A 8/4/2017    Procedure: COLONOSCOPY;  Surgeon: Mario Alberto Vega MD;  Location: [facility] ([address] [place] [address]);  Service: Endoscopy;  Laterality: N/A;  Conflict with Jack's schedule no other times available with CRS, Pt requested 8/4/17 - moved to be scoped with GI MD - ER    ROOT CANAL      UMBILICAL HERNIA REPAIR         SOCIAL HISTORY:  Social History     Socioeconomic History    Marital status: Single   Tobacco Use    Smoking status: Former     Current packs/day: 0.00     Average packs/day: 0.1 packs/day for 4.0 years (0.4 ttl  pk-yrs)     Types: Cigarettes     Start date: 1991     Quit date: 1995     Years since quittin.6    Smokeless tobacco: Never    Tobacco comments:     social smoking as teenager   Substance and Sexual Activity    Alcohol use: Yes     Comment: beer 6 pack in a month; red wine 2x a month     Drug use: No    Sexual activity: Yes     Partners: Female   Social History Narrative         Social Determinants of Health     Financial Resource Strain: Low Risk  (2023)    Overall Financial Resource Strain (CARDIA)     Difficulty of Paying Living Expenses: Not hard at all   Food Insecurity: No Food Insecurity (2023)    Hunger Vital Sign     Worried About Running Out of Food in the Last Year: Never true     Ran Out of Food in the Last Year: Never true   Transportation Needs: No Transportation Needs (2023)    PRAPARE - Transportation     Lack of Transportation (Medical): No     Lack of Transportation (Non-Medical): No   Physical Activity: Sufficiently Active (2023)    Exercise Vital Sign     Days of Exercise per Week: 3 days     Minutes of Exercise per Session: 60 min   Stress: No Stress Concern Present (2023)    [employer] [place] [employer] - Occupational Stress Questionnaire     Feeling of Stress : Only a little   Social Connections: Moderately Isolated (2023)    Social Connection and Isolation Panel [NHANES]     Frequency of Communication with Friends and Family: More than three times a week     Frequency of Social Gatherings with Friends and Family: More than three times a week     Attends Confucianism Services: More than 4 times per year     Active Member of Clubs or Organizations: No     Attends Club or Organization Meetings: Never     Marital Status:    Housing Stability: Low Risk  (2023)    Housing Stability Vital Sign     Unable to Pay for Housing in the Last Year: No     Number of Places Lived in the Last Year: 1     Unstable Housing  in the Last Year: No       FAMILY HISTORY:  Family History   Problem Relation Age of Onset    Cancer Mother         smoker     No Known Problems Father     Emphysema Sister     Diabetes Sister     No Known Problems Sister     No Known Problems Sister     Hypertension Brother     Cancer Brother         lung     No Known Problems Maternal Aunt     No Known Problems Maternal Uncle     No Known Problems Paternal Aunt     No Known Problems Paternal Uncle     No Known Problems Maternal Grandmother     No Known Problems Maternal Grandfather     No Known Problems Paternal Grandmother     No Known Problems Paternal Grandfather     No Known Problems Other     Amblyopia Neg Hx     Blindness Neg Hx     Cataracts Neg Hx     Glaucoma Neg Hx     Macular degeneration Neg Hx     Retinal detachment Neg Hx     Strabismus Neg Hx     Stroke Neg Hx     Thyroid disease Neg Hx        ALLERGIES AND MEDICATIONS: updated and reviewed.  Review of patient's allergies indicates:  No Known Allergies  Current Outpatient Medications   Medication Sig Dispense Refill    ACCU-CHEK STEPHON PLUS TEST STRP Strp 1 strip by Misc.(Non-Drug; Combo Route) route daily as needed (alternate testing time). 50 strip 11    ACCU-CHEK FASTCLIX Misc 1 lancet by Misc.(Non-Drug; Combo Route) route daily as needed. 50 each 11    acetaminophen (TYLENOL) 650 MG TbSR Take 1 tablet (650 mg total) by mouth every 8 (eight) hours as needed. 30 tablet 0    aspirin 81 MG Chew Take 81 mg by mouth once daily.      atorvastatin (LIPITOR) 40 MG tablet TAKE 1 TABLET BY MOUTH ONCE DAILY IN THE EVENING 90 tablet 2    diclofenac (VOLTAREN) 50 MG EC tablet Take 1 tablet (50 mg total) by mouth 2 (two) times daily as needed. 30 tablet 0    hydroCHLOROthiazide (HYDRODIURIL) 25 MG tablet Take 1 tablet (25 mg total) by mouth once daily. 90 tablet 3    losartan (COZAAR) 100 MG tablet Take 1 tablet (100 mg total) by mouth once daily. 90 tablet 3    metoprolol succinate (TOPROL-XL) 50 MG 24 hr  tablet Take 1 tablet (50 mg total) by mouth once daily. 90 tablet 3    tamsulosin (FLOMAX) 0.4 mg Cap Take 1 capsule (0.4 mg total) by mouth once daily. 90 capsule 3    zolpidem (AMBIEN) 10 mg Tab Take 1 tablet (10 mg total) by mouth nightly as needed (INSOMNIA). 30 tablet 5    hydrALAZINE (APRESOLINE) 50 MG tablet Take 1 tablet (50 mg total) by mouth every 12 (twelve) hours. 60 tablet 0    metFORMIN (GLUCOPHAGE) 500 MG tablet Take 1 tablet (500 mg total) by mouth 2 (two) times daily with meals. (Patient not taking: Reported on 11/17/2023) 180 tablet 1     No current facility-administered medications for this visit.       ROS  Review of Systems   Constitutional:  Negative for chills and fever.   HENT:  Negative for hearing loss and sore throat.    Eyes:  Negative for visual disturbance.   Respiratory:  Negative for cough and shortness of breath.    Cardiovascular:  Negative for chest pain, palpitations and leg swelling.   Gastrointestinal:  Negative for abdominal pain, constipation, diarrhea, nausea and vomiting.   Genitourinary:  Negative for dysuria, frequency and urgency.   Musculoskeletal:  Negative for arthralgias, joint swelling and myalgias.   Skin:  Negative for rash and wound.   Neurological:  Negative for headaches.   Psychiatric/Behavioral:  Negative for agitation and confusion. The patient is not nervous/anxious.          OBJECTIVE     Physical Exam  Vitals:    12/07/23 1153   BP: (!) 190/86    There is no height or weight on file to calculate BMI.            Physical Exam  Constitutional:       General: He is not in acute distress.     Appearance: He is well-developed.   HENT:      Head: Normocephalic and atraumatic.      Right Ear: External ear normal.      Left Ear: External ear normal.      Nose: Nose normal.   Eyes:      General: No scleral icterus.        Right eye: No discharge.         Left eye: No discharge.      Conjunctiva/sclera: Conjunctivae normal.   Neck:      Vascular: No JVD.       Trachea: No tracheal deviation.   Cardiovascular:      Rate and Rhythm: Normal rate and regular rhythm.      Heart sounds: Normal heart sounds. No murmur heard.     No friction rub. No gallop.   Pulmonary:      Effort: Pulmonary effort is normal. No respiratory distress.      Breath sounds: Normal breath sounds. No wheezing.   Abdominal:      General: Bowel sounds are normal. There is no distension.      Palpations: Abdomen is soft. There is no mass.      Tenderness: There is no abdominal tenderness. There is no guarding or rebound.   Musculoskeletal:         General: No tenderness or deformity. Normal range of motion.      Cervical back: Normal range of motion and neck supple.   Skin:     General: Skin is warm and dry.      Findings: No erythema or rash.   Neurological:      Mental Status: He is alert and oriented to person, place, and time.      Motor: No abnormal muscle tone.      Coordination: Coordination normal.   Psychiatric:         Behavior: Behavior normal.         Thought Content: Thought content normal.         Judgment: Judgment normal.           Health Maintenance         Date Due Completion Date    RSV Vaccine (Age 60+ and Pregnant patients) (1 - 1-dose 60+ series) Never done ---    Foot Exam 03/24/2021 3/24/2020 (Done)    Override on 3/24/2020: Done    Pneumococcal Vaccines (Age 65+) (3 - PPSV23 or PCV20) 06/29/2022 6/27/2019    Hemoglobin A1c 08/27/2023 2/27/2023    Influenza Vaccine (1) 09/01/2023 10/15/2019    Override on 10/1/2017: Done    Override on 10/4/2016: Done    Override on 11/13/2015: Done    COVID-19 Vaccine (6 - 2023-24 season) 09/01/2023 1/30/2023    PROSTATE-SPECIFIC ANTIGEN 02/27/2024 2/27/2023    Diabetes Urine Screening 02/27/2024 2/27/2023    Lipid Panel 02/27/2024 2/27/2023    Eye Exam 04/24/2024 4/24/2023    Override on 11/10/2020: Done    Low Dose Statin 11/17/2024 11/17/2023    Colorectal Cancer Screening 08/04/2027 8/4/2017    TETANUS VACCINE 06/27/2029 6/27/2019               ASSESSMENT     68 y.o. male with     1. Primary hypertension    2. Type 2 diabetes mellitus with diabetic neuropathy, without long-term current use of insulin          PLAN:     1. Primary hypertension  - BP elevated above goal of <140/90  - Add Hydralazine to current regimen  -  Advised to maintain a low Na diet(<2g/day), exercise, and keep BP log to present to next visit  - hydrALAZINE (APRESOLINE) 50 MG tablet; Take 1 tablet (50 mg total) by mouth every 12 (twelve) hours.  Dispense: 60 tablet; Refill: 0  - CBC Auto Differential; Future  - Comprehensive Metabolic Panel; Future    2. Type 2 diabetes mellitus with diabetic neuropathy, without long-term current use of insulin  - Check labs  - CBC Auto Differential; Future  - Comprehensive Metabolic Panel; Future  - Hemoglobin A1C; Future            RTC in 2 weeks for nurse visit BP check     Manjula Honeycutt MD  12/07/2023 1:22 PM        No follow-ups on file.

## 2023-12-08 DIAGNOSIS — E11.40 TYPE 2 DIABETES MELLITUS WITH DIABETIC NEUROPATHY, WITHOUT LONG-TERM CURRENT USE OF INSULIN: Primary | ICD-10-CM

## 2023-12-08 RX ORDER — GLIMEPIRIDE 2 MG/1
2 TABLET ORAL
Qty: 90 TABLET | Refills: 0 | Status: SHIPPED | OUTPATIENT
Start: 2023-12-08 | End: 2024-12-07

## 2023-12-20 DIAGNOSIS — I10 ESSENTIAL HYPERTENSION, BENIGN: Chronic | ICD-10-CM

## 2023-12-20 RX ORDER — HYDROCHLOROTHIAZIDE 25 MG/1
25 TABLET ORAL DAILY
Qty: 90 TABLET | Refills: 3 | OUTPATIENT
Start: 2023-12-20

## 2023-12-20 NOTE — TELEPHONE ENCOUNTER
Care Due:                  Date            Visit Type   Department     Provider  --------------------------------------------------------------------------------                                EP [facility] FAMILY                              PRIMARY      MEDICINE/INTERN  Last Visit: 12-      CARE ([facility])   AL MED         Manjula Honeycutt  Next Visit: None Scheduled  None         None Found                                                            Last  Test          Frequency    Reason                     Performed    Due Date  --------------------------------------------------------------------------------    Lipid Panel.  12 months..  atorvastatin.............  02- 02-    [facility] Embedded Care Due Messages. Reference number: [id number].   12/20/2023 2:24:18 PM CST

## 2023-12-20 NOTE — TELEPHONE ENCOUNTER
----- Message from Cady Brady sent at 12/20/2023  2:09 PM CST -----  Regarding: self [phone]  Type: RX Refill Request    Who Called: self    Have you contacted your pharmacy: no    Refill or New Rx: refill    RX Name and Strength: hydroCHLOROthiazide (HYDRODIURIL) 25 MG tablet      Is this a 30 day or 90 day RX: 30    Preferred Pharmacy with phone number:    [facility] [address] [place], [place] - [address] [place] [address]  Phone: [phone] Fax: [phone]    Local or Mail Order: local    Ordering Provider: Yinka    Would the patient rather a call back or a response via My [facility]? Call back     Best Call Back Number: [phone]

## 2023-12-21 ENCOUNTER — CLINICAL SUPPORT (OUTPATIENT)
Dept: FAMILY MEDICINE | Facility: CLINIC | Age: 68
End: 2023-12-21
Payer: MEDICARE

## 2023-12-21 VITALS — DIASTOLIC BLOOD PRESSURE: 70 MMHG | SYSTOLIC BLOOD PRESSURE: 160 MMHG | HEART RATE: 75 BPM

## 2023-12-21 DIAGNOSIS — Z01.30 BP CHECK: Primary | ICD-10-CM

## 2023-12-21 DIAGNOSIS — I10 ESSENTIAL HYPERTENSION, BENIGN: Chronic | ICD-10-CM

## 2023-12-21 PROCEDURE — 99999 PR PBB SHADOW E&M-EST. PATIENT-LVL III: CPT | Mod: PBBFAC,,,

## 2023-12-21 PROCEDURE — 99999 PR PBB SHADOW E&M-EST. PATIENT-LVL III: ICD-10-PCS | Mod: PBBFAC,,,

## 2023-12-21 RX ORDER — HYDROCHLOROTHIAZIDE 25 MG/1
25 TABLET ORAL DAILY
Qty: 90 TABLET | Refills: 0 | Status: SHIPPED | OUTPATIENT
Start: 2023-12-21 | End: 2024-01-02 | Stop reason: SDUPTHER

## 2023-12-21 RX ORDER — METHOCARBAMOL 500 MG/1
500 TABLET, FILM COATED ORAL 2 TIMES DAILY PRN
COMMUNITY
Start: 2023-11-27

## 2023-12-21 NOTE — PROGRESS NOTES
Mirza Do 68 y.o. male is here today for Blood Pressure check.   History of HTN yes.    Review of patient's allergies indicates:  No Known Allergies  Creatinine   Date Value Ref Range Status   12/07/2023 0.9 0.5 - 1.4 mg/dL Final     Sodium   Date Value Ref Range Status   12/07/2023 140 136 - 145 mmol/L Final     Potassium   Date Value Ref Range Status   12/07/2023 4.1 3.5 - 5.1 mmol/L Final   ]  Patient verifies taking blood pressure medications on a regular basis at the same time of the day.     Current Outpatient Medications:     ACCU-CHEK STEPHON PLUS TEST STRP Strp, 1 strip by Misc.(Non-Drug; Combo Route) route daily as needed (alternate testing time)., Disp: 50 strip, Rfl: 11    ACCU-CHEK FASTCLIX Misc, 1 lancet by Misc.(Non-Drug; Combo Route) route daily as needed., Disp: 50 each, Rfl: 11    aspirin 81 MG Chew, Take 81 mg by mouth once daily., Disp: , Rfl:     atorvastatin (LIPITOR) 40 MG tablet, TAKE 1 TABLET BY MOUTH ONCE DAILY IN THE EVENING, Disp: 90 tablet, Rfl: 2    glimepiride (AMARYL) 2 MG tablet, Take 1 tablet (2 mg total) by mouth before breakfast., Disp: 90 tablet, Rfl: 0    hydrALAZINE (APRESOLINE) 50 MG tablet, Take 1 tablet (50 mg total) by mouth every 12 (twelve) hours., Disp: 60 tablet, Rfl: 0    hydroCHLOROthiazide (HYDRODIURIL) 25 MG tablet, Take 1 tablet (25 mg total) by mouth once daily., Disp: 90 tablet, Rfl: 3    losartan (COZAAR) 100 MG tablet, Take 1 tablet (100 mg total) by mouth once daily., Disp: 90 tablet, Rfl: 3    methocarbamoL (ROBAXIN) 500 MG Tab, Take 500 mg by mouth 2 (two) times daily as needed., Disp: , Rfl:     metoprolol succinate (TOPROL-XL) 50 MG 24 hr tablet, Take 1 tablet (50 mg total) by mouth once daily., Disp: 90 tablet, Rfl: 3    tamsulosin (FLOMAX) 0.4 mg Cap, Take 1 capsule (0.4 mg total) by mouth once daily., Disp: 90 capsule, Rfl: 3    zolpidem (AMBIEN) 10 mg Tab, Take 1 tablet (10 mg total) by mouth nightly as needed (INSOMNIA)., Disp: 30 tablet, Rfl: 5     acetaminophen (TYLENOL) 650 MG TbSR, Take 1 tablet (650 mg total) by mouth every 8 (eight) hours as needed. (Patient not taking: Reported on 12/21/2023), Disp: 30 tablet, Rfl: 0    diclofenac (VOLTAREN) 50 MG EC tablet, Take 1 tablet (50 mg total) by mouth 2 (two) times daily as needed. (Patient not taking: Reported on 12/21/2023), Disp: 30 tablet, Rfl: 0    metFORMIN (GLUCOPHAGE) 500 MG tablet, Take 1 tablet (500 mg total) by mouth 2 (two) times daily with meals. (Patient not taking: Reported on 11/17/2023), Disp: 180 tablet, Rfl: 1  Does patient have record of home blood pressure readings no.   Last dose of blood pressure medication was taken this morning.  Patient is asymptomatic.     BP: (!) 172/78 , Pulse: 83 .    Blood pressure reading after 15 minutes was 160/70, Pulse 74.  Dr. Honeycutt notified.

## 2024-01-02 DIAGNOSIS — I10 ESSENTIAL HYPERTENSION, BENIGN: Chronic | ICD-10-CM

## 2024-01-02 RX ORDER — HYDROCHLOROTHIAZIDE 25 MG/1
25 TABLET ORAL DAILY
Qty: 90 TABLET | Refills: 0 | Status: SHIPPED | OUTPATIENT
Start: 2024-01-02

## 2024-01-02 NOTE — TELEPHONE ENCOUNTER
No care due was identified.  Health [facility] Embedded Care Due Messages. Reference number: [id number].   1/02/2024 1:09:25 PM CST

## 2024-01-02 NOTE — TELEPHONE ENCOUNTER
----- Message from Brooke Morales sent at 1/2/2024 12:45 PM CST -----  .Type: RX Refill Request    Who Called: Self     Have you contacted your pharmacy: Yes     Refill or New Rx: New Rx    RX Name and Strength:hydroCHLOROthiazide (HYDRODIURIL) 25 MG tablet    Preferred Pharmacy with phone number:.  [facility] [place] [address]  Phone: [phone] Fax: [phone]        Local or Mail Order: Local     Ordering Provider: JOSSUE Honeycutt     Would the patient rather a call back or a response via My [facility]? Call Back     Best Call Back Number: .[phone] (home)       Additional Information:

## 2024-03-04 DIAGNOSIS — M62.838 MUSCLE SPASM OF RIGHT SHOULDER: ICD-10-CM

## 2024-03-04 RX ORDER — DICLOFENAC SODIUM 50 MG/1
50 TABLET, DELAYED RELEASE ORAL 2 TIMES DAILY PRN
Qty: 30 TABLET | Refills: 0 | Status: SHIPPED | OUTPATIENT
Start: 2024-03-04

## 2024-03-04 NOTE — TELEPHONE ENCOUNTER
Care Due:                  Date            Visit Type   Department     Provider  --------------------------------------------------------------------------------                                             [facility] FAMILY                                           MEDICINE/INTERN  Last Visit: 12-      NURSE VISIT  AL MED         Manjula Honeycutt                              EP -         [facility]                              PRIMARY      MEDICINE/INTERN  Next Visit: 03-      CARE ([facility])   AL MED         Mary Peralta                                                            Last  Test          Frequency    Reason                     Performed    Due Date  --------------------------------------------------------------------------------    Lipid Panel.  12 months..  atorvastatin.............  02- 02-    Health [facility] Embedded Care Due Messages. Reference number: [id number].   3/04/2024 2:14:10 PM CST

## 2024-03-05 ENCOUNTER — OFFICE VISIT (OUTPATIENT)
Dept: FAMILY MEDICINE | Facility: CLINIC | Age: 69
End: 2024-03-05
Payer: MEDICARE

## 2024-03-05 ENCOUNTER — TELEPHONE (OUTPATIENT)
Dept: ADMINISTRATIVE | Facility: CLINIC | Age: 69
End: 2024-03-05
Payer: MEDICARE

## 2024-03-05 VITALS
BODY MASS INDEX: 43.53 KG/M2 | WEIGHT: 287.25 LBS | HEIGHT: 68 IN | DIASTOLIC BLOOD PRESSURE: 88 MMHG | TEMPERATURE: 98 F | SYSTOLIC BLOOD PRESSURE: 144 MMHG

## 2024-03-05 DIAGNOSIS — G47.00 INSOMNIA, UNSPECIFIED TYPE: ICD-10-CM

## 2024-03-05 DIAGNOSIS — J41.0 SIMPLE CHRONIC BRONCHITIS: Primary | ICD-10-CM

## 2024-03-05 PROCEDURE — 99999 PR PBB SHADOW E&M-EST. PATIENT-LVL II: CPT | Mod: PBBFAC,,, | Performed by: FAMILY MEDICINE

## 2024-03-05 PROCEDURE — 99213 OFFICE O/P EST LOW 20 MIN: CPT | Mod: S$GLB,,, | Performed by: FAMILY MEDICINE

## 2024-03-05 RX ORDER — ZOLPIDEM TARTRATE 10 MG/1
10 TABLET ORAL NIGHTLY
Qty: 30 TABLET | Refills: 2 | Status: SHIPPED | OUTPATIENT
Start: 2024-03-05 | End: 2024-06-07 | Stop reason: SDUPTHER

## 2024-03-05 RX ORDER — CEPHALEXIN 500 MG/1
500 CAPSULE ORAL EVERY 12 HOURS
Qty: 14 CAPSULE | Refills: 0 | Status: SHIPPED | OUTPATIENT
Start: 2024-03-05 | End: 2024-03-12

## 2024-03-05 RX ORDER — PROMETHAZINE HYDROCHLORIDE AND DEXTROMETHORPHAN HYDROBROMIDE 6.25; 15 MG/5ML; MG/5ML
5 SYRUP ORAL EVERY 4 HOURS PRN
Qty: 118 ML | Refills: 0 | Status: SHIPPED | OUTPATIENT
Start: 2024-03-05 | End: 2024-03-15

## 2024-03-05 NOTE — TELEPHONE ENCOUNTER
Last Office Visit Info:   The patient's last visit with Manjula Honeycutt MD was on 12/7/2023.    The patient's last visit in current department was on 12/21/2023.        Last CBC Results:   Lab Results   Component Value Date    WBC 6.31 12/07/2023    HGB 13.8 (L) 12/07/2023    HCT 45.6 12/07/2023     12/07/2023       Last CMP Results  Lab Results   Component Value Date     12/07/2023    K 4.1 12/07/2023     12/07/2023    CO2 29 12/07/2023    BUN 14 12/07/2023    CREATININE 0.9 12/07/2023    CALCIUM 9.1 12/07/2023    ALBUMIN 3.7 12/07/2023    AST 25 12/07/2023    ALT 37 12/07/2023       Last Lipids  Lab Results   Component Value Date    CHOL 205 (H) 02/27/2023    TRIG 107 02/27/2023    HDL 47 02/27/2023    LDLCALC 136.6 02/27/2023       Last A1C  Lab Results   Component Value Date    HGBA1C 7.1 (H) 12/07/2023       Last TSH  Lab Results   Component Value Date    TSH 1.278 02/27/2023             Current Med Refills  Medication List with Changes/Refills   Current Medications    ACCU-CHEK STEPHON PLUS TEST STRP STRP    1 strip by Misc.(Non-Drug; Combo Route) route daily as needed (alternate testing time).       Start Date: 10/20/2016End Date: --    ACCU-CHEK FASTCLIX MISC    1 lancet by Misc.(Non-Drug; Combo Route) route daily as needed.       Start Date: 10/20/2016End Date: --    ACETAMINOPHEN (TYLENOL) 650 MG TBSR    Take 1 tablet (650 mg total) by mouth every 8 (eight) hours as needed.       Start Date: 5/7/2019  End Date: --    ASPIRIN 81 MG CHEW    Take 81 mg by mouth once daily.       Start Date: --        End Date: --    ATORVASTATIN (LIPITOR) 40 MG TABLET    TAKE 1 TABLET BY MOUTH ONCE DAILY IN THE EVENING       Start Date: 5/9/2023  End Date: --    DICLOFENAC (VOLTAREN) 50 MG EC TABLET    Take 1 tablet by mouth twice daily as needed       Start Date: 3/4/2024  End Date: --    GLIMEPIRIDE (AMARYL) 2 MG TABLET    Take 1 tablet (2 mg total) by mouth before breakfast.       Start Date: 12/8/2023  End Date: 12/7/2024    HYDRALAZINE (APRESOLINE) 50 MG TABLET    Take 1 tablet (50 mg total) by mouth every 12 (twelve) hours.       Start Date: 12/7/2023 End Date: 12/6/2024    HYDROCHLOROTHIAZIDE (HYDRODIURIL) 25 MG TABLET    Take 1 tablet (25 mg total) by mouth once daily.       Start Date: 1/2/2024  End Date: --    LOSARTAN (COZAAR) 100 MG TABLET    Take 1 tablet (100 mg total) by mouth once daily.       Start Date: 2/27/2023 End Date: --    METFORMIN (GLUCOPHAGE) 500 MG TABLET    Take 1 tablet (500 mg total) by mouth 2 (two) times daily with meals.       Start Date: 2/27/2023 End Date: --    METHOCARBAMOL (ROBAXIN) 500 MG TAB    Take 500 mg by mouth 2 (two) times daily as needed.       Start Date: 11/27/2023End Date: --    METOPROLOL SUCCINATE (TOPROL-XL) 50 MG 24 HR TABLET    Take 1 tablet (50 mg total) by mouth once daily.       Start Date: 2/27/2023 End Date: --    TAMSULOSIN (FLOMAX) 0.4 MG CAP    Take 1 capsule (0.4 mg total) by mouth once daily.       Start Date: 2/27/2023 End Date: 2/27/2024    ZOLPIDEM (AMBIEN) 10 MG TAB    Take 1 tablet (10 mg total) by mouth nightly as needed (INSOMNIA).       Start Date: 2/27/2023 End Date: --

## 2024-03-05 NOTE — TELEPHONE ENCOUNTER
No care due was identified.  [facility] Embedded Care Due Messages. Reference number: [id number].   3/05/2024 10:23:21 AM CST

## 2024-03-05 NOTE — PROGRESS NOTES
Subjective     Patient ID: Mirza Do is a 68 y.o. male.    Chief Complaint: Cough    Cough  This is a new problem. The current episode started 1 to 4 weeks ago (2 weeks). The problem has been unchanged. The cough is Productive of purulent sputum. Associated symptoms include wheezing. Pertinent negatives include no chills, fever, nasal congestion, postnasal drip, rhinorrhea, sore throat or shortness of breath.     Past Medical History:   Diagnosis Date    COPD with acute exacerbation 6/28/2022    CVA (cerebral infarction)     CVA approx 2011/2012    Diabetes mellitus type II, non insulin dependent 10/14/2016    Erectile dysfunction     Erectile Dysfunction    Eye injury     hit os playing basketball     Hyperlipidemia 10/14/2016    Hypertension     Hypertension    Murmur     Nuclear sclerotic cataract of both eyes 8/7/2019    Polyneuropathy     Pulmonary heart disease 1/25/2023    Renal cyst, left 1/8/2020    Sleep apnea     Trouble in sleeping     Type 2 diabetes mellitus       Past Surgical History:   Procedure Laterality Date    BACK SURGERY      discectomy    COLONOSCOPY N/A 8/4/2017    Procedure: COLONOSCOPY;  Surgeon: Mario Alberto Vega MD;  [facility] ([address] [place] [address]);  Service: Endoscopy;  Laterality: N/A;  Conflict with [facility]'s schedule no other times available with CRS, Pt requested 8/4/17 - moved to be scoped with GI MD - ER    ROOT CANAL      UMBILICAL HERNIA REPAIR       Family History   Problem Relation Age of Onset    Cancer Mother         smoker     No Known Problems Father     Emphysema Sister     Diabetes Sister     No Known Problems Sister     No Known Problems Sister     Hypertension Brother     Cancer Brother         lung     No Known Problems Maternal Aunt     No Known Problems Maternal Uncle     No Known Problems Paternal Aunt     No Known Problems Paternal Uncle     No Known Problems Maternal Grandmother     No Known Problems Maternal Grandfather     No Known Problems Paternal Grandmother      No Known Problems Paternal Grandfather     No Known Problems Other     Amblyopia Neg Hx     Blindness Neg Hx     Cataracts Neg Hx     Glaucoma Neg Hx     Macular degeneration Neg Hx     Retinal detachment Neg Hx     Strabismus Neg Hx     Stroke Neg Hx     Thyroid disease Neg Hx      Social History     Socioeconomic History    Marital status: Single   Tobacco Use    Smoking status: Former     Current packs/day: 0.00     Average packs/day: 0.1 packs/day for 4.0 years (0.4 ttl pk-yrs)     Types: Cigarettes     Start date: 1991     Quit date: 1995     Years since quittin.9    Smokeless tobacco: Never    Tobacco comments:     social smoking as teenager   Substance and Sexual Activity    Alcohol use: Yes     Comment: beer 6 pack in a month; red wine 2x a month     Drug use: No    Sexual activity: Yes     Partners: Female   Social History Narrative         Social Determinants of Health     Financial Resource Strain: Low Risk  (2023)    Overall Financial Resource Strain (CARDIA)     Difficulty of Paying Living Expenses: Not hard at all   Food Insecurity: No Food Insecurity (2023)    Hunger Vital Sign     Worried About Running Out of Food in the Last Year: Never true     Ran Out of Food in the Last Year: Never true   Transportation Needs: No Transportation Needs (2023)    PRAPARE - Transportation     Lack of Transportation (Medical): No     Lack of Transportation (Non-Medical): No   Physical Activity: Sufficiently Active (2023)    Exercise Vital Sign     Days of Exercise per Week: 3 days     Minutes of Exercise per Session: 60 min   Stress: No Stress Concern Present (2023)    [employer] - Occupational Stress Questionnaire     Feeling of Stress : Only a little   Social Connections: Moderately Isolated (2023)    Social Connection and Isolation Panel [NHANES]     Frequency of Communication with Friends and Family: More than  "three times a week     Frequency of Social Gatherings with Friends and Family: More than three times a week     Attends Hoahaoism Services: More than 4 times per year     Active Member of Clubs or Organizations: No     Attends Club or Organization Meetings: Never     Marital Status:    Housing Stability: Low Risk  (1/24/2023)    Housing Stability Vital Sign     Unable to Pay for Housing in the Last Year: No     Number of Places Lived in the Last Year: 1     Unstable Housing in the Last Year: No       Review of Systems   Constitutional:  Negative for chills and fever.   HENT:  Negative for postnasal drip, rhinorrhea and sore throat.    Respiratory:  Positive for cough and wheezing. Negative for shortness of breath.           Objective   Vitals:    03/05/24 1118   BP: (!) 144/88   Temp: 97.8 °F (36.6 °C)   TempSrc: Oral   Weight: 130.3 kg (287 lb 4.2 oz)   Height: 5' 8" (1.727 m)       Physical Exam  Constitutional:       General: He is not in acute distress.     Appearance: He is well-developed. He is not diaphoretic.   HENT:      Head: Normocephalic and atraumatic.   Cardiovascular:      Rate and Rhythm: Normal rate and regular rhythm.      Heart sounds: Normal heart sounds. No murmur heard.     No friction rub. No gallop.   Pulmonary:      Effort: Pulmonary effort is normal. No respiratory distress.      Breath sounds: Normal breath sounds. No stridor. No wheezing, rhonchi or rales.   Chest:      Chest wall: No tenderness.   Musculoskeletal:      Cervical back: Normal range of motion and neck supple.            Assessment and Plan     1. Simple chronic bronchitis  -     cephALEXin (KEFLEX) 500 MG capsule; Take 1 capsule (500 mg total) by mouth every 12 (twelve) hours. for 7 days  Dispense: 14 capsule; Refill: 0  -     promethazine-dextromethorphan (PROMETHAZINE-DM) 6.25-15 mg/5 mL Syrp; Take 5 mLs by mouth every 4 (four) hours as needed.  Dispense: 118 mL; Refill: 0                 No follow-ups on " file.

## 2024-03-05 NOTE — TELEPHONE ENCOUNTER
Called pt; no answer; could not confirm appt or leave message due to line kept ringing; I was calling to confirm pt's in office EAWV appt on 3/7/24.

## 2024-05-10 ENCOUNTER — PATIENT OUTREACH (OUTPATIENT)
Dept: ADMINISTRATIVE | Facility: HOSPITAL | Age: 69
End: 2024-05-10
Payer: MEDICARE

## 2024-05-10 NOTE — PROGRESS NOTES
Health Maintenance Due   Topic Date Due    RSV Vaccine (Age 60+ and Pregnant patients) (1 - 1-dose 60+ series) Never done    Foot Exam  03/24/2021    Pneumococcal Vaccines (Age 65+) (3 of 3 - PPSV23 or PCV20) 06/29/2022    COVID-19 Vaccine (7 - 2023-24 season) 09/01/2023    PROSTATE-SPECIFIC ANTIGEN  02/27/2024    Diabetes Urine Screening  02/27/2024    Lipid Panel  02/27/2024    Eye Exam  04/24/2024    Hemoglobin A1c  06/07/2024     Chart review done.  updated. Immunizations reviewed & updated. Care Everywhere updated.

## 2024-06-04 DIAGNOSIS — G47.00 INSOMNIA, UNSPECIFIED TYPE: ICD-10-CM

## 2024-06-04 RX ORDER — ZOLPIDEM TARTRATE 10 MG/1
10 TABLET ORAL NIGHTLY
Qty: 30 TABLET | Refills: 0 | OUTPATIENT
Start: 2024-06-04

## 2024-06-04 NOTE — TELEPHONE ENCOUNTER
Care Due:                  Date            Visit Type   Department     Provider  --------------------------------------------------------------------------------                                [facility]                              PRIMARY      MEDICINE/INTERN  Last Visit: 03-      CARE ([facility])   AL MED         Mary Peralta  Next Visit: None Scheduled  None         None Found                                                            Last  Test          Frequency    Reason                     Performed    Due Date  --------------------------------------------------------------------------------    HBA1C.......  6 months...  glimepiride, metFORMIN...  12- 06-    Lipid Panel.  12 months..  atorvastatin.............  02- 02-    [employer] Embedded Care Due Messages. Reference number: [id number].   6/04/2024 12:25:52 PM CDT

## 2024-06-07 ENCOUNTER — LAB VISIT (OUTPATIENT)
Dept: LAB | Facility: HOSPITAL | Age: 69
End: 2024-06-07
Attending: INTERNAL MEDICINE
Payer: MEDICARE

## 2024-06-07 ENCOUNTER — OFFICE VISIT (OUTPATIENT)
Dept: FAMILY MEDICINE | Facility: CLINIC | Age: 69
End: 2024-06-07
Payer: MEDICARE

## 2024-06-07 VITALS
TEMPERATURE: 98 F | DIASTOLIC BLOOD PRESSURE: 106 MMHG | BODY MASS INDEX: 44.1 KG/M2 | WEIGHT: 291 LBS | HEIGHT: 68 IN | SYSTOLIC BLOOD PRESSURE: 192 MMHG | HEART RATE: 67 BPM | OXYGEN SATURATION: 96 %

## 2024-06-07 DIAGNOSIS — I42.9 CARDIOMYOPATHY, UNSPECIFIED TYPE: ICD-10-CM

## 2024-06-07 DIAGNOSIS — I77.1 STRICTURE OF ARTERY: ICD-10-CM

## 2024-06-07 DIAGNOSIS — E11.36 TYPE 2 DIABETES MELLITUS WITH DIABETIC CATARACT, WITHOUT LONG-TERM CURRENT USE OF INSULIN: ICD-10-CM

## 2024-06-07 DIAGNOSIS — E11.40 TYPE 2 DIABETES MELLITUS WITH DIABETIC NEUROPATHY, WITHOUT LONG-TERM CURRENT USE OF INSULIN: ICD-10-CM

## 2024-06-07 DIAGNOSIS — Z12.5 SCREENING PSA (PROSTATE SPECIFIC ANTIGEN): ICD-10-CM

## 2024-06-07 DIAGNOSIS — G47.00 INSOMNIA, UNSPECIFIED TYPE: ICD-10-CM

## 2024-06-07 DIAGNOSIS — I10 ESSENTIAL HYPERTENSION, BENIGN: Primary | Chronic | ICD-10-CM

## 2024-06-07 DIAGNOSIS — I47.10 PAROXYSMAL SUPRAVENTRICULAR TACHYCARDIA: ICD-10-CM

## 2024-06-07 DIAGNOSIS — I27.9 PULMONARY HEART DISEASE: ICD-10-CM

## 2024-06-07 DIAGNOSIS — I10 ESSENTIAL HYPERTENSION, BENIGN: Chronic | ICD-10-CM

## 2024-06-07 PROBLEM — E66.01 CLASS 3 OBESITY: Status: RESOLVED | Noted: 2023-11-17 | Resolved: 2024-06-07

## 2024-06-07 PROBLEM — E66.813 CLASS 3 OBESITY: Status: RESOLVED | Noted: 2023-11-17 | Resolved: 2024-06-07

## 2024-06-07 LAB
ALBUMIN SERPL BCP-MCNC: 3.6 G/DL (ref 3.5–5.2)
ALP SERPL-CCNC: 85 U/L (ref 55–135)
ALT SERPL W/O P-5'-P-CCNC: 54 U/L (ref 10–44)
ANION GAP SERPL CALC-SCNC: 8 MMOL/L (ref 8–16)
AST SERPL-CCNC: 38 U/L (ref 10–40)
BASOPHILS # BLD AUTO: 0.02 K/UL (ref 0–0.2)
BASOPHILS NFR BLD: 0.4 % (ref 0–1.9)
BILIRUB SERPL-MCNC: 0.5 MG/DL (ref 0.1–1)
BUN SERPL-MCNC: 12 MG/DL (ref 8–23)
CALCIUM SERPL-MCNC: 9.4 MG/DL (ref 8.7–10.5)
CHLORIDE SERPL-SCNC: 105 MMOL/L (ref 95–110)
CHOLEST SERPL-MCNC: 190 MG/DL (ref 120–199)
CHOLEST/HDLC SERPL: 4.2 {RATIO} (ref 2–5)
CO2 SERPL-SCNC: 28 MMOL/L (ref 23–29)
CREAT SERPL-MCNC: 0.8 MG/DL (ref 0.5–1.4)
DIFFERENTIAL METHOD BLD: ABNORMAL
EOSINOPHIL # BLD AUTO: 0.1 K/UL (ref 0–0.5)
EOSINOPHIL NFR BLD: 1.5 % (ref 0–8)
ERYTHROCYTE [DISTWIDTH] IN BLOOD BY AUTOMATED COUNT: 16.3 % (ref 11.5–14.5)
EST. GFR  (NO RACE VARIABLE): >60 ML/MIN/1.73 M^2
GLUCOSE SERPL-MCNC: 127 MG/DL (ref 70–110)
HCT VFR BLD AUTO: 44.3 % (ref 40–54)
HDLC SERPL-MCNC: 45 MG/DL (ref 40–75)
HDLC SERPL: 23.7 % (ref 20–50)
HGB BLD-MCNC: 13.4 G/DL (ref 14–18)
IMM GRANULOCYTES # BLD AUTO: 0.01 K/UL (ref 0–0.04)
IMM GRANULOCYTES NFR BLD AUTO: 0.2 % (ref 0–0.5)
LDLC SERPL CALC-MCNC: 124.4 MG/DL (ref 63–159)
LYMPHOCYTES # BLD AUTO: 1.8 K/UL (ref 1–4.8)
LYMPHOCYTES NFR BLD: 34.3 % (ref 18–48)
MCH RBC QN AUTO: 26.7 PG (ref 27–31)
MCHC RBC AUTO-ENTMCNC: 30.2 G/DL (ref 32–36)
MCV RBC AUTO: 88 FL (ref 82–98)
MONOCYTES # BLD AUTO: 0.4 K/UL (ref 0.3–1)
MONOCYTES NFR BLD: 8 % (ref 4–15)
NEUTROPHILS # BLD AUTO: 3 K/UL (ref 1.8–7.7)
NEUTROPHILS NFR BLD: 55.6 % (ref 38–73)
NONHDLC SERPL-MCNC: 145 MG/DL
NRBC BLD-RTO: 0 /100 WBC
PLATELET # BLD AUTO: 226 K/UL (ref 150–450)
PMV BLD AUTO: 10.9 FL (ref 9.2–12.9)
POTASSIUM SERPL-SCNC: 4.1 MMOL/L (ref 3.5–5.1)
PROT SERPL-MCNC: 7.4 G/DL (ref 6–8.4)
RBC # BLD AUTO: 5.02 M/UL (ref 4.6–6.2)
SODIUM SERPL-SCNC: 141 MMOL/L (ref 136–145)
TRIGL SERPL-MCNC: 103 MG/DL (ref 30–150)
TSH SERPL DL<=0.005 MIU/L-ACNC: 0.98 UIU/ML (ref 0.4–4)
WBC # BLD AUTO: 5.37 K/UL (ref 3.9–12.7)

## 2024-06-07 PROCEDURE — 3072F LOW RISK FOR RETINOPATHY: CPT | Mod: CPTII,S$GLB,, | Performed by: INTERNAL MEDICINE

## 2024-06-07 PROCEDURE — 3288F FALL RISK ASSESSMENT DOCD: CPT | Mod: CPTII,S$GLB,, | Performed by: INTERNAL MEDICINE

## 2024-06-07 PROCEDURE — 1160F RVW MEDS BY RX/DR IN RCRD: CPT | Mod: CPTII,S$GLB,, | Performed by: INTERNAL MEDICINE

## 2024-06-07 PROCEDURE — 84443 ASSAY THYROID STIM HORMONE: CPT | Performed by: INTERNAL MEDICINE

## 2024-06-07 PROCEDURE — 80053 COMPREHEN METABOLIC PANEL: CPT | Performed by: INTERNAL MEDICINE

## 2024-06-07 PROCEDURE — 83036 HEMOGLOBIN GLYCOSYLATED A1C: CPT | Performed by: INTERNAL MEDICINE

## 2024-06-07 PROCEDURE — 85025 COMPLETE CBC W/AUTO DIFF WBC: CPT | Performed by: INTERNAL MEDICINE

## 2024-06-07 PROCEDURE — 1101F PT FALLS ASSESS-DOCD LE1/YR: CPT | Mod: CPTII,S$GLB,, | Performed by: INTERNAL MEDICINE

## 2024-06-07 PROCEDURE — 3077F SYST BP >= 140 MM HG: CPT | Mod: CPTII,S$GLB,, | Performed by: INTERNAL MEDICINE

## 2024-06-07 PROCEDURE — 1126F AMNT PAIN NOTED NONE PRSNT: CPT | Mod: CPTII,S$GLB,, | Performed by: INTERNAL MEDICINE

## 2024-06-07 PROCEDURE — 99999 PR PBB SHADOW E&M-EST. PATIENT-LVL IV: CPT | Mod: PBBFAC,,, | Performed by: INTERNAL MEDICINE

## 2024-06-07 PROCEDURE — 99214 OFFICE O/P EST MOD 30 MIN: CPT | Mod: S$GLB,,, | Performed by: INTERNAL MEDICINE

## 2024-06-07 PROCEDURE — 84153 ASSAY OF PSA TOTAL: CPT | Performed by: INTERNAL MEDICINE

## 2024-06-07 PROCEDURE — G2211 COMPLEX E/M VISIT ADD ON: HCPCS | Mod: S$GLB,,, | Performed by: INTERNAL MEDICINE

## 2024-06-07 PROCEDURE — 80061 LIPID PANEL: CPT | Performed by: INTERNAL MEDICINE

## 2024-06-07 PROCEDURE — 82043 UR ALBUMIN QUANTITATIVE: CPT | Performed by: INTERNAL MEDICINE

## 2024-06-07 PROCEDURE — 1159F MED LIST DOCD IN RCRD: CPT | Mod: CPTII,S$GLB,, | Performed by: INTERNAL MEDICINE

## 2024-06-07 PROCEDURE — 3080F DIAST BP >= 90 MM HG: CPT | Mod: CPTII,S$GLB,, | Performed by: INTERNAL MEDICINE

## 2024-06-07 PROCEDURE — 3008F BODY MASS INDEX DOCD: CPT | Mod: CPTII,S$GLB,, | Performed by: INTERNAL MEDICINE

## 2024-06-07 PROCEDURE — 36415 COLL VENOUS BLD VENIPUNCTURE: CPT | Mod: PO | Performed by: INTERNAL MEDICINE

## 2024-06-07 RX ORDER — HYDROCHLOROTHIAZIDE 25 MG/1
25 TABLET ORAL DAILY
Qty: 90 TABLET | Refills: 3 | Status: SHIPPED | OUTPATIENT
Start: 2024-06-07

## 2024-06-07 RX ORDER — ZOLPIDEM TARTRATE 10 MG/1
10 TABLET ORAL NIGHTLY
Qty: 30 TABLET | Refills: 5 | Status: SHIPPED | OUTPATIENT
Start: 2024-06-07

## 2024-06-07 RX ORDER — METOPROLOL SUCCINATE 50 MG/1
50 TABLET, EXTENDED RELEASE ORAL DAILY
Qty: 90 TABLET | Refills: 0 | Status: SHIPPED | OUTPATIENT
Start: 2024-06-07

## 2024-06-07 RX ORDER — HYDRALAZINE HYDROCHLORIDE 50 MG/1
50 TABLET, FILM COATED ORAL EVERY 12 HOURS
Qty: 180 TABLET | Refills: 0 | Status: SHIPPED | OUTPATIENT
Start: 2024-06-07 | End: 2025-06-07

## 2024-06-07 RX ORDER — LOSARTAN POTASSIUM 100 MG/1
100 TABLET ORAL DAILY
Qty: 90 TABLET | Refills: 3 | Status: SHIPPED | OUTPATIENT
Start: 2024-06-07

## 2024-06-07 RX ORDER — GLIMEPIRIDE 2 MG/1
2 TABLET ORAL
Qty: 90 TABLET | Refills: 0 | Status: SHIPPED | OUTPATIENT
Start: 2024-06-07 | End: 2025-06-07

## 2024-06-07 NOTE — PROGRESS NOTES
SUBJECTIVE     Chief Complaint   Patient presents with    Medication Refill       HPI  Mirza Do is a 68 y.o. male with multiple medical diagnoses as listed in the medical history and problem list that presents for evaluation for HTN. Pt has been doing  okay  since last visit. he was fully compliant with meds until he ran out of 2 pills earlier in the week. He denied any adverse side effects while taking pills. Pt is  compliant  with a low Na diet and does exercise by walking 3 times weekly. Pt does check his BP at home, but he does not recall any numbers. Pt presents for med refills today on BP meds and Ambien.     PAST MEDICAL HISTORY:  Past Medical History:   Diagnosis Date    COPD with acute exacerbation 6/28/2022    CVA (cerebral infarction)     CVA approx 2011/2012    Diabetes mellitus type II, non insulin dependent 10/14/2016    Erectile dysfunction     Erectile Dysfunction    Eye injury     hit os playing basketball     Hyperlipidemia 10/14/2016    Hypertension     Hypertension    Murmur     Nuclear sclerotic cataract of both eyes 8/7/2019    Polyneuropathy     Pulmonary heart disease 1/25/2023    Renal cyst, left 1/8/2020    Sleep apnea     Trouble in sleeping     Type 2 diabetes mellitus        PAST SURGICAL HISTORY:  Past Surgical History:   Procedure Laterality Date    BACK SURGERY      discectomy    COLONOSCOPY N/A 8/4/2017    Procedure: COLONOSCOPY;  Surgeon: Mario Alberto Vega MD;  Location[facility] ([address] [place] [address]);  Service: Endoscopy;  Laterality: N/A;  Conflict with [facility]'s schedule no other times available with CRS, Pt requested 8/4/17 - moved to be scoped with GI MD - ER    ROOT CANAL      UMBILICAL HERNIA REPAIR         SOCIAL HISTORY:  Social History     Socioeconomic History    Marital status: Single   Tobacco Use    Smoking status: Former     Current packs/day: 0.00     Average packs/day: 0.1 packs/day for 4.0 years (0.4 ttl pk-yrs)     Types: Cigarettes     Start date: 4/16/1991     Quit date:  1995     Years since quittin.1    Smokeless tobacco: Never    Tobacco comments:     social smoking as teenager   Substance and Sexual Activity    Alcohol use: Yes     Comment: beer 6 pack in a month; red wine 2x a month     Drug use: No    Sexual activity: Yes     Partners: Female   Social History Narrative         Social Determinants of Health     Financial Resource Strain: Low Risk  (2023)    Overall Financial Resource Strain (CARDIA)     Difficulty of Paying Living Expenses: Not hard at all   Food Insecurity: No Food Insecurity (2023)    Hunger Vital Sign     Worried About Running Out of Food in the Last Year: Never true     Ran Out of Food in the Last Year: Never true   Transportation Needs: No Transportation Needs (2023)    PRAPARE - Transportation     Lack of Transportation (Medical): No     Lack of Transportation (Non-Medical): No   Physical Activity: Sufficiently Active (2023)    Exercise Vital Sign     Days of Exercise per Week: 3 days     Minutes of Exercise per Session: 60 min   Stress: No Stress Concern Present (2023)    [employer] - Occupational Stress Questionnaire     Feeling of Stress : Only a little   Housing Stability: Low Risk  (2023)    Housing Stability Vital Sign     Unable to Pay for Housing in the Last Year: No     Number of Places Lived in the Last Year: 1     Unstable Housing in the Last Year: No       FAMILY HISTORY:  Family History   Problem Relation Name Age of Onset    Cancer Mother          smoker     No Known Problems Father      Emphysema Sister      Diabetes Sister      No Known Problems Sister      No Known Problems Sister      Hypertension Brother liana     Cancer Brother yogesh         lung     No Known Problems Maternal Aunt      No Known Problems Maternal Uncle      No Known Problems Paternal Aunt      No Known Problems Paternal Uncle      No Known Problems Maternal Grandmother       No Known Problems Maternal Grandfather      No Known Problems Paternal Grandmother      No Known Problems Paternal Grandfather      No Known Problems Other      Amblyopia Neg Hx      Blindness Neg Hx      Cataracts Neg Hx      Glaucoma Neg Hx      Macular degeneration Neg Hx      Retinal detachment Neg Hx      Strabismus Neg Hx      Stroke Neg Hx      Thyroid disease Neg Hx         ALLERGIES AND MEDICATIONS: updated and reviewed.  Review of patient's allergies indicates:  No Known Allergies  Current Outpatient Medications   Medication Sig Dispense Refill    ACCU-CHEK STEPHON PLUS TEST STRP Strp 1 strip by Misc.(Non-Drug; Combo Route) route daily as needed (alternate testing time). 50 strip 11    ACCU-CHEK FASTCLIX Misc 1 lancet by Misc.(Non-Drug; Combo Route) route daily as needed. 50 each 11    acetaminophen (TYLENOL) 650 MG TbSR Take 1 tablet (650 mg total) by mouth every 8 (eight) hours as needed. 30 tablet 0    aspirin 81 MG Chew Take 81 mg by mouth once daily.      atorvastatin (LIPITOR) 40 MG tablet TAKE 1 TABLET BY MOUTH ONCE DAILY IN THE EVENING 90 tablet 2    diclofenac (VOLTAREN) 50 MG EC tablet Take 1 tablet by mouth twice daily as needed 30 tablet 0    methocarbamoL (ROBAXIN) 500 MG Tab Take 500 mg by mouth 2 (two) times daily as needed.      glimepiride (AMARYL) 2 MG tablet Take 1 tablet (2 mg total) by mouth before breakfast. 90 tablet 0    hydrALAZINE (APRESOLINE) 50 MG tablet Take 1 tablet (50 mg total) by mouth every 12 (twelve) hours. 180 tablet 0    hydroCHLOROthiazide (HYDRODIURIL) 25 MG tablet Take 1 tablet (25 mg total) by mouth once daily. 90 tablet 3    losartan (COZAAR) 100 MG tablet Take 1 tablet (100 mg total) by mouth once daily. 90 tablet 3    metoprolol succinate (TOPROL-XL) 50 MG 24 hr tablet Take 1 tablet (50 mg total) by mouth once daily. 90 tablet 0    tamsulosin (FLOMAX) 0.4 mg Cap Take 1 capsule (0.4 mg total) by mouth once daily. 90 capsule 3    zolpidem (AMBIEN) 10 mg Tab Take 1  "tablet (10 mg total) by mouth every evening. 30 tablet 5     No current facility-administered medications for this visit.       ROS  Review of Systems   Constitutional:  Negative for chills and fever.   HENT:  Negative for hearing loss and sore throat.    Eyes:  Negative for visual disturbance.   Respiratory:  Negative for cough and shortness of breath.    Cardiovascular:  Negative for chest pain, palpitations and leg swelling.   Gastrointestinal:  Negative for abdominal pain, constipation, diarrhea, nausea and vomiting.   Genitourinary:  Negative for dysuria, frequency and urgency.   Musculoskeletal:  Negative for arthralgias, joint swelling and myalgias.   Skin:  Negative for rash and wound.   Neurological:  Negative for headaches.   Psychiatric/Behavioral:  Negative for agitation and confusion. The patient is not nervous/anxious.          OBJECTIVE     Physical Exam  Vitals:    06/07/24 0953   BP: (!) 192/106   Pulse: 67   Temp: 97.7 °F (36.5 °C)    Body mass index is 44.25 kg/m².  Weight: 132 kg (291 lb 0.1 oz)   Height: 5' 8" (172.7 cm)     Physical Exam  Constitutional:       General: He is not in acute distress.     Appearance: He is well-developed.   HENT:      Head: Normocephalic and atraumatic.      Right Ear: External ear normal.      Left Ear: External ear normal.      Nose: Nose normal.   Eyes:      General: No scleral icterus.        Right eye: No discharge.         Left eye: No discharge.      Conjunctiva/sclera: Conjunctivae normal.   Neck:      Vascular: No JVD.      Trachea: No tracheal deviation.   Cardiovascular:      Rate and Rhythm: Normal rate and regular rhythm.      Heart sounds: Normal heart sounds. No murmur heard.     No friction rub. No gallop.   Pulmonary:      Effort: Pulmonary effort is normal. No respiratory distress.      Breath sounds: Normal breath sounds. No wheezing.   Abdominal:      General: Bowel sounds are normal. There is no distension.      Palpations: Abdomen is soft. " There is no mass.      Tenderness: There is no abdominal tenderness. There is no guarding or rebound.   Musculoskeletal:         General: No tenderness or deformity. Normal range of motion.      Cervical back: Normal range of motion and neck supple.      Right lower leg: Edema (1+ pitting) present.   Skin:     General: Skin is warm and dry.      Findings: No erythema or rash.   Neurological:      Mental Status: He is alert and oriented to person, place, and time.      Motor: No abnormal muscle tone.      Coordination: Coordination normal.   Psychiatric:         Behavior: Behavior normal.         Thought Content: Thought content normal.         Judgment: Judgment normal.           Health Maintenance         Date Due Completion Date    RSV Vaccine (Age 60+ and Pregnant patients) (1 - 1-dose 60+ series) Never done ---    Foot Exam 03/24/2021 3/24/2020 (Done)    Override on 3/24/2020: Done    Pneumococcal Vaccines (Age 65+) (3 of 3 - PPSV23 or PCV20) 06/29/2022 6/27/2019    COVID-19 Vaccine (7 - 2023-24 season) 09/01/2023 1/30/2023    PROSTATE-SPECIFIC ANTIGEN 02/27/2024 2/27/2023    Diabetes Urine Screening 02/27/2024 2/27/2023    Lipid Panel 02/27/2024 2/27/2023    Eye Exam 04/24/2024 4/24/2023    Override on 11/10/2020: Done    Hemoglobin A1c 06/07/2024 12/7/2023    Influenza Vaccine (Season Ended) 09/01/2024 10/15/2019    Override on 10/1/2017: Done    Override on 10/4/2016: Done    Override on 11/13/2015: Done    High Dose Statin 12/21/2024 12/21/2023    Colorectal Cancer Screening 08/04/2027 8/4/2017    TETANUS VACCINE 06/27/2029 6/27/2019              ASSESSMENT     68 y.o. male with     1. Essential hypertension, benign    2. Insomnia, unspecified type    3. Cardiomyopathy, unspecified type    4. Type 2 diabetes mellitus with diabetic neuropathy, without long-term current use of insulin    5. Type 2 diabetes mellitus with diabetic cataract, without long-term current use of insulin    6. Pulmonary heart disease     7. Paroxysmal supraventricular tachycardia    8. Stricture of artery    9. Screening PSA (prostate specific antigen)        PLAN:     1. Essential hypertension, benign  - BP well controlled; at goal of <140/90  - The current medical regimen is effective;  continue present plan and medications.  - losartan (COZAAR) 100 MG tablet; Take 1 tablet (100 mg total) by mouth once daily.  Dispense: 90 tablet; Refill: 3  - hydroCHLOROthiazide (HYDRODIURIL) 25 MG tablet; Take 1 tablet (25 mg total) by mouth once daily.  Dispense: 90 tablet; Refill: 3  - metoprolol succinate (TOPROL-XL) 50 MG 24 hr tablet; Take 1 tablet (50 mg total) by mouth once daily.  Dispense: 90 tablet; Refill: 0  - hydrALAZINE (APRESOLINE) 50 MG tablet; Take 1 tablet (50 mg total) by mouth every 12 (twelve) hours.  Dispense: 180 tablet; Refill: 0  - CBC Auto Differential; Future  - Comprehensive Metabolic Panel; Future  - Hemoglobin A1C; Future  - TSH; Future  - Lipid Panel; Future    2. Insomnia, unspecified type  - Stable; no acute issues  - The current medical regimen is effective;  continue present plan and medications.  - zolpidem (AMBIEN) 10 mg Tab; Take 1 tablet (10 mg total) by mouth every evening.  Dispense: 30 tablet; Refill: 5    3. Cardiomyopathy, unspecified type  - metoprolol succinate (TOPROL-XL) 50 MG 24 hr tablet; Take 1 tablet (50 mg total) by mouth once daily.  Dispense: 90 tablet; Refill: 0  - Lipid Panel; Future    4. Type 2 diabetes mellitus with diabetic neuropathy, without long-term current use of insulin  - Check labs  - CBC Auto Differential; Future  - Comprehensive Metabolic Panel; Future  - Hemoglobin A1C; Future  - TSH; Future  - Lipid Panel; Future  - glimepiride (AMARYL) 2 MG tablet; Take 1 tablet (2 mg total) by mouth before breakfast.  Dispense: 90 tablet; Refill: 0  - Microalbumin/Creatinine Ratio, Urine; Future    5. Type 2 diabetes mellitus with diabetic cataract, without long-term current use of insulin  - Check  labs  - CBC Auto Differential; Future  - Comprehensive Metabolic Panel; Future  - Hemoglobin A1C; Future  - TSH; Future  - Lipid Panel; Future  - Microalbumin/Creatinine Ratio, Urine; Future    6. Pulmonary heart disease  - Stable; no acute issues    7. Paroxysmal supraventricular tachycardia  - Stable; no acute issues    8. Stricture of artery  - Lipid Panel; Future    9. Screening PSA (prostate specific antigen)  - PSA, Screening; Future          RTC in 2 weeks for nurse visit BP check; 3-6 months with me based on HgbA1C     Manjula Honeycutt MD  06/07/2024 1:22 PM        No follow-ups on file.

## 2024-06-08 LAB
ALBUMIN/CREAT UR: 25 UG/MG (ref 0–30)
COMPLEXED PSA SERPL-MCNC: 0.27 NG/ML (ref 0–4)
CREAT UR-MCNC: 92 MG/DL (ref 23–375)
ESTIMATED AVG GLUCOSE: 166 MG/DL (ref 68–131)
HBA1C MFR BLD: 7.4 % (ref 4–5.6)
MICROALBUMIN UR DL<=1MG/L-MCNC: 23 UG/ML

## 2024-08-29 ENCOUNTER — TELEPHONE (OUTPATIENT)
Dept: ADMINISTRATIVE | Facility: CLINIC | Age: 69
End: 2024-08-29
Payer: MEDICARE

## 2024-08-29 NOTE — TELEPHONE ENCOUNTER
Called pt, no answer; left message informing pt I was calling to remind pt of his in office EAWV on 8/30/24 and to return my call if he had any questions; left my name and number.

## 2024-08-30 ENCOUNTER — CLINICAL SUPPORT (OUTPATIENT)
Dept: FAMILY MEDICINE | Facility: CLINIC | Age: 69
End: 2024-08-30
Attending: NURSE PRACTITIONER
Payer: MEDICARE

## 2024-08-30 ENCOUNTER — OFFICE VISIT (OUTPATIENT)
Dept: FAMILY MEDICINE | Facility: CLINIC | Age: 69
End: 2024-08-30
Payer: MEDICARE

## 2024-08-30 VITALS
BODY MASS INDEX: 43.64 KG/M2 | WEIGHT: 287.94 LBS | HEART RATE: 62 BPM | TEMPERATURE: 98 F | HEIGHT: 68 IN | OXYGEN SATURATION: 97 % | SYSTOLIC BLOOD PRESSURE: 168 MMHG | DIASTOLIC BLOOD PRESSURE: 88 MMHG

## 2024-08-30 DIAGNOSIS — E78.2 MIXED HYPERLIPIDEMIA: ICD-10-CM

## 2024-08-30 DIAGNOSIS — E11.40 TYPE 2 DIABETES MELLITUS WITH DIABETIC NEUROPATHY, WITHOUT LONG-TERM CURRENT USE OF INSULIN: ICD-10-CM

## 2024-08-30 DIAGNOSIS — J43.9 PULMONARY EMPHYSEMA, UNSPECIFIED EMPHYSEMA TYPE: ICD-10-CM

## 2024-08-30 DIAGNOSIS — I27.9 PULMONARY HEART DISEASE: ICD-10-CM

## 2024-08-30 DIAGNOSIS — E11.36 TYPE 2 DIABETES MELLITUS WITH DIABETIC CATARACT, WITHOUT LONG-TERM CURRENT USE OF INSULIN: ICD-10-CM

## 2024-08-30 DIAGNOSIS — Z00.00 ENCOUNTER FOR PREVENTIVE HEALTH EXAMINATION: Primary | ICD-10-CM

## 2024-08-30 DIAGNOSIS — H25.13 NUCLEAR SCLEROTIC CATARACT OF BOTH EYES: ICD-10-CM

## 2024-08-30 DIAGNOSIS — E11.69 HYPERLIPIDEMIA ASSOCIATED WITH TYPE 2 DIABETES MELLITUS: ICD-10-CM

## 2024-08-30 DIAGNOSIS — E78.5 HYPERLIPIDEMIA ASSOCIATED WITH TYPE 2 DIABETES MELLITUS: ICD-10-CM

## 2024-08-30 DIAGNOSIS — I77.1 STRICTURE OF ARTERY: ICD-10-CM

## 2024-08-30 DIAGNOSIS — I10 ESSENTIAL (PRIMARY) HYPERTENSION: ICD-10-CM

## 2024-08-30 DIAGNOSIS — Z86.73 HISTORY OF CVA (CEREBROVASCULAR ACCIDENT): ICD-10-CM

## 2024-08-30 DIAGNOSIS — I51.89 DIASTOLIC DYSFUNCTION WITHOUT HEART FAILURE: ICD-10-CM

## 2024-08-30 DIAGNOSIS — N40.1 BENIGN PROSTATIC HYPERPLASIA WITH URINARY FREQUENCY: ICD-10-CM

## 2024-08-30 DIAGNOSIS — G47.33 OSA ON CPAP: ICD-10-CM

## 2024-08-30 DIAGNOSIS — R35.0 BENIGN PROSTATIC HYPERPLASIA WITH URINARY FREQUENCY: ICD-10-CM

## 2024-08-30 DIAGNOSIS — I47.10 PAROXYSMAL SUPRAVENTRICULAR TACHYCARDIA: ICD-10-CM

## 2024-08-30 DIAGNOSIS — I42.9 CARDIOMYOPATHY, UNSPECIFIED TYPE: ICD-10-CM

## 2024-08-30 DIAGNOSIS — F51.01 PRIMARY INSOMNIA: ICD-10-CM

## 2024-08-30 PROBLEM — E11.9 DIABETES MELLITUS: Status: ACTIVE | Noted: 2024-08-30

## 2024-08-30 PROBLEM — E11.9 DIABETES MELLITUS: Status: RESOLVED | Noted: 2024-08-30 | Resolved: 2024-08-30

## 2024-08-30 PROBLEM — H93.19 TINNITUS: Status: ACTIVE | Noted: 2024-08-30

## 2024-08-30 PROCEDURE — 99999 PR PBB SHADOW E&M-EST. PATIENT-LVL V: CPT | Mod: PBBFAC,,, | Performed by: NURSE PRACTITIONER

## 2024-08-30 RX ORDER — HYDROCHLOROTHIAZIDE 50 MG/1
25 TABLET ORAL
COMMUNITY
Start: 2024-06-11 | End: 2024-08-30 | Stop reason: SDUPTHER

## 2024-08-30 RX ORDER — ATORVASTATIN CALCIUM 80 MG/1
40 TABLET, FILM COATED ORAL
COMMUNITY
Start: 2024-06-11 | End: 2024-08-30 | Stop reason: SDUPTHER

## 2024-08-30 RX ORDER — METOPROLOL SUCCINATE 100 MG/1
50 TABLET, EXTENDED RELEASE ORAL
COMMUNITY
Start: 2024-06-11 | End: 2024-08-30 | Stop reason: SDUPTHER

## 2024-08-30 NOTE — PROGRESS NOTES
Mirza Do is a 69 y.o. male here for a diabetic eye screening with non-dilated fundus photos per NP Omaira Rosales.    Patient cooperative?: Yes  Small pupils?: No  Last eye exam: 04/24/2023    For exam results, see Encounter Report.

## 2024-08-30 NOTE — PROGRESS NOTES
"  Mirza Do presented for a  Medicare AWV and comprehensive Health Risk Assessment today. The following components were reviewed and updated:    Medical history  Family History  Social history  Allergies and Current Medications  Health Risk Assessment  Health Maintenance  Care Team         See Completed Assessments for Annual Wellness Visit within the encounter summary.       The following assessments were completed:  Living Situation  CAGE  Depression Screening  Timed Get Up and Go  Whisper Test  Cognitive Function Screening  Nutrition Screening  ADL Screening  PAQ Screening      Opioid documentation:      Patient does not have a current opioid prescription.        Vitals:    08/30/24 1011 08/30/24 1102   BP: (!) 160/90 (!) 168/88   BP Location:  Right arm   Patient Position:  Sitting   Pulse: 62    Temp: 98 °F (36.7 °C)    TempSrc: Oral    SpO2: 97%    Weight: 130.6 kg (287 lb 14.7 oz)    Height: 5' 8" (1.727 m)      Body mass index is 43.78 kg/m².  Physical Exam  Vitals and nursing note reviewed.   Constitutional:       General: He is not in acute distress.     Appearance: Normal appearance. He is obese.   HENT:      Head: Normocephalic and atraumatic.   Eyes:      Conjunctiva/sclera: Conjunctivae normal.      Pupils: Pupils are equal, round, and reactive to light.   Cardiovascular:      Rate and Rhythm: Normal rate and regular rhythm.      Heart sounds: Normal heart sounds. No murmur heard.  Pulmonary:      Effort: Pulmonary effort is normal. No respiratory distress.      Breath sounds: Normal breath sounds.   Musculoskeletal:      Cervical back: Normal range of motion.   Skin:     General: Skin is warm and dry.   Neurological:      Mental Status: He is alert and oriented to person, place, and time.   Psychiatric:         Mood and Affect: Mood normal.         Behavior: Behavior normal.               Diagnoses and health risks identified today and associated recommendations/orders:    1. Encounter for " preventive health examination  Screenings performed as noted above.  Personal preventative testing needs reviewed.    2. History of CVA (cerebrovascular accident)  Stable.  Followed by PCP.     3. Nuclear sclerotic cataract of both eyes  Stable, The current medical regimen is effective;  continue present plan and medications.  Follow up with Optometry as recommended.     4. Pulmonary emphysema, unspecified emphysema type  Stable, followed by PCP.     5. Hyperlipidemia associated with type 2 diabetes mellitus  Continue atorvastatin 40 mg po daily.   Followed by PCP.     6. Essential (primary) hypertension  Continue losartan 100 mg daily and HCTZ 25 mg po daily, metoprolol succinate 50 mg po daily, and hydralazine 50 mg po daily.   Low sodium diet.   BP elevated today, patient reports non compliance with sodium diet.   Return to clinic in 2 weeks for BP check.     7. Diastolic dysfunction without heart failure  Stable.   Followed by Cardiology and PCP.     8. Cardiomyopathy, unspecified type  Stable, The current medical regimen is effective;  continue present plan and medications.  Followed by  Cardiology and PCP.     9. Benign prostatic hyperplasia with urinary frequency  Stable, continue tamsulosin 0.4 mg po daily.  Monitor PSA.     10. YUNIEL on CPAP  Recommend CPAP use.     11. Type 2 diabetes mellitus with diabetic neuropathy, without long-term current use of insulin  Monitor A1c and microalbumin/creatinine ratio.   Continue glimepiride 2 mg po daily.   ADA diet.   - Ambulatory referral/consult to Podiatry; Future    12. Type 2 diabetes mellitus with diabetic cataract, without long-term current use of insulin  As listed above.     13. BMI 40.0-44.9, adult  Recommend weight loss, diet and exercise as tolerated.     14. Primary insomnia  Stable, The current medical regimen is effective;  continue present plan and medications.  Followed by PCP.     15. Stricture of artery  Stable, The current medical regimen is  effective;  continue present plan and medications.  Followed by PCP.     16. Paroxysmal supraventricular tachycardia  Stable, The current medical regimen is effective;  continue present plan and medications.  Followed by PCP and Cardiology.     17. Pulmonary heart disease  Stable, The current medical regimen is effective;  continue present plan and medications.      18. Mixed hyperlipidemia  Stable, continue statin.   Monitor lipids.       Provided Mirza with a 5-10 year written screening schedule and personal prevention plan. Recommendations were developed using the USPSTF age appropriate recommendations. Education, counseling, and referrals were provided as needed. After Visit Summary printed and given to patient which includes a list of additional screenings\tests needed.    I offered to discuss advanced care planning, including how to pick a person who would make decisions for you if you were unable to make them for yourself, called a health care power of , and what kind of decisions you might make such as use of life sustaining treatments such as ventilators and tube feeding when faced with a life limiting illness recorded on a living will that they will need to know. (How you want to be cared for as you near the end of your natural life)     X Patient is interested in learning more about how to make advanced directives.  I provided them paperwork and offered to discuss this with them.    Follow up in about 1 year (around 8/30/2025) for for health prevention/screening.    Omaira Rosales, DNP

## 2024-10-07 ENCOUNTER — OFFICE VISIT (OUTPATIENT)
Dept: PODIATRY | Facility: CLINIC | Age: 69
End: 2024-10-07
Payer: MEDICARE

## 2024-10-07 VITALS — HEIGHT: 68 IN | WEIGHT: 287.94 LBS | BODY MASS INDEX: 43.64 KG/M2

## 2024-10-07 DIAGNOSIS — E11.40 TYPE 2 DIABETES MELLITUS WITH DIABETIC NEUROPATHY, WITHOUT LONG-TERM CURRENT USE OF INSULIN: ICD-10-CM

## 2024-10-07 DIAGNOSIS — I73.9 PAD (PERIPHERAL ARTERY DISEASE): Primary | ICD-10-CM

## 2024-10-07 PROCEDURE — 3051F HG A1C>EQUAL 7.0%<8.0%: CPT | Mod: CPTII,S$GLB,, | Performed by: PODIATRIST

## 2024-10-07 PROCEDURE — 3288F FALL RISK ASSESSMENT DOCD: CPT | Mod: CPTII,S$GLB,, | Performed by: PODIATRIST

## 2024-10-07 PROCEDURE — 1160F RVW MEDS BY RX/DR IN RCRD: CPT | Mod: CPTII,S$GLB,, | Performed by: PODIATRIST

## 2024-10-07 PROCEDURE — 3008F BODY MASS INDEX DOCD: CPT | Mod: CPTII,S$GLB,, | Performed by: PODIATRIST

## 2024-10-07 PROCEDURE — 1126F AMNT PAIN NOTED NONE PRSNT: CPT | Mod: CPTII,S$GLB,, | Performed by: PODIATRIST

## 2024-10-07 PROCEDURE — 3066F NEPHROPATHY DOC TX: CPT | Mod: CPTII,S$GLB,, | Performed by: PODIATRIST

## 2024-10-07 PROCEDURE — 1101F PT FALLS ASSESS-DOCD LE1/YR: CPT | Mod: CPTII,S$GLB,, | Performed by: PODIATRIST

## 2024-10-07 PROCEDURE — 3061F NEG MICROALBUMINURIA REV: CPT | Mod: CPTII,S$GLB,, | Performed by: PODIATRIST

## 2024-10-07 PROCEDURE — 1159F MED LIST DOCD IN RCRD: CPT | Mod: CPTII,S$GLB,, | Performed by: PODIATRIST

## 2024-10-07 PROCEDURE — 99999 PR PBB SHADOW E&M-EST. PATIENT-LVL III: CPT | Mod: PBBFAC,,, | Performed by: PODIATRIST

## 2024-10-07 PROCEDURE — 99203 OFFICE O/P NEW LOW 30 MIN: CPT | Mod: S$GLB,,, | Performed by: PODIATRIST

## 2024-10-07 PROCEDURE — 4010F ACE/ARB THERAPY RXD/TAKEN: CPT | Mod: CPTII,S$GLB,, | Performed by: PODIATRIST

## 2024-10-07 NOTE — PROGRESS NOTES
Subjective:      Patient ID: Mirza Do is a 69 y.o. male.    Chief Complaint: Diabetic Foot Exam (PCP Omaira Rsoales, JEREMIE 08/30/2024)    Diabetes, increased risk amputation needing evaluation/management/optomization of foot care.    Chief Complaint   Patient presents with    Diabetic Foot Exam     PCP Omaira Rosales, JEREMIE 08/30/2024       Casual shoes both feet    Review of Systems   Constitutional: Negative for chills, diaphoresis, fever, malaise/fatigue and night sweats.   Cardiovascular:  Positive for claudication and leg swelling. Negative for cyanosis and syncope.   Skin:  Negative for color change, dry skin, nail changes, rash, suspicious lesions and unusual hair distribution.   Musculoskeletal:  Negative for falls, joint pain, joint swelling, muscle cramps, muscle weakness and stiffness.   Gastrointestinal:  Negative for constipation, diarrhea, nausea and vomiting.   Neurological:  Negative for brief paralysis, disturbances in coordination, focal weakness, numbness, paresthesias, sensory change and tremors.         Objective:      Physical Exam  Constitutional:       General: He is not in acute distress.     Appearance: He is well-developed. He is not diaphoretic.   Cardiovascular:      Pulses:           Popliteal pulses are 1+ on the right side and 1+ on the left side.        Dorsalis pedis pulses are 2+ on the right side and 1+ on the left side.        Posterior tibial pulses are 2+ on the right side and 2+ on the left side.      Comments: Capillary refill 3 seconds all toes/distal feet, all toes/both feet warm to touch.      Negative lymphadenopathy bilateral popliteal fossa and tarsal tunnel.      Negavie lower extremity edema bilateral.    Musculoskeletal:      Right ankle: No swelling, deformity, ecchymosis or lacerations. Normal range of motion. Normal pulse.      Right Achilles Tendon: Normal. No defects. Truijllo's test negative.      Comments: Normal angle, base, station of  "gait. All ten toes without clubbing, cyanosis, or signs of ischemia.  No pain to palpation bilateral lower extremities.  Range of motion, stability, muscle strength, and muscle tone normal bilateral feet and legs.    Lymphadenopathy:      Lower Body: No right inguinal adenopathy. No left inguinal adenopathy.      Comments: Negative lymphadenopathy bilateral popliteal fossa and tarsal tunnel.    Negative lymphangitic streaking bilateral feet/ankles/legs.   Skin:     General: Skin is warm and dry.      Capillary Refill: Capillary refill takes 2 to 3 seconds.      Coloration: Skin is not pale.      Findings: No abrasion, bruising, burn, ecchymosis, erythema, laceration, lesion or rash.      Nails: There is no clubbing.      Comments: Skin is normal age and health appropriate color, turgor, texture, and temperature bilateral lower extremities without ulceration, hyperpigmentation, discoloration, masses nodules or cords palpated.  No ecchymosis, erythema, edema, or cardinal signs of infection bilateral lower extremities.      Neurological:      Mental Status: He is alert and oriented to person, place, and time.      Sensory: No sensory deficit.      Motor: No tremor, atrophy or abnormal muscle tone.      Gait: Gait normal.      Comments: Negative tinel sign to percussion sural, superficial peroneal, deep peroneal, saphenous, and posterior tibial nerves right and left ankles and feet.     Psychiatric:         Behavior: Behavior is cooperative.           Assessment:       Encounter Diagnoses   Name Primary?    Type 2 diabetes mellitus with diabetic neuropathy, without long-term current use of insulin     PAD (peripheral artery disease) Yes         Plan:       Mirza Padron" was seen today for diabetic foot exam.    Diagnoses and all orders for this visit:    PAD (peripheral artery disease)  -     US Lower Extrem Arteries Bilat with TAVARES (xpd); Future    Type 2 diabetes mellitus with diabetic neuropathy, without long-term " current use of insulin  -     Ambulatory referral/consult to Podiatry  -     US Lower Extrem Arteries Bilat with TAVARES (xpd); Future      I counseled the patient on his conditions, their implications and medical management.        The patient has received literature on basic diabetic foot care.  Patient will inspect feet daily, wear protective shoe gear when ambulatory, and apply moisturizer to skin as needed to maintain elasticity and help prevent ulceration.    Inspect feet multiple times daily for signs of occurrence/recurrence ulceration.    .dopplers arterial    Discussed conservative treatment with shoes of adequate dimensions, material, and style to alleviate symptoms and delay or prevent surgical intervention.            Follow up in about 1 year (around 10/7/2025).

## 2024-10-10 ENCOUNTER — HOSPITAL ENCOUNTER (OUTPATIENT)
Dept: RADIOLOGY | Facility: HOSPITAL | Age: 69
Discharge: HOME | End: 2024-10-10
Attending: PODIATRIST
Payer: MEDICARE

## 2024-10-10 DIAGNOSIS — I73.9 PAD (PERIPHERAL ARTERY DISEASE): ICD-10-CM

## 2024-10-10 DIAGNOSIS — E11.40 TYPE 2 DIABETES MELLITUS WITH DIABETIC NEUROPATHY, WITHOUT LONG-TERM CURRENT USE OF INSULIN: ICD-10-CM

## 2024-10-10 PROCEDURE — 93925 LOWER EXTREMITY STUDY: CPT | Mod: TC

## 2024-10-10 PROCEDURE — 93925 LOWER EXTREMITY STUDY: CPT | Mod: 26,,, | Performed by: RADIOLOGY

## 2024-10-18 ENCOUNTER — TELEPHONE (OUTPATIENT)
Dept: VASCULAR SURGERY | Facility: CLINIC | Age: 69
End: 2024-10-18
Payer: MEDICARE

## 2024-10-21 ENCOUNTER — INITIAL CONSULT (OUTPATIENT)
Dept: VASCULAR SURGERY | Facility: CLINIC | Age: 69
End: 2024-10-21
Payer: MEDICARE

## 2024-10-21 ENCOUNTER — OFFICE VISIT (OUTPATIENT)
Dept: PODIATRY | Facility: CLINIC | Age: 69
End: 2024-10-21
Payer: MEDICARE

## 2024-10-21 VITALS
SYSTOLIC BLOOD PRESSURE: 175 MMHG | DIASTOLIC BLOOD PRESSURE: 84 MMHG | BODY MASS INDEX: 43.27 KG/M2 | HEIGHT: 68 IN | WEIGHT: 285.5 LBS | HEART RATE: 77 BPM

## 2024-10-21 VITALS — HEIGHT: 68 IN | BODY MASS INDEX: 43.64 KG/M2 | WEIGHT: 287.94 LBS

## 2024-10-21 DIAGNOSIS — I73.9 PAD (PERIPHERAL ARTERY DISEASE): Primary | ICD-10-CM

## 2024-10-21 DIAGNOSIS — I73.9 PAD (PERIPHERAL ARTERY DISEASE): ICD-10-CM

## 2024-10-21 DIAGNOSIS — E11.40 TYPE 2 DIABETES MELLITUS WITH DIABETIC NEUROPATHY, WITHOUT LONG-TERM CURRENT USE OF INSULIN: Primary | ICD-10-CM

## 2024-10-21 PROCEDURE — 1101F PT FALLS ASSESS-DOCD LE1/YR: CPT | Mod: CPTII,S$GLB,, | Performed by: PODIATRIST

## 2024-10-21 PROCEDURE — 1125F AMNT PAIN NOTED PAIN PRSNT: CPT | Mod: CPTII,S$GLB,, | Performed by: PODIATRIST

## 2024-10-21 PROCEDURE — 1159F MED LIST DOCD IN RCRD: CPT | Mod: CPTII,S$GLB,, | Performed by: PODIATRIST

## 2024-10-21 PROCEDURE — 4010F ACE/ARB THERAPY RXD/TAKEN: CPT | Mod: CPTII,S$GLB,, | Performed by: PODIATRIST

## 2024-10-21 PROCEDURE — 1160F RVW MEDS BY RX/DR IN RCRD: CPT | Mod: CPTII,S$GLB,, | Performed by: PODIATRIST

## 2024-10-21 PROCEDURE — 99204 OFFICE O/P NEW MOD 45 MIN: CPT | Mod: S$GLB,,, | Performed by: SURGERY

## 2024-10-21 PROCEDURE — 3288F FALL RISK ASSESSMENT DOCD: CPT | Mod: CPTII,S$GLB,, | Performed by: PODIATRIST

## 2024-10-21 PROCEDURE — 99999 PR PBB SHADOW E&M-EST. PATIENT-LVL III: CPT | Mod: PBBFAC,,, | Performed by: SURGERY

## 2024-10-21 PROCEDURE — 3008F BODY MASS INDEX DOCD: CPT | Mod: CPTII,S$GLB,, | Performed by: PODIATRIST

## 2024-10-21 PROCEDURE — 3066F NEPHROPATHY DOC TX: CPT | Mod: CPTII,S$GLB,, | Performed by: PODIATRIST

## 2024-10-21 PROCEDURE — 99212 OFFICE O/P EST SF 10 MIN: CPT | Mod: S$GLB,,, | Performed by: PODIATRIST

## 2024-10-21 PROCEDURE — 3051F HG A1C>EQUAL 7.0%<8.0%: CPT | Mod: CPTII,S$GLB,, | Performed by: PODIATRIST

## 2024-10-21 PROCEDURE — 99999 PR PBB SHADOW E&M-EST. PATIENT-LVL III: CPT | Mod: PBBFAC,,, | Performed by: PODIATRIST

## 2024-10-21 PROCEDURE — 3061F NEG MICROALBUMINURIA REV: CPT | Mod: CPTII,S$GLB,, | Performed by: PODIATRIST

## 2024-10-21 NOTE — PROGRESS NOTES
Subjective:      Patient ID: Mirza Do is a 69 y.o. male.    Chief Complaint: Follow-up (Foot pain)    Left foot pain improved some since last visit.  Patient tried different shoes.  SFA 50% stenosis detected in dopplers.  Appointed with vascular surgery today.        Chief Complaint   Patient presents with    Follow-up     Foot pain       Casual shoes both feet    Review of Systems   Constitutional: Negative for chills, diaphoresis, fever, malaise/fatigue and night sweats.   Cardiovascular:  Positive for claudication and leg swelling. Negative for cyanosis and syncope.   Skin:  Negative for color change, dry skin, nail changes, rash, suspicious lesions and unusual hair distribution.   Musculoskeletal:  Negative for falls, joint pain, joint swelling, muscle cramps, muscle weakness and stiffness.   Gastrointestinal:  Negative for constipation, diarrhea, nausea and vomiting.   Neurological:  Negative for brief paralysis, disturbances in coordination, focal weakness, numbness, paresthesias, sensory change and tremors.           Objective:      Physical Exam  Constitutional:       General: He is not in acute distress.     Appearance: He is well-developed. He is not diaphoretic.   Cardiovascular:      Pulses:           Popliteal pulses are 1+ on the right side and 1+ on the left side.        Dorsalis pedis pulses are 2+ on the right side and 1+ on the left side.        Posterior tibial pulses are 2+ on the right side and 2+ on the left side.      Comments: Capillary refill 3 seconds all toes/distal feet, all toes/both feet warm to touch.      Negative lymphadenopathy bilateral popliteal fossa and tarsal tunnel.      Negavie lower extremity edema bilateral.    Musculoskeletal:      Right ankle: No swelling, deformity, ecchymosis or lacerations. Normal range of motion. Normal pulse.      Right Achilles Tendon: Normal. No defects. Trujillo's test negative.      Comments: Normal angle, base, station of gait. All ten  "toes without clubbing, cyanosis, or signs of ischemia.  No pain to palpation bilateral lower extremities.  Range of motion, stability, muscle strength, and muscle tone normal bilateral feet and legs.    Lymphadenopathy:      Lower Body: No right inguinal adenopathy. No left inguinal adenopathy.      Comments: Negative lymphadenopathy bilateral popliteal fossa and tarsal tunnel.    Negative lymphangitic streaking bilateral feet/ankles/legs.   Skin:     General: Skin is warm and dry.      Capillary Refill: Capillary refill takes 2 to 3 seconds.      Coloration: Skin is not pale.      Findings: No abrasion, bruising, burn, ecchymosis, erythema, laceration, lesion or rash.      Nails: There is no clubbing.      Comments: Skin is normal age and health appropriate color, turgor, texture, and temperature bilateral lower extremities without ulceration, hyperpigmentation, discoloration, masses nodules or cords palpated.  No ecchymosis, erythema, edema, or cardinal signs of infection bilateral lower extremities.      Neurological:      Mental Status: He is alert and oriented to person, place, and time.      Sensory: No sensory deficit.      Motor: No tremor, atrophy or abnormal muscle tone.      Gait: Gait normal.      Comments: Negative tinel sign to percussion sural, superficial peroneal, deep peroneal, saphenous, and posterior tibial nerves right and left ankles and feet.     Psychiatric:         Behavior: Behavior is cooperative.             Assessment:       Encounter Diagnoses   Name Primary?    Type 2 diabetes mellitus with diabetic neuropathy, without long-term current use of insulin Yes    PAD (peripheral artery disease)          Plan:       Mirza Padron" was seen today for follow-up.    Diagnoses and all orders for this visit:    Type 2 diabetes mellitus with diabetic neuropathy, without long-term current use of insulin    PAD (peripheral artery disease)      I counseled the patient on his conditions, their " implications and medical management.    Activity to tolerance in shoes of choice.      Keep appointment with vascular surgery.          No follow-ups on file.

## 2024-10-21 NOTE — PROGRESS NOTES
Sinan Plata MD, PhD  [facility]   10/21/2024    HPI:  Mirza Do is a 69 y.o. male with a history of peripheral arterial disease, who was referred by Dr Lopez.      Patient states location is  left lateral thigh occurring for  several months.  Associated signs and symptoms, occurs after prolonged standing.  Denies vein in his calves walking denies pain in his feet  [facility] hx:  history of diabetes CVA  Tobacco use:  former smoker but now    Patient Active Problem List   Diagnosis    Impotence of organic origin    History of CVA (cerebrovascular accident)    Insomnia    History of hiatal hernia    Type 2 diabetes mellitus with diabetic neuropathy, without long-term current use of insulin    Mixed hyperlipidemia    Diastolic dysfunction without heart failure    YUNIEL on CPAP    Nuclear sclerotic cataract of both eyes    Renal cyst, left    Chronic right flank pain    History of herpes zoster    Nuclear sclerosis of both eyes    Cardiomyopathy    Stricture of artery    Paroxysmal supraventricular tachycardia    Pulmonary emphysema    BMI 40.0-44.9, adult    Benign prostatic hyperplasia with urinary frequency    Type 2 diabetes mellitus with diabetic cataract, without long-term current use of insulin    Hyperlipidemia associated with type 2 diabetes mellitus    Pulmonary heart disease    Tinnitus    Essential (primary) hypertension     Past Medical History:   Diagnosis Date    Benign essential hypertension 08/30/2024    COPD with acute exacerbation 06/28/2022    CVA (cerebral infarction)     CVA approx 2011/2012    Diabetes mellitus 08/30/2024    Diabetes mellitus type II, non insulin dependent 10/14/2016    Erectile dysfunction     Erectile Dysfunction    Essential hypertension, benign     Hypertension      Eye injury     hit os playing basketball     Hyperlipidemia 10/14/2016    Hypertension     Hypertension    Murmur     Nuclear sclerotic cataract of both eyes 08/07/2019     Polyneuropathy     Pulmonary heart disease 2023    Renal cyst, left 2020    Sleep apnea     Trouble in sleeping     Type 2 diabetes mellitus      Past Surgical History:   Procedure Laterality Date    BACK SURGERY      discectomy    COLONOSCOPY N/A 2017    Procedure: COLONOSCOPY;  Surgeon: Mario Alberto Vega MD;  Location: [facility] ([address] [place] [address]);  Service: Endoscopy;  Laterality: N/A;  Conflict with Jack's schedule no other times available with CRS, Pt requested 17 - moved to be scoped with GI MD - ER    ROOT CANAL      UMBILICAL HERNIA REPAIR       Family History   Problem Relation Name Age of Onset    Cancer Mother          smoker     No Known Problems Father      Emphysema Sister      Diabetes Sister      No Known Problems Sister      No Known Problems Sister      Hypertension Brother liana     Cancer Brother yogesh         lung     No Known Problems Maternal Aunt      No Known Problems Maternal Uncle      No Known Problems Paternal Aunt      No Known Problems Paternal Uncle      No Known Problems Maternal Grandmother      No Known Problems Maternal Grandfather      No Known Problems Paternal Grandmother      No Known Problems Paternal Grandfather      No Known Problems Other      Amblyopia Neg Hx      Blindness Neg Hx      Cataracts Neg Hx      Glaucoma Neg Hx      Macular degeneration Neg Hx      Retinal detachment Neg Hx      Strabismus Neg Hx      Stroke Neg Hx      Thyroid disease Neg Hx       Social History     Socioeconomic History    Marital status: Single   Tobacco Use    Smoking status: Former     Current packs/day: 0.00     Average packs/day: 0.1 packs/day for 4.0 years (0.4 ttl pk-yrs)     Types: Cigarettes     Start date: 1991     Quit date: 1995     Years since quittin.5    Smokeless tobacco: Never    Tobacco comments:     social smoking as teenager   Substance and Sexual Activity    Alcohol use: Yes     Comment: beer 6 pack in a month; red wine 2x a month     Drug use:  No    Sexual activity: Yes     Partners: Female   Social History Narrative         Social Drivers of Health     Financial Resource Strain: Low Risk  (8/30/2024)    Overall Financial Resource Strain (CARDIA)     Difficulty of Paying Living Expenses: Not hard at all   Food Insecurity: No Food Insecurity (8/30/2024)    Hunger Vital Sign     Worried About Running Out of Food in the Last Year: Never true     Ran Out of Food in the Last Year: Never true   Transportation Needs: No Transportation Needs (8/30/2024)    PRAPARE - Transportation     Lack of Transportation (Medical): No     Lack of Transportation (Non-Medical): No   Physical Activity: Insufficiently Active (8/30/2024)    Exercise Vital Sign     Days of Exercise per Week: 3 days     Minutes of Exercise per Session: 30 min   Stress: No Stress Concern Present (8/30/2024)    [employer] - Occupational Stress Questionnaire     Feeling of Stress : Not at all   Housing Stability: Low Risk  (8/30/2024)    Housing Stability Vital Sign     Unable to Pay for Housing in the Last Year: No     Homeless in the Last Year: No       Current Outpatient Medications:     ACCU-CHEK STEPHON PLUS TEST STRP Strp, 1 strip by Misc.(Non-Drug; Combo Route) route daily as needed (alternate testing time)., Disp: 50 strip, Rfl: 11    ACCU-CHEK FASTCLIX Misc, 1 lancet by Misc.(Non-Drug; Combo Route) route daily as needed., Disp: 50 each, Rfl: 11    acetaminophen (TYLENOL) 650 MG TbSR, Take 1 tablet (650 mg total) by mouth every 8 (eight) hours as needed., Disp: 30 tablet, Rfl: 0    aspirin 81 MG Chew, Take 81 mg by mouth once daily., Disp: , Rfl:     atorvastatin (LIPITOR) 40 MG tablet, TAKE 1 TABLET BY MOUTH ONCE DAILY IN THE EVENING, Disp: 90 tablet, Rfl: 2    diclofenac (VOLTAREN) 50 MG EC tablet, Take 1 tablet by mouth twice daily as needed, Disp: 30 tablet, Rfl: 0    glimepiride (AMARYL) 2 MG tablet, Take 1 tablet (2 mg total) by mouth  "before breakfast., Disp: 90 tablet, Rfl: 0    hydrALAZINE (APRESOLINE) 50 MG tablet, Take 1 tablet (50 mg total) by mouth every 12 (twelve) hours., Disp: 180 tablet, Rfl: 0    hydroCHLOROthiazide (HYDRODIURIL) 25 MG tablet, Take 1 tablet (25 mg total) by mouth once daily., Disp: 90 tablet, Rfl: 3    losartan (COZAAR) 100 MG tablet, Take 1 tablet (100 mg total) by mouth once daily., Disp: 90 tablet, Rfl: 3    methocarbamoL (ROBAXIN) 500 MG Tab, Take 500 mg by mouth 2 (two) times daily as needed., Disp: , Rfl:     metoprolol succinate (TOPROL-XL) 50 MG 24 hr tablet, Take 1 tablet (50 mg total) by mouth once daily., Disp: 90 tablet, Rfl: 0    zolpidem (AMBIEN) 10 mg Tab, Take 1 tablet (10 mg total) by mouth every evening., Disp: 30 tablet, Rfl: 5    tamsulosin (FLOMAX) 0.4 mg Cap, Take 1 capsule (0.4 mg total) by mouth once daily., Disp: 90 capsule, Rfl: 3    REVIEW OF SYSTEMS:  ROS       VITALS:  Vitals:    10/21/24 1433   BP: (!) 175/84   BP Location: Left arm   Patient Position: Sitting   Pulse: 77   Weight: 129.5 kg (285 lb 7.9 oz)   Height: 5' 8" (1.727 m)        PHYSICAL EXAM:   Physical Exam         LAB RESULTS:  No results found for: "CBC"  Lab Results   Component Value Date    LABPROT 9.8 11/19/2015    INR 0.9 11/19/2015     Lab Results   Component Value Date     06/07/2024    K 4.1 06/07/2024     06/07/2024    CO2 28 06/07/2024     (H) 06/07/2024    BUN 12 06/07/2024    CREATININE 0.8 06/07/2024    CALCIUM 9.4 06/07/2024    ANIONGAP 8 06/07/2024    EGFRNONAA >60 02/22/2022     Lab Results   Component Value Date    WBC 5.37 06/07/2024    RBC 5.02 06/07/2024    HGB 13.4 (L) 06/07/2024    HCT 44.3 06/07/2024    MCV 88 06/07/2024    MCH 26.7 (L) 06/07/2024    MCHC 30.2 (L) 06/07/2024    RDW 16.3 (H) 06/07/2024     06/07/2024    MPV 10.9 06/07/2024    GRAN 3.0 06/07/2024    GRAN 55.6 06/07/2024    LYMPH 1.8 06/07/2024    LYMPH 34.3 06/07/2024    MONO 0.4 06/07/2024    MONO 8.0 06/07/2024 "    EOS 0.1 06/07/2024    BASO 0.02 06/07/2024    EOSINOPHIL 1.5 06/07/2024    BASOPHIL 0.4 06/07/2024    DIFFMETHOD Automated 06/07/2024     .  Lab Results   Component Value Date    HGBA1C 7.4 (H) 06/07/2024       IMAGING:  All pertinent imaging has been reviewed and interpreted independently.      IMP/PLAN:  Mirza Do is a 69 y.o. male with peripheral arterial disease, asymptomatic.  No claudication or rest pain.  He has left lateral thigh pains after prolonged standing but that appears to be related to his previous back operation    Plan:    Follow up with vascular in 1 year with repeat noninvasive studies  Continue aspirin and statin        I spent 45 minutes evaluating this patient and greater than 50% of the time was spent counseling, coordinator care and discussing the plan of care.  All questions were answered and patient stated understanding with agreement with the above treatment plan.    Sinan Plata MD, PhD  Vascular and Endovascular Surgery

## 2024-12-10 DIAGNOSIS — G47.00 INSOMNIA, UNSPECIFIED TYPE: ICD-10-CM

## 2024-12-10 NOTE — TELEPHONE ENCOUNTER
Care Due:                  Date            Visit Type   Department     Provider  --------------------------------------------------------------------------------                                [facility]                              PRIMARY      MEDICINE/INTERN  Last Visit: 06-      CARE ([facility])   AL MED         Manjula Honeycutt  Next Visit: None Scheduled  None         None Found                                                            Last  Test          Frequency    Reason                     Performed    Due Date  --------------------------------------------------------------------------------    HBA1C.......  6 months...  glimepiride..............  06- 12-    [employer] Embedded Care Due Messages. Reference number: [id number].   12/10/2024 5:17:25 PM CST

## 2024-12-11 RX ORDER — ZOLPIDEM TARTRATE 10 MG/1
10 TABLET ORAL NIGHTLY
Qty: 30 TABLET | Refills: 0 | OUTPATIENT
Start: 2024-12-11

## 2024-12-11 NOTE — TELEPHONE ENCOUNTER
----- Message from Brittaney sent at 12/11/2024  9:43 AM CST -----  Regarding: Refill Request  Type: RX Refill Request      Who Called: Self       Refill or New Rx: refill       RX Name and Strength: Disp Refills Start End JAIRO  zolpidem (AMBIEN) 10 mg Tab           Preferred Pharmacy with phone number:  [facility] [address] [address] [place]    Would the patient rather a call back or a response via My [facility]? Call       Best Call Back Number: [phone]

## 2024-12-19 ENCOUNTER — OFFICE VISIT (OUTPATIENT)
Dept: FAMILY MEDICINE | Facility: CLINIC | Age: 69
End: 2024-12-19
Payer: MEDICARE

## 2024-12-19 VITALS
HEIGHT: 68 IN | BODY MASS INDEX: 43.84 KG/M2 | TEMPERATURE: 98 F | HEART RATE: 64 BPM | SYSTOLIC BLOOD PRESSURE: 202 MMHG | OXYGEN SATURATION: 98 % | DIASTOLIC BLOOD PRESSURE: 104 MMHG | WEIGHT: 289.25 LBS

## 2024-12-19 DIAGNOSIS — I10 ESSENTIAL HYPERTENSION, BENIGN: Chronic | ICD-10-CM

## 2024-12-19 DIAGNOSIS — I42.9 CARDIOMYOPATHY, UNSPECIFIED TYPE: ICD-10-CM

## 2024-12-19 DIAGNOSIS — G47.00 INSOMNIA, UNSPECIFIED TYPE: Primary | ICD-10-CM

## 2024-12-19 DIAGNOSIS — R05.9 COUGH, UNSPECIFIED TYPE: ICD-10-CM

## 2024-12-19 DIAGNOSIS — E11.36 TYPE 2 DIABETES MELLITUS WITH DIABETIC CATARACT, WITHOUT LONG-TERM CURRENT USE OF INSULIN: ICD-10-CM

## 2024-12-19 PROCEDURE — 1159F MED LIST DOCD IN RCRD: CPT | Mod: CPTII,S$GLB,, | Performed by: INTERNAL MEDICINE

## 2024-12-19 PROCEDURE — 1126F AMNT PAIN NOTED NONE PRSNT: CPT | Mod: CPTII,S$GLB,, | Performed by: INTERNAL MEDICINE

## 2024-12-19 PROCEDURE — 1101F PT FALLS ASSESS-DOCD LE1/YR: CPT | Mod: CPTII,S$GLB,, | Performed by: INTERNAL MEDICINE

## 2024-12-19 PROCEDURE — 3061F NEG MICROALBUMINURIA REV: CPT | Mod: CPTII,S$GLB,, | Performed by: INTERNAL MEDICINE

## 2024-12-19 PROCEDURE — 3080F DIAST BP >= 90 MM HG: CPT | Mod: CPTII,S$GLB,, | Performed by: INTERNAL MEDICINE

## 2024-12-19 PROCEDURE — 3051F HG A1C>EQUAL 7.0%<8.0%: CPT | Mod: CPTII,S$GLB,, | Performed by: INTERNAL MEDICINE

## 2024-12-19 PROCEDURE — 3288F FALL RISK ASSESSMENT DOCD: CPT | Mod: CPTII,S$GLB,, | Performed by: INTERNAL MEDICINE

## 2024-12-19 PROCEDURE — 4010F ACE/ARB THERAPY RXD/TAKEN: CPT | Mod: CPTII,S$GLB,, | Performed by: INTERNAL MEDICINE

## 2024-12-19 PROCEDURE — 99999 PR PBB SHADOW E&M-EST. PATIENT-LVL IV: CPT | Mod: PBBFAC,,, | Performed by: INTERNAL MEDICINE

## 2024-12-19 PROCEDURE — 99214 OFFICE O/P EST MOD 30 MIN: CPT | Mod: S$GLB,,, | Performed by: INTERNAL MEDICINE

## 2024-12-19 PROCEDURE — 1160F RVW MEDS BY RX/DR IN RCRD: CPT | Mod: CPTII,S$GLB,, | Performed by: INTERNAL MEDICINE

## 2024-12-19 PROCEDURE — 3008F BODY MASS INDEX DOCD: CPT | Mod: CPTII,S$GLB,, | Performed by: INTERNAL MEDICINE

## 2024-12-19 PROCEDURE — 3066F NEPHROPATHY DOC TX: CPT | Mod: CPTII,S$GLB,, | Performed by: INTERNAL MEDICINE

## 2024-12-19 PROCEDURE — 3077F SYST BP >= 140 MM HG: CPT | Mod: CPTII,S$GLB,, | Performed by: INTERNAL MEDICINE

## 2024-12-19 RX ORDER — METOPROLOL SUCCINATE 50 MG/1
50 TABLET, EXTENDED RELEASE ORAL DAILY
Qty: 90 TABLET | Refills: 0 | Status: SHIPPED | OUTPATIENT
Start: 2024-12-19

## 2024-12-19 RX ORDER — ZOLPIDEM TARTRATE 10 MG/1
10 TABLET ORAL NIGHTLY
Qty: 30 TABLET | Refills: 5 | Status: SHIPPED | OUTPATIENT
Start: 2024-12-19

## 2024-12-19 RX ORDER — HYDRALAZINE HYDROCHLORIDE 50 MG/1
50 TABLET, FILM COATED ORAL EVERY 12 HOURS
Qty: 180 TABLET | Refills: 0 | Status: SHIPPED | OUTPATIENT
Start: 2024-12-19 | End: 2025-12-19

## 2024-12-19 RX ORDER — BENZONATATE 200 MG/1
200 CAPSULE ORAL 3 TIMES DAILY PRN
Qty: 30 CAPSULE | Refills: 0 | Status: SHIPPED | OUTPATIENT
Start: 2024-12-19 | End: 2024-12-29

## 2024-12-19 NOTE — PROGRESS NOTES
SUBJECTIVE     Chief Complaint   Patient presents with    Medication Refill    Cough    Chest Congestion    Sore Throat       HPI  Mirza Do is a 69 y.o. male with multiple medical diagnoses as listed in the medical history and problem list that presents for follow-up for insomnia med refills. Pt reports full compliance with Ambien and it remains as the only thing that helps him get sleep at night.  The patient is able to sleep for about 7-8 hours without any side effects of a.m. grogginess, confusion, etcetera.  He presents for med refills and also reports a cough for the past few days.      PAST MEDICAL HISTORY:  Past Medical History:   Diagnosis Date    Benign essential hypertension 08/30/2024    COPD with acute exacerbation 06/28/2022    CVA (cerebral infarction)     CVA approx 2011/2012    Diabetes mellitus 08/30/2024    Diabetes mellitus type II, non insulin dependent 10/14/2016    Erectile dysfunction     Erectile Dysfunction    Essential hypertension, benign     Hypertension      Eye injury     hit os playing basketball     Hyperlipidemia 10/14/2016    Hypertension     Hypertension    Murmur     Nuclear sclerotic cataract of both eyes 08/07/2019    Polyneuropathy     Pulmonary heart disease 01/25/2023    Renal cyst, left 01/08/2020    Sleep apnea     Trouble in sleeping     Type 2 diabetes mellitus        PAST SURGICAL HISTORY:  Past Surgical History:   Procedure Laterality Date    BACK SURGERY      discectomy    COLONOSCOPY N/A 8/4/2017    Procedure: COLONOSCOPY;  Surgeon: Mario Alberto Vega MD;  Location: [facility] ([address] [place] [address]);  Service: Endoscopy;  Laterality: N/A;  Conflict with [facility]'s schedule no other times available with CRS, Pt requested 8/4/17 - moved to be scoped with GI MD - ER    ROOT CANAL      UMBILICAL HERNIA REPAIR         SOCIAL HISTORY:  Social History     Socioeconomic History    Marital status: Single   Tobacco Use    Smoking status: Former     Current packs/day: 0.00     Average  packs/day: 0.1 packs/day for 4.0 years (0.4 ttl pk-yrs)     Types: Cigarettes     Start date: 1991     Quit date: 1995     Years since quittin.6    Smokeless tobacco: Never    Tobacco comments:     social smoking as teenager   Substance and Sexual Activity    Alcohol use: Yes     Comment: beer 6 pack in a month; red wine 2x a month     Drug use: No    Sexual activity: Yes     Partners: Female   Social History Narrative         Social Drivers of Health     Financial Resource Strain: Low Risk  (2024)    Overall Financial Resource Strain (CARDIA)     Difficulty of Paying Living Expenses: Not hard at all   Food Insecurity: No Food Insecurity (2024)    Hunger Vital Sign     Worried About Running Out of Food in the Last Year: Never true     Ran Out of Food in the Last Year: Never true   Transportation Needs: No Transportation Needs (2024)    PRAPARE - Transportation     Lack of Transportation (Medical): No     Lack of Transportation (Non-Medical): No   Physical Activity: Insufficiently Active (2024)    Exercise Vital Sign     Days of Exercise per Week: 3 days     Minutes of Exercise per Session: 30 min   Stress: No Stress Concern Present (2024)    [employer] [place] [employer] - Occupational Stress Questionnaire     Feeling of Stress : Not at all   Housing Stability: Low Risk  (2024)    Housing Stability Vital Sign     Unable to Pay for Housing in the Last Year: No     Homeless in the Last Year: No       FAMILY HISTORY:  Family History   Problem Relation Name Age of Onset    Cancer Mother          smoker     No Known Problems Father      Emphysema Sister      Diabetes Sister      No Known Problems Sister      No Known Problems Sister      Hypertension Brother liana     Cancer Brother yogesh         lung     No Known Problems Maternal Aunt      No Known Problems Maternal Uncle      No Known Problems Paternal Aunt      No Known Problems  Paternal Uncle      No Known Problems Maternal Grandmother      No Known Problems Maternal Grandfather      No Known Problems Paternal Grandmother      No Known Problems Paternal Grandfather      No Known Problems Other      Amblyopia Neg Hx      Blindness Neg Hx      Cataracts Neg Hx      Glaucoma Neg Hx      Macular degeneration Neg Hx      Retinal detachment Neg Hx      Strabismus Neg Hx      Stroke Neg Hx      Thyroid disease Neg Hx         ALLERGIES AND MEDICATIONS: updated and reviewed.  Review of patient's allergies indicates:  No Known Allergies  Current Outpatient Medications   Medication Sig Dispense Refill    ACCU-CHEK STEPHON PLUS TEST STRP Strp 1 strip by Misc.(Non-Drug; Combo Route) route daily as needed (alternate testing time). 50 strip 11    ACCU-CHEK FASTCLIX Misc 1 lancet by Misc.(Non-Drug; Combo Route) route daily as needed. 50 each 11    acetaminophen (TYLENOL) 650 MG TbSR Take 1 tablet (650 mg total) by mouth every 8 (eight) hours as needed. 30 tablet 0    aspirin 81 MG Chew Take 81 mg by mouth once daily.      atorvastatin (LIPITOR) 40 MG tablet TAKE 1 TABLET BY MOUTH ONCE DAILY IN THE EVENING 90 tablet 2    diclofenac (VOLTAREN) 50 MG EC tablet Take 1 tablet by mouth twice daily as needed 30 tablet 0    glimepiride (AMARYL) 2 MG tablet Take 1 tablet (2 mg total) by mouth before breakfast. 90 tablet 0    hydroCHLOROthiazide (HYDRODIURIL) 25 MG tablet Take 1 tablet (25 mg total) by mouth once daily. 90 tablet 3    losartan (COZAAR) 100 MG tablet Take 1 tablet (100 mg total) by mouth once daily. 90 tablet 3    methocarbamoL (ROBAXIN) 500 MG Tab Take 500 mg by mouth 2 (two) times daily as needed.      benzonatate (TESSALON) 200 MG capsule Take 1 capsule (200 mg total) by mouth 3 (three) times daily as needed for Cough. 30 capsule 0    hydrALAZINE (APRESOLINE) 50 MG tablet Take 1 tablet (50 mg total) by mouth every 12 (twelve) hours. 180 tablet 0    metoprolol succinate (TOPROL-XL) 50 MG 24 hr  "tablet Take 1 tablet (50 mg total) by mouth once daily. 90 tablet 0    tamsulosin (FLOMAX) 0.4 mg Cap Take 1 capsule (0.4 mg total) by mouth once daily. 90 capsule 3    zolpidem (AMBIEN) 10 mg Tab Take 1 tablet (10 mg total) by mouth every evening. 30 tablet 5     No current facility-administered medications for this visit.       ROS  Review of Systems   Constitutional:  Negative for chills and fever.   HENT:  Negative for hearing loss and sore throat.    Eyes:  Negative for visual disturbance.   Respiratory:  Positive for cough. Negative for shortness of breath.    Cardiovascular:  Negative for chest pain, palpitations and leg swelling.   Gastrointestinal:  Negative for abdominal pain, constipation, diarrhea, nausea and vomiting.   Genitourinary:  Negative for dysuria, frequency and urgency.   Musculoskeletal:  Negative for arthralgias, joint swelling and myalgias.   Skin:  Negative for rash and wound.   Neurological:  Negative for headaches.   Psychiatric/Behavioral:  Positive for sleep disturbance. Negative for agitation and confusion. The patient is not nervous/anxious.          OBJECTIVE     Physical Exam  Vitals:    12/19/24 1137   BP: (!) 202/104   Pulse: 64   Temp: 98 °F (36.7 °C)    Body mass index is 43.98 kg/m².  Weight: 131.2 kg (289 lb 3.9 oz)   Height: 5' 8" (172.7 cm)     Physical Exam  Constitutional:       General: He is not in acute distress.     Appearance: He is well-developed.   HENT:      Head: Normocephalic and atraumatic.      Right Ear: External ear normal.      Left Ear: External ear normal.      Nose: Nose normal.   Eyes:      General: No scleral icterus.        Right eye: No discharge.         Left eye: No discharge.      Conjunctiva/sclera: Conjunctivae normal.   Neck:      Vascular: No JVD.      Trachea: No tracheal deviation.   Cardiovascular:      Rate and Rhythm: Normal rate and regular rhythm.      Heart sounds: Normal heart sounds. No murmur heard.     No friction rub. No gallop. "   Pulmonary:      Effort: Pulmonary effort is normal. No respiratory distress.      Breath sounds: Normal breath sounds. No wheezing.   Abdominal:      General: Bowel sounds are normal. There is no distension.      Palpations: Abdomen is soft. There is no mass.      Tenderness: There is no abdominal tenderness. There is no guarding or rebound.   Musculoskeletal:         General: No tenderness or deformity. Normal range of motion.      Cervical back: Normal range of motion and neck supple.   Skin:     General: Skin is warm and dry.      Findings: No erythema or rash.   Neurological:      Mental Status: He is alert and oriented to person, place, and time.      Motor: No abnormal muscle tone.      Coordination: Coordination normal.   Psychiatric:         Behavior: Behavior normal.         Thought Content: Thought content normal.         Judgment: Judgment normal.           Health Maintenance         Date Due Completion Date    RSV Vaccine (Age 60+ and Pregnant patients) (1 - Risk 60-74 years 1-dose series) Never done ---    Influenza Vaccine (1) 09/01/2024 10/15/2019    Override on 10/1/2017: Done    Override on 10/4/2016: Done    Override on 11/13/2015: Done    COVID-19 Vaccine (7 - 2024-25 season) 09/01/2024 1/30/2023    Hemoglobin A1c 12/07/2024 6/7/2024    Pneumococcal Vaccines (Age 65+) (3 of 3 - PCV20 or PCV21) 08/30/2025 (Originally 6/27/2024) 6/27/2019    PROSTATE-SPECIFIC ANTIGEN 06/07/2025 6/7/2024    Diabetes Urine Screening 06/07/2025 6/7/2024    Lipid Panel 06/07/2025 6/7/2024    Eye Exam 08/30/2025 8/30/2024    Override on 11/10/2020: Done    Foot Exam 10/07/2025 10/7/2024    Override on 3/24/2020: Done    High Dose Statin 12/19/2025 12/19/2024    Colorectal Cancer Screening 08/04/2027 8/4/2017    TETANUS VACCINE 06/27/2029 6/27/2019              ASSESSMENT     69 y.o. male with     1. Insomnia, unspecified type    2. Essential hypertension, benign    3. Cardiomyopathy, unspecified type    4. Type 2  diabetes mellitus with diabetic cataract, without long-term current use of insulin    5. Cough, unspecified type        PLAN:     1. Insomnia, unspecified type  - Stable; no acute issues  - The current medical regimen is effective;  continue present plan and medications.  - zolpidem (AMBIEN) 10 mg Tab; Take 1 tablet (10 mg total) by mouth every evening.  Dispense: 30 tablet; Refill: 5    2. Essential hypertension, benign  - BP elevated above goal of <140/90; unclear if patient is fully compliant with meds, but refills given today on meds that have   -  Advised to maintain a low Na diet(<2g/day), exercise, and keep BP log to present to next visit  - metoprolol succinate (TOPROL-XL) 50 MG 24 hr tablet; Take 1 tablet (50 mg total) by mouth once daily.  Dispense: 90 tablet; Refill: 0  - hydrALAZINE (APRESOLINE) 50 MG tablet; Take 1 tablet (50 mg total) by mouth every 12 (twelve) hours.  Dispense: 180 tablet; Refill: 0  - CBC Auto Differential; Future    3. Cardiomyopathy, unspecified type  - Stable; no acute issues  - The current medical regimen is effective;  continue present plan and medications.  - metoprolol succinate (TOPROL-XL) 50 MG 24 hr tablet; Take 1 tablet (50 mg total) by mouth once daily.  Dispense: 90 tablet; Refill: 0    4. Type 2 diabetes mellitus with diabetic cataract, without long-term current use of insulin  - Check labs  - CBC Auto Differential; Future  - Comprehensive Metabolic Panel; Future  - Hemoglobin A1C; Future    5. Cough, unspecified type  - Continue symptomatic treatment with rest, increase fluid intake, tylenol or ibuprofen PRN fever(temp >/= 100.4) or body aches. Okay to take OTC antihistamines, i.e. Bendaryl, Claritin, Allegra, etc. as needed.  - Okay to gargle with warm, salt water or use throat lozenges as needed  - benzonatate (TESSALON) 200 MG capsule; Take 1 capsule (200 mg total) by mouth 3 (three) times daily as needed for Cough.  Dispense: 30 capsule; Refill: 0        RTC  in 1 week for nurse visit blood pressure check     Manjula Honeycutt MD  12/19/2024 11:59 AM        No follow-ups on file.

## 2024-12-26 ENCOUNTER — CLINICAL SUPPORT (OUTPATIENT)
Dept: FAMILY MEDICINE | Facility: CLINIC | Age: 69
End: 2024-12-26
Payer: MEDICARE

## 2024-12-26 VITALS — DIASTOLIC BLOOD PRESSURE: 64 MMHG | OXYGEN SATURATION: 98 % | HEART RATE: 84 BPM | SYSTOLIC BLOOD PRESSURE: 136 MMHG

## 2024-12-26 DIAGNOSIS — I10 ESSENTIAL (PRIMARY) HYPERTENSION: Primary | ICD-10-CM

## 2024-12-26 PROCEDURE — 99999 PR PBB SHADOW E&M-EST. PATIENT-LVL I: CPT | Mod: PBBFAC,,,

## 2024-12-26 NOTE — PROGRESS NOTES
Mirza Do 69 y.o. male is here today for Blood Pressure check.   History of HTN yes.    Review of patient's allergies indicates:  No Known Allergies  Creatinine   Date Value Ref Range Status   12/19/2024 0.8 0.5 - 1.4 mg/dL Final     Sodium   Date Value Ref Range Status   12/19/2024 139 136 - 145 mmol/L Final     Potassium   Date Value Ref Range Status   12/19/2024 4.1 3.5 - 5.1 mmol/L Final   ]      Current Outpatient Medications:     ACCU-CHEK STEPHON PLUS TEST STRP Strp, 1 strip by Misc.(Non-Drug; Combo Route) route daily as needed (alternate testing time)., Disp: 50 strip, Rfl: 11    ACCU-CHEK FASTCLIX Misc, 1 lancet by Misc.(Non-Drug; Combo Route) route daily as needed., Disp: 50 each, Rfl: 11    acetaminophen (TYLENOL) 650 MG TbSR, Take 1 tablet (650 mg total) by mouth every 8 (eight) hours as needed., Disp: 30 tablet, Rfl: 0    aspirin 81 MG Chew, Take 81 mg by mouth once daily., Disp: , Rfl:     atorvastatin (LIPITOR) 40 MG tablet, TAKE 1 TABLET BY MOUTH ONCE DAILY IN THE EVENING, Disp: 90 tablet, Rfl: 2    benzonatate (TESSALON) 200 MG capsule, Take 1 capsule (200 mg total) by mouth 3 (three) times daily as needed for Cough., Disp: 30 capsule, Rfl: 0    diclofenac (VOLTAREN) 50 MG EC tablet, Take 1 tablet by mouth twice daily as needed, Disp: 30 tablet, Rfl: 0    glimepiride (AMARYL) 2 MG tablet, Take 1 tablet (2 mg total) by mouth before breakfast., Disp: 90 tablet, Rfl: 0    hydrALAZINE (APRESOLINE) 50 MG tablet, Take 1 tablet (50 mg total) by mouth every 12 (twelve) hours., Disp: 180 tablet, Rfl: 0    hydroCHLOROthiazide (HYDRODIURIL) 25 MG tablet, Take 1 tablet (25 mg total) by mouth once daily., Disp: 90 tablet, Rfl: 3    losartan (COZAAR) 100 MG tablet, Take 1 tablet (100 mg total) by mouth once daily., Disp: 90 tablet, Rfl: 3    methocarbamoL (ROBAXIN) 500 MG Tab, Take 500 mg by mouth 2 (two) times daily as needed., Disp: , Rfl:     metoprolol succinate (TOPROL-XL) 50 MG 24 hr tablet, Take 1  tablet (50 mg total) by mouth once daily., Disp: 90 tablet, Rfl: 0    tamsulosin (FLOMAX) 0.4 mg Cap, Take 1 capsule (0.4 mg total) by mouth once daily., Disp: 90 capsule, Rfl: 3    zolpidem (AMBIEN) 10 mg Tab, Take 1 tablet (10 mg total) by mouth every evening., Disp: 30 tablet, Rfl: 5  Does patient have record of home blood pressure readings no.   Patient verifies taking blood pressure medications on a regular basis at the same time of the day.   Last dose of blood pressure medication was taken at 0600 this morning.  Patient is asymptomatic.     BP: 136/64 , Pulse: 84 .    Discussion with patient: He has been taking his blood pressure medication regularly. He has been monitoring his sodium intake and has increased his water intake. He is walking approximately 30 minutes for exercise on Mondays, Wednesdays and Fridays in his neighborhood. He has no stress.  He does not sleep well unless he takes his Ambien. He does snore and has tried a CPAP without success. He is not in pain today.  He reports that the [employer] have been building him a home that is elevated 19 feet. He will be getting more exercise with the stairs.  We scheduled another nurse visit in 2 weeks to ensure compliance since he does not have a home blood pressure cuff and is not eligible for the digital program.     Dr. Honeycutt notified.

## 2025-01-09 ENCOUNTER — CLINICAL SUPPORT (OUTPATIENT)
Dept: FAMILY MEDICINE | Facility: CLINIC | Age: 70
End: 2025-01-09
Payer: MEDICARE

## 2025-01-09 VITALS — DIASTOLIC BLOOD PRESSURE: 68 MMHG | HEART RATE: 60 BPM | SYSTOLIC BLOOD PRESSURE: 130 MMHG

## 2025-01-09 DIAGNOSIS — Z01.30 BP CHECK: Primary | ICD-10-CM

## 2025-01-09 NOTE — PROGRESS NOTES
Mirza Do 69 y.o. male is here today for Blood Pressure check.   History of HTN yes.    Review of patient's allergies indicates:  No Known Allergies  Creatinine   Date Value Ref Range Status   12/19/2024 0.8 0.5 - 1.4 mg/dL Final     Sodium   Date Value Ref Range Status   12/19/2024 139 136 - 145 mmol/L Final     Potassium   Date Value Ref Range Status   12/19/2024 4.1 3.5 - 5.1 mmol/L Final   ]  Patient verifies taking blood pressure medications on a regular basis at the same time of the day.     Current Outpatient Medications:     ACCU-CHEK STEPHON PLUS TEST STRP Strp, 1 strip by Misc.(Non-Drug; Combo Route) route daily as needed (alternate testing time)., Disp: 50 strip, Rfl: 11    ACCU-CHEK FASTCLIX Misc, 1 lancet by Misc.(Non-Drug; Combo Route) route daily as needed., Disp: 50 each, Rfl: 11    acetaminophen (TYLENOL) 650 MG TbSR, Take 1 tablet (650 mg total) by mouth every 8 (eight) hours as needed., Disp: 30 tablet, Rfl: 0    aspirin 81 MG Chew, Take 81 mg by mouth once daily., Disp: , Rfl:     atorvastatin (LIPITOR) 40 MG tablet, TAKE 1 TABLET BY MOUTH ONCE DAILY IN THE EVENING, Disp: 90 tablet, Rfl: 2    diclofenac (VOLTAREN) 50 MG EC tablet, Take 1 tablet by mouth twice daily as needed, Disp: 30 tablet, Rfl: 0    glimepiride (AMARYL) 2 MG tablet, Take 1 tablet (2 mg total) by mouth before breakfast., Disp: 90 tablet, Rfl: 0    hydrALAZINE (APRESOLINE) 50 MG tablet, Take 1 tablet (50 mg total) by mouth every 12 (twelve) hours., Disp: 180 tablet, Rfl: 0    hydroCHLOROthiazide (HYDRODIURIL) 25 MG tablet, Take 1 tablet (25 mg total) by mouth once daily., Disp: 90 tablet, Rfl: 3    losartan (COZAAR) 100 MG tablet, Take 1 tablet (100 mg total) by mouth once daily., Disp: 90 tablet, Rfl: 3    methocarbamoL (ROBAXIN) 500 MG Tab, Take 500 mg by mouth 2 (two) times daily as needed., Disp: , Rfl:     metoprolol succinate (TOPROL-XL) 50 MG 24 hr tablet, Take 1 tablet (50 mg total) by mouth once daily., Disp: 90  tablet, Rfl: 0    tamsulosin (FLOMAX) 0.4 mg Cap, Take 1 capsule (0.4 mg total) by mouth once daily., Disp: 90 capsule, Rfl: 3    zolpidem (AMBIEN) 10 mg Tab, Take 1 tablet (10 mg total) by mouth every evening., Disp: 30 tablet, Rfl: 5    Patient is asymptomatic.       BP: 130/68 , Pulse: 60 .

## 2025-04-19 ENCOUNTER — HOSPITAL ENCOUNTER (EMERGENCY)
Facility: HOSPITAL | Age: 70
Discharge: HOME | End: 2025-04-19
Attending: EMERGENCY MEDICINE
Payer: MEDICARE

## 2025-04-19 VITALS
HEIGHT: 68 IN | BODY MASS INDEX: 43.19 KG/M2 | WEIGHT: 285 LBS | TEMPERATURE: 98 F | DIASTOLIC BLOOD PRESSURE: 98 MMHG | HEART RATE: 65 BPM | RESPIRATION RATE: 16 BRPM | SYSTOLIC BLOOD PRESSURE: 211 MMHG | OXYGEN SATURATION: 97 %

## 2025-04-19 DIAGNOSIS — R06.02 SOB (SHORTNESS OF BREATH): ICD-10-CM

## 2025-04-19 DIAGNOSIS — R06.02 SHORTNESS OF BREATH: ICD-10-CM

## 2025-04-19 DIAGNOSIS — I16.0 HYPERTENSIVE URGENCY: Primary | ICD-10-CM

## 2025-04-19 LAB
ABSOLUTE EOSINOPHIL (OHS): 0.07 K/UL
ABSOLUTE MONOCYTE (OHS): 0.48 K/UL (ref 0.3–1)
ABSOLUTE NEUTROPHIL COUNT (OHS): 5.39 K/UL (ref 1.8–7.7)
ALBUMIN SERPL BCP-MCNC: 3.6 G/DL (ref 3.5–5.2)
ALP SERPL-CCNC: 90 UNIT/L (ref 40–150)
ALT SERPL W/O P-5'-P-CCNC: 122 UNIT/L (ref 10–44)
ANION GAP (OHS): 11 MMOL/L (ref 8–16)
AST SERPL-CCNC: 63 UNIT/L (ref 11–45)
BASOPHILS # BLD AUTO: 0.02 K/UL
BASOPHILS NFR BLD AUTO: 0.2 %
BILIRUB SERPL-MCNC: 0.6 MG/DL (ref 0.1–1)
BNP SERPL-MCNC: 153 PG/ML (ref 0–99)
BUN SERPL-MCNC: 9 MG/DL (ref 8–23)
CALCIUM SERPL-MCNC: 9.2 MG/DL (ref 8.7–10.5)
CHLORIDE SERPL-SCNC: 107 MMOL/L (ref 95–110)
CO2 SERPL-SCNC: 25 MMOL/L (ref 23–29)
CREAT SERPL-MCNC: 0.9 MG/DL (ref 0.5–1.4)
ERYTHROCYTE [DISTWIDTH] IN BLOOD BY AUTOMATED COUNT: 15.1 % (ref 11.5–14.5)
GFR SERPLBLD CREATININE-BSD FMLA CKD-EPI: >60 ML/MIN/1.73/M2
GLUCOSE SERPL-MCNC: 170 MG/DL (ref 70–110)
HCT VFR BLD AUTO: 44.6 % (ref 40–54)
HGB BLD-MCNC: 13.7 GM/DL (ref 14–18)
IMM GRANULOCYTES # BLD AUTO: 0.09 K/UL (ref 0–0.04)
IMM GRANULOCYTES NFR BLD AUTO: 1.1 % (ref 0–0.5)
LYMPHOCYTES # BLD AUTO: 2.15 K/UL (ref 1–4.8)
MCH RBC QN AUTO: 27 PG (ref 27–31)
MCHC RBC AUTO-ENTMCNC: 30.7 G/DL (ref 32–36)
MCV RBC AUTO: 88 FL (ref 82–98)
NUCLEATED RBC (/100WBC) (OHS): 0 /100 WBC
PLATELET # BLD AUTO: 239 K/UL (ref 150–450)
PMV BLD AUTO: 10.9 FL (ref 9.2–12.9)
POTASSIUM SERPL-SCNC: 4.1 MMOL/L (ref 3.5–5.1)
PROT SERPL-MCNC: 8.2 GM/DL (ref 6–8.4)
RBC # BLD AUTO: 5.08 M/UL (ref 4.6–6.2)
RELATIVE EOSINOPHIL (OHS): 0.9 %
RELATIVE LYMPHOCYTE (OHS): 26.2 % (ref 18–48)
RELATIVE MONOCYTE (OHS): 5.9 % (ref 4–15)
RELATIVE NEUTROPHIL (OHS): 65.7 % (ref 38–73)
SODIUM SERPL-SCNC: 143 MMOL/L (ref 136–145)
TROPONIN I SERPL DL<=0.01 NG/ML-MCNC: 0.01 NG/ML
WBC # BLD AUTO: 8.2 K/UL (ref 3.9–12.7)

## 2025-04-19 PROCEDURE — 82040 ASSAY OF SERUM ALBUMIN: CPT | Performed by: EMERGENCY MEDICINE

## 2025-04-19 PROCEDURE — 96375 TX/PRO/DX INJ NEW DRUG ADDON: CPT

## 2025-04-19 PROCEDURE — 99285 EMERGENCY DEPT VISIT HI MDM: CPT | Mod: 25

## 2025-04-19 PROCEDURE — 96374 THER/PROPH/DIAG INJ IV PUSH: CPT

## 2025-04-19 PROCEDURE — 93005 ELECTROCARDIOGRAM TRACING: CPT

## 2025-04-19 PROCEDURE — 85025 COMPLETE CBC W/AUTO DIFF WBC: CPT | Performed by: EMERGENCY MEDICINE

## 2025-04-19 PROCEDURE — 84484 ASSAY OF TROPONIN QUANT: CPT | Performed by: EMERGENCY MEDICINE

## 2025-04-19 PROCEDURE — 25000003 PHARM REV CODE 250: Performed by: EMERGENCY MEDICINE

## 2025-04-19 PROCEDURE — 63600175 PHARM REV CODE 636 W HCPCS: Performed by: EMERGENCY MEDICINE

## 2025-04-19 PROCEDURE — 93010 ELECTROCARDIOGRAM REPORT: CPT | Mod: 76,,, | Performed by: INTERNAL MEDICINE

## 2025-04-19 PROCEDURE — 83880 ASSAY OF NATRIURETIC PEPTIDE: CPT | Performed by: EMERGENCY MEDICINE

## 2025-04-19 RX ORDER — AMLODIPINE BESYLATE 5 MG/1
10 TABLET ORAL DAILY
Qty: 30 TABLET | Refills: 0 | Status: SHIPPED | OUTPATIENT
Start: 2025-04-19 | End: 2025-04-19

## 2025-04-19 RX ORDER — FUROSEMIDE 10 MG/ML
60 INJECTION INTRAMUSCULAR; INTRAVENOUS
Status: COMPLETED | OUTPATIENT
Start: 2025-04-19 | End: 2025-04-19

## 2025-04-19 RX ORDER — AMLODIPINE BESYLATE 5 MG/1
5 TABLET ORAL
Status: COMPLETED | OUTPATIENT
Start: 2025-04-19 | End: 2025-04-19

## 2025-04-19 RX ORDER — AMLODIPINE BESYLATE 5 MG/1
5 TABLET ORAL DAILY
Qty: 30 TABLET | Refills: 0 | Status: SHIPPED | OUTPATIENT
Start: 2025-04-19 | End: 2025-05-19

## 2025-04-19 RX ORDER — HYDRALAZINE HYDROCHLORIDE 20 MG/ML
10 INJECTION INTRAMUSCULAR; INTRAVENOUS
Status: COMPLETED | OUTPATIENT
Start: 2025-04-19 | End: 2025-04-19

## 2025-04-19 RX ADMIN — AMLODIPINE BESYLATE 5 MG: 5 TABLET ORAL at 08:04

## 2025-04-19 RX ADMIN — FUROSEMIDE 60 MG: 10 INJECTION, SOLUTION INTRAVENOUS at 07:04

## 2025-04-19 RX ADMIN — HYDRALAZINE HYDROCHLORIDE 10 MG: 20 INJECTION INTRAMUSCULAR; INTRAVENOUS at 07:04

## 2025-04-19 NOTE — ED PROVIDER NOTES
Encounter Date: 4/19/2025    SCRIBE #1 NOTE: I, Maria Victoria Viveros, am scribing for, and in the presence of,  Paul Thompson MD. I have scribed the following portions of the note - Other sections scribed: HPI, ROS, PE.       History     Chief Complaint   Patient presents with    Shortness of Breath     Pt to ER with reports of increased SOB with bilateral leg swelling worsening over the past few days. No c/o cough, congestion. No CP or weakness      69-year-old male, with a PMHx of HTN, HLD, T2DM, COPD, YUNIEL, SVT, and CVA, presents to the ED for evaluation of increased SOB that began a few days ago. Patient reports associated productive cough with clear phlegm. He reports he noticed BLE swelling and bilateral hand swelling this morning. He reports taking antihypertensives. No other exacerbating or alleviating factors. Denies taking diabetes medications or statins. Denies history of liver or kidney insufficiency. Denies history of CHF or DM. Denies congestion, chest pain, weakness, or other associated symptoms.     The history is provided by the patient. No  was used.     Review of patient's allergies indicates:  No Known Allergies  Past Medical History:   Diagnosis Date    Benign essential hypertension 08/30/2024    COPD with acute exacerbation 06/28/2022    CVA (cerebral infarction)     CVA approx 2011/2012    Diabetes mellitus 08/30/2024    Diabetes mellitus type II, non insulin dependent 10/14/2016    Erectile dysfunction     Erectile Dysfunction    Essential hypertension, benign     Hypertension      Eye injury     hit os playing basketball     Hyperlipidemia 10/14/2016    Hypertension     Hypertension    Murmur     Nuclear sclerotic cataract of both eyes 08/07/2019    Polyneuropathy     Pulmonary heart disease 01/25/2023    Renal cyst, left 01/08/2020    Sleep apnea     Trouble in sleeping     Type 2 diabetes mellitus      Past Surgical History:   Procedure Laterality Date    BACK SURGERY       discectomy    COLONOSCOPY N/A 8/4/2017    Procedure: COLONOSCOPY;  Surgeon: Mario Alberto Vega MD;  Location: [facility] ([address] [place] [address]);  Service: Endoscopy;  Laterality: N/A;  Conflict with Jack's schedule no other times available with CRS, Pt requested 8/4/17 - moved to be scoped with GI MD - ER    ROOT CANAL      UMBILICAL HERNIA REPAIR       Family History   Problem Relation Name Age of Onset    Cancer Mother          smoker     No Known Problems Father      Emphysema Sister      Diabetes Sister      No Known Problems Sister      No Known Problems Sister      Hypertension Brother liana     Cancer Brother yogesh         lung     No Known Problems Maternal Aunt      No Known Problems Maternal Uncle      No Known Problems Paternal Aunt      No Known Problems Paternal Uncle      No Known Problems Maternal Grandmother      No Known Problems Maternal Grandfather      No Known Problems Paternal Grandmother      No Known Problems Paternal Grandfather      No Known Problems Other      Amblyopia Neg Hx      Blindness Neg Hx      Cataracts Neg Hx      Glaucoma Neg Hx      Macular degeneration Neg Hx      Retinal detachment Neg Hx      Strabismus Neg Hx      Stroke Neg Hx      Thyroid disease Neg Hx       Social History[1]  Review of Systems   Constitutional: Negative.    HENT: Negative.  Negative for congestion.    Eyes: Negative.    Respiratory:  Positive for cough and shortness of breath.    Cardiovascular:  Positive for leg swelling (bilateral). Negative for chest pain.        (+) bilateral hand swelling   Gastrointestinal: Negative.    Endocrine: Negative.    Genitourinary: Negative.    Musculoskeletal: Negative.    Skin: Negative.    Allergic/Immunologic: Negative.    Neurological: Negative.  Negative for weakness.   Hematological: Negative.    Psychiatric/Behavioral: Negative.         Physical Exam     Initial Vitals [04/19/25 1536]   BP Pulse Resp Temp SpO2   (!) 208/85 74 18 97.5 °F (36.4 °C) 96 %      MAP       --          Physical Exam    Constitutional: Vital signs are normal. He appears well-developed. He is active and cooperative.   HENT:   Head: Normocephalic and atraumatic.   Eyes: Conjunctivae, EOM and lids are normal. Pupils are equal, round, and reactive to light.   Neck: Trachea normal. Neck supple. No thyroid mass present.    Full passive range of motion without pain.     Cardiovascular:  Normal rate, regular rhythm, S1 normal, S2 normal, normal heart sounds, intact distal pulses and normal pulses.           Pulmonary/Chest: Effort normal and breath sounds normal.   Abdominal: Abdomen is soft. Bowel sounds are normal.   Musculoskeletal:         General: Normal range of motion.      Cervical back: Full passive range of motion without pain and neck supple.      Comments: Pitting edema to BLE. Non-pitting edema to bilateral hands.      Lymphadenopathy:     He has no axillary adenopathy.   Neurological: He is alert and oriented to person, place, and time.   Skin: Skin is warm, dry and intact.   Psychiatric: He has a normal mood and affect. His speech is normal and behavior is normal. Judgment and thought content normal. Cognition and memory are normal.         ED Course   Procedures  Labs Reviewed   COMPREHENSIVE METABOLIC PANEL - Abnormal       Result Value    Sodium 143      Potassium 4.1      Chloride 107      CO2 25      Glucose 170 (*)     BUN 9      Creatinine 0.9      Calcium 9.2      Protein Total 8.2      Albumin 3.6      Bilirubin Total 0.6      ALP 90      AST 63 (*)      (*)     Anion Gap 11      eGFR >60     B-TYPE NATRIURETIC PEPTIDE - Abnormal     (*)    CBC WITH DIFFERENTIAL - Abnormal    WBC 8.20      RBC 5.08      HGB 13.7 (*)     HCT 44.6      MCV 88      MCH 27.0      MCHC 30.7 (*)     RDW 15.1 (*)     Platelet Count 239      MPV 10.9      Nucleated RBC 0      Neut % 65.7      Lymph % 26.2      Mono % 5.9      Eos % 0.9      Basophil % 0.2      Imm Grans % 1.1 (*)     Neut # 5.39      Lymph  # 2.15      Mono # 0.48      Eos # 0.07      Baso # 0.02      Imm Grans # 0.09 (*)    TROPONIN I - Normal    Troponin-I 0.012     CBC W/ AUTO DIFFERENTIAL    Narrative:     The following orders were created for panel order CBC auto differential.  Procedure                               Abnormality         Status                     ---------                               -----------         ------                     CBC with Differential[[id number]]       Abnormal            Final result                 Please view results for these tests on the individual orders.     EKG Readings: (Independently Interpreted)   Rhythm: Normal Sinus Rhythm. Heart Rate: 72. Ectopy: No Ectopy. Conduction: Normal. ST Segments: Non-Specific ST Segment Depression. Axis: Normal. Q Waves: V2. Clinical Impression: Normal Sinus Rhythm       Imaging Results              X-Ray Chest AP Portable (Final result)  Result time 04/19/25 17:46:22      Final result by Ami Chery MD (04/19/25 17:46:22)                   Impression:      Elevation of the right hemidiaphragm, otherwise no acute cardiopulmonary process identified.      Electronically signed by: Ami Chery MD  Date:    04/19/2025  Time:    17:46               Narrative:    EXAMINATION:  XR CHEST AP PORTABLE    CLINICAL HISTORY:  CHF;    TECHNIQUE:  Single frontal view of the chest was performed.    COMPARISON:  January 2023.    FINDINGS:  Cardiac silhouette is stable in size.  The left lung is well expanded.  There is prominent elevation of the right hemidiaphragm resulting in asymmetric volume loss on the right.  No evidence of focal consolidative process, pneumothorax, or significant pleural effusion.  No acute osseous abnormality identified.                                       Medications   amLODIPine tablet 5 mg (has no administration in time range)   furosemide injection 60 mg (60 mg Intravenous Given 4/19/25 1940)   hydrALAZINE injection 10 mg (10 mg Intravenous Given  4/19/25 1941)     Medical Decision Making  This patient presented to the emergency department short of breath with some mild findings of presents emergency.  Patient's laboratory data other than slightly elevated troponin was unremarkable.  I provided the patient with some Lasix has he did have a significant amount of peripheral edema.  Patient is now status post hydralazine 10 mg IV with progressive improvement in the patient's blood pressure.  Feel the patient warrants addition of a diuretic to his daily medical regimen and continuation of his antihypertensive medication.  Patient does not appear to have any end-organ injury.  Subsequently it is recommended the patient follow up with cardiologist or primary health care provider for monitoring of his blood pressure.    Amount and/or Complexity of Data Reviewed  Labs: ordered. Decision-making details documented in ED Course.  Radiology: ordered. Decision-making details documented in ED Course.  ECG/medicine tests: ordered and independent interpretation performed. Decision-making details documented in ED Course.    Risk  Prescription drug management.            Scribe Attestation:   Scribe #1: I performed the above scribed service and the documentation accurately describes the services I performed. I attest to the accuracy of the note.        ED Course as of 04/19/25 2043   Sat Apr 19, 2025 2017 Slight elevation in BNP consistent with the patient's presenting symptoms. [MI]   2017 Troponins negative [MI]   2017 CBC is unremarkable and inconsequential [MI]   2018 Read by radiology and reviewed by me patient has slight elevation of hemidiaphragm otherwise unremarkable. [MI]      ED Course User Index  [MI] Paul Thompson MD                         This document was produced by a scribe under my direction and in my presence. I agree with the content of the note and have made any necessary edits.     Paul Thompson MD    04/19/2025 8:41 PM      Clinical  Impression:  Final diagnoses:  [R06.02] SOB (shortness of breath)  [R06.02] Shortness of breath  [I16.0] Hypertensive urgency (Primary)          ED Disposition Condition    Discharge Good          ED Prescriptions       Medication Sig Dispense Start Date End Date Auth. Provider    amLODIPine (NORVASC) 5 MG tablet  (Status: Discontinued) Take 2 tablets (10 mg total) by mouth once daily. 30 tablet 2025 Paul Thompson MD    amLODIPine (NORVASC) 5 MG tablet Take 1 tablet (5 mg total) by mouth once daily. 30 tablet 2025 Paul Thompson MD          Follow-up Information       Follow up With Specialties Details Why Contact Info    Manjula Honeycutt MD Internal Medicine, Wound Care Schedule an appointment as soon as possible for a visit in 1 week  [address] [place] [address]  [address]  [place] [address]  [phone]                 Paul Thompson MD  25         [1]   Social History  Tobacco Use    Smoking status: Former     Current packs/day: 0.00     Average packs/day: 0.1 packs/day for 4.0 years (0.4 ttl pk-yrs)     Types: Cigarettes     Start date: 1991     Quit date: 1995     Years since quittin.0    Smokeless tobacco: Never    Tobacco comments:     social smoking as teenager   Substance Use Topics    Alcohol use: Yes     Comment: beer 6 pack in a month; red wine 2x a month     Drug use: No        Paul Thompson MD  25

## 2025-04-20 NOTE — ED NOTES
Pts BP is elevated, MD aware. Pt denies chest pain, SOB, blurred vision or any symptoms at this time. Pt sent home with BP prescription.

## 2025-04-20 NOTE — DISCHARGE INSTRUCTIONS
Monitor your blood pressure morning noon and night and keep a diary/log of this follow up with the doctor within 1-2 weeks to see if the medications that I provided you improved her blood pressure.  He may need to make some other adjustments.

## 2025-04-21 LAB
OHS QRS DURATION: 92 MS
OHS QRS DURATION: 94 MS
OHS QTC CALCULATION: 418 MS
OHS QTC CALCULATION: 436 MS

## 2025-05-16 ENCOUNTER — OFFICE VISIT (OUTPATIENT)
Dept: FAMILY MEDICINE | Facility: CLINIC | Age: 70
End: 2025-05-16
Payer: MEDICARE

## 2025-05-16 VITALS
HEIGHT: 68 IN | BODY MASS INDEX: 43.01 KG/M2 | TEMPERATURE: 98 F | SYSTOLIC BLOOD PRESSURE: 122 MMHG | WEIGHT: 283.75 LBS | HEART RATE: 54 BPM | RESPIRATION RATE: 16 BRPM | OXYGEN SATURATION: 97 % | DIASTOLIC BLOOD PRESSURE: 78 MMHG

## 2025-05-16 DIAGNOSIS — R79.9 ABNORMAL FINDING OF BLOOD CHEMISTRY, UNSPECIFIED: ICD-10-CM

## 2025-05-16 DIAGNOSIS — I27.9 PULMONARY HEART DISEASE: ICD-10-CM

## 2025-05-16 DIAGNOSIS — Z12.5 ENCOUNTER FOR SCREENING PROSTATE SPECIFIC ANTIGEN (PSA) MEASUREMENT: ICD-10-CM

## 2025-05-16 DIAGNOSIS — M54.50 ACUTE LEFT-SIDED LOW BACK PAIN WITHOUT SCIATICA: Primary | ICD-10-CM

## 2025-05-16 DIAGNOSIS — J43.9 PULMONARY EMPHYSEMA, UNSPECIFIED EMPHYSEMA TYPE: ICD-10-CM

## 2025-05-16 DIAGNOSIS — E11.36 TYPE 2 DIABETES MELLITUS WITH DIABETIC CATARACT, WITHOUT LONG-TERM CURRENT USE OF INSULIN: ICD-10-CM

## 2025-05-16 DIAGNOSIS — G47.00 INSOMNIA, UNSPECIFIED TYPE: ICD-10-CM

## 2025-05-16 DIAGNOSIS — D17.20 LIPOMA OF LOWER LEG: ICD-10-CM

## 2025-05-16 PROCEDURE — 99999 PR PBB SHADOW E&M-EST. PATIENT-LVL IV: CPT | Mod: PBBFAC,,, | Performed by: INTERNAL MEDICINE

## 2025-05-16 RX ORDER — ZOLPIDEM TARTRATE 10 MG/1
10 TABLET ORAL NIGHTLY PRN
Qty: 30 TABLET | Refills: 5 | Status: SHIPPED | OUTPATIENT
Start: 2025-06-19 | End: 2025-12-18

## 2025-05-16 NOTE — PROGRESS NOTES
SUBJECTIVE     Chief Complaint   Patient presents with    Leg Pain     Left leg pain states he has a knot on his leg.     Low-back Pain     Low back pain comes and goes.        JHON Do is a 69 y.o. male with multiple medical diagnoses as listed in the medical history and problem list that presents for evaluation of L low back pain since yesterday. Pt has a hx of surgery to the spine 20 years ago at L4-L5. Pain is an intermittent ache at an 8/10 without radiation. Pain improves with rest and applying heat. Pain is worse with prolonged sitting. He denies any preceding trauma, falls, or heavy lifting. Pain started after he purchased a new mattress. Otherwise, pt does report a knot in the L lower leg.     PAST MEDICAL HISTORY:  Past Medical History:   Diagnosis Date    Benign essential hypertension 08/30/2024    COPD with acute exacerbation 06/28/2022    CVA (cerebral infarction)     CVA approx 2011/2012    Diabetes mellitus 08/30/2024    Diabetes mellitus type II, non insulin dependent 10/14/2016    Erectile dysfunction     Erectile Dysfunction    Essential hypertension, benign     Hypertension      Eye injury     hit os playing basketball     Hyperlipidemia 10/14/2016    Hypertension     Hypertension    Murmur     Nuclear sclerotic cataract of both eyes 08/07/2019    Polyneuropathy     Pulmonary heart disease 01/25/2023    Renal cyst, left 01/08/2020    Sleep apnea     Trouble in sleeping     Type 2 diabetes mellitus        PAST SURGICAL HISTORY:  Past Surgical History:   Procedure Laterality Date    BACK SURGERY      discectomy    COLONOSCOPY N/A 8/4/2017    Procedure: COLONOSCOPY;  Surgeon: Mario Alberto Vega MD;  Location: [address]);  Service: Endoscopy;  Laterality: N/A;  Conflict with [facility]'s schedule no other times available with CRS, Pt requested 8/4/17 - moved to be scoped with GI MD - ER    ROOT CANAL      UMBILICAL HERNIA REPAIR         SOCIAL HISTORY:  Social History[1]    FAMILY  HISTORY:  Family History   Problem Relation Name Age of Onset    Cancer Mother          smoker     No Known Problems Father      Emphysema Sister      Diabetes Sister      No Known Problems Sister      No Known Problems Sister      Hypertension Brother liana     Cancer Brother yogesh         lung     No Known Problems Maternal Aunt      No Known Problems Maternal Uncle      No Known Problems Paternal Aunt      No Known Problems Paternal Uncle      No Known Problems Maternal Grandmother      No Known Problems Maternal Grandfather      No Known Problems Paternal Grandmother      No Known Problems Paternal Grandfather      No Known Problems Other      Amblyopia Neg Hx      Blindness Neg Hx      Cataracts Neg Hx      Glaucoma Neg Hx      Macular degeneration Neg Hx      Retinal detachment Neg Hx      Strabismus Neg Hx      Stroke Neg Hx      Thyroid disease Neg Hx         ALLERGIES AND MEDICATIONS: updated and reviewed.  Review of patient's allergies indicates:  No Known Allergies  Current Medications[2]    ROS  Review of Systems   Constitutional:  Negative for chills and fever.   HENT:  Negative for hearing loss and sore throat.    Eyes:  Negative for visual disturbance.   Respiratory:  Negative for cough and shortness of breath.    Cardiovascular:  Negative for chest pain, palpitations and leg swelling.   Gastrointestinal:  Negative for abdominal pain, constipation, diarrhea, nausea and vomiting.   Genitourinary:  Negative for dysuria, frequency and urgency.   Musculoskeletal:  Positive for back pain (left lower). Negative for arthralgias, joint swelling and myalgias.   Skin:  Negative for rash and wound.   Neurological:  Negative for headaches.   Psychiatric/Behavioral:  Negative for agitation and confusion. The patient is not nervous/anxious.          OBJECTIVE     Physical Exam  Vitals:    05/16/25 1516   BP: 122/78   Pulse: (!) 54   Resp: 16   Temp: 98 °F (36.7 °C)    Body mass index is 43.14 kg/m².  Weight:  "128.7 kg (283 lb 11.7 oz)   Height: 5' 8" (172.7 cm)     Physical Exam  Constitutional:       General: He is not in acute distress.     Appearance: He is well-developed.   HENT:      Head: Normocephalic and atraumatic.      Right Ear: External ear normal.      Left Ear: External ear normal.      Nose: Nose normal.   Eyes:      General: No scleral icterus.        Right eye: No discharge.         Left eye: No discharge.      Conjunctiva/sclera: Conjunctivae normal.   Neck:      Vascular: No JVD.      Trachea: No tracheal deviation.   Cardiovascular:      Rate and Rhythm: Normal rate and regular rhythm.      Heart sounds: Normal heart sounds. No murmur heard.     No friction rub. No gallop.   Pulmonary:      Effort: Pulmonary effort is normal. No respiratory distress.      Breath sounds: Normal breath sounds. No wheezing.   Abdominal:      General: Bowel sounds are normal. There is no distension.      Palpations: Abdomen is soft. There is no mass.      Tenderness: There is no abdominal tenderness. There is no guarding or rebound.   Musculoskeletal:         General: No tenderness or deformity. Normal range of motion.      Cervical back: Normal range of motion and neck supple.      Lumbar back: Negative left straight leg raise test.   Skin:     General: Skin is warm and dry.      Findings: No erythema or rash.   Neurological:      Mental Status: He is alert and oriented to person, place, and time.      Motor: No abnormal muscle tone.      Coordination: Coordination normal.   Psychiatric:         Behavior: Behavior normal.         Thought Content: Thought content normal.         Judgment: Judgment normal.           Health Maintenance         Date Due Completion Date    RSV Vaccine (Age 60+ and Pregnant patients) (1 - Risk 60-74 years 1-dose series) Never done ---    COVID-19 Vaccine (7 - 2024-25 season) 09/01/2024 1/30/2023    Diabetes Urine Screening 06/07/2025 6/7/2024    Lipid Panel 06/07/2025 6/7/2024    Pneumococcal " Vaccines (Age 50+) (3 of 3 - PCV20 or PCV21) 08/30/2025 (Originally 6/27/2024) 6/27/2019    PROSTATE-SPECIFIC ANTIGEN 06/07/2025 6/7/2024    Hemoglobin A1c 06/19/2025 12/19/2024    Diabetic Eye Exam 08/30/2025 8/30/2024    Override on 11/10/2020: Done    Influenza Vaccine (Season Ended) 09/01/2025 10/15/2019    Override on 10/1/2017: Done    Override on 10/4/2016: Done    Override on 11/13/2015: Done    Foot Exam 10/07/2025 10/7/2024    Override on 3/24/2020: Done    High Dose Statin 05/16/2026 5/16/2025    Colorectal Cancer Screening 08/04/2027 8/4/2017    TETANUS VACCINE 06/27/2029 6/27/2019              ASSESSMENT     69 y.o. male with     1. Acute left-sided low back pain without sciatica    2. Lipoma of lower leg    3. Insomnia, unspecified type    4. Type 2 diabetes mellitus with diabetic cataract, without long-term current use of insulin    5. Pulmonary heart disease    6. Pulmonary emphysema, unspecified emphysema type    7. Encounter for screening prostate specific antigen (PSA) measurement    8. Abnormal finding of blood chemistry, unspecified        PLAN:     1. Acute left-sided low back pain without sciatica  - Pt encouraged to apply ice packs 2-3 times daily at 10 minute intervals x 72 hours, then okay to change to heating compress with care not to burn his self; he  voiced understanding   - Pt to look into a new mattress    2. Lipoma of lower leg  - Stable; no acute issues    3. Insomnia, unspecified type  - Stable; no acute issues  - The current medical regimen is effective;  continue present plan and medications.  - zolpidem (AMBIEN) 10 mg Tab; Take 1 tablet (10 mg total) by mouth nightly as needed (insomnia).  Dispense: 30 tablet; Refill: 5    4. Type 2 diabetes mellitus with diabetic cataract, without long-term current use of insulin  - CBC Auto Differential; Future  - Comprehensive Metabolic Panel; Future  - Hemoglobin A1C; Future  - TSH; Future  - Lipid Panel; Future  - Microalbumin/Creatinine  Ratio, Urine; Future    5. Pulmonary heart disease  - Stable; no acute issues    6. Pulmonary emphysema, unspecified emphysema type  - Stable; no acute issues    7. Encounter for screening prostate specific antigen (PSA) measurement  - PSA, Screening; Future    8. Abnormal finding of blood chemistry, unspecified  - CBC Auto Differential; Future        RTC in 6 months     Manjula Honeycutt MD  2025 3:37 PM        No follow-ups on file.                     [1]   Social History  Socioeconomic History    Marital status: Single   Tobacco Use    Smoking status: Former     Current packs/day: 0.00     Average packs/day: 0.1 packs/day for 4.0 years (0.4 ttl pk-yrs)     Types: Cigarettes     Start date: 1991     Quit date: 1995     Years since quittin.1    Smokeless tobacco: Never    Tobacco comments:     social smoking as teenager   Substance and Sexual Activity    Alcohol use: Yes     Comment: beer 6 pack in a month; red wine 2x a month     Drug use: No    Sexual activity: Yes     Partners: Female   Social History Narrative         Social Drivers of Health     Financial Resource Strain: Low Risk  (2024)    Overall Financial Resource Strain (CARDIA)     Difficulty of Paying Living Expenses: Not hard at all   Food Insecurity: No Food Insecurity (2024)    Hunger Vital Sign     Worried About Running Out of Food in the Last Year: Never true     Ran Out of Food in the Last Year: Never true   Transportation Needs: No Transportation Needs (2024)    PRAPARE - Transportation     Lack of Transportation (Medical): No     Lack of Transportation (Non-Medical): No   Physical Activity: Insufficiently Active (2024)    Exercise Vital Sign     Days of Exercise per Week: 3 days     Minutes of Exercise per Session: 30 min   Stress: No Stress Concern Present (2024)    [employer] - Occupational Stress Questionnaire     Feeling of Stress : Not at all    Housing Stability: Unknown (8/30/2024)    Housing Stability Vital Sign     Unable to Pay for Housing in the Last Year: No     Homeless in the Last Year: No   [2]   Current Outpatient Medications   Medication Sig Dispense Refill    ACCU-CHEK STEPHON PLUS TEST STRP Strp 1 strip by Misc.(Non-Drug; Combo Route) route daily as needed (alternate testing time). 50 strip 11    ACCU-CHEK FASTCLIX Misc 1 lancet by Misc.(Non-Drug; Combo Route) route daily as needed. 50 each 11    acetaminophen (TYLENOL) 650 MG TbSR Take 1 tablet (650 mg total) by mouth every 8 (eight) hours as needed. 30 tablet 0    amLODIPine (NORVASC) 5 MG tablet Take 1 tablet (5 mg total) by mouth once daily. 30 tablet 0    aspirin 81 MG Chew Take 81 mg by mouth once daily.      atorvastatin (LIPITOR) 40 MG tablet TAKE 1 TABLET BY MOUTH ONCE DAILY IN THE EVENING 90 tablet 2    diclofenac (VOLTAREN) 50 MG EC tablet Take 1 tablet by mouth twice daily as needed 30 tablet 0    glimepiride (AMARYL) 2 MG tablet Take 1 tablet (2 mg total) by mouth before breakfast. 90 tablet 0    hydrALAZINE (APRESOLINE) 50 MG tablet Take 1 tablet (50 mg total) by mouth every 12 (twelve) hours. 180 tablet 0    hydroCHLOROthiazide (HYDRODIURIL) 25 MG tablet Take 1 tablet (25 mg total) by mouth once daily. 90 tablet 3    losartan (COZAAR) 100 MG tablet Take 1 tablet (100 mg total) by mouth once daily. 90 tablet 3    methocarbamoL (ROBAXIN) 500 MG Tab Take 500 mg by mouth 2 (two) times daily as needed.      metoprolol succinate (TOPROL-XL) 50 MG 24 hr tablet Take 1 tablet (50 mg total) by mouth once daily. 90 tablet 0    zolpidem (AMBIEN) 10 mg Tab Take 1 tablet (10 mg total) by mouth every evening. 30 tablet 5    tamsulosin (FLOMAX) 0.4 mg Cap Take 1 capsule (0.4 mg total) by mouth once daily. 90 capsule 3    [START ON 6/19/2025] zolpidem (AMBIEN) 10 mg Tab Take 1 tablet (10 mg total) by mouth nightly as needed (insomnia). 30 tablet 5     No current facility-administered medications  for this visit.

## 2025-05-27 ENCOUNTER — HOSPITAL ENCOUNTER (EMERGENCY)
Facility: HOSPITAL | Age: 70
Discharge: HOME | End: 2025-05-27
Attending: EMERGENCY MEDICINE
Payer: MEDICARE

## 2025-05-27 VITALS
BODY MASS INDEX: 42.89 KG/M2 | HEART RATE: 77 BPM | RESPIRATION RATE: 18 BRPM | WEIGHT: 283 LBS | TEMPERATURE: 98 F | OXYGEN SATURATION: 98 % | DIASTOLIC BLOOD PRESSURE: 71 MMHG | SYSTOLIC BLOOD PRESSURE: 170 MMHG | HEIGHT: 68 IN
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
## 2025-05-27 LAB — POCT GLUCOSE: 220 MG/DL (ref 70–110)

## 2025-05-27 PROCEDURE — 99282 EMERGENCY DEPT VISIT SF MDM: CPT

## 2025-05-27 PROCEDURE — 25000003 PHARM REV CODE 250: Performed by: NURSE PRACTITIONER

## 2025-05-27 PROCEDURE — 82962 GLUCOSE BLOOD TEST: CPT

## 2025-05-27 RX ORDER — ELECTROLYTES/DEXTROSE
SOLUTION, ORAL ORAL
Status: COMPLETED | OUTPATIENT
Start: 2025-05-27 | End: 2025-05-27

## 2025-05-27 RX ADMIN — HYDROCORTISONE: 1 CREAM TOPICAL at 05:05

## 2025-05-27 NOTE — ED PROVIDER NOTES
"Encounter Date: 5/27/2025       History     Chief Complaint   Patient presents with    Rash     Pt arrived in ED, c/o rash or insect bites to body. Pt has what appears to be insect bites to back and arms. Pt denies having any other complaints.      Patient is a 69-year-old male who reports having bumps all over his body.  States that they are itchy not painful.  Started approximately 1 week ago when he got a new mattress.  States a mattress was brand new and he got it at "[employer]".  He reports he has used Benadryl with some relief.  Denies fever and all other red flags.  States no one else in the household has a same issue.    The history is provided by the patient. No  was used.     Review of patient's allergies indicates:  No Known Allergies  Past Medical History:   Diagnosis Date    Benign essential hypertension 08/30/2024    COPD with acute exacerbation 06/28/2022    CVA (cerebral infarction)     CVA approx 2011/2012    Diabetes mellitus 08/30/2024    Diabetes mellitus type II, non insulin dependent 10/14/2016    Erectile dysfunction     Erectile Dysfunction    Essential hypertension, benign     Hypertension      Eye injury     hit os playing basketball     Hyperlipidemia 10/14/2016    Hypertension     Hypertension    Murmur     Nuclear sclerotic cataract of both eyes 08/07/2019    Polyneuropathy     Pulmonary heart disease 01/25/2023    Renal cyst, left 01/08/2020    Sleep apnea     Trouble in sleeping     Type 2 diabetes mellitus      Past Surgical History:   Procedure Laterality Date    BACK SURGERY      discectomy    COLONOSCOPY N/A 8/4/2017    Procedure: COLONOSCOPY;  Surgeon: Mario Alberto Vega MD;  Location: [address];  Service: Endoscopy;  Laterality: N/A;  Conflict with [facility]'s schedule no other times available with CRS, Pt requested 8/4/17 - moved to be scoped with GI MD - ER    ROOT CANAL      UMBILICAL HERNIA REPAIR       Family History   Problem Relation Name Age of " Onset    Cancer Mother          smoker     No Known Problems Father      Emphysema Sister      Diabetes Sister      No Known Problems Sister      No Known Problems Sister      Hypertension Brother liana     Cancer Brother yogesh         lung     No Known Problems Maternal Aunt      No Known Problems Maternal Uncle      No Known Problems Paternal Aunt      No Known Problems Paternal Uncle      No Known Problems Maternal Grandmother      No Known Problems Maternal Grandfather      No Known Problems Paternal Grandmother      No Known Problems Paternal Grandfather      No Known Problems Other      Amblyopia Neg Hx      Blindness Neg Hx      Cataracts Neg Hx      Glaucoma Neg Hx      Macular degeneration Neg Hx      Retinal detachment Neg Hx      Strabismus Neg Hx      Stroke Neg Hx      Thyroid disease Neg Hx       Social History[1]  Review of Systems   Skin:  Positive for rash.       Physical Exam     Initial Vitals [05/27/25 1631]   BP Pulse Resp Temp SpO2   (!) 170/71 77 18 98.3 °F (36.8 °C) 98 %      MAP       --         Physical Exam    Nursing note and vitals reviewed.  Constitutional: He appears well-developed and well-nourished. He is not diaphoretic. No distress.   HENT:   Head: Normocephalic and atraumatic.   Right Ear: External ear normal.   Left Ear: External ear normal.   Nose: Nose normal.   Eyes: Pupils are equal, round, and reactive to light. Right eye exhibits no discharge. Left eye exhibits no discharge. No scleral icterus.   Neck:   Normal range of motion.  Pulmonary/Chest: No respiratory distress.   Abdominal: He exhibits no distension.   Musculoskeletal:         General: Normal range of motion.      Cervical back: Normal range of motion.     Neurological: He is alert and oriented to person, place, and time.   Skin: Skin is dry. Capillary refill takes less than 2 seconds.   One papule to the right AC, 4-5 small papules to the left flank.  No surrounding redness or cellulitis no fluctuance no  "purulence noted.         ED Course   Procedures  Labs Reviewed   POCT GLUCOSE - Abnormal       Result Value    POCT Glucose 220 (*)           Imaging Results    None          Medications   hydrocortisone-aloe vera 1 % cream ( Topical (Top) Given 5/27/25 1705)     Medical Decision Making  Patient is a 69-year-old male who reports having bumps all over his body.  States that they are itchy not painful.  Started approximately 1 week ago when he got a new mattress.  States a mattress was brand new and he got it at "[employer]".  He reports he has used Benadryl with some relief.  Denies fever and all other red flags.  States no one else in the household has a same issue.    One papule to the right AC, 4-5 small papules to the left flank.  No surrounding redness or cellulitis no fluctuance no purulence noted.     Differential diagnosis includes wound infection insect bite or sting, bedbugs, mite allergy    Problems Addressed:  Bug bite, initial encounter: acute illness or injury     Details: Hydrocortisone cream given in the ED and discharged on same.  Follow up as directed.    Amount and/or Complexity of Data Reviewed  Discussion of management or test interpretation with external provider(s): Vital signs at the time of disposition were:  BP (!) 170/71 (BP Location: Left arm)   Pulse 77   Temp 98.3 °F (36.8 °C) (Oral)   Resp 18   Ht 5' 8" (1.727 m)   Wt 128.4 kg (283 lb)   SpO2 98%   BMI 43.03 kg/m²       See AVS for additional recommendations. Medications listed herein were prescribed after reviewing the patient's allergies, medication list, history, most recent laboratories as available.  Referrals below were provided after reviewing the patient's previous medical providers. He understands he  should return for any worsening or changes in condition.  Prior to discharge the patient was asked if he  had any additional concerns or complaints and he declined. The patient was given an opportunity to ask questions and " all were answered to his satisfaction.     Risk  OTC drugs.  Diagnosis or treatment significantly limited by social determinants of health.                                      Clinical Impression:  Final diagnoses:  [W57.XXXA] Bug bite, initial encounter (Primary)          ED Disposition Condition    Discharge Stable          ED Prescriptions    None       Follow-up Information       Follow up With Specialties Details Why Contact Info    Manjula Honeycutt MD Internal Medicine, Wound Care Schedule an appointment as soon as possible for a visit   [address] [place] [address]  [address]  [place] [address]  [phone]                     [1]   Social History  Tobacco Use    Smoking status: Former     Current packs/day: 0.00     Average packs/day: 0.1 packs/day for 4.0 years (0.4 ttl pk-yrs)     Types: Cigarettes     Start date: 1991     Quit date: 1995     Years since quittin.1    Smokeless tobacco: Never    Tobacco comments:     social smoking as teenager   Substance Use Topics    Alcohol use: Yes     Comment: beer 6 pack in a month; red wine 2x a month     Drug use: No        Hudson Anderson, JEREMIE  [phone]

## 2025-05-27 NOTE — DISCHARGE INSTRUCTIONS
Over the counter:  Hydrocortisone to spot treatement as directed on package.    +/- benadryl or zyrtec for extra itch control    Return to the Emergency Department for any worsening, change in condition, or any emergent concerns.

## 2025-05-28 ENCOUNTER — PATIENT OUTREACH (OUTPATIENT)
Facility: OTHER | Age: 70
End: 2025-05-28
Payer: MEDICARE

## 2025-05-28 NOTE — PROGRESS NOTES
Makenzie King, Patient Care Assistant  ED Navigator  Emergency Department    Project: [facility] ED Navigator  Role: Community Health Worker    Date: 2025  Patient Name: Mirza Do  MRN: [id number]  PCP: Manjula Honeycutt MD    Assessment:     Mirza Do is a 69 y.o. male who has presented to ED for Bug bite, initial encounter. Patient has visited the ED 1 times in the past 3 months. Patient did not contact PCP.     ED Navigator Initial Assessment    ED Navigator Enrollment Documentation  Consent to Services  Does patient consent to completing the assessment?: Yes  Contact  Method of Initial Contact: Phone  Transportation  Insurance Coverage  Do you have coverage/adequate coverage?: Yes  Specialist Appointment  Did the patient come to the ED to see a specialist?: Yes  Does the patient have a pending specialist referral?: No  Does the patient have a specialist appointment made?: No  PCP Follow Up Appointment  Medications  Is patient able to afford medication?: Yes  Psychological  Food  Communication/Education  Other Financial Concerns  Other Social Barriers/Concerns  Primary Barrier  Plan: Provided information for [facility]  Nurse triage line, [phone] or [phone] ([phone])         Social History     Socioeconomic History    Marital status: Single   Tobacco Use    Smoking status: Former     Current packs/day: 0.00     Average packs/day: 0.1 packs/day for 4.0 years (0.4 ttl pk-yrs)     Types: Cigarettes     Start date: 1991     Quit date: 1995     Years since quittin.1    Smokeless tobacco: Never    Tobacco comments:     social smoking as teenager   Substance and Sexual Activity    Alcohol use: Yes     Comment: beer 6 pack in a month; red wine 2x a month     Drug use: No    Sexual activity: Yes     Partners: Female   Social History Narrative         Social Drivers of Health     Financial Resource Strain: Low Risk  (2025)    Overall Financial  Resource Strain (CARDIA)     Difficulty of Paying Living Expenses: Not hard at all   Food Insecurity: No Food Insecurity (5/28/2025)    Hunger Vital Sign     Worried About Running Out of Food in the Last Year: Never true     Ran Out of Food in the Last Year: Never true   Transportation Needs: No Transportation Needs (5/28/2025)    PRAPARE - Transportation     Lack of Transportation (Medical): No     Lack of Transportation (Non-Medical): No   Physical Activity: Insufficiently Active (5/28/2025)    Exercise Vital Sign     Days of Exercise per Week: 7 days     Minutes of Exercise per Session: 10 min   Stress: No Stress Concern Present (5/28/2025)    [employer] - Occupational Stress Questionnaire     Feeling of Stress : Not at all   Housing Stability: Low Risk  (5/28/2025)    Housing Stability Vital Sign     Unable to Pay for Housing in the Last Year: No     Homeless in the Last Year: No       Plan:   Pt was seen in the ED on 05/27/2025 for  Bug bite. ED Navigator spoke with pt for Post ED visit follow up navigation to assist with scheduling a 7-day Post ED visit follow up appt. Pt states that he had not contacted pcp to schedule a 7-day Post ED visit follow up appt and declined assistance. Pt states that he does not feel a follow up appt is needed. Pt does not have transportation issues and has no additional needs at this time. Closing Encounter.  Makenzie King    Appointment made with: Manjula Honeycutt MD

## 2025-06-02 ENCOUNTER — LAB VISIT (OUTPATIENT)
Dept: LAB | Facility: HOSPITAL | Age: 70
End: 2025-06-02
Attending: INTERNAL MEDICINE
Payer: MEDICARE

## 2025-06-02 DIAGNOSIS — E11.36 TYPE 2 DIABETES MELLITUS WITH DIABETIC CATARACT, WITHOUT LONG-TERM CURRENT USE OF INSULIN: ICD-10-CM

## 2025-06-02 DIAGNOSIS — Z12.5 ENCOUNTER FOR SCREENING PROSTATE SPECIFIC ANTIGEN (PSA) MEASUREMENT: ICD-10-CM

## 2025-06-02 DIAGNOSIS — R79.9 ABNORMAL FINDING OF BLOOD CHEMISTRY, UNSPECIFIED: ICD-10-CM

## 2025-06-02 LAB
ABSOLUTE EOSINOPHIL (OHS): 0.09 K/UL
ABSOLUTE MONOCYTE (OHS): 0.56 K/UL (ref 0.3–1)
ABSOLUTE NEUTROPHIL COUNT (OHS): 3.6 K/UL (ref 1.8–7.7)
ALBUMIN SERPL BCP-MCNC: 3.5 G/DL (ref 3.5–5.2)
ALBUMIN/CREAT UR: 11.9 UG/MG
ALP SERPL-CCNC: 77 UNIT/L (ref 40–150)
ALT SERPL W/O P-5'-P-CCNC: 27 UNIT/L (ref 10–44)
ANION GAP (OHS): 9 MMOL/L (ref 8–16)
AST SERPL-CCNC: 28 UNIT/L (ref 11–45)
BASOPHILS # BLD AUTO: 0.02 K/UL
BASOPHILS NFR BLD AUTO: 0.3 %
BILIRUB SERPL-MCNC: 0.4 MG/DL (ref 0.1–1)
BUN SERPL-MCNC: 13 MG/DL (ref 8–23)
CALCIUM SERPL-MCNC: 9 MG/DL (ref 8.7–10.5)
CHLORIDE SERPL-SCNC: 104 MMOL/L (ref 95–110)
CHOLEST SERPL-MCNC: 187 MG/DL (ref 120–199)
CHOLEST/HDLC SERPL: 4.5 {RATIO} (ref 2–5)
CO2 SERPL-SCNC: 27 MMOL/L (ref 23–29)
CREAT SERPL-MCNC: 0.8 MG/DL (ref 0.5–1.4)
CREAT UR-MCNC: 59 MG/DL (ref 23–375)
EAG (OHS): 174 MG/DL (ref 68–131)
ERYTHROCYTE [DISTWIDTH] IN BLOOD BY AUTOMATED COUNT: 16.4 % (ref 11.5–14.5)
GFR SERPLBLD CREATININE-BSD FMLA CKD-EPI: >60 ML/MIN/1.73/M2
GLUCOSE SERPL-MCNC: 140 MG/DL (ref 70–110)
HBA1C MFR BLD: 7.7 % (ref 4–5.6)
HCT VFR BLD AUTO: 45.4 % (ref 40–54)
HDLC SERPL-MCNC: 42 MG/DL (ref 40–75)
HDLC SERPL: 22.5 % (ref 20–50)
HGB BLD-MCNC: 13.7 GM/DL (ref 14–18)
IMM GRANULOCYTES # BLD AUTO: 0.02 K/UL (ref 0–0.04)
IMM GRANULOCYTES NFR BLD AUTO: 0.3 % (ref 0–0.5)
LDLC SERPL CALC-MCNC: 122.4 MG/DL (ref 63–159)
LYMPHOCYTES # BLD AUTO: 2.21 K/UL (ref 1–4.8)
MCH RBC QN AUTO: 26.8 PG (ref 27–31)
MCHC RBC AUTO-ENTMCNC: 30.2 G/DL (ref 32–36)
MCV RBC AUTO: 89 FL (ref 82–98)
MICROALBUMIN UR-MCNC: 7 UG/ML (ref ?–5000)
NONHDLC SERPL-MCNC: 145 MG/DL
NUCLEATED RBC (/100WBC) (OHS): 0 /100 WBC
PLATELET # BLD AUTO: 259 K/UL (ref 150–450)
PMV BLD AUTO: 10.8 FL (ref 9.2–12.9)
POTASSIUM SERPL-SCNC: 4.2 MMOL/L (ref 3.5–5.1)
PROT SERPL-MCNC: 7.8 GM/DL (ref 6–8.4)
RBC # BLD AUTO: 5.12 M/UL (ref 4.6–6.2)
RELATIVE EOSINOPHIL (OHS): 1.4 %
RELATIVE LYMPHOCYTE (OHS): 34 % (ref 18–48)
RELATIVE MONOCYTE (OHS): 8.6 % (ref 4–15)
RELATIVE NEUTROPHIL (OHS): 55.4 % (ref 38–73)
SODIUM SERPL-SCNC: 140 MMOL/L (ref 136–145)
TRIGL SERPL-MCNC: 113 MG/DL (ref 30–150)
TSH SERPL-ACNC: 1.06 UIU/ML (ref 0.4–4)
WBC # BLD AUTO: 6.5 K/UL (ref 3.9–12.7)

## 2025-06-02 PROCEDURE — 83036 HEMOGLOBIN GLYCOSYLATED A1C: CPT

## 2025-06-02 PROCEDURE — 83718 ASSAY OF LIPOPROTEIN: CPT

## 2025-06-02 PROCEDURE — 80053 COMPREHEN METABOLIC PANEL: CPT

## 2025-06-02 PROCEDURE — 84443 ASSAY THYROID STIM HORMONE: CPT

## 2025-06-02 PROCEDURE — 36415 COLL VENOUS BLD VENIPUNCTURE: CPT | Mod: PO

## 2025-06-02 PROCEDURE — 85025 COMPLETE CBC W/AUTO DIFF WBC: CPT

## 2025-06-02 PROCEDURE — 82043 UR ALBUMIN QUANTITATIVE: CPT

## 2025-06-02 PROCEDURE — 84153 ASSAY OF PSA TOTAL: CPT

## 2025-06-03 ENCOUNTER — TELEPHONE (OUTPATIENT)
Dept: FAMILY MEDICINE | Facility: CLINIC | Age: 70
End: 2025-06-03
Payer: MEDICARE

## 2025-06-03 ENCOUNTER — RESULTS FOLLOW-UP (OUTPATIENT)
Dept: FAMILY MEDICINE | Facility: CLINIC | Age: 70
End: 2025-06-03
Payer: MEDICARE

## 2025-06-03 DIAGNOSIS — E11.40 TYPE 2 DIABETES MELLITUS WITH DIABETIC NEUROPATHY, WITHOUT LONG-TERM CURRENT USE OF INSULIN: ICD-10-CM

## 2025-06-03 LAB — PSA SERPL-MCNC: 0.45 NG/ML

## 2025-06-03 RX ORDER — GLIMEPIRIDE 4 MG/1
4 TABLET ORAL
Qty: 90 TABLET | Refills: 0 | Status: SHIPPED | OUTPATIENT
Start: 2025-06-03 | End: 2026-06-03

## 2025-06-10 ENCOUNTER — OFFICE VISIT (OUTPATIENT)
Dept: FAMILY MEDICINE | Facility: CLINIC | Age: 70
End: 2025-06-10
Payer: MEDICARE

## 2025-06-10 VITALS
OXYGEN SATURATION: 96 % | RESPIRATION RATE: 16 BRPM | WEIGHT: 278 LBS | BODY MASS INDEX: 42.13 KG/M2 | TEMPERATURE: 99 F | HEIGHT: 68 IN | HEART RATE: 74 BPM | SYSTOLIC BLOOD PRESSURE: 86 MMHG | DIASTOLIC BLOOD PRESSURE: 60 MMHG

## 2025-06-10 DIAGNOSIS — R10.9 ACUTE RIGHT FLANK PAIN: Primary | ICD-10-CM

## 2025-06-10 DIAGNOSIS — L30.9 HAND DERMATITIS: ICD-10-CM

## 2025-06-10 LAB
BACTERIA #/AREA URNS AUTO: NORMAL /HPF
BILIRUB SERPL-MCNC: ABNORMAL MG/DL
BILIRUB UR QL STRIP.AUTO: NEGATIVE
BLOOD URINE, POC: NEGATIVE
CLARITY UR: CLEAR
CLARITY, POC UA: CLEAR
COLOR UR AUTO: YELLOW
COLOR, POC UA: YELLOW
GLUCOSE UR QL STRIP: NEGATIVE
GLUCOSE UR QL STRIP: YELLOW
HGB UR QL STRIP: NEGATIVE
KETONES UR QL STRIP: ABNORMAL
KETONES UR QL STRIP: NEGATIVE
LEUKOCYTE ESTERASE UR QL STRIP: NEGATIVE
LEUKOCYTE ESTERASE URINE, POC: NEGATIVE
MICROSCOPIC COMMENT: NORMAL
NITRITE UR QL STRIP: NEGATIVE
NITRITE, POC UA: NEGATIVE
PH UR STRIP: 7 [PH]
PH, POC UA: 6
PROT UR QL STRIP: NEGATIVE
PROTEIN, POC: ABNORMAL
RBC #/AREA URNS AUTO: 0 /HPF (ref 0–4)
SP GR UR STRIP: 1.01
SPECIFIC GRAVITY, POC UA: 1.01
UROBILINOGEN UR STRIP-ACNC: NEGATIVE EU/DL
UROBILINOGEN, POC UA: NEGATIVE
WBC #/AREA URNS AUTO: 0 /HPF (ref 0–5)

## 2025-06-10 PROCEDURE — 3061F NEG MICROALBUMINURIA REV: CPT | Mod: CPTII,S$GLB,, | Performed by: NURSE PRACTITIONER

## 2025-06-10 PROCEDURE — 99999 PR PBB SHADOW E&M-EST. PATIENT-LVL IV: CPT | Mod: PBBFAC,,, | Performed by: NURSE PRACTITIONER

## 2025-06-10 PROCEDURE — 99214 OFFICE O/P EST MOD 30 MIN: CPT | Mod: S$GLB,,, | Performed by: NURSE PRACTITIONER

## 2025-06-10 PROCEDURE — 1159F MED LIST DOCD IN RCRD: CPT | Mod: CPTII,S$GLB,, | Performed by: NURSE PRACTITIONER

## 2025-06-10 PROCEDURE — 4010F ACE/ARB THERAPY RXD/TAKEN: CPT | Mod: CPTII,S$GLB,, | Performed by: NURSE PRACTITIONER

## 2025-06-10 PROCEDURE — 3074F SYST BP LT 130 MM HG: CPT | Mod: CPTII,S$GLB,, | Performed by: NURSE PRACTITIONER

## 2025-06-10 PROCEDURE — 3078F DIAST BP <80 MM HG: CPT | Mod: CPTII,S$GLB,, | Performed by: NURSE PRACTITIONER

## 2025-06-10 PROCEDURE — 1160F RVW MEDS BY RX/DR IN RCRD: CPT | Mod: CPTII,S$GLB,, | Performed by: NURSE PRACTITIONER

## 2025-06-10 PROCEDURE — 87086 URINE CULTURE/COLONY COUNT: CPT | Performed by: NURSE PRACTITIONER

## 2025-06-10 PROCEDURE — 3288F FALL RISK ASSESSMENT DOCD: CPT | Mod: CPTII,S$GLB,, | Performed by: NURSE PRACTITIONER

## 2025-06-10 PROCEDURE — 1126F AMNT PAIN NOTED NONE PRSNT: CPT | Mod: CPTII,S$GLB,, | Performed by: NURSE PRACTITIONER

## 2025-06-10 PROCEDURE — 3008F BODY MASS INDEX DOCD: CPT | Mod: CPTII,S$GLB,, | Performed by: NURSE PRACTITIONER

## 2025-06-10 PROCEDURE — 81003 URINALYSIS AUTO W/O SCOPE: CPT | Performed by: NURSE PRACTITIONER

## 2025-06-10 PROCEDURE — 3051F HG A1C>EQUAL 7.0%<8.0%: CPT | Mod: CPTII,S$GLB,, | Performed by: NURSE PRACTITIONER

## 2025-06-10 PROCEDURE — 1101F PT FALLS ASSESS-DOCD LE1/YR: CPT | Mod: CPTII,S$GLB,, | Performed by: NURSE PRACTITIONER

## 2025-06-10 PROCEDURE — 81002 URINALYSIS NONAUTO W/O SCOPE: CPT | Mod: S$GLB,,, | Performed by: NURSE PRACTITIONER

## 2025-06-10 PROCEDURE — 3066F NEPHROPATHY DOC TX: CPT | Mod: CPTII,S$GLB,, | Performed by: NURSE PRACTITIONER

## 2025-06-10 RX ORDER — TRIAMCINOLONE ACETONIDE 1 MG/ML
LOTION TOPICAL 4 TIMES DAILY
Qty: 120 ML | Refills: 2 | Status: SHIPPED | OUTPATIENT
Start: 2025-06-10

## 2025-06-10 RX ORDER — NABUMENTONE 750 MG/1
750 TABLET ORAL 2 TIMES DAILY PRN
Qty: 60 TABLET | Refills: 1 | Status: SHIPPED | OUTPATIENT
Start: 2025-06-10

## 2025-06-10 RX ORDER — DICLOFENAC SODIUM 100 MG/1
100 TABLET, EXTENDED RELEASE ORAL DAILY
Qty: 30 TABLET | Refills: 1 | Status: CANCELLED | OUTPATIENT
Start: 2025-06-10

## 2025-06-10 NOTE — PROGRESS NOTES
Subjective:      Patient ID: Mirza Do is a 69 y.o. male.  Pt returns to clinic with acute right flank pain that began over a week ago without trauma or recent activity. Reports pain worse with lying down, twisting or moving though not affected by breathing or eating and is having normal BM and voids. He is applying icy hot without relief.       Review of Systems   Constitutional:  Negative for activity change, appetite change, fatigue, fever, night sweats and unexpected weight change.   HENT:  Negative for nasal congestion, ear pain, postnasal drip, rhinorrhea, sneezing, sore throat and voice change.    Eyes:  Negative for pain and visual disturbance.   Respiratory:  Negative for cough, chest tightness and shortness of breath.    Cardiovascular:  Negative for chest pain, palpitations and leg swelling.   Gastrointestinal:  Negative for abdominal distention, abdominal pain, anal bleeding, blood in stool, change in bowel habit, constipation, diarrhea, nausea, rectal pain, vomiting, reflux and fecal incontinence.   Endocrine: Negative.    Genitourinary:  Positive for flank pain. Negative for bladder incontinence, decreased urine volume, difficulty urinating, discharge, dysuria, enuresis, erectile dysfunction, frequency, genital sores, hematuria, penile pain, penile swelling, scrotal swelling, testicular pain and urgency.   Musculoskeletal:  Positive for arthralgias. Negative for back pain, gait problem, joint swelling, leg pain, myalgias, neck pain, neck stiffness and joint deformity.   Integumentary:  Positive for rash (bilateral palms).   Allergic/Immunologic: Negative.    Neurological:  Negative for speech difficulty, weakness and headaches.   Hematological: Negative.    Psychiatric/Behavioral: Negative.     All other systems reviewed and are negative.        Objective:     Vitals:    06/10/25 0817   BP: (!) 86/60   Pulse: 74   Resp: 16   Temp: 98.5 °F (36.9 °C)   TempSrc: Oral   SpO2: 96%   Weight: 126.1 kg  "(278 lb)   Height: 5' 8" (1.727 m)     Physical Exam  Vitals and nursing note reviewed.   Constitutional:       General: He is not in acute distress.     Appearance: Normal appearance. He is well-developed and well-groomed. He is not ill-appearing.   HENT:      Head: Normocephalic and atraumatic.      Right Ear: External ear normal.      Left Ear: External ear normal.      Nose: Nose normal.      Mouth/Throat:      Lips: Pink.      Mouth: Mucous membranes are moist.   Eyes:      General: Lids are normal. Vision grossly intact. Gaze aligned appropriately.      Conjunctiva/sclera: Conjunctivae normal.      Pupils: Pupils are equal, round, and reactive to light.   Neck:      Trachea: Phonation normal.   Cardiovascular:      Rate and Rhythm: Normal rate and regular rhythm.      Heart sounds: Normal heart sounds.   Pulmonary:      Effort: Pulmonary effort is normal. No accessory muscle usage or respiratory distress.      Breath sounds: Normal breath sounds and air entry. No wheezing or rales.   Abdominal:      General: Abdomen is flat. Bowel sounds are normal. There is no distension.      Palpations: Abdomen is soft.      Tenderness: There is no abdominal tenderness.   Musculoskeletal:      Cervical back: Neck supple.      Thoracic back: Spasms and tenderness present. No swelling, edema, deformity, signs of trauma, lacerations or bony tenderness. Normal range of motion. No scoliosis.      Lumbar back: Spasms and tenderness present. No swelling, edema, deformity, signs of trauma, lacerations or bony tenderness. Normal range of motion. Negative right straight leg raise test and negative left straight leg raise test. No scoliosis.        Back:       Right lower leg: No edema.      Left lower leg: No edema.   Skin:     General: Skin is warm and dry.      Findings: Rash (dry sandpaper rash bilateral palms) present.   Neurological:      General: No focal deficit present.      Mental Status: He is alert and oriented to person, " place, and time. Mental status is at baseline.      Sensory: Sensation is intact.      Motor: Motor function is intact.      Coordination: Coordination is intact.      Gait: Gait is intact.   Psychiatric:         Attention and Perception: Attention and perception normal.         Mood and Affect: Mood and affect normal.         Speech: Speech normal.         Behavior: Behavior normal. Behavior is cooperative.         Thought Content: Thought content normal.         Cognition and Memory: Cognition and memory normal.         Judgment: Judgment normal.       Assessment and Plan:     1. Acute right flank pain (Primary)  Urine dipstick shows negative for all components.  Natural history and expected course discussed. Questions answered.  Proper lifting, bending technique discussed.  Stretching exercises discussed.  Regular aerobic and trunk strengthening exercises discussed.  Short (2-4 day) period of relative rest recommended until acute symptoms improve.  Ice to affected area as needed for local pain relief.  Heat to affected area as needed for local pain relief.  NSAIDs per medication orders.  OTC analgesics as needed.  - Urine culture  - Urinalysis Microscopic  - Urinalysis  - POCT URINE DIPSTICK WITHOUT MICROSCOPE  - nabumetone (RELAFEN) 750 MG tablet; Take 1 tablet (750 mg total) by mouth 2 (two) times daily as needed for Pain.  Dispense: 60 tablet; Refill: 1    2. Hand dermatitis  No acute infection and does not appear bacterial or fungal, suspect due to frequent hand wash   - triamcinolone acetonide 0.1% (KENALOG) 0.1 % Lotn; Apply topically 4 (four) times daily.  Dispense: 120 mL; Refill: 2           EUNICE Bundy, FNP-C  Family/Internal Medicine  [facility]

## 2025-06-12 LAB — BACTERIA UR CULT: NORMAL

## 2025-08-25 DIAGNOSIS — Z00.00 ENCOUNTER FOR MEDICARE ANNUAL WELLNESS EXAM: ICD-10-CM

## 2025-09-03 ENCOUNTER — OFFICE VISIT (OUTPATIENT)
Dept: FAMILY MEDICINE | Facility: CLINIC | Age: 70
End: 2025-09-03
Payer: MEDICARE

## 2025-09-03 VITALS
BODY MASS INDEX: 42.71 KG/M2 | TEMPERATURE: 98 F | DIASTOLIC BLOOD PRESSURE: 66 MMHG | OXYGEN SATURATION: 95 % | HEART RATE: 62 BPM | WEIGHT: 280.88 LBS | SYSTOLIC BLOOD PRESSURE: 138 MMHG

## 2025-09-03 DIAGNOSIS — J20.9 ACUTE BRONCHITIS, UNSPECIFIED ORGANISM: Primary | ICD-10-CM

## 2025-09-03 PROCEDURE — 99999 PR PBB SHADOW E&M-EST. PATIENT-LVL III: CPT | Mod: PBBFAC,,, | Performed by: INTERNAL MEDICINE

## 2025-09-03 RX ORDER — PROMETHAZINE HYDROCHLORIDE AND DEXTROMETHORPHAN HYDROBROMIDE 6.25; 15 MG/5ML; MG/5ML
5 SYRUP ORAL 2 TIMES DAILY PRN
Qty: 120 ML | Refills: 0 | Status: SHIPPED | OUTPATIENT
Start: 2025-09-03 | End: 2025-09-13

## 2025-09-03 RX ORDER — METHYLPREDNISOLONE 4 MG/1
TABLET ORAL
Qty: 21 EACH | Refills: 0 | Status: SHIPPED | OUTPATIENT
Start: 2025-09-04